# Patient Record
Sex: FEMALE | Race: OTHER | Employment: FULL TIME | URBAN - METROPOLITAN AREA
[De-identification: names, ages, dates, MRNs, and addresses within clinical notes are randomized per-mention and may not be internally consistent; named-entity substitution may affect disease eponyms.]

---

## 2017-12-14 ENCOUNTER — HOSPITAL ENCOUNTER (OUTPATIENT)
Dept: SURGERY | Age: 52
Discharge: HOME | End: 2017-12-14
Attending: ORTHOPAEDIC SURGERY
Payer: OTHER MISCELLANEOUS

## 2017-12-14 VITALS
DIASTOLIC BLOOD PRESSURE: 85 MMHG | OXYGEN SATURATION: 96 % | BODY MASS INDEX: 34.14 KG/M2 | TEMPERATURE: 95.8 F | SYSTOLIC BLOOD PRESSURE: 125 MMHG | WEIGHT: 173.9 LBS | HEIGHT: 60 IN | HEART RATE: 83 BPM | RESPIRATION RATE: 16 BRPM

## 2017-12-14 LAB — POTASSIUM SERPL-SCNC: 3.6 MMOL/L (ref 3.5–5.1)

## 2017-12-14 PROCEDURE — 84132 ASSAY OF SERUM POTASSIUM: CPT | Performed by: ANESTHESIOLOGY

## 2017-12-14 RX ORDER — IBUPROFEN 200 MG
200 TABLET ORAL AS NEEDED
Status: ON HOLD | COMMUNITY
End: 2017-12-21 | Stop reason: SINTOL

## 2017-12-14 RX ORDER — LISINOPRIL AND HYDROCHLOROTHIAZIDE 10; 12.5 MG/1; MG/1
TABLET ORAL DAILY
COMMUNITY

## 2017-12-14 RX ORDER — ATORVASTATIN CALCIUM 20 MG/1
20 TABLET, FILM COATED ORAL
COMMUNITY

## 2017-12-14 RX ORDER — GEMFIBROZIL 600 MG/1
600 TABLET, FILM COATED ORAL 2 TIMES DAILY
COMMUNITY

## 2017-12-14 RX ORDER — ACETAMINOPHEN 325 MG/1
325 TABLET ORAL
COMMUNITY

## 2017-12-17 PROBLEM — S83.521A RUPTURE OF POSTERIOR CRUCIATE LIGAMENT OF RIGHT KNEE: Status: ACTIVE | Noted: 2017-12-17

## 2017-12-17 PROBLEM — S83.241A TEAR OF MEDIAL MENISCUS OF RIGHT KNEE: Status: ACTIVE | Noted: 2017-12-17

## 2017-12-17 PROBLEM — S83.281A TEAR OF LATERAL MENISCUS OF RIGHT KNEE: Status: ACTIVE | Noted: 2017-12-17

## 2017-12-17 PROBLEM — M22.41 CHONDROMALACIA OF RIGHT PATELLA: Status: ACTIVE | Noted: 2017-12-17

## 2017-12-17 PROBLEM — M94.261 CHONDROMALACIA OF RIGHT KNEE: Status: ACTIVE | Noted: 2017-12-17

## 2017-12-17 NOTE — BRIEF OP NOTE
BRIEF OPERATIVE NOTE    Date of Procedure: 12/21/2017     Preoperative Diagnosis:  PCL TEAR RIGHT KNEE [S83.521A]      MEDIAL MENISCAL TEAR RIGHT KNEE  [S83.241A]      LATERAL MENISCAL TEAR RIGHT KNEE  [S83.281A]      PATELLOFEMORAL CHONDROMALACIA RIGHT KNEE  [M22.41]      CHONDROMALACIA RIGHT KNEE  [F40.431]    Postoperative Diagnosis:  SAME      Procedure(s): ARTHROSCOPY RIGHT KNEE ARTHROSCOPIC POSTERIOR CRUCIATE LIGAMENT RECONSTRUCTION UTILIZING ACHILLES TENDON ALLOGRAFT, PARTIAL MEDIAL AND LATERAL MENISECTOMIES    Surgeon(s) and Role:     * Sam Gaspar MD - Primary           Anesthesia: General WITH FEMORAL/SCIATIC NERVE BLOCK    Complications: NONE    Implants:     Implant Name Type Inv.  Item Serial No.  Lot No. LRB No. Used Action   TENDON ACHILLES TEND W CALC -- 19.5CM [facility] - [id number]  TENDON ACHILLES TEND W CALC -- 19.5CM [facility] [id number] [employer] [id number] Right 1 Implanted   WASHER LIG POST SCREW --  - [id number]   WASHER LIG POST SCREW --  [id number] [employer] [id number] Right 1 Implanted        TOURNIQUET:  111 Rusty Cho MD

## 2017-12-17 NOTE — H&P
[facility] HISTORY AND PHYSICAL    Subjective:     Patient is a 46 y.o. FEMALE WITH RIGHT KNEE PAIN. SEE OFFICE NOTE. Patient Active Problem List    Diagnosis Date Noted    Rupture of posterior cruciate ligament of right knee 2017    Tear of medial meniscus of right knee 2017    Tear of lateral meniscus of right knee 2017    Chondromalacia of right patella 2017    Chondromalacia of right knee 2017     Past Medical History:   Diagnosis Date    Adverse effect of anesthesia     pt reports she woke during 2 surgeries with general anesthesia    Former smoker     Hypercholesteremia     Hypertension       Past Surgical History:   Procedure Laterality Date    HX APPENDECTOMY      HX  SECTION      HX OOPHORECTOMY      HX OTHER SURGICAL      vericose vein removal left      Prior to Admission medications    Medication Sig Start Date End Date Taking? Authorizing Provider   atorvastatin (LIPITOR) 20 mg tablet Take 20 mg by mouth nightly. Historical Provider   lisinopril-hydroCHLOROthiazide (PRINZIDE, ZESTORETIC) 10-12.5 mg per tablet Take  by mouth daily. Indications: hypertension    Historical Provider   gemfibrozil (LOPID) 600 mg tablet Take 600 mg by mouth two (2) times a day. Historical Provider   Omega-3 Fatty Acids 60- mg cpDR Take  by mouth daily. Historical Provider   ibuprofen (ADVIL) 200 mg tablet Take 200 mg by mouth as needed for Pain. Stop 5 days prior to surgery per anesthesia guidelines. Historical Provider   acetaminophen (TYLENOL) 325 mg tablet Take 325 mg by mouth every four (4) hours as needed for Pain. Historical Provider     No Known Allergies   Social History   Substance Use Topics    Smoking status: Former Smoker    Smokeless tobacco: Former User     Quit date: 2017      Comment: quit     Alcohol use No      No family history on file.    Review of Systems  A comprehensive review of systems was negative except for that written in the HPI. Objective:     No data found. There were no vitals taken for this visit. General:  Alert, cooperative, no distress, appears stated age. Head:  Normocephalic, without obvious abnormality, atraumatic. Back:   Symmetric, no curvature. ROM normal. No CVA tenderness. Lungs:   Clear to auscultation bilaterally. Chest wall:  No tenderness or deformity. Heart:  Regular rate and rhythm, S1, S2 normal, no murmur, click, rub or gallop. Extremities: Extremities normal, atraumatic, no cyanosis or edema. Pulses: 2+ and symmetric all extremities. Skin: Skin color, texture, turgor normal. No rashes or lesions. Lymph nodes: Cervical, supraclavicular, and axillary nodes normal.   Neurologic: CNII-XII intact. Normal strength, sensation and reflexes throughout. Assessment:   Principal Problem:    Rupture of posterior cruciate ligament of right knee (12/17/2017)    Active Problems:    Tear of medial meniscus of right knee (12/17/2017)      Tear of lateral meniscus of right knee (12/17/2017)      Chondromalacia of right patella (12/17/2017)      Chondromalacia of right knee (12/17/2017)        Plan:   The various methods of treatment have been discussed with the patient and family. PATIENT HAS EXHAUSTED NON-OPERATIVE MODALITIES. After consideration of risks, benefits and other options for treatment, the patient has consented to surgical intervention. SEE OFFICE NOTE.     Milton Crandall MD

## 2017-12-20 ENCOUNTER — ANESTHESIA EVENT (OUTPATIENT)
Dept: SURGERY | Age: 52
End: 2017-12-20
Payer: OTHER MISCELLANEOUS

## 2017-12-21 ENCOUNTER — HOSPITAL ENCOUNTER (OUTPATIENT)
Age: 52
Setting detail: OBSERVATION
Discharge: HOME | End: 2017-12-23
Attending: ORTHOPAEDIC SURGERY | Admitting: ORTHOPAEDIC SURGERY
Payer: OTHER MISCELLANEOUS

## 2017-12-21 ENCOUNTER — APPOINTMENT (OUTPATIENT)
Dept: GENERAL RADIOLOGY | Age: 52
End: 2017-12-21
Attending: ORTHOPAEDIC SURGERY
Payer: OTHER MISCELLANEOUS

## 2017-12-21 ENCOUNTER — ANESTHESIA (OUTPATIENT)
Dept: SURGERY | Age: 52
End: 2017-12-21
Payer: OTHER MISCELLANEOUS

## 2017-12-21 PROBLEM — I10 HYPERTENSION: Status: ACTIVE | Noted: 2017-12-21

## 2017-12-21 PROBLEM — S83.521A SPRAIN OF POSTERIOR CRUCIATE LIGAMENT OF RIGHT KNEE: Status: ACTIVE | Noted: 2017-12-21

## 2017-12-21 PROBLEM — E78.5 HYPERLIPIDEMIA: Status: ACTIVE | Noted: 2017-12-21

## 2017-12-21 PROBLEM — E11.9 DIABETES MELLITUS TYPE 2, DIET-CONTROLLED (HCC): Status: ACTIVE | Noted: 2017-12-21

## 2017-12-21 LAB
EST. AVERAGE GLUCOSE BLD GHB EST-MCNC: 171 MG/DL
GLUCOSE BLD STRIP.AUTO-MCNC: 131 MG/DL (ref 65–100)
HBA1C MFR BLD: 7.6 % (ref 4.8–6)
HCG UR QL: NEGATIVE

## 2017-12-21 PROCEDURE — 76010000172 HC OR TIME 2.5 TO 3 HR INTENSV-TIER 1: Performed by: ORTHOPAEDIC SURGERY

## 2017-12-21 PROCEDURE — 76060000036 HC ANESTHESIA 2.5 TO 3 HR: Performed by: ORTHOPAEDIC SURGERY

## 2017-12-21 PROCEDURE — 74011250636 HC RX REV CODE- 250/636: Performed by: ANESTHESIOLOGY

## 2017-12-21 PROCEDURE — 77030002916 HC SUT ETHLN J&J -A: Performed by: ORTHOPAEDIC SURGERY

## 2017-12-21 PROCEDURE — 73560 X-RAY EXAM OF KNEE 1 OR 2: CPT

## 2017-12-21 PROCEDURE — 77030020782 HC GWN BAIR PAWS FLX 3M -B: Performed by: ANESTHESIOLOGY

## 2017-12-21 PROCEDURE — 99218 HC RM OBSERVATION: CPT

## 2017-12-21 PROCEDURE — 77030016993: Performed by: ORTHOPAEDIC SURGERY

## 2017-12-21 PROCEDURE — 77030002913 HC SUT ETHBND J&J -B: Performed by: ORTHOPAEDIC SURGERY

## 2017-12-21 PROCEDURE — 74011000250 HC RX REV CODE- 250

## 2017-12-21 PROCEDURE — 77030011283 HC ELECTRD NDL COVD -A: Performed by: ORTHOPAEDIC SURGERY

## 2017-12-21 PROCEDURE — 77030018836 HC SOL IRR NACL ICUM -A: Performed by: ORTHOPAEDIC SURGERY

## 2017-12-21 PROCEDURE — 77030014726 HC WSHR SUT W/FX MDCS -D: Performed by: ORTHOPAEDIC SURGERY

## 2017-12-21 PROCEDURE — 77030006891 HC BLD SHV RESECT STRY -B: Performed by: ORTHOPAEDIC SURGERY

## 2017-12-21 PROCEDURE — 76210000016 HC OR PH I REC 1 TO 1.5 HR: Performed by: ORTHOPAEDIC SURGERY

## 2017-12-21 PROCEDURE — 74011250636 HC RX REV CODE- 250/636

## 2017-12-21 PROCEDURE — 77030004453 HC BUR SHV STRY -B: Performed by: ORTHOPAEDIC SURGERY

## 2017-12-21 PROCEDURE — 74011000250 HC RX REV CODE- 250: Performed by: ANESTHESIOLOGY

## 2017-12-21 PROCEDURE — 77030020143 HC AIRWY LARYN INTUB CGAS -A: Performed by: ANESTHESIOLOGY

## 2017-12-21 PROCEDURE — 74011250637 HC RX REV CODE- 250/637: Performed by: ANESTHESIOLOGY

## 2017-12-21 PROCEDURE — 76942 ECHO GUIDE FOR BIOPSY: CPT | Performed by: ORTHOPAEDIC SURGERY

## 2017-12-21 PROCEDURE — 77030006788 HC BLD SAW OSC STRY -B: Performed by: ORTHOPAEDIC SURGERY

## 2017-12-21 PROCEDURE — 77030018986 HC SUT ETHBND4 J&J -B: Performed by: ORTHOPAEDIC SURGERY

## 2017-12-21 PROCEDURE — L1830 KO IMMOB CANVAS LONG PRE OTS: HCPCS | Performed by: ORTHOPAEDIC SURGERY

## 2017-12-21 PROCEDURE — 76010010054 HC POST OP PAIN BLOCK: Performed by: ORTHOPAEDIC SURGERY

## 2017-12-21 PROCEDURE — C1762 CONN TISS, HUMAN(INC FASCIA): HCPCS | Performed by: ORTHOPAEDIC SURGERY

## 2017-12-21 PROCEDURE — 77030003666 HC NDL SPINAL BD -A: Performed by: ORTHOPAEDIC SURGERY

## 2017-12-21 PROCEDURE — 77030020753 HC CUF TRNQT 1BLA STRY -B: Performed by: ORTHOPAEDIC SURGERY

## 2017-12-21 PROCEDURE — 81025 URINE PREGNANCY TEST: CPT

## 2017-12-21 PROCEDURE — 74011250636 HC RX REV CODE- 250/636: Performed by: ORTHOPAEDIC SURGERY

## 2017-12-21 PROCEDURE — 77030006590 HC BLD ARTHSC GRFT J&J -C: Performed by: ORTHOPAEDIC SURGERY

## 2017-12-21 PROCEDURE — 77030031139 HC SUT VCRL2 J&J -A: Performed by: ORTHOPAEDIC SURGERY

## 2017-12-21 PROCEDURE — 77030002986 HC SUT PROL J&J -A: Performed by: ORTHOPAEDIC SURGERY

## 2017-12-21 PROCEDURE — 77030008574 HC TBNG SUC IRR STRY -B: Performed by: ORTHOPAEDIC SURGERY

## 2017-12-21 PROCEDURE — 77030003602 HC NDL NRV BLK BBMI -B: Performed by: ANESTHESIOLOGY

## 2017-12-21 PROCEDURE — 83036 HEMOGLOBIN GLYCOSYLATED A1C: CPT | Performed by: ORTHOPAEDIC SURGERY

## 2017-12-21 PROCEDURE — 77030002937 HC SUT MERS J&J -B: Performed by: ORTHOPAEDIC SURGERY

## 2017-12-21 PROCEDURE — 77030020180 HC ACCS ACL/PCL RAPPC S&N -C: Performed by: ORTHOPAEDIC SURGERY

## 2017-12-21 PROCEDURE — 82962 GLUCOSE BLOOD TEST: CPT

## 2017-12-21 DEVICE — GRAFT HUM TISS W10-20MMXL19.5-20CM ACHILLES TEND FRZN: Type: IMPLANTABLE DEVICE | Site: KNEE | Status: FUNCTIONAL

## 2017-12-21 DEVICE — WASHER ORTH L5.5MM TI POST SCR: Type: IMPLANTABLE DEVICE | Site: KNEE | Status: FUNCTIONAL

## 2017-12-21 RX ORDER — DEXAMETHASONE SODIUM PHOSPHATE 4 MG/ML
INJECTION, SOLUTION INTRA-ARTICULAR; INTRALESIONAL; INTRAMUSCULAR; INTRAVENOUS; SOFT TISSUE AS NEEDED
Status: DISCONTINUED | OUTPATIENT
Start: 2017-12-21 | End: 2017-12-21 | Stop reason: HOSPADM

## 2017-12-21 RX ORDER — HYDROCODONE BITARTRATE AND ACETAMINOPHEN 5; 325 MG/1; MG/1
2 TABLET ORAL AS NEEDED
Status: DISCONTINUED | OUTPATIENT
Start: 2017-12-21 | End: 2017-12-21 | Stop reason: HOSPADM

## 2017-12-21 RX ORDER — SODIUM CHLORIDE, SODIUM LACTATE, POTASSIUM CHLORIDE, CALCIUM CHLORIDE 600; 310; 30; 20 MG/100ML; MG/100ML; MG/100ML; MG/100ML
75 INJECTION, SOLUTION INTRAVENOUS CONTINUOUS
Status: DISCONTINUED | OUTPATIENT
Start: 2017-12-21 | End: 2017-12-21 | Stop reason: HOSPADM

## 2017-12-21 RX ORDER — HYDROMORPHONE HYDROCHLORIDE 2 MG/1
2 TABLET ORAL
Status: DISCONTINUED | OUTPATIENT
Start: 2017-12-21 | End: 2017-12-23 | Stop reason: HOSPADM

## 2017-12-21 RX ORDER — FAMOTIDINE 20 MG/1
20 TABLET, FILM COATED ORAL ONCE
Status: COMPLETED | OUTPATIENT
Start: 2017-12-21 | End: 2017-12-21

## 2017-12-21 RX ORDER — FENTANYL CITRATE 50 UG/ML
100 INJECTION, SOLUTION INTRAMUSCULAR; INTRAVENOUS ONCE
Status: DISCONTINUED | OUTPATIENT
Start: 2017-12-21 | End: 2017-12-21 | Stop reason: SDUPTHER

## 2017-12-21 RX ORDER — LISINOPRIL AND HYDROCHLOROTHIAZIDE 10; 12.5 MG/1; MG/1
1 TABLET ORAL DAILY
Status: DISCONTINUED | OUTPATIENT
Start: 2017-12-22 | End: 2017-12-23 | Stop reason: HOSPADM

## 2017-12-21 RX ORDER — MIDAZOLAM HYDROCHLORIDE 1 MG/ML
5 INJECTION, SOLUTION INTRAMUSCULAR; INTRAVENOUS ONCE
Status: DISCONTINUED | OUTPATIENT
Start: 2017-12-21 | End: 2017-12-21 | Stop reason: HOSPADM

## 2017-12-21 RX ORDER — LIDOCAINE HYDROCHLORIDE 10 MG/ML
0.1 INJECTION INFILTRATION; PERINEURAL AS NEEDED
Status: DISCONTINUED | OUTPATIENT
Start: 2017-12-21 | End: 2017-12-21 | Stop reason: HOSPADM

## 2017-12-21 RX ORDER — PROPOFOL 10 MG/ML
INJECTION, EMULSION INTRAVENOUS AS NEEDED
Status: DISCONTINUED | OUTPATIENT
Start: 2017-12-21 | End: 2017-12-21 | Stop reason: HOSPADM

## 2017-12-21 RX ORDER — MIDAZOLAM HYDROCHLORIDE 1 MG/ML
2 INJECTION, SOLUTION INTRAMUSCULAR; INTRAVENOUS
Status: DISCONTINUED | OUTPATIENT
Start: 2017-12-21 | End: 2017-12-21 | Stop reason: HOSPADM

## 2017-12-21 RX ORDER — SODIUM CHLORIDE 0.9 % (FLUSH) 0.9 %
5-10 SYRINGE (ML) INJECTION EVERY 8 HOURS
Status: DISCONTINUED | OUTPATIENT
Start: 2017-12-21 | End: 2017-12-21 | Stop reason: HOSPADM

## 2017-12-21 RX ORDER — ACETAMINOPHEN 325 MG/1
325 TABLET ORAL
Status: DISCONTINUED | OUTPATIENT
Start: 2017-12-21 | End: 2017-12-23 | Stop reason: HOSPADM

## 2017-12-21 RX ORDER — TEMAZEPAM 15 MG/1
15 CAPSULE ORAL
Status: DISCONTINUED | OUTPATIENT
Start: 2017-12-21 | End: 2017-12-23 | Stop reason: HOSPADM

## 2017-12-21 RX ORDER — HYDROMORPHONE HYDROCHLORIDE 2 MG/ML
0.5 INJECTION, SOLUTION INTRAMUSCULAR; INTRAVENOUS; SUBCUTANEOUS
Status: DISCONTINUED | OUTPATIENT
Start: 2017-12-21 | End: 2017-12-21 | Stop reason: HOSPADM

## 2017-12-21 RX ORDER — ACETAMINOPHEN 10 MG/ML
INJECTION, SOLUTION INTRAVENOUS AS NEEDED
Status: DISCONTINUED | OUTPATIENT
Start: 2017-12-21 | End: 2017-12-21 | Stop reason: HOSPADM

## 2017-12-21 RX ORDER — FENTANYL CITRATE 50 UG/ML
100 INJECTION, SOLUTION INTRAMUSCULAR; INTRAVENOUS ONCE
Status: COMPLETED | OUTPATIENT
Start: 2017-12-21 | End: 2017-12-21

## 2017-12-21 RX ORDER — LIDOCAINE HYDROCHLORIDE 10 MG/ML
0.1 INJECTION INFILTRATION; PERINEURAL AS NEEDED
Status: DISCONTINUED | OUTPATIENT
Start: 2017-12-21 | End: 2017-12-21 | Stop reason: SDUPTHER

## 2017-12-21 RX ORDER — CEFAZOLIN SODIUM/WATER 2 G/20 ML
2 SYRINGE (ML) INTRAVENOUS ONCE
Status: COMPLETED | OUTPATIENT
Start: 2017-12-21 | End: 2017-12-21

## 2017-12-21 RX ORDER — PROMETHAZINE HYDROCHLORIDE 25 MG/1
25 TABLET ORAL
Status: DISCONTINUED | OUTPATIENT
Start: 2017-12-21 | End: 2017-12-23 | Stop reason: HOSPADM

## 2017-12-21 RX ORDER — EPHEDRINE SULFATE 50 MG/ML
INJECTION, SOLUTION INTRAVENOUS AS NEEDED
Status: DISCONTINUED | OUTPATIENT
Start: 2017-12-21 | End: 2017-12-21 | Stop reason: HOSPADM

## 2017-12-21 RX ORDER — FACIAL-BODY WIPES
10 EACH TOPICAL DAILY PRN
Status: DISCONTINUED | OUTPATIENT
Start: 2017-12-21 | End: 2017-12-23 | Stop reason: HOSPADM

## 2017-12-21 RX ORDER — ONDANSETRON 2 MG/ML
INJECTION INTRAMUSCULAR; INTRAVENOUS AS NEEDED
Status: DISCONTINUED | OUTPATIENT
Start: 2017-12-21 | End: 2017-12-21 | Stop reason: HOSPADM

## 2017-12-21 RX ORDER — HYDROMORPHONE HYDROCHLORIDE 2 MG/1
4 TABLET ORAL
Status: DISCONTINUED | OUTPATIENT
Start: 2017-12-21 | End: 2017-12-23 | Stop reason: HOSPADM

## 2017-12-21 RX ORDER — SODIUM CHLORIDE 0.9 % (FLUSH) 0.9 %
5-10 SYRINGE (ML) INJECTION AS NEEDED
Status: DISCONTINUED | OUTPATIENT
Start: 2017-12-21 | End: 2017-12-21 | Stop reason: HOSPADM

## 2017-12-21 RX ORDER — DOCUSATE SODIUM 100 MG/1
100 CAPSULE, LIQUID FILLED ORAL 2 TIMES DAILY
Status: DISCONTINUED | OUTPATIENT
Start: 2017-12-22 | End: 2017-12-23 | Stop reason: HOSPADM

## 2017-12-21 RX ORDER — GEMFIBROZIL 600 MG/1
600 TABLET, FILM COATED ORAL 2 TIMES DAILY
Status: DISCONTINUED | OUTPATIENT
Start: 2017-12-22 | End: 2017-12-23 | Stop reason: HOSPADM

## 2017-12-21 RX ORDER — ATORVASTATIN CALCIUM 10 MG/1
20 TABLET, FILM COATED ORAL
Status: DISCONTINUED | OUTPATIENT
Start: 2017-12-21 | End: 2017-12-23 | Stop reason: HOSPADM

## 2017-12-21 RX ORDER — MIDAZOLAM HYDROCHLORIDE 1 MG/ML
2 INJECTION, SOLUTION INTRAMUSCULAR; INTRAVENOUS ONCE
Status: COMPLETED | OUTPATIENT
Start: 2017-12-21 | End: 2017-12-21

## 2017-12-21 RX ORDER — CELECOXIB 200 MG/1
200 CAPSULE ORAL
Status: DISCONTINUED | OUTPATIENT
Start: 2017-12-21 | End: 2017-12-21 | Stop reason: HOSPADM

## 2017-12-21 RX ORDER — HYDROMORPHONE HYDROCHLORIDE 2 MG/ML
1 INJECTION, SOLUTION INTRAMUSCULAR; INTRAVENOUS; SUBCUTANEOUS
Status: DISCONTINUED | OUTPATIENT
Start: 2017-12-21 | End: 2017-12-23 | Stop reason: HOSPADM

## 2017-12-21 RX ORDER — SODIUM CHLORIDE 0.9 % (FLUSH) 0.9 %
5-10 SYRINGE (ML) INJECTION AS NEEDED
Status: DISCONTINUED | OUTPATIENT
Start: 2017-12-21 | End: 2017-12-23 | Stop reason: HOSPADM

## 2017-12-21 RX ORDER — FENTANYL CITRATE 50 UG/ML
INJECTION, SOLUTION INTRAMUSCULAR; INTRAVENOUS AS NEEDED
Status: DISCONTINUED | OUTPATIENT
Start: 2017-12-21 | End: 2017-12-21 | Stop reason: HOSPADM

## 2017-12-21 RX ORDER — SODIUM CHLORIDE 0.9 % (FLUSH) 0.9 %
5-10 SYRINGE (ML) INJECTION EVERY 8 HOURS
Status: DISCONTINUED | OUTPATIENT
Start: 2017-12-21 | End: 2017-12-23 | Stop reason: HOSPADM

## 2017-12-21 RX ORDER — CEFAZOLIN SODIUM/WATER 2 G/20 ML
2 SYRINGE (ML) INTRAVENOUS ONCE
Status: DISCONTINUED | OUTPATIENT
Start: 2017-12-22 | End: 2017-12-21

## 2017-12-21 RX ORDER — LIDOCAINE HYDROCHLORIDE 20 MG/ML
INJECTION, SOLUTION EPIDURAL; INFILTRATION; INTRACAUDAL; PERINEURAL AS NEEDED
Status: DISCONTINUED | OUTPATIENT
Start: 2017-12-21 | End: 2017-12-21 | Stop reason: HOSPADM

## 2017-12-21 RX ORDER — SODIUM CHLORIDE 9 MG/ML
75 INJECTION, SOLUTION INTRAVENOUS CONTINUOUS
Status: DISPENSED | OUTPATIENT
Start: 2017-12-21 | End: 2017-12-22

## 2017-12-21 RX ORDER — OXYCODONE HYDROCHLORIDE 5 MG/1
5 TABLET ORAL
Status: DISCONTINUED | OUTPATIENT
Start: 2017-12-21 | End: 2017-12-21 | Stop reason: HOSPADM

## 2017-12-21 RX ADMIN — EPHEDRINE SULFATE 10 MG: 50 INJECTION, SOLUTION INTRAVENOUS at 17:06

## 2017-12-21 RX ADMIN — FAMOTIDINE 20 MG: 20 TABLET, FILM COATED ORAL at 12:17

## 2017-12-21 RX ADMIN — PROPOFOL 200 MG: 10 INJECTION, EMULSION INTRAVENOUS at 16:55

## 2017-12-21 RX ADMIN — ACETAMINOPHEN 1000 MG: 10 INJECTION, SOLUTION INTRAVENOUS at 19:14

## 2017-12-21 RX ADMIN — DEXAMETHASONE SODIUM PHOSPHATE 8 MG: 4 INJECTION, SOLUTION INTRA-ARTICULAR; INTRALESIONAL; INTRAMUSCULAR; INTRAVENOUS; SOFT TISSUE at 17:07

## 2017-12-21 RX ADMIN — Medication 2 G: at 16:51

## 2017-12-21 RX ADMIN — SODIUM CHLORIDE, SODIUM LACTATE, POTASSIUM CHLORIDE, AND CALCIUM CHLORIDE: 600; 310; 30; 20 INJECTION, SOLUTION INTRAVENOUS at 16:51

## 2017-12-21 RX ADMIN — MIDAZOLAM HYDROCHLORIDE 2 MG: 1 INJECTION, SOLUTION INTRAMUSCULAR; INTRAVENOUS at 14:40

## 2017-12-21 RX ADMIN — HYDROMORPHONE HYDROCHLORIDE 1 MG: 2 INJECTION, SOLUTION INTRAMUSCULAR; INTRAVENOUS; SUBCUTANEOUS at 21:20

## 2017-12-21 RX ADMIN — LIDOCAINE HYDROCHLORIDE 60 MG: 20 INJECTION, SOLUTION EPIDURAL; INFILTRATION; INTRACAUDAL; PERINEURAL at 16:55

## 2017-12-21 RX ADMIN — LIDOCAINE HYDROCHLORIDE 0.1 ML: 10 INJECTION, SOLUTION INFILTRATION; PERINEURAL at 12:20

## 2017-12-21 RX ADMIN — SODIUM CHLORIDE, SODIUM LACTATE, POTASSIUM CHLORIDE, AND CALCIUM CHLORIDE 75 ML/HR: 600; 310; 30; 20 INJECTION, SOLUTION INTRAVENOUS at 12:15

## 2017-12-21 RX ADMIN — FENTANYL CITRATE 25 MCG: 50 INJECTION, SOLUTION INTRAMUSCULAR; INTRAVENOUS at 19:07

## 2017-12-21 RX ADMIN — SODIUM CHLORIDE, SODIUM LACTATE, POTASSIUM CHLORIDE, AND CALCIUM CHLORIDE: 600; 310; 30; 20 INJECTION, SOLUTION INTRAVENOUS at 17:16

## 2017-12-21 RX ADMIN — ONDANSETRON 4 MG: 2 INJECTION INTRAMUSCULAR; INTRAVENOUS at 19:14

## 2017-12-21 RX ADMIN — SODIUM CHLORIDE, SODIUM LACTATE, POTASSIUM CHLORIDE, AND CALCIUM CHLORIDE 75 ML/HR: 600; 310; 30; 20 INJECTION, SOLUTION INTRAVENOUS at 14:06

## 2017-12-21 RX ADMIN — FENTANYL CITRATE 100 MCG: 50 INJECTION INTRAMUSCULAR; INTRAVENOUS at 14:40

## 2017-12-21 RX ADMIN — PROPOFOL 50 MG: 10 INJECTION, EMULSION INTRAVENOUS at 18:36

## 2017-12-21 NOTE — ANESTHESIA PROCEDURE NOTES
Peripheral Block    Start time: 12/21/2017 2:41 PM  End time: 12/21/2017 2:50 PM  Performed by: Sonya Ochoa  Authorized by: Sonya Ochoa       Pre-procedure:    Indications: at surgeon's request and post-op pain management    Preanesthetic Checklist: patient identified, risks and benefits discussed, site marked, timeout performed, anesthesia consent given and patient being monitored    Timeout Time: 14:40          Block Type:   Block Type:  Sciatic single shot  Laterality:  Right  Monitoring:  Standard ASA monitoring, continuous pulse ox, frequent vital sign checks, heart rate, oxygen and responsive to questions  Injection Technique:  Single shot  Procedures: ultrasound guided and nerve stimulator    Patient Position: supine  Prep: chlorhexidine    Location:  Upper thigh  Needle Type:  Stimuplex  Needle Gauge:  22 G  Needle Localization:  Anatomical landmarks, nerve stimulator and ultrasound guidance  Motor Response: minimal motor response >0.4 mA    Medication Injected:  0.5%  ropivacaine  Adds:  Epi 1:200K  Volume (mL):  30  Add'l Medication Injected:  0.375%    Assessment:    Injection Assessment:

## 2017-12-21 NOTE — ANESTHESIA PREPROCEDURE EVALUATION
Anesthetic History   No history of anesthetic complications            Review of Systems / Medical History  Patient summary reviewed, nursing notes reviewed and pertinent labs reviewed    Pulmonary  Within defined limits                 Neuro/Psych   Within defined limits           Cardiovascular    Hypertension: well controlled          Hyperlipidemia    Exercise tolerance: >4 METS     GI/Hepatic/Renal     GERD: well controlled           Endo/Other        Obesity     Other Findings              Physical Exam    Airway  Mallampati: II  TM Distance: 4 - 6 cm  Neck ROM: normal range of motion   Mouth opening: Normal     Cardiovascular  Regular rate and rhythm,  S1 and S2 normal,  no murmur, click, rub, or gallop  Rhythm: regular  Rate: normal         Dental    Dentition: Upper partial plate     Pulmonary  Breath sounds clear to auscultation               Abdominal         Other Findings            Anesthetic Plan    ASA: 2  Anesthesia type: general      Post-op pain plan if not by surgeon: peripheral nerve block single    Induction: Intravenous  Anesthetic plan and risks discussed with: Patient and Spouse

## 2017-12-21 NOTE — ANESTHESIA PROCEDURE NOTES
Peripheral Block    Start time: 12/21/2017 2:52 PM  End time: 12/21/2017 2:55 PM  Performed by: Thelma Mata  Authorized by: Thelma Mata       Pre-procedure:    Indications: at surgeon's request and post-op pain management    Preanesthetic Checklist: patient identified, risks and benefits discussed, site marked, timeout performed, anesthesia consent given and patient being monitored    Timeout Time: 14:51          Block Type:   Block Type:  Femoral single shot  Laterality:  Right  Monitoring:  Standard ASA monitoring, continuous pulse ox, frequent vital sign checks, heart rate, responsive to questions and oxygen  Injection Technique:  Single shot  Procedures: ultrasound guided and nerve stimulator    Patient Position: supine  Prep: chlorhexidine    Location:  Upper thigh  Needle Type:  Stimuplex  Needle Gauge:  22 G  Needle Localization:  Ultrasound guidance and nerve stimulator  Motor Response: minimal motor response >0.4 mA    Medication Injected:  0.2%  ropivacaine  Adds:  Epi 1:200K  Volume (mL):  20  Add'l Medication Injected:  0.375%    Assessment:  Number of attempts:  1  Injection Assessment:  Incremental injection every 5 mL, local visualized surrounding nerve on ultrasound, negative aspiration for CSF, negative aspiration for blood, no paresthesia, no intravascular symptoms and ultrasound image on chart  Patient tolerance:  Patient tolerated the procedure well with no immediate complications

## 2017-12-21 NOTE — IP AVS SNAPSHOT
Julia Haney 
 
 
 [address] [place] [address] 
[phone] Patient: Finesse Spann MRN: [id number] :[id number] About your hospitalization You were admitted on:  2017 You last received care in the:  Jayy Swanson 1 You were discharged on:  2017 Why you were hospitalized Your primary diagnosis was:  Rupture Of Posterior Cruciate Ligament Of Right Knee Your diagnoses also included:  Tear Of Medial Meniscus Of Right Knee, Tear Of Lateral Meniscus Of Right Knee, Chondromalacia Of Right Patella, Chondromalacia Of Right Knee, Sprain Of Posterior Cruciate Ligament Of Right Knee, Diabetes Mellitus Type 2, Diet-Controlled (Hcc), Hypertension, Hyperlipidemia Things You Need To Do (next 8 weeks) Follow up with Janak Montoya MD  
As needed Phone:  [phone] Where:  [address] [facility] [address] Follow up with Efrem Velázquez MD  
As scheduled by office Phone:  [phone] Where:  [address] [facility] , [address], [facility] [address] Discharge Orders None A check cari indicates which time of day the medication should be taken. My Medications ASK your physician about these medications Instructions Each Dose to Equal  
 Morning Noon Evening Bedtime ADVIL 200 mg tablet Generic drug:  ibuprofen Your next dose is: Today Take 200 mg by mouth as needed for Pain. Stop 5 days prior to surgery per anesthesia guidelines. 200 mg  
    
   
   
  
   
  
 atorvastatin 20 mg tablet Commonly known as:  LIPITOR Your next dose is: Today Take 20 mg by mouth nightly. 20 mg  
    
   
   
   
  
  
 gemfibrozil 600 mg tablet Commonly known as:  LOPID Your next dose is: Today Take 600 mg by mouth two (2) times a day.   
 600 mg  
    
   
   
  
   
  
 lisinopril-hydroCHLOROthiazide 10-12.5 mg per tablet Commonly known as:  Linda Lush Your next dose is:  Tomorrow Take  by mouth daily. Indications: hypertension Omega-3 Fatty Acids 60- mg Cpdr  
Your next dose is:  Tomorrow Take  by mouth daily. TYLENOL 325 mg tablet Generic drug:  acetaminophen Your next dose is: Today Take 325 mg by mouth every four (4) hours as needed for Pain. 325 mg Discharge Instructions DISCHARGE SUMMARY from Nurse The following personal items collected during your admission are returned to you:  
Dental Appliance: Dental Appliances: Lowers; Other (comment) (bridge ) Vision: Visual Aid: Glasses Hearing Aid:   na 
Jewelry: Jewelry: None Clothing: Clothing: At bedside Other Valuables: Other Valuables: None Valuables sent to safe:   na 
 
 
 
 
PATIENT INSTRUCTIONS: 
 
New Medications: 
 
Dilaudid 2 mg tabs Take 1-2 tabs every 4-6 hrs as needed for pain. Toradol 10 mg tabs Take 1 tab every 6 hrs x 5 days. Phenergan 25 mg tabs Take 1 tab every 8 hrs as needed for nausea. After general anesthesia or intravenous sedation, for 24 hours or while taking prescription Narcotics: · Limit your activities · Do not drive and operate hazardous machinery · Do not make important personal or business decisions · Do  not drink alcoholic beverages · If you have not urinated within 8 hours after discharge, please contact your surgeon on call. Report the following to your surgeon: 
· Excessive pain, swelling, redness or odor of or around the surgical area · Temperature over 101 · Nausea and vomiting lasting longer than 4 hours or if unable to take medications · Any signs of decreased circulation or nerve impairment to extremity: change in color, persistent  numbness, tingling, coldness or increase pain · Any questions, call office @ [phone]. What to do at Home: 
Recommended activity: activity as tolerated, as instructed per Dr. Andre Rdz. Continue with exercises taught by Physical Therapy. Resume pre hospital diet. Use walker to ambulate. Partial weight  bearing to right knee. Wear knee immobilizer as instructed. Use ice and elevate leg to decrease pain and swelling. If you experience any of the following symptoms temp>101, pain unrelieved by meds, or persistent nausea or vomitting, please follow up with Dr. Andre Rdz @ [phone]. *  Please give a list of your current medications to your Primary Care Provider. *  Please update this list whenever your medications are discontinued, doses are 
    changed, or new medications (including over-the-counter products) are added. *  Please carry medication information at all times in case of emergency situations. Reconstrucción del ligamento farnaz anterior: Graeme Kumar en el hogar - [ Anterior Cruciate Ligament Reconstruction: What to Expect at [facility] ] Mueller recuperación La cirugía del ligamento farnaz anterior (LCA) reemplaza el ligamento dañado con yovana nuevo, llamado injerto. En la [employer], el injerto es un tendón que se extrae de mueller propia rodilla o los isquiotibiales (corva). En algunos casos, el injerto proviene de un donante. Se sentirá fatigado daniel varios días. Tendrá la rodilla hinchada y podría tener entumecimiento alrededor del bianca (incisión) en la rodilla. El tobillo y la espinilla podrían tener moretones o estar hinchados. Puede colocarse hielo en la bulmaro para reducir la hinchazón. La mayoría de estas complicaciones desaparecerán en pocos días. En poco tiempo debería comenzar a notar mejoría en la rodilla. Jamal vez pueda volver a hacer la mayoría de beth actividades habituales en pocas semanas. Destiny pasarán varios meses hasta que recupere el uso completo de la rodilla.  Podría tardar Keena Cease 6 meses y un año antes de que la rodilla esté lista para hacer trabajo físico intenso o ciertos deportes. La cirugía puede ayudar. Destiny aun después de la \[facility]\"", es posible que la rodilla no sea dunham savanah palmira lo era antes de la lesión. Deberá hacer ejercicios de rehabilitación para recuperar la fuerza y el movimiento de la articulación. El tiempo que tarde en volver a hacer deportes o ejercicio dependerá de cómo siga el programa de rehabilitación y cómo sane la rodilla. Lucero médico o fisioterapeuta le dará Javan Meckel idea de cuándo podría retomar estas actividades. Namrata Shira de las personas pueden volver a trotar en unos 4 meses y correr o montar en bicicleta al cabo de unos 4 a 6 meses. Es posible que necesite usar un aparato ortopédico en la rodilla para practicar deportes. Esta hoja de [employer] idea general del tiempo que le llevará recuperarse. Sin embargo, cada persona se recupera a un ritmo diferente. Siga los pasos que se mencionan a continuación para recuperarse lo más rápido posible. Cómo puede cuidarse en el hogar? Actividad ? · Descanse cuando se sienta cansado. Dormir lo suficiente le ayudará a recuperarse. Duerma con la rodilla elevada, destiny no flexionada. Ponga miroslava almohada debajo del pie. Mantenga la pierna levantada el mayor tiempo posible daniel los primeros días. ? · Podría usar un aparato ortopédico y [employer] para moverse por la casa y hacer las tareas diarias. No ponga el peso sobre lucero pierna sin las [employer] que lucero médico lo autorice. Tendrá los músculos del muslo débiles, así que tómese lucero tiempo y no corra riesgos. Tendrá que usar las muletas daniel 1 o 2 semanas. ? · No todos los médicos indican el uso de aparatos ortopédicos. Si tiene un aparato ortopédico, quíteselo únicamente para ejercitar la rodilla o para ducharse. Tenga cuidado de no ponerse al aparato ortopédico demasiado apretado. Es probable que necesite usarlo por entre 2 y 10 semanas. ? · No humberto ninguna actividad intensa daniel 12 semanas. [place] incluye no solamente deportes, sino también cosas palmira cortar el césped (zacate), barrer las hojas o quitar la donovan. ? · Puede ducharse entre 24 y 50 horas después de la [place], si lucero médico lo [place]. Cuando se duche, mantenga secos el vendaje y la incisión cubriéndolos con un plástico y sujetando el plástico con cinta adhesiva. Sería conveniente conseguir miroslava banqueta para ducha para sentarse. Si tiene un aparato ortopédico, quíteselo solo si lucero médico lo autoriza. ? · Si lucero médico no quiere que se duche ni que se quite el aparato ortopédico, puede darse un baño de esponja. ? · No tome sherwin de inmersión, nade, utilice miroslava bañera de hidromasaje ni sumerja la pierna en agua daniel 2 semanas o hasta que lucero médico lo autorice. ? · Puede conducir cuando ya no esté utilizando las [employer] ni el aparato ortopédico para la rodilla, cuando no esté tomando ya analgésicos (medicamentos para el dolor) recetados y cuando tenga algo de control sobre lucero rodilla. [address], esto lleva de 1 a 2 semanas. ? · La rapidez con la que puede volver a lucero empleo depende del trabajo que humberto. Si permanece sentado en lucero trabajo, podría volver en 1 o 2 semanas. Destiny si está de pie en lucero trabajo, podría llevarle de 4 a 6 semanas. Si lucero trabajo incluye mucha [facility], podría tardar de 4 a 6 meses. Alimentación ? · Puede continuar con lucero dieta normal. Si tiene [employer], coma alimentos suaves bajos en grasa, palmira arroz sin condimentos, agnieszka a la baldev, pan marialuisa y yogur. Precious abundantes líquidos. ? · Podría notar que no evacua el intestino con regularidad kirk después de la [place]. [place] es común. Trate de evitar el estreñimiento y de no hacer esfuerzos cuando evacua el intestino. Jamal vez desee fish un suplemento de [employer].  Si no ha evacuado el intestino después de un par de días, pregúntele a mueller médico si puede fish un laxante suave. Medicamentos ? · Mueller médico le dirá si puede volver a fish beth medicamentos y cuándo puede volver a hacerlo. También le dará indicaciones sobre cualquier medicamento nuevo que deba fish usted. ? · Si leona medicamentos que previenen la formación de coágulos de [place], palmira warfarina (Coumadin), clopidogrel (Plavix) o aspirina, asegúrese de hablar con mueller médico. Él o betzaida le dirá si debe volver a fish estos medicamentos y en qué momento. Asegúrese de que entiende exactamente lo que el médico quiere que humberto. ? · [employer] analgésicos exactamente según las indicaciones. ¨ Si el médico le recetó un analgésico, tómelo según las indicaciones. ¨ Si no está tomando un analgésico recetado, pregúntele a mueller médico si puede fish yovana de [employer]. ? · Si mueller médico le recetó antibióticos, tómelos según las indicaciones. No deje de tomarlos por el hecho de sentirse mejor. Debe fish todos los antibióticos hasta terminarlos. ? · Si le parece que el analgésico le está produciendo malestar estomacal: [address] comidas (a menos que mueller médico le haya indicado lo contrario). ¨ Pídale al médico un analgésico diferente. Cuidado de la incisión ? · Si tiene un vendaje sobre la incisión, manténgalo limpio y seco. Siga las instrucciones de mueller médico. Mueller médico probablemente querrá que se deje el vendaje hasta que Caralee Crazier a mueller consultorio. Si mueller médico lo permite, podría quitarse el vendaje al cabo de 50 a 67 horas después de la [facility]. ? · Si le lazcano colocado tiras de cinta [employer] incisión, déjeselas puestas miroslava semana o hasta que se caigan por sí solas. Mantenga la bulmaro limpia y seca. Ejercicio ? · Ejercitarse en un programa de rehabilitación es miroslava parte importante de mueller tratamiento. Le ayudará a mejorar la amplitud de movimiento de mueller Sarah [place] y a recuperar mueller fuerza muscular. ? · Es posible que le den miroslava máquina de movimiento pasivo continuo. Esta máquina hará algunos de los ejercicios por usted. La usará daniel aproximadamente 2 semanas. Melodye Harbour y Lyndal [employer] ? · Para reducir la hinchazón y el dolor, colóquese hielo o miroslava compresa fría sobre la rodilla de 10 a 20 minutos cada vez. Repita eliezer proceso cada pocas horas. Póngase un paño belcher entre el hielo y la piel. ? · Eleve la pierna adolorida sobre miroslava almohada cuando se aplique hielo o en cualquier momento que se siente o se acueste, daniel los 3 días después de la [facility] Islands. Trate de mantenerla por encima del nivel del corazón. Kimberly ayudará a reducir la hinchazón. ? · Si lucero médico le ronald medias de compresión, úselas daniel el tiempo que le indique. [place] a [employer] de [place]. La atención de seguimiento es miroslava parte clave de lucero tratamiento y seguridad. Asegúrese de hacer y acudir a todas las citas, y llame a lucero médico si está teniendo problemas. También es miroslava buena idea saber los resultados de los exámenes y mantener miroslava lista de los medicamentos que leona. Cuándo debe pedir ayuda? Llame al 911 en cualquier momento que considere que puede necesitar atención de [place]. Por ejemplo, llame si: 
? · Se desmayó (perdió el conocimiento). ? · Tiene grave dificultad para respirar. ? · Tiene dolor repentino en el pecho y falta de [place], o tose alexa. ?Llame a lucero médico ahora mismo o busque atención médica inmediata si: 
? · Tiene dolor que no mejora después de fish analgésicos (medicamentos para el dolor). ? · Tiene puntos de sutura flojos o se abre la incisión. ? · [address], tales palmira: ¨ Aumento de dolor, hinchazón, temperatura o enrojecimiento. ¨ Vetas mcghee que salen de la incisión. ¨ Pus que sale de la incisión. Earnie Jhonny. ? Vigile atentamente los [facility], y asegúrese de comunicarse con lucero médico si tiene algún problema. Dónde puede encontrar más información en inglés? Estrada Glenn a [email or web address] Harvinder Nithya Y225 en la búsqueda para aprender más acerca de \"Reconstrucción del ligamento farnaz anterior: Mika Peto en el hogar - [ Anterior Cruciate Ligament Reconstruction: What to Expect at Home ]. \" 
Revisado: 21 marzo, 2017 Versión del contenido: 11.4 © 8607-7877 Healthwise, Incorporated. Las instrucciones de cuidado fueron adaptadas bajo licencia por [employer] (which disclaims liability or warranty for this information). Si usted tiene [place]ens afección médica o sobre estas instrucciones, siempre pregunte a lucero profesional de trinh. Healthwise, Incorporated niega toda garantía o responsabilidad por lucero uso de esta información. These are general instructions for a healthy lifestyle: No smoking/ No tobacco products/ Avoid exposure to second hand smoke Surgeon General's Warning:  Quitting smoking now greatly reduces serious risk to your health. Obesity, smoking, and sedentary lifestyle greatly increases your risk for illness A healthy diet, regular physical exercise & weight monitoring are important for maintaining a healthy lifestyle You may be retaining fluid if you have a history of heart failure or if you experience any of the following symptoms:  Weight gain of 3 pounds or more overnight or 5 pounds in a week, increased swelling in our hands or feet or shortness of breath while lying flat in bed. Please call your doctor as soon as you notice any of these symptoms; do not wait until your next office visit. Recognize signs and symptoms of STROKE: 
 
F-face looks uneven A-arms unable to move or move unevenly S-speech slurred or non-existent T-time-call 911 as soon as signs and symptoms begin-DO NOT go Back to bed or wait to see if you get better-TIME IS BRAIN. The discharge information has been reviewed with the patient. The patient verbalized understanding. MyChart Announcement We are excited to announce that we are making your provider's discharge notes available to you in Visualeadhart. You will see these notes when they are completed and signed by the physician that discharged you from your recent hospital stay. If you have any questions or concerns about any information you see in Visualeadhart, please call the Health Information Department where you were seen or reach out to your Primary Care Provider for more information about your plan of care. Introducing [facility] SERVICES! Juan Pablo Benítez introduce portal paciente [facility] . Ahora se puede acceder a partes de lucero expediente médico, enviar por correo electrónico la oficina de lucero médico y solicitar renovaciones de medicamentos en línea. En lucero navegador de Internet , Nicole Loron a [email or web address] Humberto clic en el usuario por Diane Abbasi? Brock mirzaic aquí en la sesión Elease Isauro. Verá la página de registro [place]. Ingrese lucero código de [employer] deonte y palmira aparece a continuación. Usted no tendrá que [employer] código después de ramya completado el proceso de registro . Si usted no se inscribe antes de la fecha de caducidad , debe solicitar un nuevo código. · MyCBrewster Código de acceso : [id number] Expires: 2/28/2018 12:46 AM 
 
Ingresa los últimos cuatro dígitos de lucero Número de Seguro Social ( xxxx ) y fecha de nacimiento ( dd / mm / aaaa ) palmira se indica y humberto clic en Enviar. Kevin será llevado a la siguiente página de registro . Crear un ID MyCmaureen . Esta será lucero ID de inicio de sesión de MyChart y no puede ser [place] , por lo que pensar en miroslava que es Gipson Saver y fácil de recordar . Crear miroslava contraseña MyCmaureen . ted puede cambiar lucero contraseña en cualquier momento . Ingrese lucero Password Reset de preguntas y [employer] .  [place] se puede utilizar en un momento posterior si usted olvida lucero contraseña. Introduzca lucero dirección de correo electrónico . Albertina Moore recibirá miroslava notificación por correo electrónico cuando la nueva información está disponible en MyChart . Gonzalo Pillar clic en Registrarse. Di Mings andrez y descargar porciones de lucero expediente médico. 
Ayad clic en el enlace de descarga del menú Resumen para descargar miroslava copia portátil de lucero información médica . Si tiene [employer] , por favor visite la sección de preguntas frecuentes del sitio web MyChart . Recuerde, MyChart NO es que se utilizará para las necesidades urgentes. Para emergencias médicas , llame al 911 . Ahora disponible en lucero iPhone y Android ! Providers Seen During Your Hospitalization Provider Specialty Primary office phone Rosa Oppenheim., MD Orthopedic Surgery [phone] Immunizations Administered for This Admission Name Date Influenza Vaccine (Quad) PF 12/22/2017 Your Primary Care Physician (PCP) Primary Care Physician Office Phone Office Fax 238 [address] [phone] You are allergic to the following No active allergies Recent Documentation Weight Breastfeeding? BMI Smoking Status 78.5 kg No 33.79 kg/m2 Former Smoker Emergency Contacts Name Discharge Info Relation Home Work Mobile Ester Cantrell ([address] [place] [address]) DISCHARGE CAREGIVER [3] Other Relative [6] [phone] Patient Belongings The following personal items are in your possession at time of discharge: 
  Dental Appliances: Uppers  Visual Aid: Glasses      Home Medications: None   Jewelry: None Please provide this summary of care documentation to your next provider. Signatures-by signing, you are acknowledging that this After Visit Summary has been reviewed with you and you have received a copy.   
  
 
  
    
    
 Patient Signature: ____________________________________________________________ Date:  ____________________________________________________________  
  
Jackelin Quitman Provider Signature:  ____________________________________________________________ Date:  ____________________________________________________________  
  
  
   
  
Reji Anderson [address] [facility] 
[phone] Patient: Javan Jack MRN: [id number] :1965 Sobre mueller hospitalización Le admitieron el:  2017 Mueller tratamiento más reciente fue el:  SFE 3 ORTHO Le dieron de jeremias el:  2017 Por qué le ingresaron Mueller diagnosis primaria es:  Rupture Of Posterior Cruciate Ligament Of Right Knee Mueller diagnosis también incluye:  Tear Of Medial Meniscus Of Right Knee, Tear Of Lateral Meniscus Of Right Knee, Chondromalacia Of Right Patella, Chondromalacia Of Right Knee, Sprain Of Posterior Cruciate Ligament Of Right Knee, Diabetes Mellitus Type 2, Diet-Controlled (Hcc), Hypertension, Hyperlipidemia Things You Need To Do (next 8 weeks) Follow up with Kiran Chavez MD  
As needed Phone:  [phone] Where:  [address] [place] [address] Follow up with Greg Sky MD  
As scheduled by office Phone:  [phone] Where:  [address], [facility] [address] Discharge Orders [facility] A check cari indicates which time of day the medication should be taken. My Medications ASK your physician about these medications Instructions Each Dose to Equal  
 Morning Noon Evening Bedtime ADVIL 200 mg tablet Medicamento genérico:  ibuprofen Your next dose is: Today Take 200 mg by mouth as needed for Pain. Stop 5 days prior to surgery per anesthesia guidelines. 200 mg  
    
   
   
  
   
  
 atorvastatin 20 mg tablet También conocido palmira:  LIPITOR Your next dose is: Today Take 20 mg by mouth nightly. 20 mg  
    
   
   
   
  
  
 gemfibrozil 600 mg tablet También conocido palmira:  LOPID Your next dose is: Today Take 600 mg by mouth two (2) times a day. 600 mg  
    
   
   
  
   
  
 lisinopril-hydroCHLOROthiazide 10-12.5 mg per tablet También conocido palmira:  Toi Petey Your next dose is:  Tomorrow Take  by mouth daily. Indications: hypertension Omega-3 Fatty Acids 60- mg Cpdr  
Your next dose is:  Tomorrow Take  by mouth daily. TYLENOL 325 mg tablet Medicamento genérico:  acetaminophen Your next dose is: Today Take 325 mg by mouth every four (4) hours as needed for Pain. 325 mg Instrucciones a ervin de jeremias DISCHARGE SUMMARY from Nurse The following personal items collected during your admission are returned to you:  
Dental Appliance: Dental Appliances: Lowers; Other (comment) (bridge ) Vision: Visual Aid: Glasses Hearing Aid:   na 
Jewelry: Jewelry: None Clothing: Clothing: At bedside Other Valuables: Other Valuables: None Valuables sent to safe:   na 
 
 
 
 
PATIENT INSTRUCTIONS: 
 
New Medications: 
 
Dilaudid 2 mg tabs Take 1-2 tabs every 4-6 hrs as needed for pain. Toradol 10 mg tabs Take 1 tab every 6 hrs x 5 days. Phenergan 25 mg tabs Take 1 tab every 8 hrs as needed for nausea. After general anesthesia or intravenous sedation, for 24 hours or while taking prescription Narcotics: · Limit your activities · Do not drive and operate hazardous machinery · Do not make important personal or business decisions · Do  not drink alcoholic beverages · If you have not urinated within 8 hours after discharge, please contact your surgeon on call. Report the following to your surgeon: · Excessive pain, swelling, redness or odor of or around the surgical area · Temperature over 101 · Nausea and vomiting lasting longer than 4 hours or if unable to take medications · Any signs of decreased circulation or nerve impairment to extremity: change in color, persistent  numbness, tingling, coldness or increase pain · Any questions, call office @ [phone]. What to do at Home: 
Recommended activity: activity as tolerated, as instructed per Dr. Beatriz Parsons. Continue with exercises taught by Physical Therapy. Resume pre hospital diet. Use walker to ambulate. Partial weight  bearing to right knee. Wear knee immobilizer as instructed. Use ice and elevate leg to decrease pain and swelling. If you experience any of the following symptoms temp>101, pain unrelieved by meds, or persistent nausea or vomitting, please follow up with Dr. Beatriz Parsons @ [phone]. *  Please give a list of your current medications to your Primary Care Provider. *  Please update this list whenever your medications are discontinued, doses are 
    changed, or new medications (including over-the-counter products) are added. *  Please carry medication information at all times in case of emergency situations. Reconstrucción del ligamento farnaz anterior: Samy Don en el hogar - [ Anterior Cruciate Ligament Reconstruction: What to Expect at [facility] ] Mueller recuperación La cirugía del ligamento farnaz anterior (LCA) reemplaza el ligamento dañado con yovana nuevo, llamado injerto. En la [employer], el injerto es un tendón que se extrae de mueller propia rodilla o los isquiotibiales (corva). En algunos casos, el injerto proviene de un donante. Se sentirá fatigado daniel varios días. Tendrá la rodilla hinchada y podría tener entumecimiento alrededor del bianca (incisión) en la rodilla. El tobillo y la espinilla podrían tener moretones o estar hinchados.  Puede colocarse hielo en la bulmaro para reducir la hinchazón. La mayoría de estas complicaciones desaparecerán en pocos días. En poco tiempo debería comenzar a notar mejoría en la rodilla. Jamal vez pueda volver a hacer la mayoría de beth actividades habituales en pocas semanas. Destiny pasarán varios meses hasta que recupere el uso completo de la rodilla. Podría tardar Jerelyn Roof 6 meses y un año antes de que la rodilla esté lista para hacer trabajo físico intenso o ciertos deportes. La cirugía puede ayudar. Destiny aun después de la [facility] [place], es posible que la rodilla no sea dunham savanah palmira lo era antes de la lesión. Deberá hacer ejercicios de rehabilitación para recuperar la fuerza y el movimiento de la articulación. El tiempo que tarde en volver a hacer deportes o ejercicio dependerá de cómo siga el programa de rehabilitación y cómo sane la rodilla. Lucero médico o fisioterapeuta le dará Gisele Lady idea de cuándo podría retomar estas actividades. Madalynn Terry de las personas pueden volver a trotar en unos 4 meses y correr o montar en bicicleta al cabo de unos 4 a 6 meses. Es posible que necesite usar un aparato ortopédico en la rodilla para practicar deportes. Esta hoja de [employer] idea general del tiempo que le llevará recuperarse. Sin embargo, cada persona se recupera a un ritmo diferente. Siga los pasos que se mencionan a continuación para recuperarse lo más rápido posible. Cómo puede cuidarse en el hogar? Actividad ? · Descanse cuando se sienta cansado. Dormir lo suficiente le ayudará a recuperarse. Duerma con la rodilla elevada, destiny no flexionada. Ponga miroslava almohada debajo del pie. Mantenga la pierna levantada el mayor tiempo posible daniel los primeros días. ? · Podría usar un aparato ortopédico y [employer] para moverse por la casa y hacer las tareas diarias. No ponga el peso sobre lucero pierna sin las [employer] que lucero médico lo autorice.  Tendrá los músculos del muslo débiles, así que tómese lucero tiempo y no corra riesgos. Tendrá que usar las muletas daniel 1 o 2 semanas. ? · No todos los médicos indican el uso de aparatos ortopédicos. Si tiene un aparato ortopédico, quíteselo únicamente para ejercitar la rodilla o para ducharse. Tenga cuidado de no ponerse al aparato ortopédico demasiado apretado. Es probable que necesite usarlo por entre 2 y 10 semanas. ? · No humberto ninguna actividad intensa daniel 12 semanas. [place] incluye no solamente deportes, sino también cosas palmira cortar el césped (zacate), barrer las hojas o quitar la donovan. ? · Puede ducharse entre 24 y 50 horas después de la [place], si lucero médico lo [place]. Cuando se duche, mantenga secos el vendaje y la incisión cubriéndolos con un plástico y sujetando el plástico con cinta adhesiva. Sería conveniente conseguir miroslava banqueta para ducha para sentarse. Si tiene un aparato ortopédico, quíteselo solo si lucero médico lo autoriza. ? · Si lucero médico no quiere que se duche ni que se quite el aparato ortopédico, puede darse un baño de esponja. ? · No tome sherwin de inmersión, nade, utilice miroslava bañera de hidromasaje ni sumerja la pierna en agua daniel 2 semanas o hasta que lucero médico lo autorice. ? · Puede conducir cuando ya no esté utilizando las [employer] ni el aparato ortopédico para la rodilla, cuando no esté tomando ya analgésicos (medicamentos para el dolor) recetados y cuando tenga algo de control sobre lucero rodilla. [address], esto lleva de 1 a 2 semanas. ? · La rapidez con la que puede volver a lucero empleo depende del trabajo que humberto. Si permanece sentado en lucero trabajo, podría volver en 1 o 2 semanas. Destiny si está de pie en lucero trabajo, podría llevarle de 4 a 6 semanas. Si lucero trabajo incluye mucha [facility], podría tardar de 4 a 6 meses. Alimentación ?  · Puede continuar con lucero dieta normal. Si tiene [employer], coma alimentos suaves bajos en grasa, palmira arroz sin condimentos, agnieszka a la baldev, pan marialuisa y yogur. Precious abundantes líquidos. ? · Podría notar que no evacua el intestino con regularidad kirk después de la [place]. [place] es común. Trate de evitar el estreñimiento y de no hacer esfuerzos cuando evacua el intestino. Jamal vez desee fish un suplemento de [employer]. Si no ha evacuado el intestino después de un par de días, pregúntele a lucero médico si puede fish un laxante suave. Medicamentos ? · Lucero médico le dirá si puede volver a fish beth medicamentos y cuándo puede volver a hacerlo. También le dará indicaciones sobre cualquier medicamento nuevo que deba fish usted. ? · Si leona medicamentos que previenen la formación de coágulos de [place], palmira warfarina (Coumadin), clopidogrel (Plavix) o aspirina, asegúrese de hablar con lucero médico. Él o betzaida le dirá si debe volver a fish estos medicamentos y en qué momento. Asegúrese de que entiende exactamente lo que el médico quiere que humberto. ? · [employer] analgésicos exactamente según las indicaciones. ¨ Si el médico le recetó un analgésico, tómelo según las indicaciones. ¨ Si no está tomando un analgésico recetado, pregúntele a lucero médico si puede fish yovana de [employer]. ? · Si lucero médico le recetó antibióticos, tómelos según las indicaciones. No deje de tomarlos por el hecho de sentirse mejor. Debe fish todos los antibióticos hasta terminarlos. ? · Si le parece que el analgésico le está produciendo malestar estomacal: [address] comidas (a menos que lucero médico le haya indicado lo contrario). ¨ [place]dale al médico un analgésico diferente. Cuidado de la incisión ? · Si tiene un vendaje sobre la incisión, manténgalo limpio y seco. Siga las instrucciones de lucero médico. Lucero médico probablemente querrá que se deje el vendaje hasta que Tiff Sables a lucero consultorio. Si lucero médico lo permite, podría quitarse el vendaje al cabo de 50 a 67 horas después de la [facility] [place]. ? · Si le lazcano colocado tiras de cinta [employer] incisión, déjeselas puestas miroslava semana o hasta que se caigan por sí solas. Mantenga la bulmaro limpia y seca. Ejercicio ? · Ejercitarse en un programa de rehabilitación es miroslava parte importante de lucero tratamiento. Le ayudará a mejorar la amplitud de movimiento de lucero Charna Forde y a recuperar lucero fuerza muscular. ? · Es posible que le den miroslava máquina de movimiento pasivo continuo. Esta máquina hará algunos de los ejercicios por usted. La usará daniel aproximadamente 2 semanas. Precious Lowery y Rolando Jimenez ? · Para reducir la hinchazón y el dolor, colóquese hielo o miroslava compresa fría sobre la rodilla de 10 a 20 minutos cada vez. Repita eliezer proceso cada pocas horas. Póngase un paño belcher entre el hielo y la piel. ? · Eleve la pierna adolorida sobre miroslava almohada cuando se aplique hielo o en cualquier momento que se siente o se acueste, daniel los 3 días después de la \[facility] [place] [facility]\"". Trate de mantenerla por encima del nivel del corazón. [place] ayudará a reducir la hinchazón. ? · Si lucero médico le ronald medias de compresión, úselas daniel el tiempo que le indique. Kyleigh Freshwater a [employer] de [place]. La atención de seguimiento es miroslava parte clave de lucero tratamiento y seguridad. Asegúrese de hacer y acudir a todas las citas, y llame a lucero médico si está teniendo problemas. También es miroslava buena idea saber los resultados de los exámenes y mantener miroslava lista de los medicamentos que leona. Cuándo debe pedir ayuda? Llame al 911 en cualquier momento que considere que puede necesitar atención de [place]. Por ejemplo, llame si: 
? · Se desmayó (perdió el conocimiento). ? · Tiene grave dificultad para respirar. ? · Tiene dolor repentino en el pecho y falta de [place], o tose alexa. ?Llame a lucero médico ahora mismo o busque atención médica inmediata si: 
? · Tiene dolor que no mejora después de fish analgésicos (medicamentos para el dolor). ? · Tiene puntos de sutura flojos o se abre la incisión. ? · [address], tales palmira: ¨ Aumento de dolor, hinchazón, temperatura o enrojecimiento. ¨ Vetas mcghee que salen de la incisión. ¨ Pus que sale de la incisión. Laith Barrios. ? Vigile atentamente los [facility], y asegúrese de comunicarse con lucero médico si tiene algún problema. Dónde puede encontrar más información en inglés? Perfecto Denney a [email or web address].[email or web address] Sarah Parma Y225 en la búsqueda para aprender más acerca de \"Reconstrucción del ligamento farnaz anterior: Angelia Acevedo en el [facility] - [ Anterior Cruciate Ligament Reconstruction: What to Expect at Home ]. \" 
Revisado: 21 marzo, 2017 Versión del contenido: 11.4 © 5917-7271 Healthwise, Incorporated. Las instrucciones de cuidado fueron adaptadas bajo licencia por [employer] (which disclaims liability or warranty for this information). Si usted tiene [place] afección médica o sobre estas instrucciones, siempre pregunte a lucero profesional de trinh. Healthwise, Incorporated niega toda garantía o responsabilidad por lucero uso de esta información. These are general instructions for a healthy lifestyle: No smoking/ No tobacco products/ Avoid exposure to second hand smoke Surgeon General's Warning:  Quitting smoking now greatly reduces serious risk to your health. Obesity, smoking, and sedentary lifestyle greatly increases your risk for illness A healthy diet, regular physical exercise & weight monitoring are important for maintaining a healthy lifestyle You may be retaining fluid if you have a history of heart failure or if you experience any of the following symptoms:  Weight gain of 3 pounds or more overnight or 5 pounds in a week, increased swelling in our hands or feet or shortness of breath while lying flat in bed.   Please call your doctor as soon as you notice any of these symptoms; do not wait until your next office visit. Recognize signs and symptoms of STROKE: 
 
F-face looks uneven A-arms unable to move or move unevenly S-speech slurred or non-existent T-time-call 911 as soon as signs and symptoms begin-DO NOT go Back to bed or wait to see if you get better-TIME IS BRAIN. The discharge information has been reviewed with the patient. The patient verbalized understanding. [facility] Announcement We are excited to announce that we are making your provider's discharge notes available to you in [employer]. You will see these notes when they are completed and signed by the physician that discharged you from your recent hospital stay. If you have any questions or concerns about any information you see in [employer], please call the Health Information Department where you were seen or reach out to your Primary Care Provider for more information about your plan of care. Introducing [facility]! [facility] introduce portal paciente [employer] . Ahora se puede acceder a partes de lucero expediente médico, enviar por correo electrónico la oficina de lucero médico y solicitar renovaciones de medicamentos en línea. En lucero navegador de Internet , Abelinoon Fruit a [email or web address]. [email or web address] Humberto clic en el usuario por [employer] [employer]? Vonshaan Joseph clic aquí en la sesión [place]. Verá la página de registro [place]. Ingrese lucero código de [facility] Luzma deonte y palmira aparece a continuación. Usted no tendrá que [employer] código después de ramya completado el proceso de registro . Si usted no se inscribe antes de la fecha de caducidad , debe solicitar un nuevo código. · [employer] Código de acceso : [id number] Expires: 2/28/2018 12:46 AM 
 
Ingresa los últimos cuatro dígitos de lucero Número de Seguro Social ( xxxx ) y fecha de nacimiento ( dd / mm / aaaa ) palmira se indica y humberto clic en Enviar. Usted será llevado a la siguiente página de registro . Crear un ID MyChart . Esta será mueller ID de inicio de sesión de MyChart y no puede ser [place] , por lo que pensar en miroslava que es [place] y fácil de recordar . Crear miroslava contraseña MyChart . Usted puede cambiar mueller contraseña en cualquier momento . Ingrese mueller Password Reset de preguntas y [place] . [place] se puede utilizar en un momento posterior si usted olvida mueller contraseña. Introduzca mueller dirección de correo electrónico . Jose Tera recibirá miroslava notificación por correo electrónico cuando la nueva información está disponible en MyChart . Addy Niraj clic en Registrarse. Noemi Dimitri andrez y descargar porciones de mueller expediente médico. 
Ayad clic en el enlace de descarga del menú Resumen para descargar miroslava copia portátil de mueller información médica . Si tiene [employer] , por favor visite la sección de preguntas frecuentes del sitio web MyChart . Recuerde, MyChart NO es que se utilizará para las necesidades urgentes. Para emergencias médicas , llame al 911 . Ahora disponible en mueller iPhone y Android ! Providers Seen During Your Hospitalization Personal Médico Especialidad Teléfono principal de la oficina Jacqueline Cano MD Orthopedic Surgery [phone] Kelly Yost Administradas en esta admisión:    
   
 Jordan Almaguerl Influenza Vaccine (Quad) PF 12/22/2017 Mueller médico de atención primaria (PCP ) Primary Care Physician Office Phone Office Fax 238 [facility], [address] [place] [address] [phone] Tiene alergias a lo siguiente No tiene alergias Documentación recientes Weight Está amamantando? BMI ([address]) Estatus de tabaquísmo 78.5 kg No 33.79 kg/m2 Former Smoker Emergency Contacts Name Discharge Info Relation Home Work Mobile Ester Cantrell ([address] [facility]) DISCHARGE CAREGIVER [3] Other Relative [6] [phone] Patient Belongings The following personal items are in your possession at time of discharge: Dental Appliances: Uppers  Visual Aid: Glasses      Home Medications: None   Jewelry: None Please provide this summary of care documentation to your next provider. Signatures-by signing, you are acknowledging that this After Visit Summary has been reviewed with you and you have received a copy. Patient Signature:  ____________________________________________________________ Date:  ____________________________________________________________  
  
Guillermo [place] Provider Signature:  ____________________________________________________________ Date:  ____________________________________________________________

## 2017-12-22 ENCOUNTER — HOME HEALTH ADMISSION (OUTPATIENT)
Dept: HOME HEALTH SERVICES | Facility: HOME HEALTH | Age: 52
End: 2017-12-22
Payer: OTHER MISCELLANEOUS

## 2017-12-22 LAB
ANION GAP SERPL CALC-SCNC: 11 MMOL/L (ref 7–16)
BUN SERPL-MCNC: 12 MG/DL (ref 6–23)
CALCIUM SERPL-MCNC: 8.7 MG/DL (ref 8.3–10.4)
CHLORIDE SERPL-SCNC: 103 MMOL/L (ref 98–107)
CO2 SERPL-SCNC: 23 MMOL/L (ref 21–32)
CREAT SERPL-MCNC: 0.91 MG/DL (ref 0.6–1)
ERYTHROCYTE [DISTWIDTH] IN BLOOD BY AUTOMATED COUNT: 15.3 % (ref 11.9–14.6)
GLUCOSE SERPL-MCNC: 208 MG/DL (ref 65–100)
HCT VFR BLD AUTO: 33.1 % (ref 35.8–46.3)
HGB BLD-MCNC: 10.8 G/DL (ref 11.7–15.4)
MAGNESIUM SERPL-MCNC: 2 MG/DL (ref 1.8–2.4)
MCH RBC QN AUTO: 28.5 PG (ref 26.1–32.9)
MCHC RBC AUTO-ENTMCNC: 32.6 G/DL (ref 31.4–35)
MCV RBC AUTO: 87.3 FL (ref 79.6–97.8)
PLATELET # BLD AUTO: 201 K/UL (ref 150–450)
PMV BLD AUTO: 10.6 FL (ref 10.8–14.1)
POTASSIUM SERPL-SCNC: 3.9 MMOL/L (ref 3.5–5.1)
RBC # BLD AUTO: 3.79 M/UL (ref 4.05–5.25)
SODIUM SERPL-SCNC: 137 MMOL/L (ref 136–145)
WBC # BLD AUTO: 13.3 K/UL (ref 4.3–11.1)

## 2017-12-22 PROCEDURE — 97162 PT EVAL MOD COMPLEX 30 MIN: CPT

## 2017-12-22 PROCEDURE — 74011250636 HC RX REV CODE- 250/636: Performed by: ORTHOPAEDIC SURGERY

## 2017-12-22 PROCEDURE — 36415 COLL VENOUS BLD VENIPUNCTURE: CPT | Performed by: ORTHOPAEDIC SURGERY

## 2017-12-22 PROCEDURE — 99218 HC RM OBSERVATION: CPT

## 2017-12-22 PROCEDURE — 90471 IMMUNIZATION ADMIN: CPT

## 2017-12-22 PROCEDURE — 83735 ASSAY OF MAGNESIUM: CPT | Performed by: ORTHOPAEDIC SURGERY

## 2017-12-22 PROCEDURE — 74011250637 HC RX REV CODE- 250/637: Performed by: FAMILY MEDICINE

## 2017-12-22 PROCEDURE — 74011250637 HC RX REV CODE- 250/637: Performed by: ORTHOPAEDIC SURGERY

## 2017-12-22 PROCEDURE — 74011000250 HC RX REV CODE- 250: Performed by: ORTHOPAEDIC SURGERY

## 2017-12-22 PROCEDURE — 77030027138 HC INCENT SPIROMETER -A

## 2017-12-22 PROCEDURE — 94760 N-INVAS EAR/PLS OXIMETRY 1: CPT

## 2017-12-22 PROCEDURE — 80048 BASIC METABOLIC PNL TOTAL CA: CPT | Performed by: ORTHOPAEDIC SURGERY

## 2017-12-22 PROCEDURE — 85027 COMPLETE CBC AUTOMATED: CPT | Performed by: ORTHOPAEDIC SURGERY

## 2017-12-22 PROCEDURE — 90686 IIV4 VACC NO PRSV 0.5 ML IM: CPT | Performed by: ORTHOPAEDIC SURGERY

## 2017-12-22 RX ORDER — LIDOCAINE HYDROCHLORIDE 10 MG/ML
0.1 INJECTION INFILTRATION; PERINEURAL AS NEEDED
Status: CANCELLED | OUTPATIENT
Start: 2017-12-22

## 2017-12-22 RX ORDER — SODIUM CHLORIDE, SODIUM LACTATE, POTASSIUM CHLORIDE, CALCIUM CHLORIDE 600; 310; 30; 20 MG/100ML; MG/100ML; MG/100ML; MG/100ML
75 INJECTION, SOLUTION INTRAVENOUS CONTINUOUS
Status: CANCELLED | OUTPATIENT
Start: 2017-12-22 | End: 2017-12-23

## 2017-12-22 RX ORDER — MIDAZOLAM HYDROCHLORIDE 1 MG/ML
2 INJECTION, SOLUTION INTRAMUSCULAR; INTRAVENOUS
Status: CANCELLED | OUTPATIENT
Start: 2017-12-22

## 2017-12-22 RX ORDER — SODIUM CHLORIDE 0.9 % (FLUSH) 0.9 %
5-10 SYRINGE (ML) INJECTION AS NEEDED
Status: CANCELLED | OUTPATIENT
Start: 2017-12-22

## 2017-12-22 RX ORDER — FENTANYL CITRATE 50 UG/ML
100 INJECTION, SOLUTION INTRAMUSCULAR; INTRAVENOUS ONCE
Status: CANCELLED | OUTPATIENT
Start: 2017-12-22 | End: 2017-12-22

## 2017-12-22 RX ORDER — MIDAZOLAM HYDROCHLORIDE 1 MG/ML
2 INJECTION, SOLUTION INTRAMUSCULAR; INTRAVENOUS ONCE
Status: CANCELLED | OUTPATIENT
Start: 2017-12-22 | End: 2017-12-22

## 2017-12-22 RX ORDER — SODIUM CHLORIDE 0.9 % (FLUSH) 0.9 %
5-10 SYRINGE (ML) INJECTION EVERY 8 HOURS
Status: CANCELLED | OUTPATIENT
Start: 2017-12-22

## 2017-12-22 RX ADMIN — DOCUSATE SODIUM 100 MG: 100 CAPSULE, LIQUID FILLED ORAL at 08:52

## 2017-12-22 RX ADMIN — HYDROMORPHONE HYDROCHLORIDE 1 MG: 2 INJECTION, SOLUTION INTRAMUSCULAR; INTRAVENOUS; SUBCUTANEOUS at 22:20

## 2017-12-22 RX ADMIN — HYDROMORPHONE HYDROCHLORIDE 2 MG: 2 TABLET ORAL at 13:14

## 2017-12-22 RX ADMIN — HYDROMORPHONE HYDROCHLORIDE 1 MG: 2 INJECTION, SOLUTION INTRAMUSCULAR; INTRAVENOUS; SUBCUTANEOUS at 15:18

## 2017-12-22 RX ADMIN — WATER 1 G: 1 INJECTION INTRAMUSCULAR; INTRAVENOUS; SUBCUTANEOUS at 08:51

## 2017-12-22 RX ADMIN — WATER 1 G: 1 INJECTION INTRAMUSCULAR; INTRAVENOUS; SUBCUTANEOUS at 18:25

## 2017-12-22 RX ADMIN — SODIUM CHLORIDE 75 ML/HR: 900 INJECTION, SOLUTION INTRAVENOUS at 02:34

## 2017-12-22 RX ADMIN — HYDROMORPHONE HYDROCHLORIDE 2 MG: 2 TABLET ORAL at 00:28

## 2017-12-22 RX ADMIN — HYDROMORPHONE HYDROCHLORIDE 4 MG: 2 TABLET ORAL at 23:40

## 2017-12-22 RX ADMIN — PROMETHAZINE HYDROCHLORIDE 12.5 MG: 25 TABLET ORAL at 15:20

## 2017-12-22 RX ADMIN — WATER 1 G: 1 INJECTION INTRAMUSCULAR; INTRAVENOUS; SUBCUTANEOUS at 00:22

## 2017-12-22 RX ADMIN — DOCUSATE SODIUM 100 MG: 100 CAPSULE, LIQUID FILLED ORAL at 18:28

## 2017-12-22 RX ADMIN — ATORVASTATIN CALCIUM 20 MG: 10 TABLET, FILM COATED ORAL at 00:22

## 2017-12-22 RX ADMIN — HYDROMORPHONE HYDROCHLORIDE 2 MG: 2 TABLET ORAL at 05:31

## 2017-12-22 RX ADMIN — HYDROMORPHONE HYDROCHLORIDE 2 MG: 2 TABLET ORAL at 18:28

## 2017-12-22 RX ADMIN — HYDROMORPHONE HYDROCHLORIDE 2 MG: 2 TABLET ORAL at 08:51

## 2017-12-22 RX ADMIN — GEMFIBROZIL 600 MG: 600 TABLET ORAL at 18:28

## 2017-12-22 RX ADMIN — ATORVASTATIN CALCIUM 20 MG: 10 TABLET, FILM COATED ORAL at 22:15

## 2017-12-22 RX ADMIN — GEMFIBROZIL 600 MG: 600 TABLET ORAL at 08:52

## 2017-12-22 RX ADMIN — HYDROMORPHONE HYDROCHLORIDE 2 MG: 2 TABLET ORAL at 02:33

## 2017-12-22 RX ADMIN — INFLUENZA VIRUS VACCINE 0.5 ML: 15; 15; 15; 15 SUSPENSION INTRAMUSCULAR at 13:14

## 2017-12-22 NOTE — OP NOTES
[address] REPORT    Derral Skiff  MR#: [id number]  : 1965  ACCOUNT #: [de-identified]   DATE OF SERVICE: 2017    PREOPERATIVE DIAGNOSES:  1. Posterior cruciate ligament tear, right knee. 2.  Medial meniscal tear, right knee. 3.  Lateral meniscal tear, right knee. 4.  Chondromalacia, right knee. 5.  Patellofemoral chondromalacia, right knee. POSTOPERATIVE DIAGNOSES:  1. Posterior cruciate ligament tear, right knee. 2.  Medial meniscal tear, right knee. 3.  Lateral meniscal tear, right knee. 4.  Chondromalacia, right knee. 5.  Patellofemoral chondromalacia, right knee. PROCEDURES PERFORMED:  Arthroscopy of the right knee, arthroscopic posterior cruciate ligament reconstruction utilizing Achilles tendon allograft, partial medial and lateral meniscectomies. OPERATING SURGEON:  Karen Mckee. Salo Krishna MD    SPECIMENS REMOVED:   1. PCL tear, grade III. 2.  Degenerative medial meniscal tear. 3.  Degenerative lateral meniscal tear. 4.  Grade II chondromalacia of the patella. 5.  Grade I chondromalacia, lateral aspect of the patella. ANESTHESIA:  GENERAL WITH FEMORAL/SCIATIC NERVE BLOCK    CPT CODES:  [id number] and 72017. ICD-10 CODES:  S83.521, S83.241, S83.281, M22.41, M94.261.    TOURNIQUET TIME:  111 minutes. HARDWARE UTILIZED:  Two Med Surg posts and screws and Achilles tendon allograft. INDICATIONS:  The patient is a 51-year-old female injured her right knee on the job secondary to a fall. Preop physical examination and MRI demonstrated a grade III PCL tear, medial and lateral meniscal tears, chondromalacia throughout the right knee. The patient has exhausted nonoperative modalities. The patient has an unstable right knee. She is now electively for operative reconstruction. DESCRIPTION OF PROCEDURE:  Following identification of the patient, the patient taken to the operative suite.   Following the induction of general anesthesia, a femoral sciatic nerve block for postop pain control and 2 g of IV Ancef, the patient positioned on the operating table in supine fashion. The right knee was examined under anesthesia. The patient was noted with negative Lachman, 3+ posterior drawer, negative Dial test.     The right leg was then prepped and draped in a sterile fashion. The right leg was then elevated, exsanguinated with an Esmarch bandage, tourniquet was elevated to 300 mmHg. At this point, a 5 cm incision extending from the mid patellar tendon region distally was performed. Skin was incised. The subcutaneous tissue was then dissected down to the proximal tibia. The tibia tubercle and proximal lateral tibia was identified. The anterior tibialis muscle was then elevated in the hockey-stick like fashion. The proximal tibia was exposed. At this point, the 2 arthroscopic portal sites were then identified, incised, and the scope was introduced into the knee. Diagnostic arthroscopy was then commenced. Suprapatellar pouch, medial and lateral gutters were visualized. There was no evidence of loose body, foreign body or pathologic plica. The undersurface of the patella and trochlear groove were visualized. The trochlea was intact. There was a grade II chondral lesion on the lateral aspect of the patella. This was lightly shaved. A formal chondroplasty was not performed. The scope was advanced in the medial compartment as leg was flexed 30 degrees. Medial meniscus was visualized in its entirety, as well as through the notch. There was a degenerative tear of the posterior horn of the medial meniscus. With the use of an upgoing side-biter, partial meniscectomy was then performed. This was contoured with a meniscal shaver. The meniscus was probed and noted to be stable. The articular surface of the medial femoral condyle and medial suprapatella were intact. The scope was advanced to the notch. ACL was intact.   PCL was torn. The scope was advanced in the lateral compartment. The leg was placed in the figure 4 position. The lateral meniscus was visualized in its entirety, both above and below the popliteal hiatus. There was a degenerative lateral meniscal tear. Again, with the use of upgoing side-biter, partial meniscectomy was then performed. This was contoured with a meniscal shaver, and the meniscus was probed and noted to be stable. The articular surface was noted for grade I chondromalacia of the lateral femoral condyle  , which was left alone. The scope was advanced back into the notch. The PCL stump was debrided in its entirety. At this point, a posteromedial portal was then established, and the tibial insertion of the PCL was debrided as well, roughly 1 cm below the joint line. Once an adequate debridement of the PCL had been performed, the PCL tibial drill guide was inserted in the appropriate position on the tibial spine under direct vision. The guidewire was then passed. The tibial tunnel was reamed with an 11 mm reamer. The tunnel was debrided with a soft tissue shaver, and acromionized using the bur. The posterior aspect of the tunnel was carefully debrided under direct visualization with curved and straight reamers. At this point, attention was then turned to drilling our femoral tunnel. The femoral tunnel was then drilled via freehand technique from the anterolateral portal, first with an 8, 9 and 10.5 mm reamer through the medial cortex. Once was then complete, a medial incision was then performed. Dissection was then carried down to the medial femoral condyle and the tunnel entrance. This was debrided as well. At this point,  #5 sutures then passed through the PCL femoral tunnel, #5 sutures were passed through the PCL tibial tunnel. At this point, the Achilles tendon allograft was identified. It was prepared. The bony portion of the graft was debrided. The graft was sized appropriately. At this point,  the graft was passed first down through the femoral tunnel into the anterolateral fat pad, and then the graft was passed with an anterior drawer through the tibial tunnel. At this point, provisional fixation on the tibial side was achieved using the Med Surg post and screw. At this point, a second Med Surg post and screw was put on the distal medial femur. The tendinous   portion of the graft on the femoral side was tensioned, with the knee in 90 degrees of flexion and with an anterior drawer. The tendinous portion of the femoral side was secured in the standard fashion, Med Surg post and screw were centered in standard fashion. At this point, the tibial side was then re-tensioned, again with the knee in 90 degrees with an anterior drawer. The final tibial fixation was then achieved utilizing Med Surg post and screw. This was centered in standard fashion and secured. At this point, the knee was then examined. There was no further evidence of a posterior drawer. The knee was in excellent position. The scope was introduced back into the knee. The PCL graft was in excellent position, no impingement, excellent tension. ACL was intact. At this point, the tourniquet was released. Tourniquet time was 111 minutes. The arthroscopic portals were reapproximated with 2-0 nylon horizontal mattress sutures. The anterior wound was closed with 0 Vicryl figure-of-eight sutures and a 2-0 Prolene subcuticular stitch. The medial wound was closed with 0 Vicryl figure-of-eight sutures and 2-0 nylon horizontal mattress sutures. A sterile dressing was applied. A Cryo/Cuff for the knee was applied. The patient was then transferred to the recovery room in stable condition. Please note, the patient had dopplerable pulses at the end of the case. This injury was secondary to a workplace injury.       MD ALEXANDR Villalta / SAMINA  D: 12/21/2017 23:03     T: 12/22/2017 11:19  JOB #: [id number]

## 2017-12-22 NOTE — PROGRESS NOTES
Patient still with dense block  Starting to feel more  Has increased pain  No motor function  Feels pressure  Will observe in hospital today  Home tomorrow if neuro function improves  NWB right leg while block in effect

## 2017-12-22 NOTE — PROGRESS NOTES
Orthopedic Joint Progress Note    2017  Admit Date: 2017  Admit Diagnosis: Tear of posterior cruciate ligament of knee, right, initial encounter [S83.521A]  Tear of medial meniscus of right knee, unspecified tear type, unspecified whether old or current tear, initial encounter [S83.241A]  Tear of lateral meniscus of right knee, unspecified tear type, unspecified whether old or current tear, initial encounter [S83.281A]  Chondromalacia, patella, right [M22.41]  Chondromalacia of right knee [M94.261]  Tear of posterior cruciate ligament of knee, right, initial encounter [S83.521A]  Tear of medial meniscus of right knee, unspecified tear type, unspecified whether old or current tear, initial encounter [S83.241A]  Tear of lateral meniscus of right knee, unspecified tear type, unspecified whether old or current tear, initial encounter [S83.281A]  Chondromalacia, patella, right [M22.41]  Chondromalacia of right knee [M94.261]    1 Day Post-Op    Subjective: block still in effect minimal pain     Lyndal Omar     Review of Systems: Pertinent items are noted in HPI. Objective:     PT/OT:     PATIENT MOBILITY                           Vital Signs:    Blood pressure 95/63, pulse 94, temperature 96.5 °F (35.8 °C), resp. rate 16, weight 78.5 kg (173 lb), SpO2 96 %, not currently breastfeeding.   Temp (24hrs), Av.1 °F (36.2 °C), Min:96.2 °F (35.7 °C), Max:98.3 °F (36.8 °C)      Pain Control:   Pain Assessment  Pain Scale 1: Numeric (0 - 10)  Pain Intensity 1: 2    Meds:  Current Facility-Administered Medications   Medication Dose Route Frequency    0.9% sodium chloride infusion  75 mL/hr IntraVENous CONTINUOUS    sodium chloride (NS) flush 5-10 mL  5-10 mL IntraVENous Q8H    sodium chloride (NS) flush 5-10 mL  5-10 mL IntraVENous PRN    ceFAZolin (ANCEF) 1 g in sterile water (preservative free) 10 mL IV syringe  1 g IntraVENous Q8H    bisacodyl (DULCOLAX) suppository 10 mg  10 mg Rectal DAILY PRN    sodium phosphate (FLEET'S) enema 118 mL  1 Enema Rectal PRN    docusate sodium (COLACE) capsule 100 mg  100 mg Oral BID    promethazine (PHENERGAN) tablet 25 mg  25 mg Oral Q4H PRN    HYDROmorphone (PF) (DILAUDID) injection 1 mg  1 mg IntraVENous Q1H PRN    temazepam (RESTORIL) capsule 15 mg  15 mg Oral QHS PRN    HYDROmorphone (DILAUDID) tablet 4 mg  4 mg Oral Q3H PRN    HYDROmorphone (DILAUDID) tablet 2 mg  2 mg Oral Q3H PRN    acetaminophen (TYLENOL) tablet 325 mg  325 mg Oral Q4H PRN    atorvastatin (LIPITOR) tablet 20 mg  20 mg Oral QHS    gemfibrozil (LOPID) tablet 600 mg  600 mg Oral BID    lisinopril-hydroCHLOROthiazide (PRINZIDE, ZESTORETIC) 10-12.5 mg per tablet 1 Tab  1 Tab Oral DAILY        LAB:    No results found for: INR  Lab Results   Component Value Date/Time    HGB 10.8 12/22/2017 05:41 AM       Wound Knee Right (Active)   DRESSING STATUS Clean, dry, and intact 12/21/2017  8:51 PM   DRESSING TYPE Dry dressing; Ice wound dressing/Cryotherapy 12/21/2017  7:46 PM   SPLINT TYPE/MATERIAL Knee immobilizer 12/21/2017  7:46 PM   Number of days:1             Physical Exam:  No significant changes    Assessment:      Principal Problem:    Rupture of posterior cruciate ligament of right knee (12/17/2017)    Active Problems:    Tear of medial meniscus of right knee (12/17/2017)      Tear of lateral meniscus of right knee (12/17/2017)      Chondromalacia of right patella (12/17/2017)      Chondromalacia of right knee (12/17/2017)      Sprain of posterior cruciate ligament of right knee (12/21/2017)      Diabetes mellitus type 2, diet-controlled ([address] (12/21/2017)      Hypertension (12/21/2017)      Hyperlipidemia (12/21/2017)         Plan:     Continue PT/OT/Rehab  Block still in effect  No need for re block  Discharge home after afternoon physical therapy  Non weightbearing until block wears off then 50% pwb right leg    Patient Expects to be Discharged to[de-identified] Unknown     Signed By: Davey Gutiérrez Katie Valle MD

## 2017-12-22 NOTE — PERIOP NOTES
TRANSFER - OUT REPORT:    Verbal report given to [facility] RN(name) on [facility]  being transferred to [facility](unit) for routine post - op       Report consisted of patients Situation, Background, Assessment and   Recommendations(SBAR). Information from the following report(s) SBAR, Kardex, OR Summary, Procedure Summary, Intake/Output and MAR was reviewed with the receiving nurse. Opportunity for questions and clarification was provided.       Patient transported with:   O2 @ 3 liters  Tech

## 2017-12-22 NOTE — CONSULTS
HOSPITALIST INITIAL CONSULT NOTE    NAME:  Pancho Nelson   Age:  46 y.o.  :   1965   MRN:   [id number]  PCP: Genaro Ohara MD  Consulting MD:  Treatment Team: Attending Provider: Roseanne Bravo MD; Consulting Provider: Butch Ashford MD    REASON FOR CONSULT: medical comanagement     HISTORY OF PRESENT ILLNESS AT TIME OF CONSULT:   Pancho Nelson is a 46y.o. year-old female with a past medical history of hypertension and hyperlipidemia who is status post R knee arthroscopy and PCL reconstruction by Dr. Cherylene Keeler today. Dr. Cherylene Keeler has consulted our services for medical comanagement. Currently the patient denies any pain. She denies any nausea, vomiting, constipation, diarrhea, fevers, chills, chest pain, shortness of breath. REVIEW OF SYSTEMS: Comprehensive ROS performed and negative except as stated in HPI. Past Medical History:   Diagnosis Date    Adverse effect of anesthesia     pt reports she woke during 2 surgeries with general anesthesia    Former smoker     Hypercholesteremia     Hypertension         Past Surgical History:   Procedure Laterality Date    HX APPENDECTOMY      HX  SECTION      HX OOPHORECTOMY      HX OTHER SURGICAL      vericose vein removal left       Prior to Admission Medications   Prescriptions Last Dose Informant Patient Reported? Taking? Omega-3 Fatty Acids 60- mg cpDR 2017 at Unknown time  Yes Yes   Sig: Take  by mouth daily. acetaminophen (TYLENOL) 325 mg tablet 2017 at Unknown time  Yes Yes   Sig: Take 325 mg by mouth every four (4) hours as needed for Pain. atorvastatin (LIPITOR) 20 mg tablet 2017 at Unknown time  Yes Yes   Sig: Take 20 mg by mouth nightly. gemfibrozil (LOPID) 600 mg tablet 2017 at Unknown time  Yes Yes   Sig: Take 600 mg by mouth two (2) times a day.    lisinopril-hydroCHLOROthiazide (PRINZIDE, ZESTORETIC) 10-12.5 mg per tablet 2017 at Unknown time  Yes Yes   Sig: Take  by mouth daily. Indications: hypertension      Facility-Administered Medications: None       No Known Allergies    History reviewed. No pertinent family history. Social History   Substance Use Topics    Smoking status: Former Smoker    Smokeless tobacco: Former User     Quit date: 2017      Comment: quit     Alcohol use No         Objective:     Visit Vitals    /63    Pulse 92    Temp 96.5 °F (35.8 °C)    Resp 20    Wt 78.5 kg (173 lb)    SpO2 96%    Breastfeeding No    BMI 33.79 kg/m2      Temp (24hrs), Av.6 °F (36.4 °C), Min:96.5 °F (35.8 °C), Max:98.3 °F (36.8 °C)    Oxygen Therapy  O2 Sat (%): 96 % (17)  O2 Device: Nasal cannula (17)  O2 Flow Rate (L/min): 3 l/min (17)  Physical Exam:  General:    The patient is a pleasant middle aged female in no acute distress. Head:   Normocephalic/atraumatic. Eyes:  No palpebral pallor or scleral icterus. ENT:  External auricular and nasal exam within normal limits. Mucous membranes are moist.  Neck:  Supple, non-tender, no JVD. Lungs:   Clear to auscultation bilaterally without wheezes or crackles. No respiratory distress or accessory muscle use. Heart:   Regular rate and rhythm, without murmurs, rubs, or gallops. Abdomen:   Soft, non-tender, non-distended with normoactive bowel sounds. Genitourinary: No tenderness over the bladder or bilateral CVAs. Extremities: Without clubbing, cyanosis, or edema. Skin:     Normal color, texture, and turgor. No rashes, lesions, or jaundice. Pulses: Radial and dorsalis pedis pulses present 2+ bilaterally. Capillary refill <2s. Neurologic: CN II-XII grossly intact and symmetrical.     Moving all four extremities well with no focal deficits. Psychiatric: Pleasant demeanor, appropriate affect.  Alert and oriented x 3    Data Review:   Recent Results (from the past 24 hour(s))   HEMOGLOBIN A1C WITH EAG    Collection Time: 17 12:22 PM   Result Value Ref Range    Hemoglobin A1c 7.6 (H) 4.8 - 6.0 %    Est. average glucose 171 mg/dL   GLUCOSE, POC    Collection Time: 12/21/17 12:23 PM   Result Value Ref Range    Glucose (POC) 131 (H) 65 - 100 mg/dL   HCG URINE, QL. - POC    Collection Time: 12/21/17 12:26 PM   Result Value Ref Range    Pregnancy test,urine (POC) NEGATIVE  NEG         Imaging Bree Glaze /Studies:  Xr Knee Rt Max 2 Vws    Result Date: 12/21/2017  IMPRESSION: Typical postsurgical changes. Assessment and Recommendations: Active Hospital Problems    Diagnosis Date Noted    Sprain of posterior cruciate ligament of right knee 12/21/2017    Diabetes mellitus type 2, diet-controlled ([address] 12/21/2017    Hypertension 12/21/2017    Hyperlipidemia 12/21/2017    Rupture of posterior cruciate ligament of right knee 12/17/2017    Tear of medial meniscus of right knee 12/17/2017    Tear of lateral meniscus of right knee 12/17/2017    Chondromalacia of right patella 12/17/2017    Chondromalacia of right knee 12/17/2017       - Hypertension. Stable. Continue with home medications. - Hyperlipidemia. Stable. Continue home meds. - Diabetes mellitus type 2. Pt reports receiving diagnosis of DM in past, but has never bed on medications. Hgb A1c is > 7.0, so recommend pt discuss metformin or other oral hypoglycemics with PCP after discharge. For now, will follow BG daily. No indication for insulin at this time. - DVT Prophylaxis: per orthopedic surgery    Thank you for the opportunity to participate in [address] [place] [address]. Please don't hesitate to call if you have any questions.     Signed By: Kimberly Castillo MD     December 21, 2017

## 2017-12-22 NOTE — PROGRESS NOTES
Sensation returning to R thigh now. Foot remains numb. Ace bandage to R LE with iceman and knee immobilizer. No complaints. IV to [facility].

## 2017-12-22 NOTE — DISCHARGE SUMMARY
[address] [facility] Discharge Summary      Patient ID:  Graham Chowdhury  [id number]  46 y.o.  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]  Chondromalacia of right knee [M94.261]  Tear of posterior cruciate ligament of knee, right, initial encounter [D52.558Q]  Tear of medial meniscus of right knee, unspecified tear type, unspecified whether old or current tear, initial encounter [S83.241A]  Tear of lateral meniscus of right knee, unspecified tear type, unspecified whether old or current tear, initial encounter [S83.281A]  Chondromalacia, patella, right [M22.41]  Chondromalacia of right knee [M94.261]    Discharge Diagnoses: Principal Problem:    Rupture of posterior cruciate ligament of right knee (12/17/2017)    Active Problems:    Tear of medial meniscus of right knee (12/17/2017)      Tear of lateral meniscus of right knee (12/17/2017)      Chondromalacia of right patella (12/17/2017)      Chondromalacia of right knee (12/17/2017)      Diabetes mellitus type 2, diet-controlled ([facility] [address] (12/21/2017)      Hypertension (12/21/2017)      Hyperlipidemia (12/21/2017)        Surgeon: Bay Lerner MD                                Perioperative Antibiotics: Ancef  _X__                                                Vancomycin  ___          Post Op complications: none        Discharged to: Home    Discharge instructions:  -Resume pre hospital diet             -Resume home medications per medical continuation form     50 % PWB RIGHT LEG  KNEE IMMOBILIZER RIGHT KNEE  -Follow up in office as scheduled       Signed:  Brie Briseno MD  12/23/2017  7:56 PM

## 2017-12-22 NOTE — PROGRESS NOTES
Pt admitted into room 337 from PACU via bed. Pt is numb on bilateral LE's and has  c/o pain 5/10.  1 mg dilaudid given SIVP. Bulky ACE wrap to right leg from thigh to foot is clean dry and intact  Toes warm,  Brisk cap refill, and with 2+ pedal pulses. Family at the bedside.  assists with questions and teaching during the admission process. Bed controls, lights, plan of care and dining on call explained to pt via . Supervisor called for the blue  phone for use during the night.

## 2017-12-22 NOTE — PERIOP NOTES
TRANSFER - IN REPORT:    Verbal report received from [facility] (name) on Natalio Conner  being received from [facility] (unit) for routine progression of care      Report consisted of patients Situation, Background, Assessment and   Recommendations(SBAR). Information from the following report(s) SBAR, Kardex, Intake/Output and MAR was reviewed with the receiving nurse. Opportunity for questions and clarification was provided.

## 2017-12-22 NOTE — PROGRESS NOTES
present for physical therapy session with Jana Kaminski PT, consult with social Maida Guidry, and Dr. Sindi Barroso.            Giana Hansen [facility]//Patient Relations  [place]  [address] / [place], [address] [facility]

## 2017-12-22 NOTE — PROGRESS NOTES
12/22/17 0834   Oxygen Therapy   O2 Sat (%) 92 %   Pulse via Oximetry 75 beats per minute   O2 Device Room air   Patient achieved  800  Ml/sec on IS. Patient encouraged to do 10 breaths every hour while awake-patient agreed and demonstrated. No shortness of breath or distress noted. BS are clear b/l.  in room.

## 2017-12-22 NOTE — DISCHARGE INSTRUCTIONS
DISCHARGE SUMMARY from Nurse    The following personal items collected during your admission are returned to you:   Dental Appliance: Dental Appliances: Lowers; Other (comment) (bridge )  Vision: Visual Aid: Glasses  Hearing Aid:   na  Jewelry: Jewelry: None  Clothing: Clothing: At bedside  Other Valuables: Other Valuables: None  Valuables sent to safe:   na          PATIENT INSTRUCTIONS:    New Medications:    Dilaudid 2 mg tabs Take 1-2 tabs every 4-6 hrs as needed for pain. Toradol 10 mg tabs Take 1 tab every 6 hrs x 5 days. Phenergan 25 mg tabs Take 1 tab every 8 hrs as needed for nausea. After general anesthesia or intravenous sedation, for 24 hours or while taking prescription Narcotics:  · Limit your activities  · Do not drive and operate hazardous machinery  · Do not make important personal or business decisions  · Do  not drink alcoholic beverages  · If you have not urinated within 8 hours after discharge, please contact your surgeon on call. Report the following to your surgeon:  · Excessive pain, swelling, redness or odor of or around the surgical area  · Temperature over 101  · Nausea and vomiting lasting longer than 4 hours or if unable to take medications  · Any signs of decreased circulation or nerve impairment to extremity: change in color, persistent  numbness, tingling, coldness or increase pain  · Any questions, call office @ [phone]. What to do at Home:  Recommended activity: activity as tolerated, as instructed per Dr. Harpreet Johnson. Continue with exercises taught by Physical Therapy. Resume pre hospital diet. Use walker to ambulate. Partial weight  bearing to right knee. Wear knee immobilizer as instructed. Use ice and elevate leg to decrease pain and swelling. If you experience any of the following symptoms temp>101, pain unrelieved by meds, or persistent nausea or vomitting, please follow up with Dr. Harpreet Johnson @ [phone].       *  Please give a list of your current medications to your Primary Care Provider. *  Please update this list whenever your medications are discontinued, doses are      changed, or new medications (including over-the-counter products) are added. *  Please carry medication information at all times in case of emergency situations. Reconstrucción del ligamento farnaz anterior: Naya [place] en el hogar - [ Anterior Cruciate Ligament Reconstruction: What to Expect at Home ]  Mueller recuperación  La cirugía del ligamento farnaz anterior (LCA) reemplaza el ligamento dañado con yovana nuevo, llamado injerto. En la [employer], el injerto es un tendón que se extrae de mueller propia rodilla o los isquiotibiales (corva). En algunos casos, el injerto proviene de un donante. Se sentirá fatigado daniel varios días. Tendrá la rodilla hinchada y podría tener entumecimiento alrededor del bianca (incisión) en la rodilla. El tobillo y la espinilla podrían tener moretones o estar hinchados. Puede colocarse hielo en la bulmaro para reducir la hinchazón. La mayoría de estas complicaciones desaparecerán en pocos días. En poco tiempo debería comenzar a notar mejoría en la rodilla. Jamal vez pueda volver a hacer la mayoría de beth actividades habituales en pocas semanas. Destiny pasarán varios meses hasta que recupere el uso completo de la rodilla. Podría tardar Melva Ree 6 meses y un año antes de que la rodilla esté lista para hacer trabajo físico intenso o ciertos deportes. La cirugía puede ayudar. Destiny aun después de la \[facility] [place] [facility]\"", es posible que la rodilla no sea dunham savanah palmira lo era antes de la lesión. Deberá hacer ejercicios de rehabilitación para recuperar la fuerza y el movimiento de la articulación. El tiempo que tarde en volver a hacer deportes o ejercicio dependerá de cómo siga el programa de rehabilitación y cómo sane la rodilla. Mueller médico o fisioterapeuta le dará Xavier parker de cuándo podría retomar estas actividades.  Young  de las personas pueden volver a trotar en unos 4 meses y correr o montar en bicicleta al cabo de unos 4 a 6 meses. Es posible que necesite usar un aparato ortopédico en la rodilla para practicar deportes. Esta hoja de [employer] idea general del tiempo que le llevará recuperarse. Sin embargo, cada persona se recupera a un ritmo diferente. Siga los pasos que se mencionan a continuación para recuperarse lo más rápido posible. ¿Cómo puede cuidarse en el hogar? Actividad  ? · Descanse cuando se sienta cansado. Dormir lo suficiente le ayudará a recuperarse. Duerma con la rodilla elevada, rylie no flexionada. Ponga miroslava almohada debajo del pie. Mantenga la pierna levantada el mayor tiempo posible daniel los primeros días. ? · Podría usar un aparato ortopédico y [employer] para moverse por la casa y hacer las tareas diarias. No ponga el peso sobre lucero pierna sin las [employer] que lucero médico lo autorice. Tendrá los músculos del muslo débiles, así que tómese lucero tiempo y no corra riesgos. Tendrá que usar las muletas daniel 1 o 2 semanas. ? · No todos los médicos indican el uso de aparatos ortopédicos. Si tiene un aparato ortopédico, quíteselo únicamente para ejercitar la rodilla o para ducharse. Tenga cuidado de no ponerse al aparato ortopédico demasiado apretado. Es probable que necesite usarlo por entre 2 y 10 semanas. ? · No humberto ninguna actividad intensa daniel 12 semanas. [place] incluye no solamente deportes, sino también cosas palmira cortar el césped (zacate), barrer las hojas o quitar la donovan. ? · Puede ducharse entre 24 y 50 horas después de la [place], si lucero médico lo [place]. Cuando se duche, mantenga secos el vendaje y la incisión cubriéndolos con un plástico y sujetando el plástico con cinta adhesiva. Sería conveniente conseguir miroslava banqueta para ducha para sentarse. Si tiene un aparato ortopédico, quíteselo solo si lucero médico lo autoriza.    ? · Si lucero médico no quiere que se duche ni que se quite el aparato ortopédico, puede darse un baño de esponja. ? · No tome sherwin de inmersión, nade, utilice miroslava bañera de hidromasaje ni sumerja la pierna en agua daniel 2 semanas o hasta que lucero médico lo autorice. ? · Puede conducir cuando ya no esté utilizando las CDNlion ni el aparato ortopédico para la rodilla, cuando no esté tomando ya analgésicos (medicamentos para el dolor) recetados y cuando tenga algo de control sobre lucero rodilla. [address], esto lleva de 1 a 2 semanas. ? · La rapidez con la que puede volver a lucero empleo depende del trabajo que humberto. Si permanece sentado en lucero trabajo, podría volver en 1 o 2 semanas. Destiny si está de pie en lucero trabajo, podría llevarle de 4 a 6 semanas. Si lucero trabajo incluye mucha [facility], podría tardar de 4 a 6 meses. Alimentación  ? · Puede continuar con lucero dieta normal. Si tiene [employer], coma alimentos suaves bajos en grasa, palmira arroz sin condimentos, agnieszka a la baldev, pan marialuisa y yogur. Precious abundantes líquidos. ? · Podría notar que no evacua el intestino con regularidad kirk después de la [place]. [place] es común. Trate de evitar el estreñimiento y de no hacer esfuerzos cuando evacua el intestino. Jamal vez desee fish un suplemento de iDubba. Si no ha evacuado el intestino después de un par de días, pregúntele a lucero médico si puede fish un laxante suave. Medicamentos  ? · Lucero médico le dirá si puede volver a fish beth medicamentos y cuándo puede volver a hacerlo. También le dará indicaciones sobre cualquier medicamento nuevo que deba fish usted. ? · Si leona medicamentos que previenen la formación de coágulos de [place], palmira warfarina (Coumadin), clopidogrel (Plavix) o aspirina, asegúrese de hablar con lucero médico. Él o betzaida le dirá si debe volver a fish estos medicamentos y en qué momento. Asegúrese de que entiende exactamente lo que el médico quiere que humberto. ? · [employer] analgésicos exactamente según las indicaciones.   ¨ Si el [employer] recetó un analgésico, tómelo según las indicaciones. ¨ Si no está tomando un analgésico recetado, pregúntele a lucero médico si puede fish yovana de [place]. ? · Si lucero médico le recetó antibióticos, tómelos según las indicaciones. No deje de tomarlos por el hecho de sentirse mejor. Debe fish todos los antibióticos hasta terminarlos. ? · Si le parece que el analgésico le está produciendo malestar estomacal:  ¨ [place] el medicamento después de las comidas (a menos que lucero médico le haya indicado lo contrario). ¨ [place] al médico un analgésico diferente. Cuidado de la incisión  ? · Si tiene un vendaje sobre la incisión, manténgalo limpio y seco. Siga las instrucciones de lucero médico. Lucero médico probablemente querrá que se deje el vendaje hasta que Kip Severe a lucero consultorio. Si lucero médico lo permite, podría quitarse el vendaje al cabo de 50 a 67 horas después de la [facility] [place]. ? · Si le lazcano colocado tiras de cinta [employer] incisión, déjeselas puestas miroslava semana o hasta que se caigan por sí solas. Mantenga la bulmaro limpia y seca. Ejercicio  ? · Ejercitarse en un programa de rehabilitación es miroslava parte importante de lucero tratamiento. Le ayudará a mejorar la amplitud de movimiento de lucero Carloyn Downy y a recuperar lucero fuerza muscular. ? · Es posible que le den miroslava máquina de movimiento pasivo continuo. Esta máquina hará algunos de los ejercicios por usted. La usará daniel aproximadamente 2 semanas. Hielo y elevación  ? · Para reducir la hinchazón y el dolor, colóquese hielo o miroslava compresa fría sobre la rodilla de 10 a 20 minutos cada vez. Repita eliezer proceso cada pocas horas. Póngase un paño belcher entre el hielo y la piel. ? · Eleve la pierna adolorida sobre miroslava almohada cuando se aplique hielo o en cualquier momento que se siente o se acueste, daniel los 3 días después de la [place]. Trate de mantenerla por encima del nivel del corazón. [place] ayudará a reducir la hinchazón.    ? · Si lucero médico le ronald medias de compresión, úselas daniel el tiempo que le indique. Lorena Alley a [employer] de [place]. La atención de seguimiento es miroslava parte clave de lucero tratamiento y seguridad. Asegúrese de hacer y acudir a todas las citas, y llame a lucero médico si está teniendo problemas. También es miroslava buena idea saber los resultados de los exámenes y mantener miroslava lista de los medicamentos que leona. ¿Cuándo debe pedir ayuda? Llame al 911 en cualquier momento que considere que puede necesitar atención de [place]. Por ejemplo, llame si:  ? · Se desmayó (perdió el conocimiento). ? · Tiene grave dificultad para respirar. ? · Tiene dolor repentino en el pecho y falta de [place], o tose alexa. ?Llame a lucero médico ahora mismo o busque atención médica inmediata si:  ? · Tiene dolor que no mejora después de fish analgésicos (medicamentos para el dolor). ? · Tiene puntos de sutura flojos o se abre la incisión. ? · Tiene señales de infección, tales palmira:  ¨ Aumento de dolor, hinchazón, temperatura o enrojecimiento. ¨ Vetas mcghee que salen de la incisión. ¨ Pus que sale de la incisión. [place]. ? Vigile atentamente los [facility], y asegúrese de comunicarse con lucero médico si tiene algún problema. ¿Dónde puede encontrar más información en inglés? Sonja Cevallos a http://brain-erna.info/. Ness Longo Y225 en la búsqueda para aprender más acerca de \"Reconstrucción del ligamento farnaz anterior: Dionicio Magdalena en el hogar - [ Anterior Cruciate Ligament Reconstruction: What to Expect at Home ]. \"  Revisado: 21 marzo, 2017  Versión del contenido: 11.4  © 3187-1323 Healthwise, Incorporated. Las instrucciones de cuidado fueron adaptadas bajo licencia por [employer] (which disclaims liability or warranty for this information). Si usted tiene [place] afección médica o sobre estas instrucciones, siempre pregunte a lucero profesional de trinh.  Healthwise, Incorporated niega toda garantía o responsabilidad por lucero uso de nathan king. These are general instructions for a healthy lifestyle:    No smoking/ No tobacco products/ Avoid exposure to second hand smoke    Surgeon General's Warning:  Quitting smoking now greatly reduces serious risk to your health. Obesity, smoking, and sedentary lifestyle greatly increases your risk for illness    A healthy diet, regular physical exercise & weight monitoring are important for maintaining a healthy lifestyle    You may be retaining fluid if you have a history of heart failure or if you experience any of the following symptoms:  Weight gain of 3 pounds or more overnight or 5 pounds in a week, increased swelling in our hands or feet or shortness of breath while lying flat in bed. Please call your doctor as soon as you notice any of these symptoms; do not wait until your next office visit. Recognize signs and symptoms of STROKE:    F-face looks uneven    A-arms unable to move or move unevenly    S-speech slurred or non-existent    T-time-call 911 as soon as signs and symptoms begin-DO NOT go       Back to bed or wait to see if you get better-TIME IS BRAIN. The discharge information has been reviewed with the patient. The patient verbalized understanding.

## 2017-12-22 NOTE — PROGRESS NOTES
Foot remains numb but has sensation in knee, starting to throb, medicated with dilaudid 2mg po.  Resting in bed with iceman and JAYLA MCINTYRE.

## 2017-12-22 NOTE — PROGRESS NOTES
Initial visit with patient by [address] [place] [address]. Card left. Sascha Ott M.Div.

## 2017-12-22 NOTE — PROGRESS NOTES
Problem: Mobility Impaired (Adult and Pediatric)  Goal: *Acute Goals and Plan of Care (Insert Text)  1 WEEK GOALS :  (1.)Ms. Richie Silvestre will move from supine to sit and sit to supine  with SUPERVISION within 7 day(s). (2.)Ms. Richie Silvestre will transfer from bed to chair and chair to bed with SUPERVISION using the least restrictive device within 7 day(s). (3.)Ms. Richie Silvestre will ambulate with SUPERVISION for 50-75 feet with the least restrictive device within 7 day(s). __________________________________________________________________________    PHYSICAL THERAPY: Initial Assessment, Treatment Day: Day of Assessment, AM 12/22/2017  OBSERVATION: Hospital Day: 2  Payor: Alejo Bautista / Plan: [address] [place] [address] / Product Type:  Workers Comp /      NAME/AGE/GENDER: Pancho Nelson is a 46 y.o. female   PRIMARY DIAGNOSIS: Tear of posterior cruciate ligament of knee, right, initial encounter [S83.521A]  Tear of medial meniscus of right knee, unspecified tear type, unspecified whether old or current tear, initial encounter [S83.241A]  Tear of lateral meniscus of right knee, unspecified tear type, unspecified whether old or current tear, initial encounter [S83.281A]  Chondromalacia, patella, right [M22.41]  Chondromalacia of right knee [M94.261]  Tear of posterior cruciate ligament of knee, right, initial encounter [A62.329X]  Tear of medial meniscus of right knee, unspecified tear type, unspecified whether old or current tear, initial encounter [S83.241A]  Tear of lateral meniscus of right knee, unspecified tear type, unspecified whether old or current tear, initial encounter [S83.281A]  Chondromalacia, patella, right [M22.41]  Chondromalacia of right knee [M94.261] Rupture of posterior cruciate ligament of right knee Rupture of posterior cruciate ligament of right knee  Procedure(s) (LRB):  RIGHT KNEE ARTHROSCOPY WITH POSTERIOR CRUCIATE LIGAMENT RECONSTRUCTION WITH ALLOGRAFT WITH BONE PATELLA TENDON BONE GRAFT  GEN/FEM SCIATIC NERVE BLOCK  (Right)  POSS ACHILLES ALLOGRAFT/ RIGHT (Right)  1 Day Post-Op  ICD-10: Treatment Diagnosis:   · Generalized Muscle Weakness (M62.81)  · Other lack of cordination (R27.8)  · Difficulty in walking, Not elsewhere classified (R26.2)  · Other abnormalities of gait and mobility (R26.89)   Precaution/Allergies:  Review of patient's allergies indicates no known allergies. ASSESSMENT:     Ms. Susie Marie presents with block still intact therefore having to perform NWB technique with transfers & gait. This pt was informed with  present that she was not safe to be unattended till HHPT determined that pt was independent using a rolling walker with the proper WB technique. This section established at most recent assessment   PROBLEM LIST (Impairments causing functional limitations):  1. Decreased Strength  2. Decreased ADL/Functional Activities  3. Decreased Transfer Abilities  4. Decreased Ambulation Ability/Technique  5. Decreased Balance  6. Increased Pain  7. Decreased Activity Tolerance   INTERVENTIONS PLANNED: (Benefits and precautions of physical therapy have been discussed with the patient.)  1. Balance Exercise  2. Bed Mobility  3. Gait Training  4. Therapeutic Activites  5. Transfer Training     TREATMENT PLAN: Frequency/Duration: twice daily for duration of hospital stay  Rehabilitation Potential For Stated Goals: Darcie Hargrove REHABILITATION/EQUIPMENT: (at time of discharge pending progress): Due to the probability of continued deficits (see above) this patient will likely need continued skilled physical therapy after discharge.   Equipment:    rolling walker fixed wheels & HHPT              HISTORY:   History of Present Injury/Illness (Reason for Referral):    Past Medical History/Comorbidities:   Ms. Susie Marie  has a past medical history of Adverse effect of anesthesia; Former smoker; Hypercholesteremia; and Hypertension. She also has no past medical history of Difficult intubation; Malignant hyperthermia due to anesthesia; Nausea & vomiting; or Pseudocholinesterase deficiency. Ms. Teresa Chavarria  has a past surgical history that includes hx appendectomy; hx other surgical; hx oophorectomy; and hx  section. Social History/Living Environment:   Home Environment: Private residence  # Steps to Enter: 0  One/Two Story Residence: One story  Living Alone: No  Support Systems: Family member(s), Friends \ neighbors  Patient Expects to be Discharged to[de-identified] Private residence  Current DME Used/Available at Home: Crutches  Prior Level of Function/Work/Activity:  Pt was functioning with crutches & knee brace since Oct 2017  Personal Factors: Other factors that influence how disability is experienced by the patient:  Current & PMH   Number of Personal Factors/Comorbidities that affect the Plan of Care: 1-2: MODERATE COMPLEXITY   EXAMINATION:   Most Recent Physical Functioning:   Gross Assessment:  AROM: Generally decreased, functional (left LE)  Strength: Generally decreased, functional (left LE)  Coordination: Generally decreased, functional (left LE)                    Balance:  Sitting: Intact; Without support  Standing: Impaired; With support (walker) Bed Mobility:  Supine to Sit: Minimum assistance  Sit to Supine:  (NT)  Scooting: Contact guard assistance       Transfers:  Sit to Stand: Minimum assistance  Stand to Sit: Minimum assistance  Bed to Chair: Minimum assistance (with walker)  Gait:  Right Side Weight Bearing:  (will treat with block in as NWB, when block gone 50% PWB)  Step Length: Right shortened  Gait Abnormalities: Decreased step clearance;Shuffling gait  Distance (ft): 30 Feet (ft)  Assistive Device: Walker, rolling  Ambulation - Level of Assistance: Contact guard assistance   Functional Mobility:         Gait/Ambulation:  cga        Transfers: cga        Bed Mobility:  min   Body Structures Involved:  1. Joints  2. Muscles Body Functions Affected:  1. Sensory/Pain  2. Movement Related Activities and Participation Affected:  1. General Tasks and Demands  2. Mobility   Number of elements that affect the Plan of Care: 3: MODERATE COMPLEXITY   CLINICAL PRESENTATION:   Presentation: Evolving clinical presentation with changing clinical characteristics: MODERATE COMPLEXITY   CLINICAL DECISION MAKIN [facility] Mobility Inpatient Short Form  How much difficulty does the patient currently have. .. Unable A Lot A Little None   1. Turning over in bed (including adjusting bedclothes, sheets and blankets)? [] 1   [] 2   [x] 3   [] 4   2. Sitting down on and standing up from a chair with arms ( e.g., wheelchair, bedside commode, etc.)   [] 1   [] 2   [x] 3   [] 4   3. Moving from lying on back to sitting on the side of the bed? [] 1   [] 2   [x] 3   [] 4   How much help from another person does the patient currently need. .. Total A Lot A Little None   4. Moving to and from a bed to a chair (including a wheelchair)? [] 1   [] 2   [x] 3   [] 4   5. Need to walk in hospital room? [] 1   [] 2   [x] 3   [] 4   6. Climbing 3-5 steps with a railing? [x] 1   [] 2   [] 3   [] 4   © , Trustees of [address] [place], [place] [address], under license to [employer]. All rights reserved      Score:  Initial: 16 Most Recent: X (Date: -- )    Interpretation of Tool:  Represents activities that are increasingly more difficult (i.e. Bed mobility, Transfers, Gait). Score 24 23 22-20 19-15 14-10 9-7 6     Modifier CH CI CJ CK CL CM CN      ?  Mobility - Walking and Moving Around:     - CURRENT STATUS: CK - 40%-59% impaired, limited or restricted    - GOAL STATUS: CJ - 20%-39% impaired, limited or restricted  ---------------To be determined---------------    - D/C STATUS:  ---------------To be determined---------------  Payor: GENERIC WORKERS COMPENSATION / Plan: [address] / Product Type: Workers Comp /      Medical Necessity:     · Patient is expected to demonstrate progress in strength, balance, coordination and functional technique to decrease assistance required with bed mobility, transfers & gait. Reason for Services/Other Comments:  · Patient continues to require skilled intervention due to pt unsafe with functional moibility. Use of outcome tool(s) and clinical judgement create a POC that gives a: Questionable prediction of patient's progress: MODERATE COMPLEXITY            TREATMENT:   (In addition to Assessment/Re-Assessment sessions the following treatments were rendered)   Pre-treatment Symptoms/Complaints:  Poor pain control  Pain: Initial: visual scale  Pain Intensity 1: 6  Pain Location 1: Knee  Pain Orientation 1: Right  Pain Intervention(s) 1: Cold pack, Repositioned  Post Session:  6/10     Assessment/Reassessment only, no treatment provided today    Braces/Orthotics/Lines/Etc:   · IV  Treatment/Session Assessment:    · Response to Treatment:  Tolerated poor  · Interdisciplinary Collaboration:   o Registered Nurse  o   · After treatment position/precautions:   o Up in chair  o Bed/Chair-wheels locked  o Call light within reach  o RN notified   · Compliance with Program/Exercises: Will assess as treatment progresses. · Recommendations/Intent for next treatment session: \"Next visit will focus on reduction in assistance provided\".   Total Treatment Duration:  PT Patient Time In/Time Out  Time In: 1045  Time Out: 1100    Vamshi Rodas PT

## 2017-12-23 VITALS
RESPIRATION RATE: 16 BRPM | SYSTOLIC BLOOD PRESSURE: 108 MMHG | HEART RATE: 69 BPM | DIASTOLIC BLOOD PRESSURE: 68 MMHG | OXYGEN SATURATION: 96 % | TEMPERATURE: 97.9 F | WEIGHT: 173 LBS | BODY MASS INDEX: 33.79 KG/M2

## 2017-12-23 PROBLEM — S83.521A SPRAIN OF POSTERIOR CRUCIATE LIGAMENT OF RIGHT KNEE: Status: RESOLVED | Noted: 2017-12-21 | Resolved: 2017-12-23

## 2017-12-23 PROCEDURE — 74011250636 HC RX REV CODE- 250/636: Performed by: ORTHOPAEDIC SURGERY

## 2017-12-23 PROCEDURE — 97530 THERAPEUTIC ACTIVITIES: CPT

## 2017-12-23 PROCEDURE — 74011250637 HC RX REV CODE- 250/637: Performed by: ORTHOPAEDIC SURGERY

## 2017-12-23 PROCEDURE — 74011250637 HC RX REV CODE- 250/637: Performed by: FAMILY MEDICINE

## 2017-12-23 PROCEDURE — 99218 HC RM OBSERVATION: CPT

## 2017-12-23 RX ADMIN — HYDROMORPHONE HYDROCHLORIDE 4 MG: 2 TABLET ORAL at 04:48

## 2017-12-23 RX ADMIN — DOCUSATE SODIUM 100 MG: 100 CAPSULE, LIQUID FILLED ORAL at 08:00

## 2017-12-23 RX ADMIN — GEMFIBROZIL 600 MG: 600 TABLET ORAL at 08:00

## 2017-12-23 RX ADMIN — HYDROMORPHONE HYDROCHLORIDE 1 MG: 2 INJECTION, SOLUTION INTRAMUSCULAR; INTRAVENOUS; SUBCUTANEOUS at 07:57

## 2017-12-23 RX ADMIN — HYDROMORPHONE HYDROCHLORIDE 4 MG: 2 TABLET ORAL at 12:14

## 2017-12-23 NOTE — PROGRESS NOTES
Pt is resting in the bed c/o pain 10/10, and is crying  4 mg dilaudid given PO. B/P Is low so explained to pt we can not give the IV pain med. Pt able to move her feet and ankles now.  at the bedside. Dressing clean dry and intact with the ace wrap , and knee immobilizer.   No further needs or c/o's

## 2017-12-23 NOTE — PROGRESS NOTES
Awake,alert,,pain level=6/10 as stated ,,able to dorsiflexed  Right foot fairly well,,,noted knee immobilizer present on RLE,, Dr.A. Rouse in to see patient,,orders made,,assessment completed ,,given slow IV push Dilaudid (1mg.),prn for pain,,immediate needs attended to by ,,patient is Italian but can understand [place] when spoken to. .. Mabeline Sink Mabeline Sink Mabeline Sink

## 2017-12-23 NOTE — DISCHARGE SUMMARY
[address] [address] LINDSAY Jameson  MR#: [id number]  : 1965  ACCOUNT #: [de-identified]   ADMIT DATE: 2017  DISCHARGE DATE:     ADMISSION DIAGNOSES:    1. Posterior cruciate ligament tear right knee. 2.  Medial meniscal tear right knee. 3.  Lateral meniscal repair right knee  4. Patellofemoral chondromalacia right knee. 5.  Chondromalacia right knee. 6.  Hypertension and hypercholesterolemia. Please see H and P, office notes and consult for details. HISTORY OF PRESENT ILLNESS:  Patient is a 59-year-old female who sustained a right knee injury on the job. The patient was admitted on 2017 underwent uncomplicated arthroscopy of the right knee, arthroscopic posterior cruciate ligament reconstruction utilizing Achilles tendon allograft, partial medial and lateral meniscectomies. Patient was kept overnight for observation due to the magnitude of her operative procedure. On postoperative day #1, it was noted that the patient's right lower extremity block was still intact. The patient had a dense block with only pressure sensation and inability to move her foot, ankle, toes and support her weight. Due to the fact that the patient could not bear weight, therapy could not start. The patient could not support herself. The patient started to improve in regards to resolution of her block and improvement of her neurologic function late in the day on 2017. Due to the magnitude of the block, it was elected to keep the patient overnight for an additional night. On postoperative day #2, which is 2017 her block had resolved. All labs were within normal limits. She was discharged home on postoperative day #2. She will continue therapy on the outside and follow up in my office in 10 days. The hospitalist was following.       MD ALEXANDR Fierro/JINNY  D: 2017 08:31     T: 2017 12:02  JOB #: [id number]

## 2017-12-23 NOTE — PROGRESS NOTES
Orthopedic Joint Progress Note    2017  Admit Date: 2017  Admit Diagnosis: Tear of posterior cruciate ligament of knee, right, initial encounter [S83.521A]  Tear of medial meniscus of right knee, unspecified tear type, unspecified whether old or current tear, initial encounter [S83.241A]  Tear of lateral meniscus of right knee, unspecified tear type, unspecified whether old or current tear, initial encounter [S83.281A]  Chondromalacia, patella, right [M22.41]  Chondromalacia of right knee [M94.261]  Tear of posterior cruciate ligament of knee, right, initial encounter [S83.521A]  Tear of medial meniscus of right knee, unspecified tear type, unspecified whether old or current tear, initial encounter [S83.241A]  Tear of lateral meniscus of right knee, unspecified tear type, unspecified whether old or current tear, initial encounter [S83.281A]  Chondromalacia, patella, right [M22.41]  Chondromalacia of right knee [M94.261]    2 Days Post-Op    Subjective: more pain today can move leg, foot, ankle, and toes     Anneliese      Review of Systems: Pertinent items are noted in HPI. Objective:     PT/OT:     PATIENT MOBILITY    Bed Mobility  Supine to Sit: Minimum assistance  Sit to Supine:  (NT)  Scooting: Contact guard assistance  Transfers  Sit to Stand: Minimum assistance  Stand to Sit: Minimum assistance  Bed to Chair: Minimum assistance (with walker)      Gait  Step Length: Right shortened  Gait Abnormalities: Decreased step clearance, Shuffling gait  Ambulation - Level of Assistance: Contact guard assistance  Distance (ft): 30 Feet (ft)  Assistive Device: Walker, rolling   Weight Bearing Status  Right Side Weight Bearing:  (will treat with block in as NWB, when block gone 50% PWB)        Vital Signs:    Blood pressure 108/68, pulse 69, temperature 97.9 °F (36.6 °C), resp. rate 16, weight 78.5 kg (173 lb), SpO2 96 %, not currently breastfeeding.   Temp (24hrs), Av.1 °F (36.7 °C), Min:97.8 °F (36.6 °C), Max:98.4 °F (36.9 °C)      Pain Control:   Pain Assessment  Pain Scale 1: Numeric (0 - 10)  Pain Intensity 1: 7  Pain Location 1: Knee  Pain Orientation 1: Right  Pain Description 1: Constant  Pain Intervention(s) 1: Medication (see MAR), Cold pack    Meds:  Current Facility-Administered Medications   Medication Dose Route Frequency    sodium chloride (NS) flush 5-10 mL  5-10 mL IntraVENous Q8H    sodium chloride (NS) flush 5-10 mL  5-10 mL IntraVENous PRN    bisacodyl (DULCOLAX) suppository 10 mg  10 mg Rectal DAILY PRN    sodium phosphate (FLEET'S) enema 118 mL  1 Enema Rectal PRN    docusate sodium (COLACE) capsule 100 mg  100 mg Oral BID    promethazine (PHENERGAN) tablet 25 mg  25 mg Oral Q4H PRN    HYDROmorphone (PF) (DILAUDID) injection 1 mg  1 mg IntraVENous Q1H PRN    temazepam (RESTORIL) capsule 15 mg  15 mg Oral QHS PRN    HYDROmorphone (DILAUDID) tablet 4 mg  4 mg Oral Q3H PRN    HYDROmorphone (DILAUDID) tablet 2 mg  2 mg Oral Q3H PRN    acetaminophen (TYLENOL) tablet 325 mg  325 mg Oral Q4H PRN    atorvastatin (LIPITOR) tablet 20 mg  20 mg Oral QHS    gemfibrozil (LOPID) tablet 600 mg  600 mg Oral BID    lisinopril-hydroCHLOROthiazide (PRINZIDE, ZESTORETIC) 10-12.5 mg per tablet 1 Tab  1 Tab Oral DAILY        LAB:    No results found for: INR  Lab Results   Component Value Date/Time    HGB 10.8 12/22/2017 05:41 AM       Wound Knee Right (Active)   DRESSING STATUS Clean, dry, and intact 12/22/2017  7:55 PM   DRESSING TYPE Dry dressing; Ice wound dressing/Cryotherapy 12/21/2017  7:46 PM   SPLINT TYPE/MATERIAL Knee immobilizer 12/22/2017  7:55 PM   Number of days:2             Physical Exam:  No significant changes    Assessment:      Principal Problem:    Rupture of posterior cruciate ligament of right knee (12/17/2017)    Active Problems:    Tear of medial meniscus of right knee (12/17/2017)      Tear of lateral meniscus of right knee (12/17/2017)      Chondromalacia of right patella (12/17/2017)      Chondromalacia of right knee (12/17/2017)      Diabetes mellitus type 2, diet-controlled ([address] (12/21/2017)      Hypertension (12/21/2017)      Hyperlipidemia (12/21/2017)         Plan:     Continue PT/OT/Rehab  Neurologic function returned  Discharge home after afternoon physical therapy today  Will arrange home health and physical therapy    Patient Expects to be Discharged to[de-identified] Private residence     Signed By: Connie Virk MD

## 2017-12-23 NOTE — PROGRESS NOTES
Patient request for pain medication. See MAR for medication administration. Patient crying DT pain level of 10. Patient given IV medication. RN explained to patient that she should call before pain level is so high. Available times for pain medication written on white board and information verbally communicated to patient via [facility]  # [id number].

## 2017-12-23 NOTE — PROGRESS NOTES
Shift assessment completed with assistance of  # [id number]. Able to verbalize needs. Resting quietly with no distress noted. Dressing to right knee is clean, dry and intact. Everlena Marrow in place. Patient is non weight bearing on RLE. Neurovascular and peripheral vascular checks WNL. Incentive Spirometry at bedside. Patient encouraged to use hourly x 10 repetitions. Patient voiding clear yellow urine without difficulty. Pain is being managed with Dilaudid with patient tolerating well. Bed low and locked. Call light within reach. Patient instructed to call for assistance. Pt verbalizes understanding. Will monitor.

## 2017-12-23 NOTE — PROGRESS NOTES
Problem: Mobility Impaired (Adult and Pediatric)  Goal: *Acute Goals and Plan of Care (Insert Text)  1 WEEK GOALS :  (1.)Ms. Kaitlyn Colon will move from supine to sit and sit to supine  with SUPERVISION within 7 day(s). (2.)Ms. Kaitlyn Colon will transfer from bed to chair and chair to bed with SUPERVISION using the least restrictive device within 7 day(s). (3.)Ms. Kaitlyn Colon will ambulate with SUPERVISION for 50-75 feet with the least restrictive device within 7 day(s). __________________________________________________________________________    PHYSICAL THERAPY: Daily Note, Discharge, Treatment Day: 1st, AM 12/23/2017  OBSERVATION: Hospital Day: 3  Payor: Kiko Nguyen / Plan: [id number] [facility] / Product Type:  Workers Comp /      NAME/AGE/GENDER: Javan Santiago is a 46 y.o. female   PRIMARY DIAGNOSIS: Tear of posterior cruciate ligament of knee, right, initial encounter [S83.521A]  Tear of medial meniscus of right knee, unspecified tear type, unspecified whether old or current tear, initial encounter [S83.241A]  Tear of lateral meniscus of right knee, unspecified tear type, unspecified whether old or current tear, initial encounter [S83.281A]  Chondromalacia, patella, right [M22.41]  Chondromalacia of right knee [M94.261]  Tear of posterior cruciate ligament of knee, right, initial encounter [Y05.473I]  Tear of medial meniscus of right knee, unspecified tear type, unspecified whether old or current tear, initial encounter [S83.241A]  Tear of lateral meniscus of right knee, unspecified tear type, unspecified whether old or current tear, initial encounter [S83.281A]  Chondromalacia, patella, right [M22.41]  Chondromalacia of right knee [M94.261] Rupture of posterior cruciate ligament of right knee Rupture of posterior cruciate ligament of right knee  Procedure(s) (LRB):  RIGHT KNEE ARTHROSCOPY WITH POSTERIOR CRUCIATE LIGAMENT RECONSTRUCTION WITH ALLOGRAFT WITH BONE PATELLA TENDON BONE GRAFT  GEN/FEM SCIATIC NERVE BLOCK  (Right)  POSS ACHILLES ALLOGRAFT/ RIGHT (Right)  2 Days Post-Op  ICD-10: Treatment Diagnosis:   · Generalized Muscle Weakness (M62.81)  · Other lack of cordination (R27.8)  · Difficulty in walking, Not elsewhere classified (R26.2)  · Other abnormalities of gait and mobility (R26.89)   Precaution/Allergies:  Review of patient's allergies indicates no known allergies. ASSESSMENT:     Ms. Geneva Butler was able to demonstrate correct PWB with  present. PT instructed spouse on how to carry pt in chair up 2 steps since she is unable to safely attempt stairs. PT instructed pt to perform 100 + ankle pumps a day & to not get up unattended, still a fall risk. This section established at most recent assessment   PROBLEM LIST (Impairments causing functional limitations):  1. Decreased Strength  2. Decreased ADL/Functional Activities  3. Decreased Transfer Abilities  4. Decreased Ambulation Ability/Technique  5. Decreased Balance  6. Increased Pain  7. Decreased Activity Tolerance   INTERVENTIONS PLANNED: (Benefits and precautions of physical therapy have been discussed with the patient.)  1. Balance Exercise  2. Bed Mobility  3. Gait Training  4. Therapeutic Activites  5. Transfer Training     TREATMENT PLAN: Frequency/Duration: twice daily for duration of hospital stay  Rehabilitation Potential For Stated Goals: Mounika Navarro REHABILITATION/EQUIPMENT: (at time of discharge pending progress): Due to the probability of continued deficits (see above) this patient will likely need continued skilled physical therapy after discharge.   Equipment:    rolling walker fixed wheels & HHPT              HISTORY:   History of Present Injury/Illness (Reason for Referral):  As noted above multiple repairs to right knee  Past Medical History/Comorbidities:   Ms. Geneva Butler  has a past medical history of Adverse effect of anesthesia; Former smoker; Hypercholesteremia; and Hypertension. She also has no past medical history of Difficult intubation; Malignant hyperthermia due to anesthesia; Nausea & vomiting; or Pseudocholinesterase deficiency. Ms. Latrice Devlin  has a past surgical history that includes hx appendectomy; hx other surgical; hx oophorectomy; and hx  section. Social History/Living Environment:   Home Environment: Private residence  # Steps to Enter: 0  One/Two Story Residence: One story  Living Alone: No  Support Systems: Family member(s), Friends \ neighbors  Patient Expects to be Discharged to[de-identified] Private residence  Current DME Used/Available at Home: Crutches  Prior Level of Function/Work/Activity:  Pt was functioning with crutches & knee brace since Oct 2017  Personal Factors: Other factors that influence how disability is experienced by the patient:  Current & PMH   Number of Personal Factors/Comorbidities that affect the Plan of Care: 1-2: MODERATE COMPLEXITY   EXAMINATION:   Most Recent Physical Functioning:   Gross Assessment:  AROM: Generally decreased, functional (left LE)  Strength: Generally decreased, functional (left LE)  Coordination: Generally decreased, functional (left LE)                    Balance:  Sitting: Intact; Without support  Standing: Impaired; With support (walker) Bed Mobility:  Supine to Sit:  (NT)  Sit to Supine:  (NT)  Scooting: Contact guard assistance       Transfers:  Sit to Stand: Contact guard assistance  Stand to Sit: Contact guard assistance  Bed to Chair: Contact guard assistance (walker)  Duration: 15 Minutes (extra time to work through activity noted)  Gait:  Right Side Weight Bearing: Partial (%) (50%)  Step Length: Left shortened;Right shortened  Gait Abnormalities: Antalgic;Decreased step clearance  Distance (ft): 40 Feet (ft)  Assistive Device: Walker, rolling  Ambulation - Level of Assistance: Contact guard assistance   Functional Mobility: Gait/Ambulation:  cga        Transfers:  cga        Bed Mobility:  NT   Body Structures Involved:  1. Joints  2. Muscles Body Functions Affected:  1. Sensory/Pain  2. Movement Related Activities and Participation Affected:  1. General Tasks and Demands  2. Mobility   Number of elements that affect the Plan of Care: 3: MODERATE COMPLEXITY   CLINICAL PRESENTATION:   Presentation: Evolving clinical presentation with changing clinical characteristics: MODERATE COMPLEXITY   CLINICAL DECISION MAKIN [facility] Inpatient Short Form  How much difficulty does the patient currently have. .. Unable A Lot A Little None   1. Turning over in bed (including adjusting bedclothes, sheets and blankets)? [] 1   [] 2   [x] 3   [] 4   2. Sitting down on and standing up from a chair with arms ( e.g., wheelchair, bedside commode, etc.)   [] 1   [] 2   [x] 3   [] 4   3. Moving from lying on back to sitting on the side of the bed? [] 1   [] 2   [x] 3   [] 4   How much help from another person does the patient currently need. .. Total A Lot A Little None   4. Moving to and from a bed to a chair (including a wheelchair)? [] 1   [] 2   [x] 3   [] 4   5. Need to walk in hospital room? [] 1   [] 2   [x] 3   [] 4   6. Climbing 3-5 steps with a railing? [x] 1   [] 2   [] 3   [] 4   © , Trustees of [address] [place] [address], under license to "[employer] All rights reserved      Score:  Initial: 16 Most Recent: X (Date: -- )    Interpretation of Tool:  Represents activities that are increasingly more difficult (i.e. Bed mobility, Transfers, Gait). Score 24 23 22-20 19-15 14-10 9-7 6     Modifier CH CI CJ CK CL CM CN      ?  Mobility - Walking and Moving Around:     - CURRENT STATUS: CK - 40%-59% impaired, limited or restricted    - GOAL STATUS: CJ - 20%-39% impaired, limited or restricted  ---------------To be determined---------------    - D/C STATUS:  CK - 40%-59% impaired, limited or restricted  Payor: Alem Lozano / Plan: [id number] [facility] / Product Type: Workers Comp /      Medical Necessity:     · Patient is expected to demonstrate progress in strength, balance, coordination and functional technique to decrease assistance required with bed mobility, transfers & gait. Reason for Services/Other Comments:  · Patient continues to require skilled intervention due to pt unsafe with functional moibility. Use of outcome tool(s) and clinical judgement create a POC that gives a: Questionable prediction of patient's progress: MODERATE COMPLEXITY            TREATMENT:   (In addition to Assessment/Re-Assessment sessions the following treatments were rendered)   Pre-treatment Symptoms/Complaints:  Pt agreeable & understanding of PT's instruction  Pain: Initial: visual scale  Pain Intensity 1: 6  Pain Location 1: Knee  Pain Orientation 1: Right  Pain Intervention(s) 1: Cold pack  Post Session:  6/10     Therapeutic Activity: (  15 Minutes (extra time to work through activity noted) ):  Therapeutic activities including transfers, working on gait with teaching PWB 50% [employer] to improve safe maneuvering. Braces/Orthotics/Lines/Etc:   · IV  Treatment/Session Assessment:    · Response to Treatment:  Tolerated better today  · Interdisciplinary Collaboration:   o Registered Nurse  o   · After treatment position/precautions:   o Up in chair  o Bed/Chair-wheels locked  o Caregiver at bedside  o Call light within reach  o RN notified  o Family at bedside   · Compliance with Program/Exercises: Will assess as treatment progresses.   Recommendations/discharge acute PT, HHPT to follow up  Total Treatment Duration:  PT Patient Time In/Time Out  Time In: 1105  Time Out: 210 Lakisha Rey, PT

## 2017-12-23 NOTE — PROGRESS NOTES
IV access taken out of right arm,,,given two tablets Dilaudid ( 2mgs. each),prn for pain,,dressing changed already on RLE,,ace wrapped,,dorsiflexing right foot well,,knee immobilizer in place with instructions given to patient to keep RLE elevated and straight with immobilizer on,,discharge instructions given to patient thru ,,also given four prescriptions from physician (Dilaudid,Phenergan,Toradol and Restoril tablets),,both given opportunity for questions and for clarifications,,DME's needed available in room to take home. ..... Michelle Drake

## 2017-12-27 ENCOUNTER — HOME CARE VISIT (OUTPATIENT)
Dept: SCHEDULING | Facility: HOME HEALTH | Age: 52
End: 2017-12-27
Payer: OTHER MISCELLANEOUS

## 2017-12-27 VITALS
SYSTOLIC BLOOD PRESSURE: 132 MMHG | TEMPERATURE: 96.8 F | DIASTOLIC BLOOD PRESSURE: 76 MMHG | HEART RATE: 69 BPM | RESPIRATION RATE: 18 BRPM

## 2017-12-27 PROCEDURE — G0151 HHCP-SERV OF PT,EA 15 MIN: HCPCS

## 2017-12-27 PROCEDURE — 400013 HH SOC

## 2017-12-29 ENCOUNTER — HOME CARE VISIT (OUTPATIENT)
Dept: SCHEDULING | Facility: HOME HEALTH | Age: 52
End: 2017-12-29
Payer: OTHER MISCELLANEOUS

## 2017-12-29 VITALS
SYSTOLIC BLOOD PRESSURE: 122 MMHG | RESPIRATION RATE: 16 BRPM | HEART RATE: 72 BPM | TEMPERATURE: 97.3 F | DIASTOLIC BLOOD PRESSURE: 78 MMHG

## 2017-12-29 PROCEDURE — G0157 HHC PT ASSISTANT EA 15: HCPCS

## 2017-12-29 NOTE — ANESTHESIA POSTPROCEDURE EVALUATION
Patient received her vitamin b12 vaccine today. Tolerated vaccine well. She will follow next month for next medication administration. Post-Anesthesia Evaluation and Assessment    Patient: Barry Breaux MRN: [id number]  SSN: [id number]    YOB: 1965  Age: 46 y.o. Sex: female       Cardiovascular Function/Vital Signs  Visit Vitals    /60    Pulse 85    Temp 36.7 °C (98 °F)    Resp 14    Wt 78.5 kg (173 lb)    SpO2 94%    Breastfeeding No    BMI 33.79 kg/m2       Patient is status post general, regional anesthesia for Procedure(s):  RIGHT KNEE ARTHROSCOPY WITH POSTERIOR CRUCIATE LIGAMENT RECONSTRUCTION WITH ALLOGRAFT WITH BONE PATELLA TENDON BONE GRAFT  GEN/FEM SCIATIC NERVE BLOCK   POSS ACHILLES ALLOGRAFT/ RIGHT. Nausea/Vomiting: None    Postoperative hydration reviewed and adequate. Pain:  Pain Scale 1: Numeric (0 - 10) (12/21/17 2015)  Pain Intensity 1: 0 (12/21/17 2015)   Managed    Neurological Status:   Neuro (WDL): Exceptions to WDL (12/21/17 1946)  Neuro  Neurologic State: Drowsy (12/21/17 2050)  Cognition: Follows commands (12/21/17 2015)  LUE Motor Response: Purposeful (12/21/17 2015)  LLE Motor Response: Purposeful (12/21/17 2015)  RUE Motor Response: Purposeful (12/21/17 2015)  RLE Motor Response: Pharmacologically paralyzed (12/21/17 2015)   At baseline    Mental Status and Level of Consciousness: Arousable    Pulmonary Status:   O2 Device: Nasal cannula (12/21/17 2030)   Adequate oxygenation and airway patent    Complications related to anesthesia: None    Post-anesthesia assessment completed.  No concerns    Signed By: Sandy May MD     December 21, 2017

## 2018-01-03 ENCOUNTER — HOME CARE VISIT (OUTPATIENT)
Dept: SCHEDULING | Facility: HOME HEALTH | Age: 53
End: 2018-01-03
Payer: OTHER MISCELLANEOUS

## 2018-01-03 VITALS
HEART RATE: 76 BPM | RESPIRATION RATE: 16 BRPM | TEMPERATURE: 98.3 F | SYSTOLIC BLOOD PRESSURE: 122 MMHG | DIASTOLIC BLOOD PRESSURE: 70 MMHG

## 2018-01-03 PROCEDURE — G0157 HHC PT ASSISTANT EA 15: HCPCS

## 2018-01-05 ENCOUNTER — HOME CARE VISIT (OUTPATIENT)
Dept: SCHEDULING | Facility: HOME HEALTH | Age: 53
End: 2018-01-05
Payer: OTHER MISCELLANEOUS

## 2018-01-05 VITALS
RESPIRATION RATE: 18 BRPM | SYSTOLIC BLOOD PRESSURE: 116 MMHG | DIASTOLIC BLOOD PRESSURE: 76 MMHG | TEMPERATURE: 97.8 F | HEART RATE: 76 BPM

## 2018-01-05 PROCEDURE — G0151 HHCP-SERV OF PT,EA 15 MIN: HCPCS

## 2018-01-08 ENCOUNTER — HOSPITAL ENCOUNTER (OUTPATIENT)
Dept: PHYSICAL THERAPY | Age: 53
Discharge: HOME | End: 2018-01-08
Payer: COMMERCIAL

## 2018-01-08 PROCEDURE — 97110 THERAPEUTIC EXERCISES: CPT

## 2018-01-08 PROCEDURE — 97162 PT EVAL MOD COMPLEX 30 MIN: CPT

## 2018-01-08 NOTE — PROGRESS NOTES
Ambulatory/Rehab Services H2 Model Falls Risk Assessment    Risk Factor Pts. ·   Confusion/Disorientation/Impulsivity  []    4 ·   Symptomatic Depression  []   2 ·   Altered Elimination  []   1 ·   Dizziness/Vertigo  []   1 ·   Gender (Male)  []   1 ·   Any administered antiepileptics (anticonvulsants):  []   2 ·   Any administered benzodiazepines:  []   1 ·   Visual Impairment (specify):  []   1 ·   Portable Oxygen Use  []   1 ·   Orthostatic ? BP  []   1 ·   History of Recent Falls (within 3 mos.)  []   5     Ability to Rise from Chair (choose one) Pts. ·   Ability to rise in a single movement  []   0 ·   Pushes up, successful in one attempt  [x]   1 ·   Multiple attempts, but successful  []   3 ·   Unable to rise without assistance  []   4   Total: (5 or greater = High Risk) 1     Falls Prevention Plan:   []                Physical Limitations to Exercise (specify):   []                Mobility Assistance Device (type):   []                Exercise/Equipment Adaptation (specify):    ©2010 [facility] [address] [place] [address] Patent #[id number].  Federal Law prohibits the replication, distribution or use without written permission from [facility] [employer]

## 2018-01-08 NOTE — THERAPY EVALUATION
[address]  : 1965  Payor: [address] [place] [address] Road / Plan: Mercedes Precise / Product Type: Workers Comp /  [address]  at [facility] [place] [employer]  [address].  [phone]   [phone]       OUTPATIENT PHYSICAL [place] Assessment 2018      ICD-10: Treatment Diagnosis: Sprain of posterior cruciate ligament of right knee, sequela (S83.521S); Pain in right knee (M25.561); Stiffness of right knee, not elsewhere classified (M25.661); Difficulty in walking, not elsewhere classified (R26.2)  Precautions/Allergies:   Review of patient's allergies indicates no known allergies. Fall Risk Score: 1 (? 5 = High Risk)  MD Orders:Evaluate and treat, HEP, ROM MEDICAL/REFERRING DIAGNOSIS:  S/p R knee scope, PCL reconstruction   DATE OF ONSET: Surgery 17  REFERRING PHYSICIAN: Tomasz Payton MD  RETURN PHYSICIAN APPOINTMENT: 18     INITIAL ASSESSMENT:  Ms. Jg Jules presents 2 1/2 weeks s/p right knee scope with PCL reconstruction. Post-op wound healing, swelling, pain, decreased ROM and strength are limiting basic mobility and normal activity level. She will benefit from PT for guided post-op rehab to promote normalized return of motion, strength, and function in order for safe return to work and daily. PROBLEM LIST (Impacting functional limitations):  1. Post-op pain and swelling right knee  2. Decreased ROM right knee   3. Decreased functional strength right knee/LE   4. Decreased gait skills INTERVENTIONS PLANNED:  1. aquatic therapy  2. Thermal and electric modalities, manual therapies for pain. 3. Manual therapies and therapeutic exercises for ROM and strength. 4. Therapeutic exercises for gait and balance   TREATMENT PLAN:  Effective Dates: 18 TO 18. Frequency/Duration: 3 times a week for 12 weeks  GOALS: (Goals have been discussed and agreed upon with patient.)   Short-Term Functional Goals: Time Frame: 6 weeks  1.  Pt will be able to perform a good quad set and SLR with no extensor lag with for improved strength for gait. 2. Pt will increase R knee ROM to 0-90 for improved mobility. 3. Pt will improved R ankle DF AROM to 0 for improved gait. 4. Pt will report pain 5/10 with modified daily activities. Discharge Goals: Time Frame: 12 weeks  1. Pt will increase R LE strength to 5/5 with manual muscle testing for improved strength for ADLs and work  2. Pt will negotiate 1 flight of stairs with step over step with good control. 3. Pt will increase R knee ROM to 0-120 for mobility. 4. Pt will report pain 3/10 with daily activities. 5. Pt will score 45/80 on LEFS. Rehabilitation Potential For Stated Goals: Good  Regarding Perez  therapy, I certify that the treatment plan above will be carried out by a therapist or under their direction. Thank you for this referral,    Allie Ward, PT                    HISTORY:   History of Present Injury/Illness (Reason for Referral): She works at [employer] and she was entering the cooler and there was oil on the floor. She slipped and the feet slipped out from under her. She tried to get up and slipped again. Injury was August 14-17. She did go to physical therapy but it was making her worse. They decided to have surgery. Surgery 12-21-17. Stayed 2 nights in the hospital. She did have home health physical therapy 3-4x. Pain increases with walking. Past Medical History/Comorbidities: diabetes type 2 controlled with diet, HTN, L LE varicose vein surgery  Social History/Living Environment: Lives with . 2 steps to get inside. Prior Level of Function/Work/Activity: Works at [employer]. 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 Job does require lifting. Would like to get back to walking and walking dogs.    Current Medications:       Current Outpatient Prescriptions:     magnesium hydroxide (BEAUCHAMP MILK OF MAGNESIA) 400 mg/5 mL suspension, Take 15 mL by mouth as needed for Constipation. as needed daily, Disp: , Rfl:     temazepam (RESTORIL) 15 mg capsule, Take 15 mg by mouth nightly as needed for Sleep., Disp: , Rfl:     promethazine (PHENERGAN) 25 mg tablet, Take 25 mg by mouth every eight (8) hours as needed for Nausea., Disp: , Rfl:     HYDROmorphone (DILAUDID) 2 mg tablet, Take 2-4 mg by mouth as needed for Pain. as needed every 4-6 hours, Disp: , Rfl:     atorvastatin (LIPITOR) 20 mg tablet, Take 20 mg by mouth nightly., Disp: , Rfl:     lisinopril-hydroCHLOROthiazide (PRINZIDE, ZESTORETIC) 10-12.5 mg per tablet, Take  by mouth daily. Indications: hypertension, Disp: , Rfl:     gemfibrozil (LOPID) 600 mg tablet, Take 600 mg by mouth two (2) times a day., Disp: , Rfl:     Omega-3 Fatty Acids 60- mg cpDR, Take  by mouth daily. , Disp: , Rfl:     acetaminophen (TYLENOL) 325 mg tablet, Take 325 mg by mouth every four (4) hours as needed for Pain., Disp: , Rfl:    Date Last Reviewed:  1-8-18   Number of Personal Factors/Comorbidities that affect the Plan of Care: 1-2: MODERATE COMPLEXITY   EXAMINATION:     Observation/Orthostatic Postural Assessment: Pt with healed incision on the R knee with small area of redness over medial knee incision. Scabbing noted to each incision. Quad atrophy to the R knee. Pt arrived to therapy with walker with non weight bearing on R LE    Palpation: tender to palpation around each incision. ROM:    LLE AROM  L Knee Flexion: 120  L Knee Extension: 0  L Ankle Dorsiflexion: 10    RLE AROM  R Ankle Dorsiflexion: -20  RLE PROM  R Knee Flexion: 15  R Knee Extension: 0      Strength:         poor quad set on R LE         Special Tests: none  Functional Mobility: Sit to/from Stand: independent. Bed Mobility: independent and uses hands to move the R LE. Walking on level ground: with walker with supervision.  Verbal cues to place weight on the R LE and able to ambulate with step two gait pattern with very limited weight on the R LE. Balance:n/t        Body Structures Involved:  1. Joints  2. Muscles  3. Ligaments Body Functions Affected:  1. Neuromusculoskeletal Activities and Participation Affected:  1. Mobility  2. Community, Social and [place]   Number of elements (examined above) that affect the Plan of Care: 4+: HIGH COMPLEXITY   CLINICAL PRESENTATION:   Presentation: Evolving clinical presentation with changing clinical characteristics: MODERATE COMPLEXITY   CLINICAL DECISION MAKING:   Outcome Measure: Tool Used: Lower Extremity Functional Scale (LEFS)  Score:  Initial: 5/80 Most Recent: X/80 (Date: -- )   Interpretation of Score: 20 questions each scored on a 5 point scale with 0 representing \"extreme difficulty or unable to perform\" and 4 representing \"no difficulty\". The lower the score, the greater the functional disability. 80/80 represents no disability. Minimal detectable change is 9 points. Score 80 79-63 62-48 47-32 31-16 15-1 0   Modifier CH CI CJ CK CL CM CN     Medical Necessity:   · Patient is expected to demonstrate progress in strength, range of motion and balance to improve gait. Reason for Services/Other Comments:  · Patient continues to require skilled intervention due to post-op R knee, decreased ROM, strength, gait. Use of outcome tool(s) and clinical judgement create a POC that gives a: Questionable prediction of patient's progress: MODERATE COMPLEXITY            TREATMENT:   (In addition to Assessment/Re-Assessment sessions the following treatments were rendered)  Pre-treatment Symptoms/Complaints: Pt reports she does not like the new brace because it rubs on her incision. Pain: Initial:   8/10 at rest.  Post Session:  8/10. Therapeutic Exercise (15 Minutes): Instruction and performance in initial post-op knee exercises for muscle activation, including quad setting in extension and ankle pumps.  Practiced gait with rolling walker with maximal verbal cues to place the right foot on the ground and step two the right foot uses the walker to un weight the R LE. Pt able to place very small amount of weight on the R LE during gait. Practiced gait ~20 ft with supervision. She is to work on putting weight on the R LE at home. HEP: she is to work on quad sets, ankle pumps, and walking putting the R foot on the ground. Treatment/Session Assessment:    · Response to Treatment:  Pain with knee flexion ROM. · Compliance with Program/Exercises: Will assess as treatment progresses. · Recommendations/Intent for next treatment session: \"Next visit will focus on gait, knee ROM\".    Total Treatment Duration: 50 Minutes  PT Patient Time In/Time Out  Time In: 1030  Time Out: [address]

## 2018-01-10 ENCOUNTER — HOSPITAL ENCOUNTER (OUTPATIENT)
Dept: PHYSICAL THERAPY | Age: 53
Discharge: HOME | End: 2018-01-10
Payer: COMMERCIAL

## 2018-01-10 PROCEDURE — 97113 AQUATIC THERAPY/EXERCISES: CPT

## 2018-01-10 NOTE — THERAPY EVALUATION
Dana Magallanes  : 1965  Payor: Mathew Ordaz / Plan: Keshav Caras / Product Type: Workers Comp /  [address]  at [address] [place] [address] Rd  [address] [facility],  [place].  [phone]   [phone]       OUTPATIENT PHYSICAL THERAPY:Daily Note 1/10/2018      ICD-10: Treatment Diagnosis: Sprain of posterior cruciate ligament of right knee, sequela (S83.521S); Pain in right knee (M25.561); Stiffness of right knee, not elsewhere classified (M25.661); Difficulty in walking, not elsewhere classified (R26.2)  Precautions/Allergies:   Review of patient's allergies indicates no known allergies. Fall Risk Score: 1 (? 5 = High Risk)  MD Orders:Evaluate and treat, HEP, ROM MEDICAL/REFERRING DIAGNOSIS:  S/p R knee scope, PCL reconstruction   DATE OF ONSET: Surgery 17  REFERRING PHYSICIAN: Lauren Weaver MD  RETURN PHYSICIAN APPOINTMENT: 18     INITIAL ASSESSMENT:  Ms. Refugio Miller presents 2 1/2 weeks s/p right knee scope with PCL reconstruction. Post-op wound healing, swelling, pain, decreased ROM and strength are limiting basic mobility and normal activity level. She will benefit from PT for guided post-op rehab to promote normalized return of motion, strength, and function in order for safe return to work and daily. PROBLEM LIST (Impacting functional limitations):  1. Post-op pain and swelling right knee  2. Decreased ROM right knee   3. Decreased functional strength right knee/LE   4. Decreased gait skills INTERVENTIONS PLANNED:  1. aquatic therapy  2. Thermal and electric modalities, manual therapies for pain. 3. Manual therapies and therapeutic exercises for ROM and strength. 4. Therapeutic exercises for gait and balance   TREATMENT PLAN:  Effective Dates: 18 TO 18. Frequency/Duration: 3 times a week for 12 weeks  GOALS: (Goals have been discussed and agreed upon with patient.)   Short-Term Functional Goals: Time Frame: 6 weeks  1.  Pt will be able to perform a good quad set and SLR with no extensor lag with for improved strength for gait. 2. Pt will increase R knee ROM to 0-90 for improved mobility. 3. Pt will improved R ankle DF AROM to 0 for improved gait. 4. Pt will report pain 5/10 with modified daily activities. Discharge Goals: Time Frame: 12 weeks  1. Pt will increase R LE strength to 5/5 with manual muscle testing for improved strength for ADLs and work  2. Pt will negotiate 1 flight of stairs with step over step with good control. 3. Pt will increase R knee ROM to 0-120 for mobility. 4. Pt will report pain 3/10 with daily activities. 5. Pt will score 45/80 on LEFS. Rehabilitation Potential For Stated Goals: Good  Regarding Perez  therapy, I certify that the treatment plan above will be carried out by a therapist or under their direction. Thank you for this referral,    Yaritza Navarrete, PT                    HISTORY:   History of Present Injury/Illness (Reason for Referral): She works at [employer] and she was entering the cooler and there was oil on the floor. She slipped and the feet slipped out from under her. She tried to get up and slipped again. Injury was August 14-17. She did go to physical therapy but it was making her worse. They decided to have surgery. Surgery 12-21-17. Stayed 2 nights in the hospital. She did have home health physical therapy 3-4x. Pain increases with walking. Past Medical History/Comorbidities: diabetes type 2 controlled with diet, HTN, L LE varicose vein surgery  Social History/Living Environment: Lives with . 2 steps to get inside. Prior Level of Function/Work/Activity: Works at [employer]. 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 Job does require lifting. Would like to get back to walking and walking dogs.    Current Medications:       Current Outpatient Prescriptions:     magnesium hydroxide (BEAUCHAMP MILK OF MAGNESIA) 400 mg/5 mL suspension, Take 15 mL by mouth as needed for Constipation. as needed daily, Disp: , Rfl:     temazepam (RESTORIL) 15 mg capsule, Take 15 mg by mouth nightly as needed for Sleep., Disp: , Rfl:     promethazine (PHENERGAN) 25 mg tablet, Take 25 mg by mouth every eight (8) hours as needed for Nausea., Disp: , Rfl:     HYDROmorphone (DILAUDID) 2 mg tablet, Take 2-4 mg by mouth as needed for Pain. as needed every 4-6 hours, Disp: , Rfl:     atorvastatin (LIPITOR) 20 mg tablet, Take 20 mg by mouth nightly., Disp: , Rfl:     lisinopril-hydroCHLOROthiazide (PRINZIDE, ZESTORETIC) 10-12.5 mg per tablet, Take  by mouth daily. Indications: hypertension, Disp: , Rfl:     gemfibrozil (LOPID) 600 mg tablet, Take 600 mg by mouth two (2) times a day., Disp: , Rfl:     Omega-3 Fatty Acids 60- mg cpDR, Take  by mouth daily. , Disp: , Rfl:     acetaminophen (TYLENOL) 325 mg tablet, Take 325 mg by mouth every four (4) hours as needed for Pain., Disp: , Rfl:    Date Last Reviewed:  1-8-18   Number of Personal Factors/Comorbidities that affect the Plan of Care: 1-2: MODERATE COMPLEXITY   EXAMINATION:     Observation/Orthostatic Postural Assessment: Pt with healed incision on the R knee with small area of redness over medial knee incision. Scabbing noted to each incision. Quad atrophy to the R knee. Pt arrived to therapy with walker with non weight bearing on R LE    Palpation: tender to palpation around each incision. ROM:                Strength:         poor quad set on R LE         Special Tests: none  Functional Mobility: Sit to/from Stand: independent. Bed Mobility: independent and uses hands to move the R LE. Walking on level ground: with walker with supervision. Verbal cues to place weight on the R LE and able to ambulate with step two gait pattern with very limited weight on the R LE. Balance:n/t        Body Structures Involved:  1. Joints  2. Muscles  3. Ligaments Body Functions Affected:  1.  Neuromusculoskeletal Activities and Participation Affected:  1. Mobility  2. Community, Social and [place]   Number of elements (examined above) that affect the Plan of Care: 4+: HIGH COMPLEXITY   CLINICAL PRESENTATION:   Presentation: Evolving clinical presentation with changing clinical characteristics: MODERATE COMPLEXITY   CLINICAL DECISION MAKING:   Outcome Measure: Tool Used: Lower Extremity Functional Scale (LEFS)  Score:  Initial: 5/80 Most Recent: X/80 (Date: -- )   Interpretation of Score: 20 questions each scored on a 5 point scale with 0 representing \"extreme difficulty or unable to perform\" and 4 representing \"no difficulty\". The lower the score, the greater the functional disability. 80/80 represents no disability. Minimal detectable change is 9 points. Score 80 79-63 62-48 47-32 31-16 15-1 0   Modifier CH CI CJ CK CL CM CN     Medical Necessity:   · Patient is expected to demonstrate progress in strength, range of motion and balance to improve gait. Reason for Services/Other Comments:  · Patient continues to require skilled intervention due to post-op R knee, decreased ROM, strength, gait. Use of outcome tool(s) and clinical judgement create a POC that gives a: Questionable prediction of patient's progress: MODERATE COMPLEXITY            TREATMENT:   (In addition to Assessment/Re-Assessment sessions the following treatments were rendered)  Pre-treatment Symptoms/Complaints: Pt states she is hoping to walk in the pool  Pain: Initial:   8/10 at rest.  Post Session:  8/10. Aquatic Exercise- 45 minutes- Focused on ROM- flexion of knee, all ROM of ankle in standing position in 3.5' and in sitting on seat in 3.5 '  Walking- Pt walked with marked limp in 3.5 ft depth due to lack of knee ROM . Several laps. Minimal improvement in technique by end of session. Manual assist with knee flexion, dorsiflexion, plantarflexion in pool. Pt complaining of pain with ex.      HEP: she is to work on quad sets, ankle pumps, and walking putting the R foot on the ground. Encouraged pt to perform ROM ex throughout her day. Treatment/Session Assessment:  Minimal improvement in ROM after session. · Response to Treatment:  Pt complained of pain during stretching and session in pool but states thigh felt looser and more relaxed after session. · Compliance with Program/Exercises: Will assess as treatment progresses. · Recommendations/Intent for next treatment session: \"Next visit will focus on gait, knee ROM\".    Total Treatment Duration: 45 Minutes  PT Patient Time In/Time Out  Time In: 0100  Time Out: 0145    Chavo Segal, PT

## 2018-01-12 ENCOUNTER — HOSPITAL ENCOUNTER (OUTPATIENT)
Dept: PHYSICAL THERAPY | Age: 53
Discharge: HOME | End: 2018-01-12
Payer: COMMERCIAL

## 2018-01-12 PROCEDURE — 97113 AQUATIC THERAPY/EXERCISES: CPT

## 2018-01-15 ENCOUNTER — HOSPITAL ENCOUNTER (OUTPATIENT)
Dept: PHYSICAL THERAPY | Age: 53
Discharge: HOME | End: 2018-01-15
Payer: COMMERCIAL

## 2018-01-15 PROCEDURE — 97110 THERAPEUTIC EXERCISES: CPT

## 2018-01-15 NOTE — PROGRESS NOTES
Morelia Gonzalez  : 1965  Payor: [address] [place] [address] Road / Plan: [place] / Product Type: Workers Comp /  Julio Heredia at [address], [address] [place].  [phone]   [phone]       OUTPATIENT PHYSICAL THERAPY:Daily Note 1/15/2018      ICD-10: Treatment Diagnosis: Sprain of posterior cruciate ligament of right knee, sequela (S83.521S); Pain in right knee (M25.561); Stiffness of right knee, not elsewhere classified (M25.661); Difficulty in walking, not elsewhere classified (R26.2)  Precautions/Allergies:   Review of patient's allergies indicates no known allergies. Fall Risk Score: 1 (? 5 = High Risk)  MD Orders:Evaluate and treat, HEP, ROM MEDICAL/REFERRING DIAGNOSIS:  S/p R knee scope, PCL reconstruction   DATE OF ONSET: Surgery 17  REFERRING PHYSICIAN: Rose Ansari., MD  RETURN PHYSICIAN APPOINTMENT: 18     INITIAL ASSESSMENT:  Ms. Crystal Gr presents 2 1/2 weeks s/p right knee scope with PCL reconstruction. Post-op wound healing, swelling, pain, decreased ROM and strength are limiting basic mobility and normal activity level. She will benefit from PT for guided post-op rehab to promote normalized return of motion, strength, and function in order for safe return to work and daily. PROBLEM LIST (Impacting functional limitations):  1. Post-op pain and swelling right knee  2. Decreased ROM right knee   3. Decreased functional strength right knee/LE   4. Decreased gait skills INTERVENTIONS PLANNED:  1. aquatic therapy  2. Thermal and electric modalities, manual therapies for pain. 3. Manual therapies and therapeutic exercises for ROM and strength. 4. Therapeutic exercises for gait and balance   TREATMENT PLAN:  Effective Dates: 18 TO 18. Frequency/Duration: 3 times a week for 12 weeks  GOALS: (Goals have been discussed and agreed upon with patient.)   Short-Term Functional Goals: Time Frame: 6 weeks  1.  Pt will be able to perform a good quad set and SLR with no extensor lag with for improved strength for gait. 2. Pt will increase R knee ROM to 0-90 for improved mobility. 3. Pt will improved R ankle DF AROM to 0 for improved gait. 4. Pt will report pain 5/10 with modified daily activities. Discharge Goals: Time Frame: 12 weeks  1. Pt will increase R LE strength to 5/5 with manual muscle testing for improved strength for ADLs and work  2. Pt will negotiate 1 flight of stairs with step over step with good control. 3. Pt will increase R knee ROM to 0-120 for mobility. 4. Pt will report pain 3/10 with daily activities. 5. Pt will score 45/80 on LEFS. Rehabilitation Potential For Stated Goals: Good  Regarding Perez  therapy, I certify that the treatment plan above will be carried out by a therapist or under their direction. Thank you for this referral,    Yaritza Navarrete, PT                    HISTORY:   History of Present Injury/Illness (Reason for Referral): She works at [employer] and she was entering the cooler and there was oil on the floor. She slipped and the feet slipped out from under her. She tried to get up and slipped again. Injury was August 14-17. She did go to physical therapy but it was making her worse. They decided to have surgery. Surgery 12-21-17. Stayed 2 nights in the hospital. She did have home health physical therapy 3-4x. Pain increases with walking. Past Medical History/Comorbidities: diabetes type 2 controlled with diet, HTN, L LE varicose vein surgery  Social History/Living Environment: Lives with . 2 steps to get inside. Prior Level of Function/Work/Activity: Works at [employer]. 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 Job does require lifting. Would like to get back to walking and walking dogs.    Current Medications:       Current Outpatient Prescriptions:     magnesium hydroxide (BEAUCHAMP MILK OF MAGNESIA) 400 mg/5 mL suspension, Take 15 mL by mouth as needed for Constipation. as needed daily, Disp: , Rfl:     temazepam (RESTORIL) 15 mg capsule, Take 15 mg by mouth nightly as needed for Sleep., Disp: , Rfl:     promethazine (PHENERGAN) 25 mg tablet, Take 25 mg by mouth every eight (8) hours as needed for Nausea., Disp: , Rfl:     HYDROmorphone (DILAUDID) 2 mg tablet, Take 2-4 mg by mouth as needed for Pain. as needed every 4-6 hours, Disp: , Rfl:     atorvastatin (LIPITOR) 20 mg tablet, Take 20 mg by mouth nightly., Disp: , Rfl:     lisinopril-hydroCHLOROthiazide (PRINZIDE, ZESTORETIC) 10-12.5 mg per tablet, Take  by mouth daily. Indications: hypertension, Disp: , Rfl:     gemfibrozil (LOPID) 600 mg tablet, Take 600 mg by mouth two (2) times a day., Disp: , Rfl:     Omega-3 Fatty Acids 60- mg cpDR, Take  by mouth daily. , Disp: , Rfl:     acetaminophen (TYLENOL) 325 mg tablet, Take 325 mg by mouth every four (4) hours as needed for Pain., Disp: , Rfl:    Date Last Reviewed:  1-8-18   Number of Personal Factors/Comorbidities that affect the Plan of Care: 1-2: MODERATE COMPLEXITY   EXAMINATION:     Observation/Orthostatic Postural Assessment: Pt with healed incision on the R knee with small area of redness over medial knee incision. Scabbing noted to each incision. Quad atrophy to the R knee. Pt arrived to therapy with walker with non weight bearing on R LE    Palpation: tender to palpation around each incision. ROM:                Strength:         poor quad set on R LE         Special Tests: none  Functional Mobility: Sit to/from Stand: independent. Bed Mobility: independent and uses hands to move the R LE. Walking on level ground: with walker with supervision. Verbal cues to place weight on the R LE and able to ambulate with step two gait pattern with very limited weight on the R LE. Balance:n/t        Body Structures Involved:  1. Joints  2. Muscles  3. Ligaments Body Functions Affected:  1.  Neuromusculoskeletal Activities and Participation Affected:  1. Mobility  2. Community, Social and [place]   Number of elements (examined above) that affect the Plan of Care: 4+: HIGH COMPLEXITY   CLINICAL PRESENTATION:   Presentation: Evolving clinical presentation with changing clinical characteristics: MODERATE COMPLEXITY   CLINICAL DECISION MAKING:   Outcome Measure: Tool Used: Lower Extremity Functional Scale (LEFS)  Score:  Initial: 5/80 Most Recent: X/80 (Date: -- )   Interpretation of Score: 20 questions each scored on a 5 point scale with 0 representing \"extreme difficulty or unable to perform\" and 4 representing \"no difficulty\". The lower the score, the greater the functional disability. 80/80 represents no disability. Minimal detectable change is 9 points. Score 80 79-63 62-48 47-32 31-16 15-1 0   Modifier CH CI CJ CK CL CM CN     Medical Necessity:   · Patient is expected to demonstrate progress in strength, range of motion and balance to improve gait. Reason for Services/Other Comments:  · Patient continues to require skilled intervention due to post-op R knee, decreased ROM, strength, gait. Use of outcome tool(s) and clinical judgement create a POC that gives a: Questionable prediction of patient's progress: MODERATE COMPLEXITY            TREATMENT:   (In addition to Assessment/Re-Assessment sessions the following treatments were rendered)  Pre-treatment Symptoms/Complaints: Pt states she worked on bending the knee at home. She enters clinic and sits with knee propped in extension. Discussed with pt attempting to rest foot on floor and have some flexion in resting position. Pain: Initial:   8/10 at rest.  Post Session:  8/10. Aquatic Exercise- 45 minutes- Focused on ROM- flexion of knee, in standing position in 3.5' and in sitting on seat in 3.5 '  Utilized mini squats, lunge onto step, knee flex in standing with hip extended. Also hip ext in standing.   Walking- Pt performed walking in 3.5 dept- forward, backward, march. Pt was able to walk with some knee flexion by end of session. Manual assist with knee flexion, dorsiflexion, plantarflexion in pool. Did not measure flexion but approx 45 degrees by end of session today. Pt complaining of pain with ex. HEP: she is to work on quad sets, ankle pumps, and walking putting the R foot on the ground. Encouraged pt to perform ROM ex throughout her day. Treatment/Session Assessment:  Pt had significant improvement in flexion and in wt bearing by end of session. Able to  3.5 depth and wt bear only on R.    · Response to Treatment:  Pt complained of pain during stretching and session in pool but states thigh felt looser and more relaxed after session. · Compliance with Program/Exercises: Will assess as treatment progresses. · Recommendations/Intent for next treatment session: \"Next visit will focus on gait, knee ROM\".    Total Treatment Duration: 45 Minutes       Carl Randle, PT

## 2018-01-15 NOTE — PROGRESS NOTES
Adrien Gandhi  : 1965  Payor: [address] [place] [address] Road / Plan: Rebecca Dyer / Product Type: Workers Comp /  [address]  at [facility] [place] [employer]  [address] [place] [address], [address], [address] [place] [address]  [phone]   [phone]       OUTPATIENT PHYSICAL THERAPY:Daily Note 1/15/2018      ICD-10: Treatment Diagnosis: Sprain of posterior cruciate ligament of right knee, sequela (S83.521S); Pain in right knee (M25.561); Stiffness of right knee, not elsewhere classified (M25.661); Difficulty in walking, not elsewhere classified (R26.2)  Precautions/Allergies:   Review of patient's allergies indicates no known allergies. Fall Risk Score: 1 (? 5 = High Risk)  MD Orders:Evaluate and treat, HEP, ROM MEDICAL/REFERRING DIAGNOSIS:  S/p R knee scope, PCL reconstruction   DATE OF ONSET: Surgery 17  REFERRING PHYSICIAN: Earlene Johnson MD  RETURN PHYSICIAN APPOINTMENT: 18     INITIAL ASSESSMENT:  Ms. Joe Lacey presents 2 1/2 weeks s/p right knee scope with PCL reconstruction. Post-op wound healing, swelling, pain, decreased ROM and strength are limiting basic mobility and normal activity level. She will benefit from PT for guided post-op rehab to promote normalized return of motion, strength, and function in order for safe return to work and daily. PROBLEM LIST (Impacting functional limitations):  1. Post-op pain and swelling right knee  2. Decreased ROM right knee   3. Decreased functional strength right knee/LE   4. Decreased gait skills INTERVENTIONS PLANNED:  1. aquatic therapy  2. Thermal and electric modalities, manual therapies for pain. 3. Manual therapies and therapeutic exercises for ROM and strength. 4. Therapeutic exercises for gait and balance   TREATMENT PLAN:  Effective Dates: 18 TO 18. Frequency/Duration: 3 times a week for 12 weeks  GOALS: (Goals have been discussed and agreed upon with patient.)   Short-Term Functional Goals: Time Frame: 6 weeks  1.  Pt will be able to perform a good quad set and SLR with no extensor lag with for improved strength for gait. 2. Pt will increase R knee ROM to 0-90 for improved mobility. 3. Pt will improved R ankle DF AROM to 0 for improved gait. 4. Pt will report pain 5/10 with modified daily activities. Discharge Goals: Time Frame: 12 weeks  1. Pt will increase R LE strength to 5/5 with manual muscle testing for improved strength for ADLs and work  2. Pt will negotiate 1 flight of stairs with step over step with good control. 3. Pt will increase R knee ROM to 0-120 for mobility. 4. Pt will report pain 3/10 with daily activities. 5. Pt will score 45/80 on LEFS. Rehabilitation Potential For Stated Goals: Good  Regarding Perez  therapy, I certify that the treatment plan above will be carried out by a therapist or under their direction. Thank you for this referral,    Allie Bardales, PT                    HISTORY:   History of Present Injury/Illness (Reason for Referral): She works at [employer] and she was entering the cooler and there was oil on the floor. She slipped and the feet slipped out from under her. She tried to get up and slipped again. Injury was August 14-17. She did go to physical therapy but it was making her worse. They decided to have surgery. Surgery 12-21-17. Stayed 2 nights in the hospital. She did have home health physical therapy 3-4x. Pain increases with walking. Past Medical History/Comorbidities: diabetes type 2 controlled with diet, HTN, L LE varicose vein surgery  Social History/Living Environment: Lives with . 2 steps to get inside. Prior Level of Function/Work/Activity: Works at [employer]. 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 Job does require lifting. Would like to get back to walking and walking dogs.    Current Medications:       Current Outpatient Prescriptions:     magnesium hydroxide (BEAUCHAMP MILK OF MAGNESIA) 400 mg/5 mL suspension, Take 15 mL by mouth as needed for Constipation. as needed daily, Disp: , Rfl:     temazepam (RESTORIL) 15 mg capsule, Take 15 mg by mouth nightly as needed for Sleep., Disp: , Rfl:     promethazine (PHENERGAN) 25 mg tablet, Take 25 mg by mouth every eight (8) hours as needed for Nausea., Disp: , Rfl:     HYDROmorphone (DILAUDID) 2 mg tablet, Take 2-4 mg by mouth as needed for Pain. as needed every 4-6 hours, Disp: , Rfl:     atorvastatin (LIPITOR) 20 mg tablet, Take 20 mg by mouth nightly., Disp: , Rfl:     lisinopril-hydroCHLOROthiazide (PRINZIDE, ZESTORETIC) 10-12.5 mg per tablet, Take  by mouth daily. Indications: hypertension, Disp: , Rfl:     gemfibrozil (LOPID) 600 mg tablet, Take 600 mg by mouth two (2) times a day., Disp: , Rfl:     Omega-3 Fatty Acids 60- mg cpDR, Take  by mouth daily. , Disp: , Rfl:     acetaminophen (TYLENOL) 325 mg tablet, Take 325 mg by mouth every four (4) hours as needed for Pain., Disp: , Rfl:    Date Last Reviewed:  1-15-18   Number of Personal Factors/Comorbidities that affect the Plan of Care: 1-2: MODERATE COMPLEXITY   EXAMINATION:     Observation/Orthostatic Postural Assessment:  Pt arrived to therapy with walker with partial weight bearing on R LE and knee brace locked in extension    Palpation: tender to palpation around each incision. ROM:         RLE PROM  R Knee Flexion: 30      Strength:         poor quad set on R LE         Special Tests: none      Balance:n/t        Body Structures Involved:  1. Joints  2. Muscles  3. Ligaments Body Functions Affected:  1. Neuromusculoskeletal Activities and Participation Affected:  1. Mobility  2. Community, Social and [place]   Number of elements (examined above) that affect the Plan of Care: 4+: HIGH COMPLEXITY   CLINICAL PRESENTATION:   Presentation: Evolving clinical presentation with changing clinical characteristics: MODERATE COMPLEXITY   CLINICAL DECISION MAKING:   Outcome Measure:    Tool Used: Lower Extremity Functional Scale (LEFS)  Score:  Initial: 5/80 Most Recent: X/80 (Date: -- )   Interpretation of Score: 20 questions each scored on a 5 point scale with 0 representing \"extreme difficulty or unable to perform\" and 4 representing \"no difficulty\". The lower the score, the greater the functional disability. 80/80 represents no disability. Minimal detectable change is 9 points. Score 80 79-63 62-48 47-32 31-16 15-1 0   Modifier CH CI CJ CK CL CM CN     Medical Necessity:   · Patient is expected to demonstrate progress in strength, range of motion and balance to improve gait. Reason for Services/Other Comments:  · Patient continues to require skilled intervention due to post-op R knee, decreased ROM, strength, gait. Use of outcome tool(s) and clinical judgement create a POC that gives a: Questionable prediction of patient's progress: MODERATE COMPLEXITY            TREATMENT:   (In addition to Assessment/Re-Assessment sessions the following treatments were rendered)  Pre-treatment Symptoms/Complaints: Pt reports she feels like she is getting better. Pain: Initial:   5/10 at rest.  Post Session:  5/10. Therapeutic Exercise (40 Minutes): For improved muscle activation, gait, and knee ROM. Pt supine and quad setx10, short arc quad with 1/2 foam roll and then foam roll x10 ea, ankle 4-way with yellow t-band x10, SLR with min assist 10x, side lying hip abduction with pillow between knees x10 reps. Knee flexion PROM with half roll under knee and then in sitting with patient's leg resting on therapist's leg. Reviewed how to unlock and lock the brace with pt sitting with pt to perform at home to work on knee flexion. Pt standing at walker and weight shifts side to side x10 reps. HEP: she is to work on quad sets, ankle pumps, and walking putting the R foot on the ground. Treatment/Session Assessment:    · Response to Treatment:  May need to review locking and unlocking the brace.  Improved knee flexion ROM today and improved weight bearing on the R LE during gait. · Compliance with Program/Exercises: Will assess as treatment progresses. · Recommendations/Intent for next treatment session: \"Next visit will focus on gait, knee ROM\". Review locking and unlocking the brace.    Total Treatment Duration: 40 Minutes  PT Patient Time In/Time Out  Time In: 0953  Time Out: 1632 [facility]

## 2018-01-17 ENCOUNTER — HOSPITAL ENCOUNTER (OUTPATIENT)
Dept: PHYSICAL THERAPY | Age: 53
End: 2018-01-17
Payer: COMMERCIAL

## 2018-01-19 ENCOUNTER — HOSPITAL ENCOUNTER (OUTPATIENT)
Dept: PHYSICAL THERAPY | Age: 53
Discharge: HOME | End: 2018-01-19
Payer: COMMERCIAL

## 2018-01-19 PROCEDURE — 97113 AQUATIC THERAPY/EXERCISES: CPT

## 2018-01-19 NOTE — PROGRESS NOTES
Chely Augustin  : 1965  Payor: [address] [place] [address] Road / Plan: [place] / Product Type: Workers Comp /  [address]  at [facility] [place] [employer]  [address] [place] [address], [address].  [phone]   [phone]       OUTPATIENT PHYSICAL THERAPY:Daily Note 2018      ICD-10: Treatment Diagnosis: Sprain of posterior cruciate ligament of right knee, sequela (S83.521S); Pain in right knee (M25.561); Stiffness of right knee, not elsewhere classified (M25.661); Difficulty in walking, not elsewhere classified (R26.2)  Precautions/Allergies:   Review of patient's allergies indicates no known allergies. Fall Risk Score: 1 (? 5 = High Risk)  MD Orders:Evaluate and treat, HEP, ROM MEDICAL/REFERRING DIAGNOSIS:  S/p R knee scope, PCL reconstruction   DATE OF ONSET: Surgery 17  REFERRING PHYSICIAN: Deana Valencia MD  RETURN PHYSICIAN APPOINTMENT: 18     INITIAL ASSESSMENT:  Ms. Jomar Arango presents 2 1/2 weeks s/p right knee scope with PCL reconstruction. Post-op wound healing, swelling, pain, decreased ROM and strength are limiting basic mobility and normal activity level. She will benefit from PT for guided post-op rehab to promote normalized return of motion, strength, and function in order for safe return to work and daily. PROBLEM LIST (Impacting functional limitations):  1. Post-op pain and swelling right knee  2. Decreased ROM right knee   3. Decreased functional strength right knee/LE   4. Decreased gait skills INTERVENTIONS PLANNED:  1. aquatic therapy  2. Thermal and electric modalities, manual therapies for pain. 3. Manual therapies and therapeutic exercises for ROM and strength. 4. Therapeutic exercises for gait and balance   TREATMENT PLAN:  Effective Dates: 18 TO 18. Frequency/Duration: 3 times a week for 12 weeks  GOALS: (Goals have been discussed and agreed upon with patient.)   Short-Term Functional Goals: Time Frame: 6 weeks  1.  Pt will be able to perform a good quad set and SLR with no extensor lag with for improved strength for gait. 2. Pt will increase R knee ROM to 0-90 for improved mobility. 3. Pt will improved R ankle DF AROM to 0 for improved gait. 4. Pt will report pain 5/10 with modified daily activities. Discharge Goals: Time Frame: 12 weeks  1. Pt will increase R LE strength to 5/5 with manual muscle testing for improved strength for ADLs and work  2. Pt will negotiate 1 flight of stairs with step over step with good control. 3. Pt will increase R knee ROM to 0-120 for mobility. 4. Pt will report pain 3/10 with daily activities. 5. Pt will score 45/80 on LEFS. Rehabilitation Potential For Stated Goals: Good  Regarding Perez  therapy, I certify that the treatment plan above will be carried out by a therapist or under their direction. Thank you for this referral,    Yaritza Navarrete, PT                    HISTORY:   History of Present Injury/Illness (Reason for Referral): She works at [employer] and she was entering the cooler and there was oil on the floor. She slipped and the feet slipped out from under her. She tried to get up and slipped again. Injury was August 14-17. She did go to physical therapy but it was making her worse. They decided to have surgery. Surgery 12-21-17. Stayed 2 nights in the hospital. She did have home health physical therapy 3-4x. Pain increases with walking. Past Medical History/Comorbidities: diabetes type 2 controlled with diet, HTN, L LE varicose vein surgery  Social History/Living Environment: Lives with . 2 steps to get inside. Prior Level of Function/Work/Activity: Works at [employer]. 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 Job does require lifting. Would like to get back to walking and walking dogs.    Current Medications:       Current Outpatient Prescriptions:     magnesium hydroxide (BEAUCHAMP MILK OF MAGNESIA) 400 mg/5 mL suspension, Take 15 mL by mouth as needed for Constipation. as needed daily, Disp: , Rfl:     temazepam (RESTORIL) 15 mg capsule, Take 15 mg by mouth nightly as needed for Sleep., Disp: , Rfl:     promethazine (PHENERGAN) 25 mg tablet, Take 25 mg by mouth every eight (8) hours as needed for Nausea., Disp: , Rfl:     HYDROmorphone (DILAUDID) 2 mg tablet, Take 2-4 mg by mouth as needed for Pain. as needed every 4-6 hours, Disp: , Rfl:     atorvastatin (LIPITOR) 20 mg tablet, Take 20 mg by mouth nightly., Disp: , Rfl:     lisinopril-hydroCHLOROthiazide (PRINZIDE, ZESTORETIC) 10-12.5 mg per tablet, Take  by mouth daily. Indications: hypertension, Disp: , Rfl:     gemfibrozil (LOPID) 600 mg tablet, Take 600 mg by mouth two (2) times a day., Disp: , Rfl:     Omega-3 Fatty Acids 60- mg cpDR, Take  by mouth daily. , Disp: , Rfl:     acetaminophen (TYLENOL) 325 mg tablet, Take 325 mg by mouth every four (4) hours as needed for Pain., Disp: , Rfl:    Date Last Reviewed:  1-15-18   Number of Personal Factors/Comorbidities that affect the Plan of Care: 1-2: MODERATE COMPLEXITY   EXAMINATION:     Observation/Orthostatic Postural Assessment:  Pt arrived to therapy with walker with partial weight bearing on R LE and knee brace locked in extension    Palpation: tender to palpation around each incision. ROM:                Strength:         poor quad set on R LE         Special Tests: none      Balance:n/t        Body Structures Involved:  1. Joints  2. Muscles  3. Ligaments Body Functions Affected:  1. Neuromusculoskeletal Activities and Participation Affected:  1. Mobility  2. Community, Social and [place]   Number of elements (examined above) that affect the Plan of Care: 4+: HIGH COMPLEXITY   CLINICAL PRESENTATION:   Presentation: Evolving clinical presentation with changing clinical characteristics: MODERATE COMPLEXITY   CLINICAL DECISION MAKING:   Outcome Measure:    Tool Used: Lower Extremity Functional Scale (LEFS)  Score:  Initial: 5/80 Most Recent: X/80 (Date: -- )   Interpretation of Score: 20 questions each scored on a 5 point scale with 0 representing \"extreme difficulty or unable to perform\" and 4 representing \"no difficulty\". The lower the score, the greater the functional disability. 80/80 represents no disability. Minimal detectable change is 9 points. Score 80 79-63 62-48 47-32 31-16 15-1 0   Modifier CH CI CJ CK CL CM CN     Medical Necessity:   · Patient is expected to demonstrate progress in strength, range of motion and balance to improve gait. Reason for Services/Other Comments:  · Patient continues to require skilled intervention due to post-op R knee, decreased ROM, strength, gait. Use of outcome tool(s) and clinical judgement create a POC that gives a: Questionable prediction of patient's progress: MODERATE COMPLEXITY            TREATMENT:   (In addition to Assessment/Re-Assessment sessions the following treatments were rendered)  Pre-treatment Symptoms/Complaints: Pt states she is bending more. Pain: Initial:   5/10 at rest.  Post Session:  5/10. Aquatic therapy- 45 minutes   Date:  1/19 Date:   Date:     Activity/Exercise Parameters Parameters Parameters   Lunges onto step for ROM 5 min     Squats 3.5 depth      On step in 3.g depth 10  10     Seated knee flex/ext 25x     Standing hip ext with knee flex 10x with overpressure  10x with noodle for overpressure     Gait- forward 6 laps               back 6 laps               march 6 laps                                HEP: she is to work on quad sets, ankle pumps, and walking putting the R foot on the ground. Treatment/Session Assessment:    · Response to Treatment:  Stated she was locking and unlocking brace now. Improved ROM today therefore improved gait in pool. · Compliance with Program/Exercises: encouraged pt to unlock brace when possible and work on ex throughout day.   · Recommendations/Intent for next treatment session: \"Next visit will focus on gait, knee ROM\".      Total Treatment Duration: 45 Minutes  PT Patient Time In/Time Out  Time In: 0115  Time Out: 0200    Oren Landers, PT

## 2018-01-22 ENCOUNTER — HOSPITAL ENCOUNTER (OUTPATIENT)
Dept: PHYSICAL THERAPY | Age: 53
Discharge: HOME | End: 2018-01-22
Payer: COMMERCIAL

## 2018-01-22 PROCEDURE — 97110 THERAPEUTIC EXERCISES: CPT

## 2018-01-22 NOTE — PROGRESS NOTES
Morelia Gonzalez  : 1965  Payor: [address] [place] [address] Road / Plan: [employer] / Product Type: Workers Comp /  [address]  at [facility] [place] [employer]  [address] [place] [address], [address].  [phone]   [phone]       OUTPATIENT PHYSICAL THERAPY:Daily Note 2018      ICD-10: Treatment Diagnosis: Sprain of posterior cruciate ligament of right knee, sequela (S83.521S); Pain in right knee (M25.561); Stiffness of right knee, not elsewhere classified (M25.661); Difficulty in walking, not elsewhere classified (R26.2)  Precautions/Allergies:   Review of patient's allergies indicates no known allergies. Fall Risk Score: 1 (? 5 = High Risk)  MD Orders:Evaluate and treat, HEP, ROM MEDICAL/REFERRING DIAGNOSIS:  S/p R knee scope, PCL reconstruction   DATE OF ONSET: Surgery 17  REFERRING PHYSICIAN: Rose Ansari., MD  RETURN PHYSICIAN APPOINTMENT: 18     INITIAL ASSESSMENT:  Ms. Crystal Gr presents 2 1/2 weeks s/p right knee scope with PCL reconstruction. Post-op wound healing, swelling, pain, decreased ROM and strength are limiting basic mobility and normal activity level. She will benefit from PT for guided post-op rehab to promote normalized return of motion, strength, and function in order for safe return to work and daily. PROBLEM LIST (Impacting functional limitations):  1. Post-op pain and swelling right knee  2. Decreased ROM right knee   3. Decreased functional strength right knee/LE   4. Decreased gait skills INTERVENTIONS PLANNED:  1. aquatic therapy  2. Thermal and electric modalities, manual therapies for pain. 3. Manual therapies and therapeutic exercises for ROM and strength. 4. Therapeutic exercises for gait and balance   TREATMENT PLAN:  Effective Dates: 18 TO 18. Frequency/Duration: 3 times a week for 12 weeks  GOALS: (Goals have been discussed and agreed upon with patient.)   Short-Term Functional Goals: Time Frame: 6 weeks  1.  Pt will be able to perform a good quad set and SLR with no extensor lag with for improved strength for gait. 2. Pt will increase R knee ROM to 0-90 for improved mobility. 3. Pt will improved R ankle DF AROM to 0 for improved gait. 4. Pt will report pain 5/10 with modified daily activities. Discharge Goals: Time Frame: 12 weeks  1. Pt will increase R LE strength to 5/5 with manual muscle testing for improved strength for ADLs and work  2. Pt will negotiate 1 flight of stairs with step over step with good control. 3. Pt will increase R knee ROM to 0-120 for mobility. 4. Pt will report pain 3/10 with daily activities. 5. Pt will score 45/80 on LEFS. Rehabilitation Potential For Stated Goals: Good  Regarding Perez  therapy, I certify that the treatment plan above will be carried out by a therapist or under their direction. Thank you for this referral,    Allie Alberto , PT                    HISTORY:   History of Present Injury/Illness (Reason for Referral): She works at [employer] and she was entering the cooler and there was oil on the floor. She slipped and the feet slipped out from under her. She tried to get up and slipped again. Injury was August 14-17. She did go to physical therapy but it was making her worse. They decided to have surgery. Surgery 12-21-17. Stayed 2 nights in the hospital. She did have home health physical therapy 3-4x. Pain increases with walking. Past Medical History/Comorbidities: diabetes type 2 controlled with diet, HTN, L LE varicose vein surgery  Social History/Living Environment: Lives with . 2 steps to get inside. Prior Level of Function/Work/Activity: Works at [employer]. 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 Job does require lifting. Would like to get back to walking and walking dogs.    Current Medications:       Current Outpatient Prescriptions:     magnesium hydroxide (BEAUCHAMP MILK OF MAGNESIA) 400 mg/5 mL suspension, Take 15 mL by mouth as needed for Constipation. as needed daily, Disp: , Rfl:     temazepam (RESTORIL) 15 mg capsule, Take 15 mg by mouth nightly as needed for Sleep., Disp: , Rfl:     promethazine (PHENERGAN) 25 mg tablet, Take 25 mg by mouth every eight (8) hours as needed for Nausea., Disp: , Rfl:     HYDROmorphone (DILAUDID) 2 mg tablet, Take 2-4 mg by mouth as needed for Pain. as needed every 4-6 hours, Disp: , Rfl:     atorvastatin (LIPITOR) 20 mg tablet, Take 20 mg by mouth nightly., Disp: , Rfl:     lisinopril-hydroCHLOROthiazide (PRINZIDE, ZESTORETIC) 10-12.5 mg per tablet, Take  by mouth daily. Indications: hypertension, Disp: , Rfl:     gemfibrozil (LOPID) 600 mg tablet, Take 600 mg by mouth two (2) times a day., Disp: , Rfl:     Omega-3 Fatty Acids 60- mg cpDR, Take  by mouth daily. , Disp: , Rfl:     acetaminophen (TYLENOL) 325 mg tablet, Take 325 mg by mouth every four (4) hours as needed for Pain., Disp: , Rfl:    Date Last Reviewed:  1-15-18   Number of Personal Factors/Comorbidities that affect the Plan of Care: 1-2: MODERATE COMPLEXITY   EXAMINATION:     Observation/Orthostatic Postural Assessment:  Pt arrived to therapy with walker with partial weight bearing on R LE and knee brace locked in extension    Palpation: tender to palpation around each incision. ROM:         RLE PROM  R Knee Flexion: 45      Strength:         poor quad set on R LE         Special Tests: none      Balance:n/t        Body Structures Involved:  1. Joints  2. Muscles  3. Ligaments Body Functions Affected:  1. Neuromusculoskeletal Activities and Participation Affected:  1. Mobility  2. Community, Social and [place]   Number of elements (examined above) that affect the Plan of Care: 4+: HIGH COMPLEXITY   CLINICAL PRESENTATION:   Presentation: Evolving clinical presentation with changing clinical characteristics: MODERATE COMPLEXITY   CLINICAL DECISION MAKING:   Outcome Measure:    Tool Used: Lower Extremity Functional Scale (LEFS)  Score:  Initial: 5/80 Most Recent: X/80 (Date: -- )   Interpretation of Score: 20 questions each scored on a 5 point scale with 0 representing \"extreme difficulty or unable to perform\" and 4 representing \"no difficulty\". The lower the score, the greater the functional disability. 80/80 represents no disability. Minimal detectable change is 9 points. Score 80 79-63 62-48 47-32 31-16 15-1 0   Modifier CH CI CJ CK CL CM CN     Medical Necessity:   · Patient is expected to demonstrate progress in strength, range of motion and balance to improve gait. Reason for Services/Other Comments:  · Patient continues to require skilled intervention due to post-op R knee, decreased ROM, strength, gait. Use of outcome tool(s) and clinical judgement create a POC that gives a: Questionable prediction of patient's progress: MODERATE COMPLEXITY            TREATMENT:   (In addition to Assessment/Re-Assessment sessions the following treatments were rendered)  Pre-treatment Symptoms/Complaints: Pt reports the knee feels a little sore. Saturday pain was a 10/10. Pain: Initial:   5/10 Post Session:  5/10. Therapeutic Exercise (40 Minutes): For improved muscle activation, gait, and knee ROM. Pt supine and quad set 2x10, short arc quad with 1/2 foam roll and then foam roll 2x10 ea, ankle 4-way with yellow t-band x10, SLR with min assist 2x 10x, side lying hip abduction with pillow between knees 2x10 reps. Pt in sitting and heel slide on slide board with strap 2x10 then sitting and heel slides for knee flexion ROM 2x10. Standing quad sets 2x10. HEP: she is to work on quad sets, ankle pumps, and walking putting the R foot on the ground. Treatment/Session Assessment:    · Response to Treatment: She is making slow progress with R knee flexion ROM. · Compliance with Program/Exercises:  Appears complaint   · Recommendations/Intent for next treatment session:  \"Next visit will focus on gait, knee ROM\".    Total Treatment Duration: 40 Minutes  PT Patient Time In/Time Out  Time In: 1300  Time Out: [address] [place]

## 2018-01-24 ENCOUNTER — HOSPITAL ENCOUNTER (OUTPATIENT)
Dept: PHYSICAL THERAPY | Age: 53
Discharge: HOME | End: 2018-01-24
Payer: COMMERCIAL

## 2018-01-24 PROCEDURE — 97113 AQUATIC THERAPY/EXERCISES: CPT

## 2018-01-24 NOTE — PROGRESS NOTES
Ajit Foster  : 1965  Payor: [address] [place] [address] Road / Plan: [employer] / Product Type: Workers Comp /  [address]  at [facility] [place] [employer]  [address] [place] [address], [address].  [phone]   [phone]       OUTPATIENT PHYSICAL THERAPY:Daily Note 2018      ICD-10: Treatment Diagnosis: Sprain of posterior cruciate ligament of right knee, sequela (S83.521S); Pain in right knee (M25.561); Stiffness of right knee, not elsewhere classified (M25.661); Difficulty in walking, not elsewhere classified (R26.2)  Precautions/Allergies:   Review of patient's allergies indicates no known allergies. Fall Risk Score: 1 (? 5 = High Risk)  MD Orders:Evaluate and treat, HEP, ROM MEDICAL/REFERRING DIAGNOSIS:  S/p R knee scope, PCL reconstruction   DATE OF ONSET: Surgery 17  REFERRING PHYSICIAN: Clint Augustin MD  RETURN PHYSICIAN APPOINTMENT: 18     INITIAL ASSESSMENT:  Ms. Raya Becerril presents 2 1/2 weeks s/p right knee scope with PCL reconstruction. Post-op wound healing, swelling, pain, decreased ROM and strength are limiting basic mobility and normal activity level. She will benefit from PT for guided post-op rehab to promote normalized return of motion, strength, and function in order for safe return to work and daily. PROBLEM LIST (Impacting functional limitations):  1. Post-op pain and swelling right knee  2. Decreased ROM right knee   3. Decreased functional strength right knee/LE   4. Decreased gait skills INTERVENTIONS PLANNED:  1. aquatic therapy  2. Thermal and electric modalities, manual therapies for pain. 3. Manual therapies and therapeutic exercises for ROM and strength. 4. Therapeutic exercises for gait and balance   TREATMENT PLAN:  Effective Dates: 18 TO 18. Frequency/Duration: 3 times a week for 12 weeks  GOALS: (Goals have been discussed and agreed upon with patient.)   Short-Term Functional Goals: Time Frame: 6 weeks  1.  Pt will be able to perform a good quad set and SLR with no extensor lag with for improved strength for gait. 2. Pt will increase R knee ROM to 0-90 for improved mobility. 3. Pt will improved R ankle DF AROM to 0 for improved gait. 4. Pt will report pain 5/10 with modified daily activities. Discharge Goals: Time Frame: 12 weeks  1. Pt will increase R LE strength to 5/5 with manual muscle testing for improved strength for ADLs and work  2. Pt will negotiate 1 flight of stairs with step over step with good control. 3. Pt will increase R knee ROM to 0-120 for mobility. 4. Pt will report pain 3/10 with daily activities. 5. Pt will score 45/80 on LEFS. Rehabilitation Potential For Stated Goals: Good  Regarding Perez  therapy, I certify that the treatment plan above will be carried out by a therapist or under their direction. Thank you for this referral,    Yaritza Navarrete, PT                    HISTORY:   History of Present Injury/Illness (Reason for Referral): She works at [employer] and she was entering the cooler and there was oil on the floor. She slipped and the feet slipped out from under her. She tried to get up and slipped again. Injury was August 14-17. She did go to physical therapy but it was making her worse. They decided to have surgery. Surgery 12-21-17. Stayed 2 nights in the hospital. She did have home health physical therapy 3-4x. Pain increases with walking. Past Medical History/Comorbidities: diabetes type 2 controlled with diet, HTN, L LE varicose vein surgery  Social History/Living Environment: Lives with . 2 steps to get inside. Prior Level of Function/Work/Activity: Works at [employer]. 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 Job does require lifting. Would like to get back to walking and walking dogs.    Current Medications:       Current Outpatient Prescriptions:     magnesium hydroxide (BEAUCHAMP MILK OF MAGNESIA) 400 mg/5 mL suspension, Take 15 mL by mouth as needed for Constipation. as needed daily, Disp: , Rfl:     temazepam (RESTORIL) 15 mg capsule, Take 15 mg by mouth nightly as needed for Sleep., Disp: , Rfl:     promethazine (PHENERGAN) 25 mg tablet, Take 25 mg by mouth every eight (8) hours as needed for Nausea., Disp: , Rfl:     HYDROmorphone (DILAUDID) 2 mg tablet, Take 2-4 mg by mouth as needed for Pain. as needed every 4-6 hours, Disp: , Rfl:     atorvastatin (LIPITOR) 20 mg tablet, Take 20 mg by mouth nightly., Disp: , Rfl:     lisinopril-hydroCHLOROthiazide (PRINZIDE, ZESTORETIC) 10-12.5 mg per tablet, Take  by mouth daily. Indications: hypertension, Disp: , Rfl:     gemfibrozil (LOPID) 600 mg tablet, Take 600 mg by mouth two (2) times a day., Disp: , Rfl:     Omega-3 Fatty Acids 60- mg cpDR, Take  by mouth daily. , Disp: , Rfl:     acetaminophen (TYLENOL) 325 mg tablet, Take 325 mg by mouth every four (4) hours as needed for Pain., Disp: , Rfl:    Date Last Reviewed:  1-15-18   Number of Personal Factors/Comorbidities that affect the Plan of Care: 1-2: MODERATE COMPLEXITY   EXAMINATION:     Observation/Orthostatic Postural Assessment:  Pt arrived to therapy with walker with partial weight bearing on R LE and knee brace locked in extension    Palpation: tender to palpation around each incision. ROM:                Strength:         poor quad set on R LE         Special Tests: none      Balance:n/t        Body Structures Involved:  1. Joints  2. Muscles  3. Ligaments Body Functions Affected:  1. Neuromusculoskeletal Activities and Participation Affected:  1. Mobility  2. Community, Social and [place]   Number of elements (examined above) that affect the Plan of Care: 4+: HIGH COMPLEXITY   CLINICAL PRESENTATION:   Presentation: Evolving clinical presentation with changing clinical characteristics: MODERATE COMPLEXITY   CLINICAL DECISION MAKING:   Outcome Measure:    Tool Used: Lower Extremity Functional Scale (LEFS)  Score:  Initial: 5/80 Most Recent: X/80 (Date: -- )   Interpretation of Score: 20 questions each scored on a 5 point scale with 0 representing \"extreme difficulty or unable to perform\" and 4 representing \"no difficulty\". The lower the score, the greater the functional disability. 80/80 represents no disability. Minimal detectable change is 9 points. Score 80 79-63 62-48 47-32 31-16 15-1 0   Modifier CH CI CJ CK CL CM CN     Medical Necessity:   · Patient is expected to demonstrate progress in strength, range of motion and balance to improve gait. Reason for Services/Other Comments:  · Patient continues to require skilled intervention due to post-op R knee, decreased ROM, strength, gait. Use of outcome tool(s) and clinical judgement create a POC that gives a: Questionable prediction of patient's progress: MODERATE COMPLEXITY            TREATMENT:   (In addition to Assessment/Re-Assessment sessions the following treatments were rendered)  Pre-treatment Symptoms/Complaints: Pt states she is doing ok. Pain: Initial:   5/10 at rest.  Post Session:  5/10. Aquatic therapy- 45 minutes   Date:  1/19 Date:  1/24 Date:     Activity/Exercise Parameters Parameters Parameters   Lunges onto step for ROM 5 min 5 min    Squats 3.5 depth      On step in 3.g depth 10  10 10  10    Seated knee flex/ext 25x Seated with overpressure 25x    Standing hip ext with knee flex 10x with overpressure  10x with noodle for overpressure 10x with overpressure and hold 10sec  Noodle overpressure 10x    Gait- forward 6 laps 6 laps              back 6 laps               march 6 laps     kicking  Small range 15                         HEP: ROM out of brace    Treatment/Session Assessment:    · Response to Treatment:  ROM slowly improving. · Compliance with Program/Exercises: encouraged pt to unlock brace when possible and work on ex throughout day. · Recommendations/Intent for next treatment session:  \"Next visit will focus on gait, knee ROM\".      Total Treatment Duration: 45 Minutes  PT Patient Time In/Time Out  Time In: 0100  Time Out: 0145    Janna Paredes, PT

## 2018-01-26 ENCOUNTER — HOSPITAL ENCOUNTER (OUTPATIENT)
Dept: PHYSICAL THERAPY | Age: 53
Discharge: HOME | End: 2018-01-26
Payer: COMMERCIAL

## 2018-01-26 PROCEDURE — 97113 AQUATIC THERAPY/EXERCISES: CPT

## 2018-01-29 ENCOUNTER — HOSPITAL ENCOUNTER (OUTPATIENT)
Dept: PHYSICAL THERAPY | Age: 53
Discharge: HOME | End: 2018-01-29
Payer: COMMERCIAL

## 2018-01-29 PROCEDURE — 97110 THERAPEUTIC EXERCISES: CPT

## 2018-01-29 NOTE — PROGRESS NOTES
Nilda Donis  : 1965  Payor: [address] [place] [address] Road / Plan: [facility] / Product Type: Workers Comp /  [address]  at [facility] [place] [employer]  [facility], [address] [place] [address] [address], 4[address] [facility]  [phone]   [phone]       OUTPATIENT PHYSICAL THERAPY:Daily Note and Progress Report 2018      ICD-10: Treatment Diagnosis: Sprain of posterior cruciate ligament of right knee, sequela (S83.521S); Pain in right knee (M25.561); Stiffness of right knee, not elsewhere classified (M25.661); Difficulty in walking, not elsewhere classified (R26.2)  Precautions/Allergies:   Review of patient's allergies indicates no known allergies. Fall Risk Score: 1 (? 5 = High Risk)  MD Orders:Evaluate and treat, HEP, ROM MEDICAL/REFERRING DIAGNOSIS:  S/p R knee scope, PCL reconstruction   DATE OF ONSET: Surgery 17  REFERRING PHYSICIAN: Luna Edmondson MD  RETURN PHYSICIAN APPOINTMENT: 18     INITIAL ASSESSMENT:  Ms. Prema Rodrigez presents s/p right knee scope with PCL reconstruction. She is progressing with R knee ROM. She does continue with stiffness with R ankle ROM. She is able to ambulate with increased weight on R LE with brace on and walker. She will benefit from continuing skilled physical therapy to continue to progress functional mobility. PROBLEM LIST (Impacting functional limitations):  1. Post-op pain and swelling right knee  2. Decreased ROM right knee   3. Decreased functional strength right knee/LE   4. Decreased gait skills INTERVENTIONS PLANNED:  1. aquatic therapy  2. Thermal and electric modalities, manual therapies for pain. 3. Manual therapies and therapeutic exercises for ROM and strength. 4. Therapeutic exercises for gait and balance   TREATMENT PLAN:  Effective Dates: 18 TO 18. Frequency/Duration: 3 times a week for 12 weeks  GOALS: (Goals have been discussed and agreed upon with patient.)   Short-Term Functional Goals: Time Frame: 6 weeks  1.  Pt will be able to perform a good quad set and SLR with no extensor lag with for improved strength for gait. Progressing towards  2. Pt will increase R knee ROM to 0-90 for improved mobility. Progressing towards  3. Pt will improved R ankle DF AROM to 0 for improved gait. Progressing towards  4. Pt will report pain 5/10 with modified daily activities. GOAL MET  Discharge Goals: Time Frame: 12 weeks progressing towards long term goals   1. Pt will increase R LE strength to 5/5 with manual muscle testing for improved strength for ADLs and work  2. Pt will negotiate 1 flight of stairs with step over step with good control. 3. Pt will increase R knee ROM to 0-120 for mobility. 4. Pt will report pain 3/10 with daily activities. 5. Pt will score 45/80 on LEFS. Rehabilitation Potential For Stated Goals: Good  Regarding Perez  therapy, I certify that the treatment plan above will be carried out by a therapist or under their direction. Thank you for this referral,    Allie Randolph, PT                    HISTORY:   History of Present Injury/Illness (Reason for Referral): She works at [employer] and she was entering the cooler and there was oil on the floor. She slipped and the feet slipped out from under her. She tried to get up and slipped again. Injury was August 14-17. She did go to physical therapy but it was making her worse. They decided to have surgery. Surgery 12-21-17. Stayed 2 nights in the hospital. She did have home health physical therapy 3-4x. Pain increases with walking. Past Medical History/Comorbidities: diabetes type 2 controlled with diet, HTN, L LE varicose vein surgery  Social History/Living Environment: Lives with . 2 steps to get inside. Prior Level of Function/Work/Activity: Works at [employer]. 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 Job does require lifting. Would like to get back to walking and walking dogs.    Current Medications: Current Outpatient Prescriptions:     magnesium hydroxide (BEAUCHAMP MILK OF MAGNESIA) 400 mg/5 mL suspension, Take 15 mL by mouth as needed for Constipation. as needed daily, Disp: , Rfl:     temazepam (RESTORIL) 15 mg capsule, Take 15 mg by mouth nightly as needed for Sleep., Disp: , Rfl:     promethazine (PHENERGAN) 25 mg tablet, Take 25 mg by mouth every eight (8) hours as needed for Nausea., Disp: , Rfl:     HYDROmorphone (DILAUDID) 2 mg tablet, Take 2-4 mg by mouth as needed for Pain. as needed every 4-6 hours, Disp: , Rfl:     atorvastatin (LIPITOR) 20 mg tablet, Take 20 mg by mouth nightly., Disp: , Rfl:     lisinopril-hydroCHLOROthiazide (PRINZIDE, ZESTORETIC) 10-12.5 mg per tablet, Take  by mouth daily. Indications: hypertension, Disp: , Rfl:     gemfibrozil (LOPID) 600 mg tablet, Take 600 mg by mouth two (2) times a day., Disp: , Rfl:     Omega-3 Fatty Acids 60- mg cpDR, Take  by mouth daily. , Disp: , Rfl:     acetaminophen (TYLENOL) 325 mg tablet, Take 325 mg by mouth every four (4) hours as needed for Pain., Disp: , Rfl:    Date Last Reviewed:  1-29-18   Number of Personal Factors/Comorbidities that affect the Plan of Care: 1-2: MODERATE COMPLEXITY   EXAMINATION:     Observation/Orthostatic Postural Assessment:  Pt arrived to therapy with walker with partial weight bearing on R LE and knee brace locked in extension    Palpation: no tenderness around incisions     ROM:         RLE AROM  R Ankle Dorsiflexion: -20  RLE PROM  R Knee Flexion: 80  R Ankle Dorsiflexion: -10      Strength:    SLR with min assist               Special Tests: none      Balance:n/t        Body Structures Involved:  1. Joints  2. Muscles  3. Ligaments Body Functions Affected:  1. Neuromusculoskeletal Activities and Participation Affected:  1. Mobility  2.  Community, Social and [place]   Number of elements (examined above) that affect the Plan of Care: 4+: HIGH COMPLEXITY   CLINICAL PRESENTATION: Presentation: Evolving clinical presentation with changing clinical characteristics: MODERATE COMPLEXITY   CLINICAL DECISION MAKING:   Outcome Measure: Tool Used: Lower Extremity Functional Scale (LEFS)  Score:  Initial: 5/80 Most Recent: X/80 (Date: -- )   Interpretation of Score: 20 questions each scored on a 5 point scale with 0 representing \"extreme difficulty or unable to perform\" and 4 representing \"no difficulty\". The lower the score, the greater the functional disability. 80/80 represents no disability. Minimal detectable change is 9 points. Score 80 79-63 62-48 47-32 31-16 15-1 0   Modifier CH CI CJ CK CL CM CN     Medical Necessity:   · Patient is expected to demonstrate progress in strength, range of motion and balance to improve gait. Reason for Services/Other Comments:  · Patient continues to require skilled intervention due to post-op R knee, decreased ROM, strength, gait. Use of outcome tool(s) and clinical judgement create a POC that gives a: Questionable prediction of patient's progress: MODERATE COMPLEXITY            TREATMENT:   (In addition to Assessment/Re-Assessment sessions the following treatments were rendered)  Pre-treatment Symptoms/Complaints: Pt reports she is walking better. Pain: Initial:   5/10 Post Session:  5/10. Therapeutic Exercise (40 Minutes): For improved muscle activation, gait, and knee ROM. Pt supine and quad set 2x10, short arc quad with 1/2 foam roll and then foam roll 2x10 ea, ankle 4-way with yellow t-band 2x10 for ankle DF and PF, AROM for ankle inversion and eversion 2x10 ea, SLR with min assist 2x 10x, side lying hip abduction with pillow between knees 2x10 reps. Pt in sitting and heel slide on slide board with strap 2x10 then sitting and heel slides for knee flexion ROM 2x10. Practiced gait with verbal cues for R foot flat on the floor instead of ankle inversion during stance phase.   Instructed in ankle DF stretch with strap 20\"x3 and instructed HEP: she is to work on quad sets, ankle pumps, and walking putting the R foot on the ground. Treatment/Session Assessment:    · Response to Treatment: She continues to make progress with knee flexion ROM. · Compliance with Program/Exercises:  Appears complaint   · Recommendations/Intent for next treatment session: \"Next visit will focus on gait, knee ROM\".    Total Treatment Duration: 40 Minutes  PT Patient Time In/Time Out  Time In: 1305  Time Out: Thomas 177

## 2018-01-29 NOTE — PROGRESS NOTES
Federica Aguilera  : 1965  Payor: [address] [place] [address] Road / Plan: Marie Calhouns / Product Type: Workers Comp /  [address]  at [facility] [place] [employer]  [address] [place] [address], [address] [place].  [phone]   [phone]       OUTPATIENT PHYSICAL THERAPY:Daily Note 2018      ICD-10: Treatment Diagnosis: Sprain of posterior cruciate ligament of right knee, sequela (S83.521S); Pain in right knee (M25.561); Stiffness of right knee, not elsewhere classified (M25.661); Difficulty in walking, not elsewhere classified (R26.2)  Precautions/Allergies:   Review of patient's allergies indicates no known allergies. Fall Risk Score: 1 (? 5 = High Risk)  MD Orders:Evaluate and treat, HEP, ROM MEDICAL/REFERRING DIAGNOSIS:  S/p R knee scope, PCL reconstruction   DATE OF ONSET: Surgery 17  REFERRING PHYSICIAN: Jesenia Philip MD  RETURN PHYSICIAN APPOINTMENT: 18     INITIAL ASSESSMENT:  Ms. Sivakumar Aiken presents 2 1/2 weeks s/p right knee scope with PCL reconstruction. Post-op wound healing, swelling, pain, decreased ROM and strength are limiting basic mobility and normal activity level. She will benefit from PT for guided post-op rehab to promote normalized return of motion, strength, and function in order for safe return to work and daily. PROBLEM LIST (Impacting functional limitations):  1. Post-op pain and swelling right knee  2. Decreased ROM right knee   3. Decreased functional strength right knee/LE   4. Decreased gait skills INTERVENTIONS PLANNED:  1. aquatic therapy  2. Thermal and electric modalities, manual therapies for pain. 3. Manual therapies and therapeutic exercises for ROM and strength. 4. Therapeutic exercises for gait and balance   TREATMENT PLAN:  Effective Dates: 18 TO 18. Frequency/Duration: 3 times a week for 12 weeks  GOALS: (Goals have been discussed and agreed upon with patient.)   Short-Term Functional Goals: Time Frame: 6 weeks  1.  Pt will be able to perform a good quad set and SLR with no extensor lag with for improved strength for gait. 2. Pt will increase R knee ROM to 0-90 for improved mobility. 3. Pt will improved R ankle DF AROM to 0 for improved gait. 4. Pt will report pain 5/10 with modified daily activities. Discharge Goals: Time Frame: 12 weeks  1. Pt will increase R LE strength to 5/5 with manual muscle testing for improved strength for ADLs and work  2. Pt will negotiate 1 flight of stairs with step over step with good control. 3. Pt will increase R knee ROM to 0-120 for mobility. 4. Pt will report pain 3/10 with daily activities. 5. Pt will score 45/80 on LEFS. Rehabilitation Potential For Stated Goals: Good  Regarding Perez  therapy, I certify that the treatment plan above will be carried out by a therapist or under their direction. Thank you for this referral,    Yaritza Navarrete, PT                    HISTORY:   History of Present Injury/Illness (Reason for Referral): She works at [employer] and she was entering the cooler and there was oil on the floor. She slipped and the feet slipped out from under her. She tried to get up and slipped again. Injury was August 14-17. She did go to physical therapy but it was making her worse. They decided to have surgery. Surgery 12-21-17. Stayed 2 nights in the hospital. She did have home health physical therapy 3-4x. Pain increases with walking. Past Medical History/Comorbidities: diabetes type 2 controlled with diet, HTN, L LE varicose vein surgery  Social History/Living Environment: Lives with . 2 steps to get inside. Prior Level of Function/Work/Activity: Works at [employer]. 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 Job does require lifting. Would like to get back to walking and walking dogs.    Current Medications:       Current Outpatient Prescriptions:     magnesium hydroxide (BEAUCHAMP MILK OF MAGNESIA) 400 mg/5 mL suspension, Take 15 mL by mouth as needed for Constipation. as needed daily, Disp: , Rfl:     temazepam (RESTORIL) 15 mg capsule, Take 15 mg by mouth nightly as needed for Sleep., Disp: , Rfl:     promethazine (PHENERGAN) 25 mg tablet, Take 25 mg by mouth every eight (8) hours as needed for Nausea., Disp: , Rfl:     HYDROmorphone (DILAUDID) 2 mg tablet, Take 2-4 mg by mouth as needed for Pain. as needed every 4-6 hours, Disp: , Rfl:     atorvastatin (LIPITOR) 20 mg tablet, Take 20 mg by mouth nightly., Disp: , Rfl:     lisinopril-hydroCHLOROthiazide (PRINZIDE, ZESTORETIC) 10-12.5 mg per tablet, Take  by mouth daily. Indications: hypertension, Disp: , Rfl:     gemfibrozil (LOPID) 600 mg tablet, Take 600 mg by mouth two (2) times a day., Disp: , Rfl:     Omega-3 Fatty Acids 60- mg cpDR, Take  by mouth daily. , Disp: , Rfl:     acetaminophen (TYLENOL) 325 mg tablet, Take 325 mg by mouth every four (4) hours as needed for Pain., Disp: , Rfl:    Date Last Reviewed:  1-15-18   Number of Personal Factors/Comorbidities that affect the Plan of Care: 1-2: MODERATE COMPLEXITY   EXAMINATION:     Observation/Orthostatic Postural Assessment:  Pt arrived to therapy with walker with partial weight bearing on R LE and knee brace locked in extension    Palpation: tender to palpation around each incision. ROM:                Strength:         poor quad set on R LE         Special Tests: none      Balance:n/t        Body Structures Involved:  1. Joints  2. Muscles  3. Ligaments Body Functions Affected:  1. Neuromusculoskeletal Activities and Participation Affected:  1. Mobility  2. Community, Social and [place]   Number of elements (examined above) that affect the Plan of Care: 4+: HIGH COMPLEXITY   CLINICAL PRESENTATION:   Presentation: Evolving clinical presentation with changing clinical characteristics: MODERATE COMPLEXITY   CLINICAL DECISION MAKING:   Outcome Measure:    Tool Used: Lower Extremity Functional Scale (LEFS)  Score:  Initial: 5/80 Most Recent: X/80 (Date: -- )   Interpretation of Score: 20 questions each scored on a 5 point scale with 0 representing \"extreme difficulty or unable to perform\" and 4 representing \"no difficulty\". The lower the score, the greater the functional disability. 80/80 represents no disability. Minimal detectable change is 9 points. Score 80 79-63 62-48 47-32 31-16 15-1 0   Modifier CH CI CJ CK CL CM CN     Medical Necessity:   · Patient is expected to demonstrate progress in strength, range of motion and balance to improve gait. Reason for Services/Other Comments:  · Patient continues to require skilled intervention due to post-op R knee, decreased ROM, strength, gait. Use of outcome tool(s) and clinical judgement create a POC that gives a: Questionable prediction of patient's progress: MODERATE COMPLEXITY            TREATMENT:   (In addition to Assessment/Re-Assessment sessions the following treatments were rendered)  Pre-treatment Symptoms/Complaints: Pt states she worked very hard at home. Pain: Initial:   5/10 at rest.  Post Session:  5/10. Aquatic therapy- 45 minutes   Date:  1/19 Date:  1/24 Date:  1/26   Activity/Exercise Parameters Parameters Parameters   Lunges onto step for ROM 5 min 5 min 5 min   Squats 3.5 depth      On step in 3.g depth 10  10 10  10 15  15   Seated knee flex/ext 25x Seated with overpressure 25x    Standing hip ext with knee flex 10x with overpressure  10x with noodle for overpressure 10x with overpressure and hold 10sec  Noodle overpressure 10x 10x with overpressure   Gait- forward 6 laps 6 laps 6 laps               back 6 laps  6 laps             march 6 laps  6 laps   kicking  Small range 15                         HEP: ROM out of brace    Treatment/Session Assessment:    · Response to Treatment:  Knee flexion greater than 90 degrees today.   · Compliance with Program/Exercises: encouraged pt to unlock brace when possible and work on ex throughout day.  · Recommendations/Intent for next treatment session: \"Next visit will focus on gait, knee ROM\".      Total Treatment Duration: 45 Minutes  PT Patient Time In/Time Out  Time In: 0115  Time Out: 0200    Ben Law PT

## 2018-01-31 ENCOUNTER — HOSPITAL ENCOUNTER (OUTPATIENT)
Dept: PHYSICAL THERAPY | Age: 53
Discharge: HOME | End: 2018-01-31
Payer: COMMERCIAL

## 2018-01-31 PROCEDURE — 97113 AQUATIC THERAPY/EXERCISES: CPT

## 2018-01-31 NOTE — PROGRESS NOTES
Jeanette Funez  : 1965  Payor: [address] [place] [address] Road / Plan: Gia Vance / Product Type: Workers Comp /  [address]  [facility] [place] [address] [facility] [address].  [phone]   [phone]       OUTPATIENT PHYSICAL THERAPY:Daily Note 2018      ICD-10: Treatment Diagnosis: Sprain of posterior cruciate ligament of right knee, sequela (S83.521S); Pain in right knee (M25.561); Stiffness of right knee, not elsewhere classified (M25.661); Difficulty in walking, not elsewhere classified (R26.2)  Precautions/Allergies:   Review of patient's allergies indicates no known allergies. Fall Risk Score: 1 (? 5 = High Risk)  MD Orders:Evaluate and treat, HEP, ROM MEDICAL/REFERRING DIAGNOSIS:  S/p R knee scope, PCL reconstruction   DATE OF ONSET: Surgery 17  REFERRING PHYSICIAN: Jose Nunes MD  RETURN PHYSICIAN APPOINTMENT: 18     INITIAL ASSESSMENT:  Ms. Liang Hernandez presents s/p right knee scope with PCL reconstruction. She is progressing with R knee ROM. She does continue with stiffness with R ankle ROM. She is able to ambulate with increased weight on R LE with brace on and walker. She will benefit from continuing skilled physical therapy to continue to progress functional mobility. PROBLEM LIST (Impacting functional limitations):  1. Post-op pain and swelling right knee  2. Decreased ROM right knee   3. Decreased functional strength right knee/LE   4. Decreased gait skills INTERVENTIONS PLANNED:  1. aquatic therapy  2. Thermal and electric modalities, manual therapies for pain. 3. Manual therapies and therapeutic exercises for ROM and strength. 4. Therapeutic exercises for gait and balance   TREATMENT PLAN:  Effective Dates: 18 TO 18. Frequency/Duration: 3 times a week for 12 weeks  GOALS: (Goals have been discussed and agreed upon with patient.)   Short-Term Functional Goals: Time Frame: 6 weeks  1.  Pt will be able to perform a good quad set and SLR with no extensor lag with for improved strength for gait. Progressing towards  2. Pt will increase R knee ROM to 0-90 for improved mobility. Progressing towards  3. Pt will improved R ankle DF AROM to 0 for improved gait. Progressing towards  4. Pt will report pain 5/10 with modified daily activities. GOAL MET  Discharge Goals: Time Frame: 12 weeks progressing towards long term goals   1. Pt will increase R LE strength to 5/5 with manual muscle testing for improved strength for ADLs and work  2. Pt will negotiate 1 flight of stairs with step over step with good control. 3. Pt will increase R knee ROM to 0-120 for mobility. 4. Pt will report pain 3/10 with daily activities. 5. Pt will score 45/80 on LEFS. Rehabilitation Potential For Stated Goals: Good  Regarding Perez  therapy, I certify that the treatment plan above will be carried out by a therapist or under their direction. Thank you for this referral,    Yaritza Navarrete, PT                    HISTORY:   History of Present Injury/Illness (Reason for Referral): She works at [employer] and she was entering the cooler and there was oil on the floor. She slipped and the feet slipped out from under her. She tried to get up and slipped again. Injury was August 14-17. She did go to physical therapy but it was making her worse. They decided to have surgery. Surgery 12-21-17. Stayed 2 nights in the hospital. She did have home health physical therapy 3-4x. Pain increases with walking. Past Medical History/Comorbidities: diabetes type 2 controlled with diet, HTN, L LE varicose vein surgery  Social History/Living Environment: Lives with . 2 steps to get inside. Prior Level of Function/Work/Activity: Works at [employer]. 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 Job does require lifting. Would like to get back to walking and walking dogs.    Current Medications:       Current Outpatient Prescriptions:     magnesium hydroxide (BEAUCHAMP MILK OF MAGNESIA) 400 mg/5 mL suspension, Take 15 mL by mouth as needed for Constipation. as needed daily, Disp: , Rfl:     temazepam (RESTORIL) 15 mg capsule, Take 15 mg by mouth nightly as needed for Sleep., Disp: , Rfl:     promethazine (PHENERGAN) 25 mg tablet, Take 25 mg by mouth every eight (8) hours as needed for Nausea., Disp: , Rfl:     HYDROmorphone (DILAUDID) 2 mg tablet, Take 2-4 mg by mouth as needed for Pain. as needed every 4-6 hours, Disp: , Rfl:     atorvastatin (LIPITOR) 20 mg tablet, Take 20 mg by mouth nightly., Disp: , Rfl:     lisinopril-hydroCHLOROthiazide (PRINZIDE, ZESTORETIC) 10-12.5 mg per tablet, Take  by mouth daily. Indications: hypertension, Disp: , Rfl:     gemfibrozil (LOPID) 600 mg tablet, Take 600 mg by mouth two (2) times a day., Disp: , Rfl:     Omega-3 Fatty Acids 60- mg cpDR, Take  by mouth daily. , Disp: , Rfl:     acetaminophen (TYLENOL) 325 mg tablet, Take 325 mg by mouth every four (4) hours as needed for Pain., Disp: , Rfl:    Date Last Reviewed:  1-29-18   Number of Personal Factors/Comorbidities that affect the Plan of Care: 1-2: MODERATE COMPLEXITY   EXAMINATION:     Observation/Orthostatic Postural Assessment:  Pt arrived to therapy with walker with partial weight bearing on R LE and knee brace locked in extension    Palpation: no tenderness around incisions     ROM:                Strength:    SLR with min assist               Special Tests: none      Balance:n/t        Body Structures Involved:  1. Joints  2. Muscles  3. Ligaments Body Functions Affected:  1. Neuromusculoskeletal Activities and Participation Affected:  1. Mobility  2.  Community, Social and [place]   Number of elements (examined above) that affect the Plan of Care: 4+: HIGH COMPLEXITY   CLINICAL PRESENTATION:   Presentation: Evolving clinical presentation with changing clinical characteristics: [address] MAKING:   Outcome Measure: Tool Used: Lower Extremity Functional Scale (LEFS)  Score:  Initial: 5/80 Most Recent: X/80 (Date: -- )   Interpretation of Score: 20 questions each scored on a 5 point scale with 0 representing \"extreme difficulty or unable to perform\" and 4 representing \"no difficulty\". The lower the score, the greater the functional disability. 80/80 represents no disability. Minimal detectable change is 9 points. Score 80 79-63 62-48 47-32 31-16 15-1 0   Modifier CH CI CJ CK CL CM CN     Medical Necessity:   · Patient is expected to demonstrate progress in strength, range of motion and balance to improve gait. Reason for Services/Other Comments:  · Patient continues to require skilled intervention due to post-op R knee, decreased ROM, strength, gait. Use of outcome tool(s) and clinical judgement create a POC that gives a: Questionable prediction of patient's progress: MODERATE COMPLEXITY            TREATMENT:   (In addition to Assessment/Re-Assessment sessions the following treatments were rendered)  Pre-treatment Symptoms/Complaints: Pt states it is not hurting as much. Pain: Initial:   3/10 Post Session:  3/10. Date:  1/31 Date:   Date:     Activity/Exercise Parameters Parameters Parameters   Lunge on step for flex and ext stretch 5 min     squats 2 x 10     Calf stretch 5 x 20 sec     Seated knee flex and ext 2 x 10     Ankle dorsi flex/ plantar flex 2 x 10  Manual stretching for ankle     Toe tap 2 x 10 on bottom step     Walking  Forward - 6  Back 4 March 6                                     HEP: she is to work on quad sets, ankle pumps, and walking putting the R foot on the ground. Treatment/Session Assessment:    · Response to Treatment: worked on keeping foot flat with standing and walking. Increased awareness of foot position. · Compliance with Program/Exercises:  Appears complaint   · Recommendations/Intent for next treatment session:  \"Next visit will focus on gait, knee ROM\".    Total Treatment Duration: 45 Minutes  PT Patient Time In/Time Out  Time In: 0100  Time Out: 0145    Carmelita Bergeron, PT

## 2018-02-02 ENCOUNTER — HOSPITAL ENCOUNTER (OUTPATIENT)
Dept: PHYSICAL THERAPY | Age: 53
Discharge: HOME | End: 2018-02-02
Payer: COMMERCIAL

## 2018-02-02 PROCEDURE — 97113 AQUATIC THERAPY/EXERCISES: CPT

## 2018-02-02 NOTE — PROGRESS NOTES
Deanna Sheppard  : 1965  Payor: [address] [place] [address] Road / Plan: Mc Vázquez / Product Type: Workers Comp /  Gerard Dieterich  at [address] [place] [address] [address]  [address] [facility], [address].  [phone]   [phone]       OUTPATIENT PHYSICAL THERAPY:Daily Note 2018      ICD-10: Treatment Diagnosis: Sprain of posterior cruciate ligament of right knee, sequela (S83.521S); Pain in right knee (M25.561); Stiffness of right knee, not elsewhere classified (M25.661); Difficulty in walking, not elsewhere classified (R26.2)  Precautions/Allergies:   Review of patient's allergies indicates no known allergies. Fall Risk Score: 1 (? 5 = High Risk)  MD Orders:Evaluate and treat, HEP, ROM MEDICAL/REFERRING DIAGNOSIS:  S/p R knee scope, PCL reconstruction   DATE OF ONSET: Surgery 17  REFERRING PHYSICIAN: Yahaira Pope MD  RETURN PHYSICIAN APPOINTMENT: 18     INITIAL ASSESSMENT:  Ms. Aayush Nix presents s/p right knee scope with PCL reconstruction. She is progressing with R knee ROM. She does continue with stiffness with R ankle ROM. She is able to ambulate with increased weight on R LE with brace on and walker. She will benefit from continuing skilled physical therapy to continue to progress functional mobility. PROBLEM LIST (Impacting functional limitations):  1. Post-op pain and swelling right knee  2. Decreased ROM right knee   3. Decreased functional strength right knee/LE   4. Decreased gait skills INTERVENTIONS PLANNED:  1. aquatic therapy  2. Thermal and electric modalities, manual therapies for pain. 3. Manual therapies and therapeutic exercises for ROM and strength. 4. Therapeutic exercises for gait and balance   TREATMENT PLAN:  Effective Dates: 18 TO 18. Frequency/Duration: 3 times a week for 12 weeks  GOALS: (Goals have been discussed and agreed upon with patient.)   Short-Term Functional Goals: Time Frame: 6 weeks  1.  Pt will be able to perform a good quad set and SLR with no extensor lag with for improved strength for gait. Progressing towards  2. Pt will increase R knee ROM to 0-90 for improved mobility. Progressing towards  3. Pt will improved R ankle DF AROM to 0 for improved gait. Progressing towards  4. Pt will report pain 5/10 with modified daily activities. GOAL MET  Discharge Goals: Time Frame: 12 weeks progressing towards long term goals   1. Pt will increase R LE strength to 5/5 with manual muscle testing for improved strength for ADLs and work  2. Pt will negotiate 1 flight of stairs with step over step with good control. 3. Pt will increase R knee ROM to 0-120 for mobility. 4. Pt will report pain 3/10 with daily activities. 5. Pt will score 45/80 on LEFS. Rehabilitation Potential For Stated Goals: Good  Regarding Perez  therapy, I certify that the treatment plan above will be carried out by a therapist or under their direction. Thank you for this referral,    Yaritza Navarrete, PT                    HISTORY:   History of Present Injury/Illness (Reason for Referral): She works at [employer] and she was entering the cooler and there was oil on the floor. She slipped and the feet slipped out from under her. She tried to get up and slipped again. Injury was August 14-17. She did go to physical therapy but it was making her worse. They decided to have surgery. Surgery 12-21-17. Stayed 2 nights in the hospital. She did have home health physical therapy 3-4x. Pain increases with walking. Past Medical History/Comorbidities: diabetes type 2 controlled with diet, HTN, L LE varicose vein surgery  Social History/Living Environment: Lives with . 2 steps to get inside. Prior Level of Function/Work/Activity: Works at [employer]. 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 Job does require lifting. Would like to get back to walking and walking dogs.    Current Medications:       Current Outpatient Prescriptions:     magnesium hydroxide (BEAUCHAMP MILK OF MAGNESIA) 400 mg/5 mL suspension, Take 15 mL by mouth as needed for Constipation. as needed daily, Disp: , Rfl:     temazepam (RESTORIL) 15 mg capsule, Take 15 mg by mouth nightly as needed for Sleep., Disp: , Rfl:     promethazine (PHENERGAN) 25 mg tablet, Take 25 mg by mouth every eight (8) hours as needed for Nausea., Disp: , Rfl:     HYDROmorphone (DILAUDID) 2 mg tablet, Take 2-4 mg by mouth as needed for Pain. as needed every 4-6 hours, Disp: , Rfl:     atorvastatin (LIPITOR) 20 mg tablet, Take 20 mg by mouth nightly., Disp: , Rfl:     lisinopril-hydroCHLOROthiazide (PRINZIDE, ZESTORETIC) 10-12.5 mg per tablet, Take  by mouth daily. Indications: hypertension, Disp: , Rfl:     gemfibrozil (LOPID) 600 mg tablet, Take 600 mg by mouth two (2) times a day., Disp: , Rfl:     Omega-3 Fatty Acids 60- mg cpDR, Take  by mouth daily. , Disp: , Rfl:     acetaminophen (TYLENOL) 325 mg tablet, Take 325 mg by mouth every four (4) hours as needed for Pain., Disp: , Rfl:    Date Last Reviewed:  1-29-18   Number of Personal Factors/Comorbidities that affect the Plan of Care: 1-2: MODERATE COMPLEXITY   EXAMINATION:     Observation/Orthostatic Postural Assessment:  Pt arrived to therapy with walker with partial weight bearing on R LE and knee brace locked in extension    Palpation: no tenderness around incisions     ROM:                Strength:    SLR with min assist               Special Tests: none      Balance:n/t        Body Structures Involved:  1. Joints  2. Muscles  3. Ligaments Body Functions Affected:  1. Neuromusculoskeletal Activities and Participation Affected:  1. Mobility  2.  Community, Social and [place]   Number of elements (examined above) that affect the Plan of Care: 4+: HIGH COMPLEXITY   CLINICAL PRESENTATION:   Presentation: Evolving clinical presentation with changing clinical characteristics: [address] MAKING:   Outcome Measure: Tool Used: Lower Extremity Functional Scale (LEFS)  Score:  Initial: 5/80 Most Recent: X/80 (Date: -- )   Interpretation of Score: 20 questions each scored on a 5 point scale with 0 representing \"extreme difficulty or unable to perform\" and 4 representing \"no difficulty\". The lower the score, the greater the functional disability. 80/80 represents no disability. Minimal detectable change is 9 points. Score 80 79-63 62-48 47-32 31-16 15-1 0   Modifier CH CI CJ CK CL CM CN     Medical Necessity:   · Patient is expected to demonstrate progress in strength, range of motion and balance to improve gait. Reason for Services/Other Comments:  · Patient continues to require skilled intervention due to post-op R knee, decreased ROM, strength, gait. Use of outcome tool(s) and clinical judgement create a POC that gives a: Questionable prediction of patient's progress: MODERATE COMPLEXITY            TREATMENT:   (In addition to Assessment/Re-Assessment sessions the following treatments were rendered)  Pre-treatment Symptoms/Complaints: Pt continues to make progress with ROM and wt bearing. Pain: Initial:   3/10 Post Session:  3/10. Date:  1/31 Date:  2/2 Date:     Activity/Exercise Parameters Parameters Parameters   Lunge on step for flex and ext stretch 5 min 5 min    squats 2 x 10 2 x10    Calf stretch 5 x 20 sec     Seated knee flex and ext 2 x 10 With manual assist 5 min    Ankle dorsi flex/ plantar flex 2 x 10  Manual stretching for ankle     Toe tap 2 x 10 on bottom step     Walking  Forward - 6  Back 4 March 6 Forward 6  Back 4 March 6                                    HEP: she is to work on quad sets, ankle pumps, and walking putting the R foot on the ground. Treatment/Session Assessment:    · Response to Treatment: continues to make progress and work hard  · Compliance with Program/Exercises:  Appears complaint   · Recommendations/Intent for next treatment session:  \"Next visit will focus on gait, knee ROM\".    Total Treatment Duration: 45 Minutes  PT Patient Time In/Time Out  Time In: 0115  Time Out: 0200    Justin Barroso PT

## 2018-02-07 ENCOUNTER — HOSPITAL ENCOUNTER (OUTPATIENT)
Dept: PHYSICAL THERAPY | Age: 53
Discharge: HOME | End: 2018-02-07
Payer: COMMERCIAL

## 2018-02-07 PROCEDURE — 97110 THERAPEUTIC EXERCISES: CPT

## 2018-02-07 NOTE — PROGRESS NOTES
Israel Castelans  : 1965  Payor: [address] [place] [address] Road / Plan: Roby Ram / Product Type: Workers Comp /  [address]  at [facility] [place] [facility]  [address] [place] [address], [address].  [phone]   [phone]       OUTPATIENT PHYSICAL THERAPY:Daily Note 2018      ICD-10: Treatment Diagnosis: Sprain of posterior cruciate ligament of right knee, sequela (S83.521S); Pain in right knee (M25.561); Stiffness of right knee, not elsewhere classified (M25.661); Difficulty in walking, not elsewhere classified (R26.2)  Precautions/Allergies:   Review of patient's allergies indicates no known allergies. Fall Risk Score: 1 (? 5 = High Risk)  MD Orders:Evaluate and treat, HEP, ROM MEDICAL/REFERRING DIAGNOSIS:  S/p R knee scope, PCL reconstruction   DATE OF ONSET: Surgery 17  REFERRING PHYSICIAN: Hailey Westbrook MD  RETURN PHYSICIAN APPOINTMENT: 18     INITIAL ASSESSMENT:  Ms. Marifer Gonzalez presents s/p right knee scope with PCL reconstruction. She is progressing with R knee ROM. She does continue with stiffness with R ankle ROM. She is able to ambulate with increased weight on R LE with brace on and walker. She will benefit from continuing skilled physical therapy to continue to progress functional mobility. PROBLEM LIST (Impacting functional limitations):  1. Post-op pain and swelling right knee  2. Decreased ROM right knee   3. Decreased functional strength right knee/LE   4. Decreased gait skills INTERVENTIONS PLANNED:  1. aquatic therapy  2. Thermal and electric modalities, manual therapies for pain. 3. Manual therapies and therapeutic exercises for ROM and strength. 4. Therapeutic exercises for gait and balance   TREATMENT PLAN:  Effective Dates: 18 TO 18. Frequency/Duration: 3 times a week for 12 weeks  GOALS: (Goals have been discussed and agreed upon with patient.)   Short-Term Functional Goals: Time Frame: 6 weeks  1.  Pt will be able to perform a good quad set and SLR with no extensor lag with for improved strength for gait. Progressing towards  2. Pt will increase R knee ROM to 0-90 for improved mobility. Progressing towards  3. Pt will improved R ankle DF AROM to 0 for improved gait. Progressing towards  4. Pt will report pain 5/10 with modified daily activities. GOAL MET  Discharge Goals: Time Frame: 12 weeks progressing towards long term goals   1. Pt will increase R LE strength to 5/5 with manual muscle testing for improved strength for ADLs and work  2. Pt will negotiate 1 flight of stairs with step over step with good control. 3. Pt will increase R knee ROM to 0-120 for mobility. 4. Pt will report pain 3/10 with daily activities. 5. Pt will score 45/80 on LEFS. Rehabilitation Potential For Stated Goals: Good  Regarding Perez  therapy, I certify that the treatment plan above will be carried out by a therapist or under their direction. Thank you for this referral,    Allie Chamorro, PT                    HISTORY:   History of Present Injury/Illness (Reason for Referral): She works at [employer] and she was entering the cooler and there was oil on the floor. She slipped and the feet slipped out from under her. She tried to get up and slipped again. Injury was August 14-17. She did go to physical therapy but it was making her worse. They decided to have surgery. Surgery 12-21-17. Stayed 2 nights in the hospital. She did have home health physical therapy 3-4x. Pain increases with walking. Past Medical History/Comorbidities: diabetes type 2 controlled with diet, HTN, L LE varicose vein surgery  Social History/Living Environment: Lives with . 2 steps to get inside. Prior Level of Function/Work/Activity: Works at [employer]. 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 Job does require lifting. Would like to get back to walking and walking dogs.    Current Medications:       Current Outpatient Prescriptions:     magnesium hydroxide (BEAUCHAMP MILK OF MAGNESIA) 400 mg/5 mL suspension, Take 15 mL by mouth as needed for Constipation. as needed daily, Disp: , Rfl:     temazepam (RESTORIL) 15 mg capsule, Take 15 mg by mouth nightly as needed for Sleep., Disp: , Rfl:     promethazine (PHENERGAN) 25 mg tablet, Take 25 mg by mouth every eight (8) hours as needed for Nausea., Disp: , Rfl:     HYDROmorphone (DILAUDID) 2 mg tablet, Take 2-4 mg by mouth as needed for Pain. as needed every 4-6 hours, Disp: , Rfl:     atorvastatin (LIPITOR) 20 mg tablet, Take 20 mg by mouth nightly., Disp: , Rfl:     lisinopril-hydroCHLOROthiazide (PRINZIDE, ZESTORETIC) 10-12.5 mg per tablet, Take  by mouth daily. Indications: hypertension, Disp: , Rfl:     gemfibrozil (LOPID) 600 mg tablet, Take 600 mg by mouth two (2) times a day., Disp: , Rfl:     Omega-3 Fatty Acids 60- mg cpDR, Take  by mouth daily. , Disp: , Rfl:     acetaminophen (TYLENOL) 325 mg tablet, Take 325 mg by mouth every four (4) hours as needed for Pain., Disp: , Rfl:    Date Last Reviewed:  2-7-18   Number of Personal Factors/Comorbidities that affect the Plan of Care: 1-2: MODERATE COMPLEXITY   EXAMINATION:     Observation/Orthostatic Postural Assessment:  Pt arrived to therapy with walker with partial weight bearing on R LE and knee brace locked in extension    Palpation: no tenderness around incisions     ROM:                Strength:                   Special Tests: none      Balance:n/t        Body Structures Involved:  1. Joints  2. Muscles  3. Ligaments Body Functions Affected:  1. Neuromusculoskeletal Activities and Participation Affected:  1. Mobility  2.  Community, Social and [place]   Number of elements (examined above) that affect the Plan of Care: 4+: HIGH COMPLEXITY   CLINICAL PRESENTATION:   Presentation: Evolving clinical presentation with changing clinical characteristics: MODERATE COMPLEXITY   CLINICAL DECISION MAKING: Outcome Measure: Tool Used: Lower Extremity Functional Scale (LEFS)  Score:  Initial: 5/80 Most Recent: X/80 (Date: -- )   Interpretation of Score: 20 questions each scored on a 5 point scale with 0 representing \"extreme difficulty or unable to perform\" and 4 representing \"no difficulty\". The lower the score, the greater the functional disability. 80/80 represents no disability. Minimal detectable change is 9 points. Score 80 79-63 62-48 47-32 31-16 15-1 0   Modifier CH CI CJ CK CL CM CN     Medical Necessity:   · Patient is expected to demonstrate progress in strength, range of motion and balance to improve gait. Reason for Services/Other Comments:  · Patient continues to require skilled intervention due to post-op R knee, decreased ROM, strength, gait. Use of outcome tool(s) and clinical judgement create a POC that gives a: Questionable prediction of patient's progress: MODERATE COMPLEXITY            TREATMENT:   (In addition to Assessment/Re-Assessment sessions the following treatments were rendered)  Pre-treatment Symptoms/Complaints: Pt reports she is walking better. She reports pain in the foot that goes up her leg when she walks with her foot flat instead of walking on the side of her foot. Pain: Initial:   5/10 Post Session:  5/10. Therapeutic Exercise (45 Minutes): For improved muscle activation, gait, and knee ROM. Pt supine and quad set 2x10,, ankle 4-way with yellow t-band 2x10 for ankle DF and PF, AROM for ankle inversion and eversion 2x10 ea, SLR 2x 10x, side lying hip abduction with pillow between knees 2x10 reps. Pt in sitting and heel slide on slide board with strap 2x10 then sitting and heel slides for knee flexion ROM 2x10. Seated long arc quad 2x10. Kinesio tape applied for lateral patella support during long arc quads. Added sit to stand with knee brace on but unlocked x10 reps. Tactile cues on R LE for knee flexion during standing and sitting.    Added standing toe taps on the L LE with standing with increased weight on R LE x10 reps with tactile cues on knee for support. Practice gait with walker with brace unlocked and taking bigger steps with left LE. Instructed in ankle DF stretch with strap 20\"x3 and instructed     HEP: she is to work on quad sets, ankle pumps, and walking putting the R foot on the ground. Treatment/Session Assessment:    · Response to Treatment: She had pain with long arc quads and said it felt like the knee \"got stuck\". It could be decreased patella tracking from keeping knee in extended position for so long. She was able to increase strengthening exercises today. · Compliance with Program/Exercises:  Appears complaint   · Recommendations/Intent for next treatment session: \"Next visit will focus on gait, knee ROM\".    Total Treatment Duration: 45 Minutes  PT Patient Time In/Time Out  Time In: 8514  Time Out: 1190 Jevon Rey, PT

## 2018-02-08 ENCOUNTER — HOSPITAL ENCOUNTER (OUTPATIENT)
Dept: PHYSICAL THERAPY | Age: 53
End: 2018-02-08
Payer: COMMERCIAL

## 2018-02-08 PROCEDURE — 97113 AQUATIC THERAPY/EXERCISES: CPT

## 2018-02-08 NOTE — PROGRESS NOTES
Patrick James  : 1965  Payor: Armando Rogers / Plan: Nick Inch / Product Type: Workers Comp /  [address] at [facility] [place] [facility]  [facility], [address] [place] [address],[address], [facility] [address]  [phone]   [phone]       OUTPATIENT PHYSICAL [address] Note and Aquatic Note 2018      ICD-10: Treatment Diagnosis: Sprain of posterior cruciate ligament of right knee, sequela (S83.521S); Pain in right knee (M25.561); Stiffness of right knee, not elsewhere classified (M25.661); Difficulty in walking, not elsewhere classified (R26.2)  Precautions/Allergies:   Review of patient's allergies indicates no known allergies. Fall Risk Score: 1 (? 5 = High Risk)  MD Orders:Evaluate and treat, HEP, ROM MEDICAL/REFERRING DIAGNOSIS:  S/p R knee scope, PCL reconstruction   DATE OF ONSET: Surgery 17  REFERRING PHYSICIAN: Nasima Quinones MD  RETURN PHYSICIAN APPOINTMENT: 18     INITIAL ASSESSMENT:  Ms. Frankie Kate presents s/p right knee scope with PCL reconstruction. She is progressing with R knee ROM. She does continue with stiffness with R ankle ROM. She is able to ambulate with increased weight on R LE with brace on and walker. She will benefit from continuing skilled physical therapy to continue to progress functional mobility. PROBLEM LIST (Impacting functional limitations):  1. Post-op pain and swelling right knee  2. Decreased ROM right knee   3. Decreased functional strength right knee/LE   4. Decreased gait skills INTERVENTIONS PLANNED:  1. aquatic therapy  2. Thermal and electric modalities, manual therapies for pain. 3. Manual therapies and therapeutic exercises for ROM and strength. 4. Therapeutic exercises for gait and balance   TREATMENT PLAN:  Effective Dates: 18 TO 18. Frequency/Duration: 3 times a week for 12 weeks  GOALS: (Goals have been discussed and agreed upon with patient.)   Short-Term Functional Goals: Time Frame: 6 weeks  1.  Pt will be able to perform a good quad set and SLR with no extensor lag with for improved strength for gait. Progressing towards  2. Pt will increase R knee ROM to 0-90 for improved mobility. Progressing towards  3. Pt will improved R ankle DF AROM to 0 for improved gait. Progressing towards  4. Pt will report pain 5/10 with modified daily activities. GOAL MET  Discharge Goals: Time Frame: 12 weeks progressing towards long term goals   1. Pt will increase R LE strength to 5/5 with manual muscle testing for improved strength for ADLs and work  2. Pt will negotiate 1 flight of stairs with step over step with good control. 3. Pt will increase R knee ROM to 0-120 for mobility. 4. Pt will report pain 3/10 with daily activities. 5. Pt will score 45/80 on LEFS. Rehabilitation Potential For Stated Goals: Good  Regarding Perez  therapy, I certify that the treatment plan above will be carried out by a therapist or under their direction. Thank you for this referral,    Kimberly Gant PTA                    HISTORY:   History of Present Injury/Illness (Reason for Referral): She works at [employer] and she was entering the cooler and there was oil on the floor. She slipped and the feet slipped out from under her. She tried to get up and slipped again. Injury was August 14-17. She did go to physical therapy but it was making her worse. They decided to have surgery. Surgery 12-21-17. Stayed 2 nights in the hospital. She did have home health physical therapy 3-4x. Pain increases with walking. Past Medical History/Comorbidities: diabetes type 2 controlled with diet, HTN, L LE varicose vein surgery  Social History/Living Environment: Lives with . 2 steps to get inside. Prior Level of Function/Work/Activity: Works at [employer]. 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 Job does require lifting. Would like to get back to walking and walking dogs.    Current Medications:     No current facility-administered medications for this encounter. Current Outpatient Prescriptions:     magnesium hydroxide (BEAUCHAMP MILK OF MAGNESIA) 400 mg/5 mL suspension, Take 15 mL by mouth as needed for Constipation. as needed daily, Disp: , Rfl:     temazepam (RESTORIL) 15 mg capsule, Take 15 mg by mouth nightly as needed for Sleep., Disp: , Rfl:     promethazine (PHENERGAN) 25 mg tablet, Take 25 mg by mouth every eight (8) hours as needed for Nausea., Disp: , Rfl:     HYDROmorphone (DILAUDID) 2 mg tablet, Take 2-4 mg by mouth as needed for Pain. as needed every 4-6 hours, Disp: , Rfl:     atorvastatin (LIPITOR) 20 mg tablet, Take 20 mg by mouth nightly., Disp: , Rfl:     lisinopril-hydroCHLOROthiazide (PRINZIDE, ZESTORETIC) 10-12.5 mg per tablet, Take  by mouth daily. Indications: hypertension, Disp: , Rfl:     gemfibrozil (LOPID) 600 mg tablet, Take 600 mg by mouth two (2) times a day., Disp: , Rfl:     Omega-3 Fatty Acids 60- mg cpDR, Take  by mouth daily. , Disp: , Rfl:     acetaminophen (TYLENOL) 325 mg tablet, Take 325 mg by mouth every four (4) hours as needed for Pain., Disp: , Rfl:    Date Last Reviewed:  1-29-18   Number of Personal Factors/Comorbidities that affect the Plan of Care: 1-2: MODERATE COMPLEXITY   EXAMINATION:     Observation/Orthostatic Postural Assessment:  Pt arrived to therapy with walker with partial weight bearing on R LE and knee brace locked in extension    Palpation: no tenderness around incisions     ROM:                Strength:    SLR with min assist               Special Tests: none      Balance:n/t        Body Structures Involved:  1. Joints  2. Muscles  3. Ligaments Body Functions Affected:  1. Neuromusculoskeletal Activities and Participation Affected:  1. Mobility  2.  Community, Social and [place]   Number of elements (examined above) that affect the Plan of Care: 4+: HIGH COMPLEXITY   CLINICAL PRESENTATION:   Presentation: Evolving clinical presentation with changing clinical characteristics: MODERATE COMPLEXITY   CLINICAL DECISION MAKING:   Outcome Measure: Tool Used: Lower Extremity Functional Scale (LEFS)  Score:  Initial: 5/80 Most Recent: X/80 (Date: -- )   Interpretation of Score: 20 questions each scored on a 5 point scale with 0 representing \"extreme difficulty or unable to perform\" and 4 representing \"no difficulty\". The lower the score, the greater the functional disability. 80/80 represents no disability. Minimal detectable change is 9 points. Score 80 79-63 62-48 47-32 31-16 15-1 0   Modifier CH CI CJ CK CL CM CN     Medical Necessity:   · Patient is expected to demonstrate progress in strength, range of motion and balance to improve gait. Reason for Services/Other Comments:  · Patient continues to require skilled intervention due to post-op R knee, decreased ROM, strength, gait. Use of outcome tool(s) and clinical judgement create a POC that gives a: Questionable prediction of patient's progress: MODERATE COMPLEXITY            TREATMENT:   (In addition to Assessment/Re-Assessment sessions the following treatments were rendered)  Pre-treatment Symptoms/Complaints: Patient through  states her patella has been \"catching\". Pain: Initial:   4-5/10 Post Session:  5/10.    45 minutes    Date:  1/31 Date:  2-6-18 Date:  2/8/18   Activity/Exercise Parameters Parameters Parameters   Lunge on step for flex and ext stretch 5 min Lunge on red step   2 x 10 B     squats 2 x 10 2 x 10     Calf stretch 5 x 20 sec 5 x 20 sec  5 x 10 secs   Seated knee flex and ext 2 x 10 X 20  With over pressure on R  X 20 reps   Ankle dorsi flex/ plantar flex 2 x 10  Manual stretching for ankle X 20   Standing x 10  X 20 sitting   X 10 standing   Toe tap 2 x 10 on bottom step     Walking  Forward - 6  Back 4 March 6 Forward - 6  Backward - 4 Forward x 4   Sideways x 4   Jumping jacks  X 20 in 4.5 feet with rings and hold ing rail X 2 min in 4. 5' with rings and holding therapist   Cross country ski   X 20 in 4.5 feet with rings x2 min in 4. 5' holding rail with blue rings   clams  X 10 B    Reverse clams  X 10 B    Bicycle    X 2 min with blue rings and holding therapist   Cassi [address]    x2 min with blue rings and holding therapist   Step ups - short step in 4.5'   x10 B         Pt worked on pre-gait exercises with emphasis on heel first and keeping right great toe down. HEP:  She is to work heel/toe taps and walking getting heel down first with great toe down      Treatment/Session Assessment:  Pt doing well in pool. She seems to enjoy the water more now since she is becoming more comfortable with it. · Response to Treatment: worked on keeping foot flat with standing and walking. Increased awareness of foot position. · Compliance with Program/Exercises:  Appears complaint   · Recommendations/Intent for next treatment session: \"Next visit will focus on gait, knee ROM\".    Total Treatment Duration: 39 Minutes  PT Patient Time In/Time Out  Time In: 1225  Time Out: PATTIE Higuera

## 2018-02-12 ENCOUNTER — HOSPITAL ENCOUNTER (OUTPATIENT)
Dept: PHYSICAL THERAPY | Age: 53
Discharge: HOME | End: 2018-02-12
Payer: COMMERCIAL

## 2018-02-12 PROCEDURE — 97113 AQUATIC THERAPY/EXERCISES: CPT

## 2018-02-13 ENCOUNTER — HOSPITAL ENCOUNTER (OUTPATIENT)
Dept: PHYSICAL THERAPY | Age: 53
Discharge: HOME | End: 2018-02-13
Payer: COMMERCIAL

## 2018-02-13 PROCEDURE — 97110 THERAPEUTIC EXERCISES: CPT

## 2018-02-13 PROCEDURE — 97140 MANUAL THERAPY 1/> REGIONS: CPT

## 2018-02-13 NOTE — PROGRESS NOTES
Sheila Matthews  : 1965  Payor: Nicola Cobb / Plan: Misael Wilson / Product Type: Workers Comp /  [address]  at [facility] [place] [employer]  [facility], [address] [place] [address],[address], [address] [facility]  [phone]   [phone]       OUTPATIENT PHYSICAL THERAPY:Daily Note and Aquatic Note       ICD-10: Treatment Diagnosis: Sprain of posterior cruciate ligament of right knee, sequela (S83.521S); Pain in right knee (M25.561); Stiffness of right knee, not elsewhere classified (M25.661); Difficulty in walking, not elsewhere classified (R26.2)  Precautions/Allergies:   Review of patient's allergies indicates no known allergies. Fall Risk Score: 1 (? 5 = High Risk)  MD Orders:Evaluate and treat, HEP, ROM MEDICAL/REFERRING DIAGNOSIS:  S/p R knee scope, PCL reconstruction   DATE OF ONSET: Surgery 17  REFERRING PHYSICIAN: Elina Chand MD  RETURN PHYSICIAN APPOINTMENT: 18     INITIAL ASSESSMENT:  Ms. Caleb Richards presents s/p right knee scope with PCL reconstruction. She is progressing with R knee ROM. She does continue with stiffness with R ankle ROM. She is able to ambulate with increased weight on R LE with brace on and walker. She will benefit from continuing skilled physical therapy to continue to progress functional mobility. PROBLEM LIST (Impacting functional limitations):  1. Post-op pain and swelling right knee  2. Decreased ROM right knee   3. Decreased functional strength right knee/LE   4. Decreased gait skills INTERVENTIONS PLANNED:  1. aquatic therapy  2. Thermal and electric modalities, manual therapies for pain. 3. Manual therapies and therapeutic exercises for ROM and strength. 4. Therapeutic exercises for gait and balance   TREATMENT PLAN:  Effective Dates: 18 TO 18. Frequency/Duration: 3 times a week for 12 weeks  GOALS: (Goals have been discussed and agreed upon with patient.)   Short-Term Functional Goals: Time Frame: 6 weeks  1.  Pt will be able to perform a good quad set and SLR with no extensor lag with for improved strength for gait. Progressing towards  2. Pt will increase R knee ROM to 0-90 for improved mobility. Progressing towards  3. Pt will improved R ankle DF AROM to 0 for improved gait. Progressing towards  4. Pt will report pain 5/10 with modified daily activities. GOAL MET  Discharge Goals: Time Frame: 12 weeks progressing towards long term goals   1. Pt will increase R LE strength to 5/5 with manual muscle testing for improved strength for ADLs and work  2. Pt will negotiate 1 flight of stairs with step over step with good control. 3. Pt will increase R knee ROM to 0-120 for mobility. 4. Pt will report pain 3/10 with daily activities. 5. Pt will score 45/80 on LEFS. Rehabilitation Potential For Stated Goals: Good  Regarding Perez  therapy, I certify that the treatment plan above will be carried out by a therapist or under their direction. Thank you for this referral,    Yaritza Navarrete, PT                    HISTORY:   History of Present Injury/Illness (Reason for Referral): She works at [employer] and she was entering the cooler and there was oil on the floor. She slipped and the feet slipped out from under her. She tried to get up and slipped again. Injury was August 14-17. She did go to physical therapy but it was making her worse. They decided to have surgery. Surgery 12-21-17. Stayed 2 nights in the hospital. She did have home health physical therapy 3-4x. Pain increases with walking. Past Medical History/Comorbidities: diabetes type 2 controlled with diet, HTN, L LE varicose vein surgery  Social History/Living Environment: Lives with . 2 steps to get inside. Prior Level of Function/Work/Activity: Works at [employer]. 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 Job does require lifting. Would like to get back to walking and walking dogs.    Current Medications:       Current Outpatient Prescriptions:     magnesium hydroxide (BEAUCHAMP MILK OF MAGNESIA) 400 mg/5 mL suspension, Take 15 mL by mouth as needed for Constipation. as needed daily, Disp: , Rfl:     temazepam (RESTORIL) 15 mg capsule, Take 15 mg by mouth nightly as needed for Sleep., Disp: , Rfl:     promethazine (PHENERGAN) 25 mg tablet, Take 25 mg by mouth every eight (8) hours as needed for Nausea., Disp: , Rfl:     HYDROmorphone (DILAUDID) 2 mg tablet, Take 2-4 mg by mouth as needed for Pain. as needed every 4-6 hours, Disp: , Rfl:     atorvastatin (LIPITOR) 20 mg tablet, Take 20 mg by mouth nightly., Disp: , Rfl:     lisinopril-hydroCHLOROthiazide (PRINZIDE, ZESTORETIC) 10-12.5 mg per tablet, Take  by mouth daily. Indications: hypertension, Disp: , Rfl:     gemfibrozil (LOPID) 600 mg tablet, Take 600 mg by mouth two (2) times a day., Disp: , Rfl:     Omega-3 Fatty Acids 60- mg cpDR, Take  by mouth daily. , Disp: , Rfl:     acetaminophen (TYLENOL) 325 mg tablet, Take 325 mg by mouth every four (4) hours as needed for Pain., Disp: , Rfl:    Date Last Reviewed:  1-29-18   Number of Personal Factors/Comorbidities that affect the Plan of Care: 1-2: MODERATE COMPLEXITY   EXAMINATION:     Observation/Orthostatic Postural Assessment:  Pt arrived to therapy with walker with partial weight bearing on R LE and knee brace locked in extension    Palpation: no tenderness around incisions     ROM:                Strength:    SLR with min assist               Special Tests: none      Balance:n/t        Body Structures Involved:  1. Joints  2. Muscles  3. Ligaments Body Functions Affected:  1. Neuromusculoskeletal Activities and Participation Affected:  1. Mobility  2.  Community, Social and [place]   Number of elements (examined above) that affect the Plan of Care: 4+: HIGH COMPLEXITY   CLINICAL PRESENTATION:   Presentation: Evolving clinical presentation with changing clinical characteristics: MODERATE COMPLEXITY   CLINICAL DECISION MAKING:   Outcome Measure: Tool Used: Lower Extremity Functional Scale (LEFS)  Score:  Initial: 5/80 Most Recent: X/80 (Date: -- )   Interpretation of Score: 20 questions each scored on a 5 point scale with 0 representing \"extreme difficulty or unable to perform\" and 4 representing \"no difficulty\". The lower the score, the greater the functional disability. 80/80 represents no disability. Minimal detectable change is 9 points. Score 80 79-63 62-48 47-32 31-16 15-1 0   Modifier CH CI CJ CK CL CM CN     Medical Necessity:   · Patient is expected to demonstrate progress in strength, range of motion and balance to improve gait. Reason for Services/Other Comments:  · Patient continues to require skilled intervention due to post-op R knee, decreased ROM, strength, gait. Use of outcome tool(s) and clinical judgement create a POC that gives a: Questionable prediction of patient's progress: MODERATE COMPLEXITY            TREATMENT:   (In addition to Assessment/Re-Assessment sessions the following treatments were rendered)  Pre-treatment Symptoms/Complaints: Patient continues to have incidence of complaining of the knee cap or knee catching.   Pain: Initial:   4-5/10 Post Session:  5/10.    45 minutes    Date:  1/31 Date:  2-6-18 Date:  2/8/18 Date  2/12/18   Activity/Exercise Parameters Parameters Parameters    Lunge on step for flex and ext stretch 5 min Lunge on red step   2 x 10 B   2 x 10   squats 2 x 10 2 x 10   2 x 10   Calf stretch 5 x 20 sec 5 x 20 sec  5 x 10 secs 5 x 10   Seated knee flex and ext 2 x 10 X 20  With over pressure on R  X 20 reps X 20   Manual assist x 20   Ankle dorsi flex/ plantar flex 2 x 10  Manual stretching for ankle X 20   Standing x 10  X 20 sitting   X 10 standing    Toe tap 2 x 10 on bottom step      Walking  Forward - 6  Back 4 March 6 Forward - 6  Backward - 4 Forward x 4   Sideways x 4 Forward x 6  Back x 4   Jumping jacks  X 20 in 4.5 feet with rings and hold ing rail X 2 min in 4. 5' with rings and holding therapist    Cross country ski   X 20 in 4.5 feet with rings x2 min in 4. 5' holding rail with blue rings    clams  X 10 B     Reverse clams  X 10 B     Bicycle    X 2 min with blue rings and holding therapist Seated x 30   DKTC    x2 min with blue rings and holding therapist    Step ups - short step in 4.5'   x10 B Red step x 10   Hs curls     With over pressure x 10   Leg press    noodle x 10                     HEP:  She is to work heel/toe taps and walking getting heel down first with great toe down      Treatment/Session Assessment:  Pt doing well in pool. ROM improving  · Response to Treatment: working on active knee flex / ext,  Incorporating knee flexion into gait. · Compliance with Program/Exercises:  Appears complaint   · Recommendations/Intent for next treatment session: \"Next visit will focus on gait, knee ROM\".    Total Treatment Duration: 45 Minutes  PT Patient Time In/Time Out  Time In: 1100  Time Out: 2425 Andreas Morrow, PT

## 2018-02-13 NOTE — PROGRESS NOTES
Molina Avelar  : 1965  Payor: [address] [place] [address] Road / Plan: Chepe Zamorano / Product Type: Workers Comp /  [address]  [facility] [place] [facility]  [address] [place] [address], [address].  [phone]   [phone]       OUTPATIENT PHYSICAL THERAPY:Daily Note 2018      ICD-10: Treatment Diagnosis: Sprain of posterior cruciate ligament of right knee, sequela (S83.521S); Pain in right knee (M25.561); Stiffness of right knee, not elsewhere classified (M25.661); Difficulty in walking, not elsewhere classified (R26.2)  Precautions/Allergies:   Review of patient's allergies indicates no known allergies. Fall Risk Score: 1 (? 5 = High Risk)  MD Orders:Evaluate and treat, HEP, ROM MEDICAL/REFERRING DIAGNOSIS:  S/p R knee scope, PCL reconstruction   DATE OF ONSET: Surgery 17  REFERRING PHYSICIAN: Carter Alvarez MD  RETURN PHYSICIAN APPOINTMENT: 18     INITIAL ASSESSMENT:  Ms. Pete Hernandez presents s/p right knee scope with PCL reconstruction. She is progressing with R knee ROM. She does continue with stiffness with R ankle ROM. She is able to ambulate with increased weight on R LE with brace on and walker. She will benefit from continuing skilled physical therapy to continue to progress functional mobility. PROBLEM LIST (Impacting functional limitations):  1. Post-op pain and swelling right knee  2. Decreased ROM right knee   3. Decreased functional strength right knee/LE   4. Decreased gait skills INTERVENTIONS PLANNED:  1. aquatic therapy  2. Thermal and electric modalities, manual therapies for pain. 3. Manual therapies and therapeutic exercises for ROM and strength. 4. Therapeutic exercises for gait and balance   TREATMENT PLAN:  Effective Dates: 18 TO 18. Frequency/Duration: 3 times a week for 12 weeks  GOALS: (Goals have been discussed and agreed upon with patient.)   Short-Term Functional Goals: Time Frame: 6 weeks  1.  Pt will be able to perform a good quad set and SLR with no extensor lag with for improved strength for gait. Progressing towards  2. Pt will increase R knee ROM to 0-90 for improved mobility. Progressing towards  3. Pt will improved R ankle DF AROM to 0 for improved gait. Progressing towards  4. Pt will report pain 5/10 with modified daily activities. GOAL MET  Discharge Goals: Time Frame: 12 weeks progressing towards long term goals   1. Pt will increase R LE strength to 5/5 with manual muscle testing for improved strength for ADLs and work  2. Pt will negotiate 1 flight of stairs with step over step with good control. 3. Pt will increase R knee ROM to 0-120 for mobility. 4. Pt will report pain 3/10 with daily activities. 5. Pt will score 45/80 on LEFS. Rehabilitation Potential For Stated Goals: Good  Regarding Perez  therapy, I certify that the treatment plan above will be carried out by a therapist or under their direction. Thank you for this referral,    Lauren Adonna Saint, PT                    HISTORY:   History of Present Injury/Illness (Reason for Referral): She works at [employer] and she was entering the cooler and there was oil on the floor. She slipped and the feet slipped out from under her. She tried to get up and slipped again. Injury was August 14-17. She did go to physical therapy but it was making her worse. They decided to have surgery. Surgery 12-21-17. Stayed 2 nights in the hospital. She did have home health physical therapy 3-4x. Pain increases with walking. Past Medical History/Comorbidities: diabetes type 2 controlled with diet, HTN, L LE varicose vein surgery  Social History/Living Environment: Lives with . 2 steps to get inside. Prior Level of Function/Work/Activity: Works at [employer]. 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 Job does require lifting. Would like to get back to walking and walking dogs.    Current Medications:       Current Outpatient Prescriptions:     magnesium hydroxide (BEAUCHAMP MILK OF MAGNESIA) 400 mg/5 mL suspension, Take 15 mL by mouth as needed for Constipation. as needed daily, Disp: , Rfl:     temazepam (RESTORIL) 15 mg capsule, Take 15 mg by mouth nightly as needed for Sleep., Disp: , Rfl:     promethazine (PHENERGAN) 25 mg tablet, Take 25 mg by mouth every eight (8) hours as needed for Nausea., Disp: , Rfl:     HYDROmorphone (DILAUDID) 2 mg tablet, Take 2-4 mg by mouth as needed for Pain. as needed every 4-6 hours, Disp: , Rfl:     atorvastatin (LIPITOR) 20 mg tablet, Take 20 mg by mouth nightly., Disp: , Rfl:     lisinopril-hydroCHLOROthiazide (PRINZIDE, ZESTORETIC) 10-12.5 mg per tablet, Take  by mouth daily. Indications: hypertension, Disp: , Rfl:     gemfibrozil (LOPID) 600 mg tablet, Take 600 mg by mouth two (2) times a day., Disp: , Rfl:     Omega-3 Fatty Acids 60- mg cpDR, Take  by mouth daily. , Disp: , Rfl:     acetaminophen (TYLENOL) 325 mg tablet, Take 325 mg by mouth every four (4) hours as needed for Pain., Disp: , Rfl:    Date Last Reviewed:  2-7-18   Number of Personal Factors/Comorbidities that affect the Plan of Care: 1-2: MODERATE COMPLEXITY   EXAMINATION:     Observation/Orthostatic Postural Assessment:  Pt arrived to therapy with walker with partial weight bearing on R LE and knee brace locked in extension    Palpation: no tenderness around incisions     ROM:         RLE PROM  R Knee Flexion: 100      Strength:                   Special Tests: none      Balance:n/t        Body Structures Involved:  1. Joints  2. Muscles  3. Ligaments Body Functions Affected:  1. Neuromusculoskeletal Activities and Participation Affected:  1. Mobility  2.  Community, Social and [place]   Number of elements (examined above) that affect the Plan of Care: 4+: HIGH COMPLEXITY   CLINICAL PRESENTATION:   Presentation: Evolving clinical presentation with changing clinical characteristics: MODERATE COMPLEXITY CLINICAL DECISION MAKING:   Outcome Measure: Tool Used: Lower Extremity Functional Scale (LEFS)  Score:  Initial: 5/80 Most Recent: X/80 (Date: -- )   Interpretation of Score: 20 questions each scored on a 5 point scale with 0 representing \"extreme difficulty or unable to perform\" and 4 representing \"no difficulty\". The lower the score, the greater the functional disability. 80/80 represents no disability. Minimal detectable change is 9 points. Score 80 79-63 62-48 47-32 31-16 15-1 0   Modifier CH CI CJ CK CL CM CN     Medical Necessity:   · Patient is expected to demonstrate progress in strength, range of motion and balance to improve gait. Reason for Services/Other Comments:  · Patient continues to require skilled intervention due to post-op R knee, decreased ROM, strength, gait. Use of outcome tool(s) and clinical judgement create a POC that gives a: Questionable prediction of patient's progress: MODERATE COMPLEXITY            TREATMENT:   (In addition to Assessment/Re-Assessment sessions the following treatments were rendered)  Pre-treatment Symptoms/Complaints: Pt reports she thinks she is putting her foot flat more when she is walking. Pain: Initial:   4/10 Post Session:  5/10. MANUAL THERAPY: (10 minutes): Joint mobilization and Soft tissue mobilization was utilized and necessary because of the patient's restricted joint motion and restricted motion of soft tissue. Pt supine and sitting and scar massage to incisions. BriefTool assisted soft tissue mobilization to anterior knee incision. Patella superior and inferior glides with active knee flexion in sitting. Therapeutic Exercise (35 Minutes): For improved muscle activation, gait, and knee ROM. sitting and heel slides for knee flexion ROM 3x10. Seated long arc quad 3x10 with tactile cues on the patella. Instructed in hip flexor stretch off the side of table for flexibility and pt demonstrated 30\"x3.  She is to perform at home and she verbalizes understanding. Shuttle press with 0# 2x10 with tactile cues on R foot to keep foot flat and verbal cues for pushing through her foot. Standing with tactile cues on the R knee and foot and weight shifts onto R LE and heel/toe out with the L LE to increase weight through the R LE.      HEP: she is to work on quad sets, ankle pumps, and walking putting the R foot on the ground. She is to work on scar massage and hip flexor/quad stretch in supine. Treatment/Session Assessment:    · Response to Treatment: Improved knee flexion PROM today. She had a difficult time with shuttle press and pushing through the R LE to move the shuttle. · Compliance with Program/Exercises:  Appears complaint   · Recommendations/Intent for next treatment session: \"Next visit will focus on gait, knee ROM\".    Total Treatment Duration: 45 Minutes  PT Patient Time In/Time Out  Time In: 1320  Time Out: [address]

## 2018-02-16 ENCOUNTER — HOSPITAL ENCOUNTER (OUTPATIENT)
Dept: PHYSICAL THERAPY | Age: 53
Discharge: HOME | End: 2018-02-16
Payer: COMMERCIAL

## 2018-02-16 PROCEDURE — 97113 AQUATIC THERAPY/EXERCISES: CPT

## 2018-02-16 NOTE — PROGRESS NOTES
Cristofer Simon  : 1965  Payor: [address] [place] [address] Road / Plan: Maia Libman / Product Type: Workers Comp /  [address]  at [facility] [place] [employer]  1101 [facility], [address] [place] [address] [address]  [phone]   [phone]       OUTPATIENT PHYSICAL [address] Note and Aquatic Note 2018      ICD-10: Treatment Diagnosis: Sprain of posterior cruciate ligament of right knee, sequela (S83.521S); Pain in right knee (M25.561); Stiffness of right knee, not elsewhere classified (M25.661); Difficulty in walking, not elsewhere classified (R26.2)  Precautions/Allergies:   Review of patient's allergies indicates no known allergies. Fall Risk Score: 1 (? 5 = High Risk)  MD Orders:Evaluate and treat, HEP, ROM MEDICAL/REFERRING DIAGNOSIS:  S/p R knee scope, PCL reconstruction   DATE OF ONSET: Surgery 17  REFERRING PHYSICIAN: Gatito Lai MD  RETURN PHYSICIAN APPOINTMENT: 18     INITIAL ASSESSMENT:  Ms. Lynette Mcguire presents s/p right knee scope with PCL reconstruction. She is progressing with R knee ROM. She does continue with stiffness with R ankle ROM. She is able to ambulate with increased weight on R LE with brace on and walker. She will benefit from continuing skilled physical therapy to continue to progress functional mobility. PROBLEM LIST (Impacting functional limitations):  1. Post-op pain and swelling right knee  2. Decreased ROM right knee   3. Decreased functional strength right knee/LE   4. Decreased gait skills INTERVENTIONS PLANNED:  1. aquatic therapy  2. Thermal and electric modalities, manual therapies for pain. 3. Manual therapies and therapeutic exercises for ROM and strength. 4. Therapeutic exercises for gait and balance   TREATMENT PLAN:  Effective Dates: 18 TO 18. Frequency/Duration: 3 times a week for 12 weeks  GOALS: (Goals have been discussed and agreed upon with patient.)   Short-Term Functional Goals: Time Frame: 6 weeks  1.  Pt will be able to perform a good quad set and SLR with no extensor lag with for improved strength for gait. Progressing towards  2. Pt will increase R knee ROM to 0-90 for improved mobility. Progressing towards  3. Pt will improved R ankle DF AROM to 0 for improved gait. Progressing towards  4. Pt will report pain 5/10 with modified daily activities. GOAL MET  Discharge Goals: Time Frame: 12 weeks progressing towards long term goals   1. Pt will increase R LE strength to 5/5 with manual muscle testing for improved strength for ADLs and work  2. Pt will negotiate 1 flight of stairs with step over step with good control. 3. Pt will increase R knee ROM to 0-120 for mobility. 4. Pt will report pain 3/10 with daily activities. 5. Pt will score 45/80 on LEFS. Rehabilitation Potential For Stated Goals: Good  Regarding Perez  therapy, I certify that the treatment plan above will be carried out by a therapist or under their direction. Thank you for this referral,    Yaritza Navarrete, PT                    HISTORY:   History of Present Injury/Illness (Reason for Referral): She works at [employer] and she was entering the cooler and there was oil on the floor. She slipped and the feet slipped out from under her. She tried to get up and slipped again. Injury was August 14-17. She did go to physical therapy but it was making her worse. They decided to have surgery. Surgery 12-21-17. Stayed 2 nights in the hospital. She did have home health physical therapy 3-4x. Pain increases with walking. Past Medical History/Comorbidities: diabetes type 2 controlled with diet, HTN, L LE varicose vein surgery  Social History/Living Environment: Lives with . 2 steps to get inside. Prior Level of Function/Work/Activity: Works at [employer]. 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 Job does require lifting. Would like to get back to walking and walking dogs.    Current Medications: Current Outpatient Prescriptions:     magnesium hydroxide (BEAUCHAMP MILK OF MAGNESIA) 400 mg/5 mL suspension, Take 15 mL by mouth as needed for Constipation. as needed daily, Disp: , Rfl:     temazepam (RESTORIL) 15 mg capsule, Take 15 mg by mouth nightly as needed for Sleep., Disp: , Rfl:     promethazine (PHENERGAN) 25 mg tablet, Take 25 mg by mouth every eight (8) hours as needed for Nausea., Disp: , Rfl:     HYDROmorphone (DILAUDID) 2 mg tablet, Take 2-4 mg by mouth as needed for Pain. as needed every 4-6 hours, Disp: , Rfl:     atorvastatin (LIPITOR) 20 mg tablet, Take 20 mg by mouth nightly., Disp: , Rfl:     lisinopril-hydroCHLOROthiazide (PRINZIDE, ZESTORETIC) 10-12.5 mg per tablet, Take  by mouth daily. Indications: hypertension, Disp: , Rfl:     gemfibrozil (LOPID) 600 mg tablet, Take 600 mg by mouth two (2) times a day., Disp: , Rfl:     Omega-3 Fatty Acids 60- mg cpDR, Take  by mouth daily. , Disp: , Rfl:     acetaminophen (TYLENOL) 325 mg tablet, Take 325 mg by mouth every four (4) hours as needed for Pain., Disp: , Rfl:    Date Last Reviewed:  1-29-18   Number of Personal Factors/Comorbidities that affect the Plan of Care: 1-2: MODERATE COMPLEXITY   EXAMINATION:     Observation/Orthostatic Postural Assessment:  Pt arrived to therapy with walker with partial weight bearing on R LE and knee brace locked in extension    Palpation: no tenderness around incisions     ROM:                Strength:    SLR with min assist               Special Tests: none      Balance:n/t        Body Structures Involved:  1. Joints  2. Muscles  3. Ligaments Body Functions Affected:  1. Neuromusculoskeletal Activities and Participation Affected:  1. Mobility  2.  Community, Social and [place]   Number of elements (examined above) that affect the Plan of Care: 4+: HIGH COMPLEXITY   CLINICAL PRESENTATION:   Presentation: Evolving clinical presentation with changing clinical characteristics: MODERATE COMPLEXITY CLINICAL DECISION MAKING:   Outcome Measure: Tool Used: Lower Extremity Functional Scale (LEFS)  Score:  Initial: 5/80 Most Recent: X/80 (Date: -- )   Interpretation of Score: 20 questions each scored on a 5 point scale with 0 representing \"extreme difficulty or unable to perform\" and 4 representing \"no difficulty\". The lower the score, the greater the functional disability. 80/80 represents no disability. Minimal detectable change is 9 points. Score 80 79-63 62-48 47-32 31-16 15-1 0   Modifier CH CI CJ CK CL CM CN     Medical Necessity:   · Patient is expected to demonstrate progress in strength, range of motion and balance to improve gait. Reason for Services/Other Comments:  · Patient continues to require skilled intervention due to post-op R knee, decreased ROM, strength, gait. Use of outcome tool(s) and clinical judgement create a POC that gives a: Questionable prediction of patient's progress: MODERATE COMPLEXITY            TREATMENT:   (In addition to Assessment/Re-Assessment sessions the following treatments were rendered)  Pre-treatment Symptoms/Complaints: Patient states it is not catching as much.   Pain: Initial:   4-5/10 Post Session:  5/10.    45 minutes    Date:  1/31 Date:  2-6-18 Date:  2/8/18 Date  2/12/18 Date  2/16/18   Activity/Exercise Parameters Parameters Parameters     Lunge on step for flex and ext stretch 5 min Lunge on red step   2 x 10 B   2 x 10 5 min   squats 2 x 10 2 x 10   2 x 10 2 x10   Calf stretch 5 x 20 sec 5 x 20 sec  5 x 10 secs 5 x 10 5 x 10 sec   Seated knee flex and ext 2 x 10 X 20  With over pressure on R  X 20 reps X 20   Manual assist x 20 X 20   Ankle dorsi flex/ plantar flex 2 x 10  Manual stretching for ankle X 20   Standing x 10  X 20 sitting   X 10 standing  X 20 sitting   Toe tap 2 x 10 on bottom step       Walking  Forward - 6  Back 4 March 6 Forward - 6  Backward - 4 Forward x 4   Sideways x 4 Forward x 6  Back x 4 Forward x 6  Back x 4   Jumping jacks X 20 in 4.5 feet with rings and hold ing rail X 2 min in 4. 5' with rings and holding therapist     Cross country ski   X 20 in 4.5 feet with rings x2 min in 4. 5' holding rail with blue rings     clams  X 10 B      Reverse clams  X 10 B      Bicycle    X 2 min with blue rings and holding therapist Seated x 30 Seated x 30   DKTC    x2 min with blue rings and holding therapist     Step ups - short step in 4.5'   x10 B Red step x 10 Red step x 10   Hs curls     With over pressure x 10 X 20 with overpressure   Leg press    noodle x 10                        HEP:  She is to work heel/toe taps and walking getting heel down first with great toe down      Treatment/Session Assessment:  Continues to have marked weakness but it progressing in wt bearing and ROM. · Response to Treatment: working on active knee flex / ext,  Incorporating knee flexion into gait. · Compliance with Program/Exercises:  Appears complaint   · Recommendations/Intent for next treatment session: \"Next visit will focus on gait, knee ROM\".    Total Treatment Duration: 45 Minutes       Carl Randle, PT

## 2018-02-19 ENCOUNTER — HOSPITAL ENCOUNTER (OUTPATIENT)
Dept: PHYSICAL THERAPY | Age: 53
Discharge: HOME | End: 2018-02-19
Payer: COMMERCIAL

## 2018-02-19 PROCEDURE — 97110 THERAPEUTIC EXERCISES: CPT

## 2018-02-19 NOTE — PROGRESS NOTES
Ainsley Ascencio  : 1965  Payor: [address] [place] [address] Road / Plan: Nanettejerson Peterson / Product Type: [employer] /  [address]  at [address] [place] [address] Rd  1101 [facility], [address].  [phone]   [phone]       OUTPATIENT PHYSICAL THERAPY:Daily Note 2018      ICD-10: Treatment Diagnosis: Sprain of posterior cruciate ligament of right knee, sequela (S83.521S); Pain in right knee (M25.561); Stiffness of right knee, not elsewhere classified (M25.661); Difficulty in walking, not elsewhere classified (R26.2)  Precautions/Allergies:   Review of patient's allergies indicates no known allergies. Fall Risk Score: 1 (? 5 = High Risk)  MD Orders:Evaluate and treat, HEP, ROM MEDICAL/REFERRING DIAGNOSIS:  S/p R knee scope, PCL reconstruction   DATE OF ONSET: Surgery 17  REFERRING PHYSICIAN: Daquan Hines MD  RETURN PHYSICIAN APPOINTMENT: 18     INITIAL ASSESSMENT:  Ms. Michael Kenney presents s/p right knee scope with PCL reconstruction. She is progressing with R knee ROM. She does continue with stiffness with R ankle ROM. She is able to ambulate with increased weight on R LE with brace on and walker. She will benefit from continuing skilled physical therapy to continue to progress functional mobility. PROBLEM LIST (Impacting functional limitations):  1. Post-op pain and swelling right knee  2. Decreased ROM right knee   3. Decreased functional strength right knee/LE   4. Decreased gait skills INTERVENTIONS PLANNED:  1. aquatic therapy  2. Thermal and electric modalities, manual therapies for pain. 3. Manual therapies and therapeutic exercises for ROM and strength. 4. Therapeutic exercises for gait and balance   TREATMENT PLAN:  Effective Dates: 18 TO 18. Frequency/Duration: 3 times a week for 12 weeks  GOALS: (Goals have been discussed and agreed upon with patient.)   Short-Term Functional Goals: Time Frame: 6 weeks  1.  Pt will be able to perform a good quad set and SLR with no extensor lag with for improved strength for gait. Progressing towards  2. Pt will increase R knee ROM to 0-90 for improved mobility. Progressing towards  3. Pt will improved R ankle DF AROM to 0 for improved gait. Progressing towards  4. Pt will report pain 5/10 with modified daily activities. GOAL MET  Discharge Goals: Time Frame: 12 weeks progressing towards long term goals   1. Pt will increase R LE strength to 5/5 with manual muscle testing for improved strength for ADLs and work  2. Pt will negotiate 1 flight of stairs with step over step with good control. 3. Pt will increase R knee ROM to 0-120 for mobility. 4. Pt will report pain 3/10 with daily activities. 5. Pt will score 45/80 on LEFS. Rehabilitation Potential For Stated Goals: Good  Regarding Perez  therapy, I certify that the treatment plan above will be carried out by a therapist or under their direction. Thank you for this referral,    Allie Guy, PT                    HISTORY:   History of Present Injury/Illness (Reason for Referral): She works at [employer] and she was entering the cooler and there was oil on the floor. She slipped and the feet slipped out from under her. She tried to get up and slipped again. Injury was August 14-17. She did go to physical therapy but it was making her worse. They decided to have surgery. Surgery 12-21-17. Stayed 2 nights in the hospital. She did have home health physical therapy 3-4x. Pain increases with walking. Past Medical History/Comorbidities: diabetes type 2 controlled with diet, HTN, L LE varicose vein surgery  Social History/Living Environment: Lives with . 2 steps to get inside. Prior Level of Function/Work/Activity: Works at [employer]. 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 Job does require lifting. Would like to get back to walking and walking dogs.    Current Medications:       Current Outpatient Prescriptions:     magnesium hydroxide (BEAUCHAMP MILK OF MAGNESIA) 400 mg/5 mL suspension, Take 15 mL by mouth as needed for Constipation. as needed daily, Disp: , Rfl:     temazepam (RESTORIL) 15 mg capsule, Take 15 mg by mouth nightly as needed for Sleep., Disp: , Rfl:     promethazine (PHENERGAN) 25 mg tablet, Take 25 mg by mouth every eight (8) hours as needed for Nausea., Disp: , Rfl:     HYDROmorphone (DILAUDID) 2 mg tablet, Take 2-4 mg by mouth as needed for Pain. as needed every 4-6 hours, Disp: , Rfl:     atorvastatin (LIPITOR) 20 mg tablet, Take 20 mg by mouth nightly., Disp: , Rfl:     lisinopril-hydroCHLOROthiazide (PRINZIDE, ZESTORETIC) 10-12.5 mg per tablet, Take  by mouth daily. Indications: hypertension, Disp: , Rfl:     gemfibrozil (LOPID) 600 mg tablet, Take 600 mg by mouth two (2) times a day., Disp: , Rfl:     Omega-3 Fatty Acids 60- mg cpDR, Take  by mouth daily. , Disp: , Rfl:     acetaminophen (TYLENOL) 325 mg tablet, Take 325 mg by mouth every four (4) hours as needed for Pain., Disp: , Rfl:    Date Last Reviewed:  2-19-18   Number of Personal Factors/Comorbidities that affect the Plan of Care: 1-2: MODERATE COMPLEXITY   EXAMINATION:     Observation/Orthostatic Postural Assessment:  Pt arrived to therapy with walker with partial weight bearing on R LE and knee brace locked in extension    Palpation: no tenderness around incisions     ROM:                Strength:                   Special Tests: none      Balance:n/t        Body Structures Involved:  1. Joints  2. Muscles  3. Ligaments Body Functions Affected:  1. Neuromusculoskeletal Activities and Participation Affected:  1. Mobility  2.  Community, Social and [place]   Number of elements (examined above) that affect the Plan of Care: 4+: HIGH COMPLEXITY   CLINICAL PRESENTATION:   Presentation: Evolving clinical presentation with changing clinical characteristics: MODERATE COMPLEXITY   CLINICAL DECISION MAKING: Outcome Measure: Tool Used: Lower Extremity Functional Scale (LEFS)  Score:  Initial: 5/80 Most Recent: X/80 (Date: -- )   Interpretation of Score: 20 questions each scored on a 5 point scale with 0 representing \"extreme difficulty or unable to perform\" and 4 representing \"no difficulty\". The lower the score, the greater the functional disability. 80/80 represents no disability. Minimal detectable change is 9 points. Score 80 79-63 62-48 47-32 31-16 15-1 0   Modifier CH CI CJ CK CL CM CN     Medical Necessity:   · Patient is expected to demonstrate progress in strength, range of motion and balance to improve gait. Reason for Services/Other Comments:  · Patient continues to require skilled intervention due to post-op R knee, decreased ROM, strength, gait. Use of outcome tool(s) and clinical judgement create a POC that gives a: Questionable prediction of patient's progress: MODERATE COMPLEXITY            TREATMENT:   (In addition to Assessment/Re-Assessment sessions the following treatments were rendered)  Pre-treatment Symptoms/Complaints: Pt reports she thinks she is putting her foot flat more when she is walking. Pain: Initial:   4/10 Post Session:  5/10. Therapeutic Exercise (40 Minutes): For improved muscle activation, gait, and knee ROM. sitting and heel slides for knee flexion ROM 3x10. Hip flexion stretch in supine with R LE hanging off table 30\"x3. Seated long arc quad 1# 3x10 with tactile cues on the patella. Shuttle press with 0# 2x10 with tactile cues on R foot to keep foot flat and verbal cues for pushing through her foot. Standing with tactile cues on the R knee and foot and weight shifts onto R LE and heel/toe out with the L LE to increase weight through the R LE. Sit to stand 2x10 with tactile cues on R LE to increase weight on R LE.      HEP: she is to work on quad sets, ankle pumps, and walking putting the R foot on the ground.   She is to work on scar massage and hip flexor/quad stretch in supine. Treatment/Session Assessment:    · Response to Treatment: Improved weight shifting onto R LE. Weakness in R LE that is limiting functional mobility. · Compliance with Program/Exercises:  Appears complaint   · Recommendations/Intent for next treatment session: \"Next visit will focus on gait, knee ROM\".    Total Treatment Duration: 40 Minutes  PT Patient Time In/Time Out  Time In: 1300  Time Out: [address] [place]

## 2018-02-21 ENCOUNTER — HOSPITAL ENCOUNTER (OUTPATIENT)
Dept: PHYSICAL THERAPY | Age: 53
Discharge: HOME | End: 2018-02-21
Payer: COMMERCIAL

## 2018-02-21 PROCEDURE — 97113 AQUATIC THERAPY/EXERCISES: CPT

## 2018-02-21 NOTE — PROGRESS NOTES
Charly Campo  : 1965  Payor: [address] [place] [address] Road / Plan: Antony Hoop / Product Type: Workers Comp /  [address]  at [facility] [place] [employer]  1101 [facility], [address] [place] [address],[address], [address]  [phone]   [phone]       OUTPATIENT PHYSICAL 1300 [facility] Aquatics 2018      ICD-10: Treatment Diagnosis: Sprain of posterior cruciate ligament of right knee, sequela (S83.521S); Pain in right knee (M25.561); Stiffness of right knee, not elsewhere classified (M25.661); Difficulty in walking, not elsewhere classified (R26.2)  Precautions/Allergies:   Review of patient's allergies indicates no known allergies. Fall Risk Score: 1 (? 5 = High Risk)  MD Orders:Evaluate and treat, HEP, ROM MEDICAL/REFERRING DIAGNOSIS:  S/p R knee scope, PCL reconstruction   DATE OF ONSET: Surgery 17  REFERRING PHYSICIAN: Kirsten Chaparro MD  RETURN PHYSICIAN APPOINTMENT: 18     INITIAL ASSESSMENT:  Ms. Tayla Winters presents s/p right knee scope with PCL reconstruction. She is progressing with R knee ROM. She does continue with stiffness with R ankle ROM. She is able to ambulate with increased weight on R LE with brace on and walker. She will benefit from continuing skilled physical therapy to continue to progress functional mobility. PROBLEM LIST (Impacting functional limitations):  1. Post-op pain and swelling right knee  2. Decreased ROM right knee   3. Decreased functional strength right knee/LE   4. Decreased gait skills INTERVENTIONS PLANNED:  1. aquatic therapy  2. Thermal and electric modalities, manual therapies for pain. 3. Manual therapies and therapeutic exercises for ROM and strength. 4. Therapeutic exercises for gait and balance   TREATMENT PLAN:  Effective Dates: 18 TO 18. Frequency/Duration: 3 times a week for 12 weeks  GOALS: (Goals have been discussed and agreed upon with patient.)   Short-Term Functional Goals: Time Frame: 6 weeks  1.  Pt will be able to perform a good quad set and SLR with no extensor lag with for improved strength for gait. Progressing towards  2. Pt will increase R knee ROM to 0-90 for improved mobility. Progressing towards  3. Pt will improved R ankle DF AROM to 0 for improved gait. Progressing towards  4. Pt will report pain 5/10 with modified daily activities. GOAL MET  Discharge Goals: Time Frame: 12 weeks progressing towards long term goals   1. Pt will increase R LE strength to 5/5 with manual muscle testing for improved strength for ADLs and work  2. Pt will negotiate 1 flight of stairs with step over step with good control. 3. Pt will increase R knee ROM to 0-120 for mobility. 4. Pt will report pain 3/10 with daily activities. 5. Pt will score 45/80 on LEFS. Rehabilitation Potential For Stated Goals: Good  Regarding Perez  therapy, I certify that the treatment plan above will be carried out by a therapist or under their direction. Thank you for this referral,    Yaritza Navarrete, PT                    HISTORY:   History of Present Injury/Illness (Reason for Referral): She works at [employer] and she was entering the cooler and there was oil on the floor. She slipped and the feet slipped out from under her. She tried to get up and slipped again. Injury was August 14-17. She did go to physical therapy but it was making her worse. They decided to have surgery. Surgery 12-21-17. Stayed 2 nights in the hospital. She did have home health physical therapy 3-4x. Pain increases with walking. Past Medical History/Comorbidities: diabetes type 2 controlled with diet, HTN, L LE varicose vein surgery  Social History/Living Environment: Lives with . 2 steps to get inside. Prior Level of Function/Work/Activity: Works at [employer]. 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 Job does require lifting. Would like to get back to walking and walking dogs.    Current Medications:       Current Outpatient Prescriptions:     magnesium hydroxide (BEAUCHAMP MILK OF MAGNESIA) 400 mg/5 mL suspension, Take 15 mL by mouth as needed for Constipation. as needed daily, Disp: , Rfl:     temazepam (RESTORIL) 15 mg capsule, Take 15 mg by mouth nightly as needed for Sleep., Disp: , Rfl:     promethazine (PHENERGAN) 25 mg tablet, Take 25 mg by mouth every eight (8) hours as needed for Nausea., Disp: , Rfl:     HYDROmorphone (DILAUDID) 2 mg tablet, Take 2-4 mg by mouth as needed for Pain. as needed every 4-6 hours, Disp: , Rfl:     atorvastatin (LIPITOR) 20 mg tablet, Take 20 mg by mouth nightly., Disp: , Rfl:     lisinopril-hydroCHLOROthiazide (PRINZIDE, ZESTORETIC) 10-12.5 mg per tablet, Take  by mouth daily. Indications: hypertension, Disp: , Rfl:     gemfibrozil (LOPID) 600 mg tablet, Take 600 mg by mouth two (2) times a day., Disp: , Rfl:     Omega-3 Fatty Acids 60- mg cpDR, Take  by mouth daily. , Disp: , Rfl:     acetaminophen (TYLENOL) 325 mg tablet, Take 325 mg by mouth every four (4) hours as needed for Pain., Disp: , Rfl:    Date Last Reviewed:  2-21-18   Number of Personal Factors/Comorbidities that affect the Plan of Care: 1-2: MODERATE COMPLEXITY   EXAMINATION:     Observation/Orthostatic Postural Assessment:  Pt arrived to therapy with walker with partial weight bearing on R LE and knee brace locked in extension    Palpation: no tenderness around incisions     ROM:                Strength:                   Special Tests: none      Balance:n/t        Body Structures Involved:  1. Joints  2. Muscles  3. Ligaments Body Functions Affected:  1. Neuromusculoskeletal Activities and Participation Affected:  1. Mobility  2.  Community, Social and [place]   Number of elements (examined above) that affect the Plan of Care: 4+: HIGH COMPLEXITY   CLINICAL PRESENTATION:   Presentation: Evolving clinical presentation with changing clinical characteristics: MODERATE COMPLEXITY   CLINICAL DECISION MAKING: Outcome Measure: Tool Used: Lower Extremity Functional Scale (LEFS)  Score:  Initial: 5/80 Most Recent: X/80 (Date: -- )   Interpretation of Score: 20 questions each scored on a 5 point scale with 0 representing \"extreme difficulty or unable to perform\" and 4 representing \"no difficulty\". The lower the score, the greater the functional disability. 80/80 represents no disability. Minimal detectable change is 9 points. Score 80 79-63 62-48 47-32 31-16 15-1 0   Modifier CH CI CJ CK CL CM CN     Medical Necessity:   · Patient is expected to demonstrate progress in strength, range of motion and balance to improve gait. Reason for Services/Other Comments:  · Patient continues to require skilled intervention due to post-op R knee, decreased ROM, strength, gait. Use of outcome tool(s) and clinical judgement create a POC that gives a: Questionable prediction of patient's progress: MODERATE COMPLEXITY            TREATMENT:   (In addition to Assessment/Re-Assessment sessions the following treatments were rendered)  Pre-treatment Symptoms/Complaints: Pt reports that she is sore from seeing MD who checked her ROM  Pain: Initial:   4/10 Post Session:  5/10. Date:  2/21 Date:   Date:     Activity/Exercise Parameters Parameters Parameters   Lunges on step for ROM 5 min with overpressure     Seated knee ext 2# 2 x 30     Standing knee flex 2# 2 x 12     squats 2 x 12     Calf stretching Standing 3 x 20  Seated manual stretch                 Aquatic therapy- 45 minutes  HEP: she is to work on quad sets, ankle pumps, and walking putting the R foot on the ground. She is to work on scar massage and hip flexor/quad stretch in supine. Treatment/Session Assessment:    · Response to Treatment: Pt did well with active ex with 2#.  Requires assist for first few ex and to hold at end range before she began doing them indepedently.   · Compliance with Program/Exercises:  Appears complaint   · Recommendations/Intent for next treatment session: \"Next visit will focus on gait, knee ROM\".    Total Treatment Duration: 45 Minutes  PT Patient Time In/Time Out  Time In: 0145  Time Out: 0230    Jerman Gonzalez PT

## 2018-02-23 ENCOUNTER — HOSPITAL ENCOUNTER (OUTPATIENT)
Dept: PHYSICAL THERAPY | Age: 53
Discharge: HOME | End: 2018-02-23
Payer: COMMERCIAL

## 2018-02-23 PROCEDURE — 97113 AQUATIC THERAPY/EXERCISES: CPT

## 2018-02-23 NOTE — PROGRESS NOTES
Chely Augustin  : 1965  Payor: [address] [place] [address] Road / Plan: [place] / Product Type: Workers Comp /  [address] at [facility] [place] [employer]  1101 [facility], [address] [place] [address] [address], 1961 [facility]  [phone]   [phone]       OUTPATIENT PHYSICAL [address] [facility] 2018      ICD-10: Treatment Diagnosis: Sprain of posterior cruciate ligament of right knee, sequela (S83.521S); Pain in right knee (M25.561); Stiffness of right knee, not elsewhere classified (M25.661); Difficulty in walking, not elsewhere classified (R26.2)  Precautions/Allergies:   Review of patient's allergies indicates no known allergies. Fall Risk Score: 1 (? 5 = High Risk)  MD Orders:Evaluate and treat, HEP, ROM MEDICAL/REFERRING DIAGNOSIS:  S/p R knee scope, PCL reconstruction   DATE OF ONSET: Surgery 17  REFERRING PHYSICIAN: Deana Valencia MD  RETURN PHYSICIAN APPOINTMENT: 18     INITIAL ASSESSMENT:  Ms. Jomar Arango presents s/p right knee scope with PCL reconstruction. She is progressing with R knee ROM. She does continue with stiffness with R ankle ROM. She is able to ambulate with increased weight on R LE with brace on and walker. She will benefit from continuing skilled physical therapy to continue to progress functional mobility. PROBLEM LIST (Impacting functional limitations):  1. Post-op pain and swelling right knee  2. Decreased ROM right knee   3. Decreased functional strength right knee/LE   4. Decreased gait skills INTERVENTIONS PLANNED:  1. aquatic therapy  2. Thermal and electric modalities, manual therapies for pain. 3. Manual therapies and therapeutic exercises for ROM and strength. 4. Therapeutic exercises for gait and balance   TREATMENT PLAN:  Effective Dates: 18 TO 18. Frequency/Duration: 3 times a week for 12 weeks  GOALS: (Goals have been discussed and agreed upon with patient.)   Short-Term Functional Goals: Time Frame: 6 weeks  1.  Pt will be able to perform a good quad set and SLR with no extensor lag with for improved strength for gait. Progressing towards  2. Pt will increase R knee ROM to 0-90 for improved mobility. Progressing towards  3. Pt will improved R ankle DF AROM to 0 for improved gait. Progressing towards  4. Pt will report pain 5/10 with modified daily activities. GOAL MET  Discharge Goals: Time Frame: 12 weeks progressing towards long term goals   1. Pt will increase R LE strength to 5/5 with manual muscle testing for improved strength for ADLs and work  2. Pt will negotiate 1 flight of stairs with step over step with good control. 3. Pt will increase R knee ROM to 0-120 for mobility. 4. Pt will report pain 3/10 with daily activities. 5. Pt will score 45/80 on LEFS. Rehabilitation Potential For Stated Goals: Good  Regarding Perez  therapy, I certify that the treatment plan above will be carried out by a therapist or under their direction. Thank you for this referral,    Yaritza Navarrete, PT                    HISTORY:   History of Present Injury/Illness (Reason for Referral): She works at [employer] and she was entering the cooler and there was oil on the floor. She slipped and the feet slipped out from under her. She tried to get up and slipped again. Injury was August 14-17. She did go to physical therapy but it was making her worse. They decided to have surgery. Surgery 12-21-17. Stayed 2 nights in the hospital. She did have home health physical therapy 3-4x. Pain increases with walking. Past Medical History/Comorbidities: diabetes type 2 controlled with diet, HTN, L LE varicose vein surgery  Social History/Living Environment: Lives with . 2 steps to get inside. Prior Level of Function/Work/Activity: Works at [employer]. 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 Job does require lifting. Would like to get back to walking and walking dogs.    Current Medications:       Current Outpatient Prescriptions:     magnesium hydroxide (BEAUCHAMP MILK OF MAGNESIA) 400 mg/5 mL suspension, Take 15 mL by mouth as needed for Constipation. as needed daily, Disp: , Rfl:     temazepam (RESTORIL) 15 mg capsule, Take 15 mg by mouth nightly as needed for Sleep., Disp: , Rfl:     promethazine (PHENERGAN) 25 mg tablet, Take 25 mg by mouth every eight (8) hours as needed for Nausea., Disp: , Rfl:     HYDROmorphone (DILAUDID) 2 mg tablet, Take 2-4 mg by mouth as needed for Pain. as needed every 4-6 hours, Disp: , Rfl:     atorvastatin (LIPITOR) 20 mg tablet, Take 20 mg by mouth nightly., Disp: , Rfl:     lisinopril-hydroCHLOROthiazide (PRINZIDE, ZESTORETIC) 10-12.5 mg per tablet, Take  by mouth daily. Indications: hypertension, Disp: , Rfl:     gemfibrozil (LOPID) 600 mg tablet, Take 600 mg by mouth two (2) times a day., Disp: , Rfl:     Omega-3 Fatty Acids 60- mg cpDR, Take  by mouth daily. , Disp: , Rfl:     acetaminophen (TYLENOL) 325 mg tablet, Take 325 mg by mouth every four (4) hours as needed for Pain., Disp: , Rfl:    Date Last Reviewed:  2-21-18   Number of Personal Factors/Comorbidities that affect the Plan of Care: 1-2: MODERATE COMPLEXITY   EXAMINATION:     Observation/Orthostatic Postural Assessment:  Pt arrived to therapy with walker with partial weight bearing on R LE and knee brace locked in extension    Palpation: no tenderness around incisions     ROM:                Strength:                   Special Tests: none      Balance:n/t        Body Structures Involved:  1. Joints  2. Muscles  3. Ligaments Body Functions Affected:  1. Neuromusculoskeletal Activities and Participation Affected:  1. Mobility  2.  Community, Social and [place]   Number of elements (examined above) that affect the Plan of Care: 4+: HIGH COMPLEXITY   CLINICAL PRESENTATION:   Presentation: Evolving clinical presentation with changing clinical characteristics: MODERATE COMPLEXITY   CLINICAL DECISION MAKING: Outcome Measure: Tool Used: Lower Extremity Functional Scale (LEFS)  Score:  Initial: 5/80 Most Recent: X/80 (Date: -- )   Interpretation of Score: 20 questions each scored on a 5 point scale with 0 representing \"extreme difficulty or unable to perform\" and 4 representing \"no difficulty\". The lower the score, the greater the functional disability. 80/80 represents no disability. Minimal detectable change is 9 points. Score 80 79-63 62-48 47-32 31-16 15-1 0   Modifier CH CI CJ CK CL CM CN     Medical Necessity:   · Patient is expected to demonstrate progress in strength, range of motion and balance to improve gait. Reason for Services/Other Comments:  · Patient continues to require skilled intervention due to post-op R knee, decreased ROM, strength, gait. Use of outcome tool(s) and clinical judgement create a POC that gives a: Questionable prediction of patient's progress: MODERATE COMPLEXITY            TREATMENT:   (In addition to Assessment/Re-Assessment sessions the following treatments were rendered)  Pre-treatment Symptoms/Complaints: Pt states she is doing better. She continues to complain of the \"sticking\" sensation. She stated the MD has ordered a CPM for her. Pain: Initial:   4/10 Post Session:  5/10. Date:  2/21 Date:  2/23 Date:     Activity/Exercise Parameters Parameters Parameters   Lunges on step for ROM 5 min with overpressure 5 min on step    Seated knee ext 2# 2 x 30 2# 2 x 30    Standing knee flex 2# 2 x 12 2# 2 x 12    squats 2 x 12 2 x 15    Calf stretching Standing 3 x 20  Seated manual stretch Standing 2 x 15    Walking forward, back, side, march, toe/heels  6          Aquatic therapy- 45 minutes  HEP: she is to work on quad sets, ankle pumps, and walking putting the R foot on the ground. She is to work on scar massage and hip flexor/quad stretch in supine.     Treatment/Session Assessment:    · Response to Treatment: Pt is doing better with the active ex in the water with 2#.  · Compliance with Program/Exercises:  Appears complaint   · Recommendations/Intent for next treatment session: \"Next visit will focus on gait, knee ROM\".    Total Treatment Duration: 45 Minutes  PT Patient Time In/Time Out  Time In: 1215  Time Out: 0100    Sachin Ta, PT

## 2018-02-26 ENCOUNTER — HOSPITAL ENCOUNTER (OUTPATIENT)
Dept: PHYSICAL THERAPY | Age: 53
Discharge: HOME | End: 2018-02-26
Payer: COMMERCIAL

## 2018-02-26 PROCEDURE — 97110 THERAPEUTIC EXERCISES: CPT

## 2018-02-26 NOTE — PROGRESS NOTES
Sven Ordaz  : 1965  Payor: [address] [place] [address] Road / Plan: Clary Edward / Product Type: Workers Comp /  [address] [place] [place]  at [facility] [place] [employer]  [facility], [address] [place] [address],[address], [facility] [address].  [phone]   [phone]       OUTPATIENT PHYSICAL THERAPY:Daily Note and Progress Report 2018      ICD-10: Treatment Diagnosis: Sprain of posterior cruciate ligament of right knee, sequela (S83.521S); Pain in right knee (M25.561); Stiffness of right knee, not elsewhere classified (M25.661); Difficulty in walking, not elsewhere classified (R26.2)  Precautions/Allergies:   Review of patient's allergies indicates no known allergies. Fall Risk Score: 1 (? 5 = High Risk)  MD Orders:Evaluate and treat, HEP, ROM MEDICAL/REFERRING DIAGNOSIS:  S/p R knee scope, PCL reconstruction   DATE OF ONSET: Surgery 17  REFERRING PHYSICIAN: Smitha Anderson., MD  RETURN PHYSICIAN APPOINTMENT: 18     INITIAL ASSESSMENT:  Ms. Shannen Ocampo presents s/p right knee scope with PCL reconstruction. She continues to slowly progress with R knee ROM. She does continue with stiffness with R ankle ROM. She is able to ambulate with increased weight on R LE with brace on and walker and is able to ambulate short distances with no assistive device. She has difficulty with strengthening exercises requiring frequent verbal cueing and putting weight through R LE during ambulation and standing exercises. She will benefit from continuing skilled physical therapy to continue to progress functional mobility. PROBLEM LIST (Impacting functional limitations):  1. Post-op pain and swelling right knee  2. Decreased ROM right knee   3. Decreased functional strength right knee/LE   4. Decreased gait skills INTERVENTIONS PLANNED:  1. aquatic therapy  2. Thermal and electric modalities, manual therapies for pain. 3. Manual therapies and therapeutic exercises for ROM and strength.    4. Therapeutic exercises for gait and balance   TREATMENT PLAN:  Effective Dates: 1-8-18 TO 4-6-18. Frequency/Duration: 3 times a week for 12 weeks  GOALS: (Goals have been discussed and agreed upon with patient.)   Short-Term Functional Goals: Time Frame: 6 weeks  1. Pt will be able to perform a good quad set and SLR with no extensor lag with for improved strength for gait. Progressing towards  2. Pt will increase R knee ROM to 0-90 for improved mobility. GOAL MET  3. Pt will improved R ankle DF AROM to 0 for improved gait. Progressing towards  4. Pt will report pain 5/10 with modified daily activities. GOAL MET  Discharge Goals: Time Frame: 12 weeks progressing towards long term goals   1. Pt will increase R LE strength to 5/5 with manual muscle testing for improved strength for ADLs and work  2. Pt will negotiate 1 flight of stairs with step over step with good control. 3. Pt will increase R knee ROM to 0-120 for mobility. 4. Pt will report pain 3/10 with daily activities. 5. Pt will score 45/80 on LEFS. Rehabilitation Potential For Stated Goals: Good  Regarding Perez  therapy, I certify that the treatment plan above will be carried out by a therapist or under their direction. Thank you for this referral,    Allie Yarbrough Im, PT                    HISTORY:   History of Present Injury/Illness (Reason for Referral): She works at [employer] and she was entering the cooler and there was oil on the floor. She slipped and the feet slipped out from under her. She tried to get up and slipped again. Injury was August 14-17. She did go to physical therapy but it was making her worse. They decided to have surgery. Surgery 12-21-17. Stayed 2 nights in the hospital. She did have home health physical therapy 3-4x. Pain increases with walking. Past Medical History/Comorbidities: diabetes type 2 controlled with diet, HTN, L LE varicose vein surgery  Social History/Living Environment: Lives with . 2 steps to get inside. Prior Level of Function/Work/Activity: Works at [employer]. 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 Job does require lifting. Would like to get back to walking and walking dogs. Current Medications:       Current Outpatient Prescriptions:     magnesium hydroxide (BEAUCHAMP MILK OF MAGNESIA) 400 mg/5 mL suspension, Take 15 mL by mouth as needed for Constipation. as needed daily, Disp: , Rfl:     temazepam (RESTORIL) 15 mg capsule, Take 15 mg by mouth nightly as needed for Sleep., Disp: , Rfl:     promethazine (PHENERGAN) 25 mg tablet, Take 25 mg by mouth every eight (8) hours as needed for Nausea., Disp: , Rfl:     HYDROmorphone (DILAUDID) 2 mg tablet, Take 2-4 mg by mouth as needed for Pain. as needed every 4-6 hours, Disp: , Rfl:     atorvastatin (LIPITOR) 20 mg tablet, Take 20 mg by mouth nightly., Disp: , Rfl:     lisinopril-hydroCHLOROthiazide (PRINZIDE, ZESTORETIC) 10-12.5 mg per tablet, Take  by mouth daily. Indications: hypertension, Disp: , Rfl:     gemfibrozil (LOPID) 600 mg tablet, Take 600 mg by mouth two (2) times a day., Disp: , Rfl:     Omega-3 Fatty Acids 60- mg cpDR, Take  by mouth daily. , Disp: , Rfl:     acetaminophen (TYLENOL) 325 mg tablet, Take 325 mg by mouth every four (4) hours as needed for Pain., Disp: , Rfl:    Date Last Reviewed:  2-27-18   Number of Personal Factors/Comorbidities that affect the Plan of Care: 1-2: MODERATE COMPLEXITY   EXAMINATION:     Observation/Orthostatic Postural Assessment:  Pt arrived to therapy with walker with partial weight bearing on R LE with hinge brace on the R knee    Palpation: no tenderness around incisions     ROM:         RLE PROM  R Knee Flexion: 100  R Knee Extension: 0  R Ankle Dorsiflexion: 0      Strength:                   Mobility: Pt ambulates with no assistive device with decreased left step length, right ankle in supination in stance phase, decreased weight on R LE during stance phase.    Balance:n/t Body Structures Involved:  1. Joints  2. Muscles  3. Ligaments Body Functions Affected:  1. Neuromusculoskeletal Activities and Participation Affected:  1. Mobility  2. Community, Social and [facility]   Number of elements (examined above) that affect the Plan of Care: 4+: HIGH COMPLEXITY   CLINICAL PRESENTATION:   Presentation: Evolving clinical presentation with changing clinical characteristics: MODERATE COMPLEXITY   CLINICAL DECISION MAKING:   Outcome Measure: Tool Used: Lower Extremity Functional Scale (LEFS)  Score:  Initial: 5/80 Most Recent: X/80 (Date: -- )   Interpretation of Score: 20 questions each scored on a 5 point scale with 0 representing \"extreme difficulty or unable to perform\" and 4 representing \"no difficulty\". The lower the score, the greater the functional disability. 80/80 represents no disability. Minimal detectable change is 9 points. Score 80 79-63 62-48 47-32 31-16 15-1 0   Modifier CH CI CJ CK CL CM CN     Medical Necessity:   · Patient is expected to demonstrate progress in strength, range of motion and balance to improve gait. Reason for Services/Other Comments:  · Patient continues to require skilled intervention due to post-op R knee, decreased ROM, strength, gait. Use of outcome tool(s) and clinical judgement create a POC that gives a: Questionable prediction of patient's progress: MODERATE COMPLEXITY            TREATMENT:   (In addition to Assessment/Re-Assessment sessions the following treatments were rendered)  Pre-treatment Symptoms/Complaints: Pt reports she was given a new brace but feels like it is too tight and prefers the other brace. Pain: Initial:   4/10 Post Session:  5/10. Therapeutic Exercise (45 Minutes): For improved muscle activation, gait, and knee ROM. sitting and heel slides for knee flexion ROM 3x10. Seated long arc quad 1# 3x10 with tactile cues on the patella.     Shuttle press with 0# 2x10 with tactile cues on R foot to keep foot flat and verbal cues for pushing through her foot. . Sit to stand 2x10 with tactile cues on R LE to increase weight on R LE. Standing toe taps on 4\" step with R LE with maximal verbal and visual cues for hip and knee flexion and to avoid hip hiking 2x10. Toe taps with L LE with tactile cues on R LE and verbal cues to increase weight on R LE x10 reps. HEP: she is to work on quad sets, ankle pumps, and walking putting the R foot on the ground. She is to work on scar massage and hip flexor/quad stretch in supine. Treatment/Session Assessment:    · Response to Treatment: Improved knee and ankle ROM today. She continues to have difficulty with strengthening exercises and activating the quad muscle. · Compliance with Program/Exercises:  Appears complaint   · Recommendations/Intent for next treatment session: \"Next visit will focus on gait, knee ROM\".    Total Treatment Duration: 45 Minutes  PT Patient Time In/Time Out  Time In: 1520  Time Out: [address], JORGE

## 2018-02-28 ENCOUNTER — HOSPITAL ENCOUNTER (OUTPATIENT)
Dept: PHYSICAL THERAPY | Age: 53
Discharge: HOME | End: 2018-02-28
Payer: COMMERCIAL

## 2018-02-28 PROCEDURE — 97113 AQUATIC THERAPY/EXERCISES: CPT

## 2018-02-28 NOTE — PROGRESS NOTES
Israel Castelans  : 1965  Payor: [address] [place] [address] Road / Plan: Roby Ram / Product Type: [employer] /  [address]  at [address] [place] [address] Rd  [facility],  Kings Reyna.  [phone]   [phone]       OUTPATIENT PHYSICAL THERAPY:Daily Note 2018      ICD-10: Treatment Diagnosis: Sprain of posterior cruciate ligament of right knee, sequela (S83.521S); Pain in right knee (M25.561); Stiffness of right knee, not elsewhere classified (M25.661); Difficulty in walking, not elsewhere classified (R26.2)  Precautions/Allergies:   Review of patient's allergies indicates no known allergies. Fall Risk Score: 1 (? 5 = High Risk)  MD Orders:Evaluate and treat, HEP, ROM MEDICAL/REFERRING DIAGNOSIS:  S/p R knee scope, PCL reconstruction   DATE OF ONSET: Surgery 17  REFERRING PHYSICIAN: Hailey Westbrook MD  RETURN PHYSICIAN APPOINTMENT: 18     INITIAL ASSESSMENT:  Ms. Marifer Gonzalez presents s/p right knee scope with PCL reconstruction. She continues to slowly progress with R knee ROM. She does continue with stiffness with R ankle ROM. She is able to ambulate with increased weight on R LE with brace on and walker and is able to ambulate short distances with no assistive device. She has difficulty with strengthening exercises requiring frequent verbal cueing and putting weight through R LE during ambulation and standing exercises. She will benefit from continuing skilled physical therapy to continue to progress functional mobility. PROBLEM LIST (Impacting functional limitations):  1. Post-op pain and swelling right knee  2. Decreased ROM right knee   3. Decreased functional strength right knee/LE   4. Decreased gait skills INTERVENTIONS PLANNED:  1. aquatic therapy  2. Thermal and electric modalities, manual therapies for pain. 3. Manual therapies and therapeutic exercises for ROM and strength.    4. Therapeutic exercises for gait and balance   TREATMENT PLAN:  Effective Dates: 1-8-18 TO 4-6-18. Frequency/Duration: 3 times a week for 12 weeks  GOALS: (Goals have been discussed and agreed upon with patient.)   Short-Term Functional Goals: Time Frame: 6 weeks  1. Pt will be able to perform a good quad set and SLR with no extensor lag with for improved strength for gait. Progressing towards  2. Pt will increase R knee ROM to 0-90 for improved mobility. GOAL MET  3. Pt will improved R ankle DF AROM to 0 for improved gait. Progressing towards  4. Pt will report pain 5/10 with modified daily activities. GOAL MET  Discharge Goals: Time Frame: 12 weeks progressing towards long term goals   1. Pt will increase R LE strength to 5/5 with manual muscle testing for improved strength for ADLs and work  2. Pt will negotiate 1 flight of stairs with step over step with good control. 3. Pt will increase R knee ROM to 0-120 for mobility. 4. Pt will report pain 3/10 with daily activities. 5. Pt will score 45/80 on LEFS. Rehabilitation Potential For Stated Goals: Good  Regarding [facility] 87 therapy, I certify that the treatment plan above will be carried out by a therapist or under their direction. Thank you for this referral,    Yaritza Navarrete, PT                    HISTORY:   History of Present Injury/Illness (Reason for Referral): She works at [employer] and she was entering the cooler and there was oil on the floor. She slipped and the feet slipped out from under her. She tried to get up and slipped again. Injury was August 14-17. She did go to physical therapy but it was making her worse. They decided to have surgery. Surgery 12-21-17. Stayed 2 nights in the hospital. She did have home health physical therapy 3-4x. Pain increases with walking. Past Medical History/Comorbidities: diabetes type 2 controlled with diet, HTN, L LE varicose vein surgery  Social History/Living Environment: Lives with . 2 steps to get inside.      Prior Level of Function/Work/Activity: Works at [employer]. 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 Job does require lifting. Would like to get back to walking and walking dogs. Current Medications:       Current Outpatient Prescriptions:     magnesium hydroxide (BEAUCHAMP MILK OF MAGNESIA) 400 mg/5 mL suspension, Take 15 mL by mouth as needed for Constipation. as needed daily, Disp: , Rfl:     temazepam (RESTORIL) 15 mg capsule, Take 15 mg by mouth nightly as needed for Sleep., Disp: , Rfl:     promethazine (PHENERGAN) 25 mg tablet, Take 25 mg by mouth every eight (8) hours as needed for Nausea., Disp: , Rfl:     HYDROmorphone (DILAUDID) 2 mg tablet, Take 2-4 mg by mouth as needed for Pain. as needed every 4-6 hours, Disp: , Rfl:     atorvastatin (LIPITOR) 20 mg tablet, Take 20 mg by mouth nightly., Disp: , Rfl:     lisinopril-hydroCHLOROthiazide (PRINZIDE, ZESTORETIC) 10-12.5 mg per tablet, Take  by mouth daily. Indications: hypertension, Disp: , Rfl:     gemfibrozil (LOPID) 600 mg tablet, Take 600 mg by mouth two (2) times a day., Disp: , Rfl:     Omega-3 Fatty Acids 60- mg cpDR, Take  by mouth daily. , Disp: , Rfl:     acetaminophen (TYLENOL) 325 mg tablet, Take 325 mg by mouth every four (4) hours as needed for Pain., Disp: , Rfl:    Date Last Reviewed:  3/1/2018   Number of Personal Factors/Comorbidities that affect the Plan of Care: 1-2: MODERATE COMPLEXITY   EXAMINATION:     Observation/Orthostatic Postural Assessment:  Pt arrived to therapy with walker with partial weight bearing on R LE with hinge brace on the R knee    Palpation: no tenderness around incisions     ROM:                Strength:                   Mobility: Pt ambulates with no assistive device with decreased left step length, right ankle in supination in stance phase, decreased weight on R LE during stance phase. Balance:n/t        Body Structures Involved:  1. Joints  2. Muscles  3.  Ligaments Body Functions Affected:  1. Neuromusculoskeletal Activities and Participation Affected:  1. Mobility  2. Community, Social and [place]   Number of elements (examined above) that affect the Plan of Care: 4+: HIGH COMPLEXITY   CLINICAL PRESENTATION:   Presentation: Evolving clinical presentation with changing clinical characteristics: MODERATE COMPLEXITY   CLINICAL DECISION MAKING:   Outcome Measure: Tool Used: Lower Extremity Functional Scale (LEFS)  Score:  Initial: 5/80 Most Recent: X/80 (Date: -- )   Interpretation of Score: 20 questions each scored on a 5 point scale with 0 representing \"extreme difficulty or unable to perform\" and 4 representing \"no difficulty\". The lower the score, the greater the functional disability. 80/80 represents no disability. Minimal detectable change is 9 points. Score 80 79-63 62-48 47-32 31-16 15-1 0   Modifier CH CI CJ CK CL CM CN     Medical Necessity:   · Patient is expected to demonstrate progress in strength, range of motion and balance to improve gait. Reason for Services/Other Comments:  · Patient continues to require skilled intervention due to post-op R knee, decreased ROM, strength, gait. Use of outcome tool(s) and clinical judgement create a POC that gives a: Questionable prediction of patient's progress: MODERATE COMPLEXITY            TREATMENT:   (In addition to Assessment/Re-Assessment sessions the following treatments were rendered)  Pre-treatment Symptoms/Complaints: Pt states she is doing ok- continues to work at home. Pain: Initial:   4/10 Post Session:  5/10.         Aquatic therapy- 45 min   Date:  2/28 Date:   Date:     Activity/Exercise Parameters Parameters Parameters   Lunge on step for ROM 5 min     Step ups/step downs 10 on red step     Knee ext 4# seated 2 x 15     Knee flex 4# standing 2 x 15     Calf stretch 3 x 20 sec     squats 2 x 12     Walking - forward, back , side , march 4 laps each      Stationary bike- Pt worked on stationary bike for ROM x 7 minutes. Pt was able to go full revolutions of pedals with significant lateral deviation of knee for substitution. HEP: she is to work on quad sets, ankle pumps, and walking putting the R foot on the ground. She is to work on scar massage and hip flexor/quad stretch in supine. Treatment/Session Assessment:    · Response to Treatment: Continues to do well in the pool. Does complain of the knee \"sticking\". With knee ext she tended to internally rotate to initiate movement. · Compliance with Program/Exercises:  Appears complaint   · Recommendations/Intent for next treatment session: \"Next visit will focus on gait, knee ROM\".    Total Treatment Duration: 45 Minutes  PT Patient Time In/Time Out  Time In: 0100  Time Out: 0145    Jerman Gonzalez, PT

## 2018-03-02 ENCOUNTER — HOSPITAL ENCOUNTER (OUTPATIENT)
Dept: PHYSICAL THERAPY | Age: 53
Discharge: HOME | End: 2018-03-02
Payer: COMMERCIAL

## 2018-03-02 PROCEDURE — 97113 AQUATIC THERAPY/EXERCISES: CPT

## 2018-03-02 NOTE — PROGRESS NOTES
Federica Aguilera  : 1965  Payor: [address] [place] [address] Road / Plan: Datjerson Santana / Product Type: Workers Comp /  [address]  at [facility] [place] [facility]  [address] [place] [address], [address].  [phone]   [phone]       OUTPATIENT PHYSICAL THERAPY:Daily Note 3/2/2018      ICD-10: Treatment Diagnosis: Sprain of posterior cruciate ligament of right knee, sequela (S83.521S); Pain in right knee (M25.561); Stiffness of right knee, not elsewhere classified (M25.661); Difficulty in walking, not elsewhere classified (R26.2)  Precautions/Allergies:   Review of patient's allergies indicates no known allergies. Fall Risk Score: 1 (? 5 = High Risk)  MD Orders:Evaluate and treat, HEP, ROM MEDICAL/REFERRING DIAGNOSIS:  S/p R knee scope, PCL reconstruction   DATE OF ONSET: Surgery 17  REFERRING PHYSICIAN: Jesenia Philip MD  RETURN PHYSICIAN APPOINTMENT: 18     INITIAL ASSESSMENT:  Ms. Sivakumar Aiken presents s/p right knee scope with PCL reconstruction. She continues to slowly progress with R knee ROM. She does continue with stiffness with R ankle ROM. She is able to ambulate with increased weight on R LE with brace on and walker and is able to ambulate short distances with no assistive device. She has difficulty with strengthening exercises requiring frequent verbal cueing and putting weight through R LE during ambulation and standing exercises. She will benefit from continuing skilled physical therapy to continue to progress functional mobility. PROBLEM LIST (Impacting functional limitations):  1. Post-op pain and swelling right knee  2. Decreased ROM right knee   3. Decreased functional strength right knee/LE   4. Decreased gait skills INTERVENTIONS PLANNED:  1. aquatic therapy  2. Thermal and electric modalities, manual therapies for pain. 3. Manual therapies and therapeutic exercises for ROM and strength.    4. Therapeutic exercises for gait and balance   TREATMENT PLAN:  Effective Dates: 1-8-18 TO 4-6-18. Frequency/Duration: 3 times a week for 12 weeks  GOALS: (Goals have been discussed and agreed upon with patient.)   Short-Term Functional Goals: Time Frame: 6 weeks  1. Pt will be able to perform a good quad set and SLR with no extensor lag with for improved strength for gait. Progressing towards  2. Pt will increase R knee ROM to 0-90 for improved mobility. GOAL MET  3. Pt will improved R ankle DF AROM to 0 for improved gait. Progressing towards  4. Pt will report pain 5/10 with modified daily activities. GOAL MET  Discharge Goals: Time Frame: 12 weeks progressing towards long term goals   1. Pt will increase R LE strength to 5/5 with manual muscle testing for improved strength for ADLs and work  2. Pt will negotiate 1 flight of stairs with step over step with good control. 3. Pt will increase R knee ROM to 0-120 for mobility. 4. Pt will report pain 3/10 with daily activities. 5. Pt will score 45/80 on LEFS. Rehabilitation Potential For Stated Goals: Good  Regarding [facility] therapy, I certify that the treatment plan above will be carried out by a therapist or under their direction. Thank you for this referral,    Yaritza Navarrete, PT                    HISTORY:   History of Present Injury/Illness (Reason for Referral): She works at [employer] and she was entering the cooler and there was oil on the floor. She slipped and the feet slipped out from under her. She tried to get up and slipped again. Injury was August 14-17. She did go to physical therapy but it was making her worse. They decided to have surgery. Surgery 12-21-17. Stayed 2 nights in the hospital. She did have home health physical therapy 3-4x. Pain increases with walking. Past Medical History/Comorbidities: diabetes type 2 controlled with diet, HTN, L LE varicose vein surgery  Social History/Living Environment: Lives with . 2 steps to get inside.      Prior Level of Function/Work/Activity: Works at [employer]. 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 Job does require lifting. Would like to get back to walking and walking dogs. Current Medications:       Current Outpatient Prescriptions:     magnesium hydroxide (BEAUCHAMP MILK OF MAGNESIA) 400 mg/5 mL suspension, Take 15 mL by mouth as needed for Constipation. as needed daily, Disp: , Rfl:     temazepam (RESTORIL) 15 mg capsule, Take 15 mg by mouth nightly as needed for Sleep., Disp: , Rfl:     promethazine (PHENERGAN) 25 mg tablet, Take 25 mg by mouth every eight (8) hours as needed for Nausea., Disp: , Rfl:     HYDROmorphone (DILAUDID) 2 mg tablet, Take 2-4 mg by mouth as needed for Pain. as needed every 4-6 hours, Disp: , Rfl:     atorvastatin (LIPITOR) 20 mg tablet, Take 20 mg by mouth nightly., Disp: , Rfl:     lisinopril-hydroCHLOROthiazide (PRINZIDE, ZESTORETIC) 10-12.5 mg per tablet, Take  by mouth daily. Indications: hypertension, Disp: , Rfl:     gemfibrozil (LOPID) 600 mg tablet, Take 600 mg by mouth two (2) times a day., Disp: , Rfl:     Omega-3 Fatty Acids 60- mg cpDR, Take  by mouth daily. , Disp: , Rfl:     acetaminophen (TYLENOL) 325 mg tablet, Take 325 mg by mouth every four (4) hours as needed for Pain., Disp: , Rfl:    Date Last Reviewed:  3/2/2018   Number of Personal Factors/Comorbidities that affect the Plan of Care: 1-2: MODERATE COMPLEXITY   EXAMINATION:     Observation/Orthostatic Postural Assessment:  Pt arrived to therapy with walker with partial weight bearing on R LE with hinge brace on the R knee    Palpation: no tenderness around incisions     ROM:                Strength:                   Mobility: Pt ambulates with no assistive device with decreased left step length, right ankle in supination in stance phase, decreased weight on R LE during stance phase. Balance:n/t        Body Structures Involved:  1. Joints  2. Muscles  3.  Ligaments Body Functions Affected:  1. Neuromusculoskeletal Activities and Participation Affected:  1. Mobility  2. Community, Social and [place]   Number of elements (examined above) that affect the Plan of Care: 4+: HIGH COMPLEXITY   CLINICAL PRESENTATION:   Presentation: Evolving clinical presentation with changing clinical characteristics: MODERATE COMPLEXITY   CLINICAL DECISION MAKING:   Outcome Measure: Tool Used: Lower Extremity Functional Scale (LEFS)  Score:  Initial: 5/80 Most Recent: X/80 (Date: -- )   Interpretation of Score: 20 questions each scored on a 5 point scale with 0 representing \"extreme difficulty or unable to perform\" and 4 representing \"no difficulty\". The lower the score, the greater the functional disability. 80/80 represents no disability. Minimal detectable change is 9 points. Score 80 79-63 62-48 47-32 31-16 15-1 0   Modifier CH CI CJ CK CL CM CN     Medical Necessity:   · Patient is expected to demonstrate progress in strength, range of motion and balance to improve gait. Reason for Services/Other Comments:  · Patient continues to require skilled intervention due to post-op R knee, decreased ROM, strength, gait. Use of outcome tool(s) and clinical judgement create a POC that gives a: Questionable prediction of patient's progress: MODERATE COMPLEXITY            TREATMENT:   (In addition to Assessment/Re-Assessment sessions the following treatments were rendered)  Pre-treatment Symptoms/Complaints: Pt states she was very sore after riding the bike last time. Pain: Initial:   4/10 Post Session:  5/10.         Aquatic therapy- 45 min   Date:  2/28 Date:  3/2 Date:         Activity/Exercise Parameters Parameters Parameters       Lunge on step for ROM 5 min 5 min        Step ups/step downs 10 on red step         Knee ext 4# seated 2 x 15         Knee flex 4# standing 2 x 15         Calf stretch 3 x 20 sec         squats 2 x 12         Walking - forward, back , side , march 4 laps each 4 laps each        3 way hip   4# 15x        Deep water  Jog, cross country, scissors x 10 minutes total                                       Stationary bike- Pt worked on recumbant stationary bike for ROM x 7 minutes. HEP: she is to work on quad sets, ankle pumps, and walking putting the R foot on the ground. She is to work on scar massage and hip flexor/quad stretch in supine. Treatment/Session Assessment:    · Response to Treatment: Biggest complaint is of knee cap sticking. · Compliance with Program/Exercises:  Appears complaint   · Recommendations/Intent for next treatment session: \"Next visit will focus on gait, knee ROM\".    Total Treatment Duration: 45 Minutes  PT Patient Time In/Time Out  Time In: 1215  Time Out: 0100    Lorena Evans, PT

## 2018-03-05 ENCOUNTER — HOSPITAL ENCOUNTER (OUTPATIENT)
Dept: PHYSICAL THERAPY | Age: 53
Discharge: HOME | End: 2018-03-05
Payer: COMMERCIAL

## 2018-03-05 PROCEDURE — 97110 THERAPEUTIC EXERCISES: CPT

## 2018-03-05 NOTE — PROGRESS NOTES
Bassam Brandon  : 1965  Payor: [address] [place] [address] Road / Plan: Tabatha Liriano / Product Type: [employer] /  [address]  at [address] [place] [address] Rd  [address] [facility], [address] [place], [place] [address], [address] [place] [address]  [phone]   [phone]       OUTPATIENT PHYSICAL THERAPY:Daily Note 3/5/2018      ICD-10: Treatment Diagnosis: Sprain of posterior cruciate ligament of right knee, sequela (S83.521S); Pain in right knee (M25.561); Stiffness of right knee, not elsewhere classified (M25.661); Difficulty in walking, not elsewhere classified (R26.2)  Precautions/Allergies:   Review of patient's allergies indicates no known allergies. Fall Risk Score: 1 (? 5 = High Risk)  MD Orders:Evaluate and treat, HEP, ROM MEDICAL/REFERRING DIAGNOSIS:  S/p R knee scope, PCL reconstruction   DATE OF ONSET: Surgery 17  REFERRING PHYSICIAN: Rigoberto Aburto MD  RETURN PHYSICIAN APPOINTMENT: 18     INITIAL ASSESSMENT:  Ms. Sherry Castro presents s/p right knee scope with PCL reconstruction. She continues to slowly progress with R knee ROM. She does continue with stiffness with R ankle ROM. She is able to ambulate with increased weight on R LE with brace on and walker and is able to ambulate short distances with no assistive device. She has difficulty with strengthening exercises requiring frequent verbal cueing and putting weight through R LE during ambulation and standing exercises. She will benefit from continuing skilled physical therapy to continue to progress functional mobility. PROBLEM LIST (Impacting functional limitations):  1. Post-op pain and swelling right knee  2. Decreased ROM right knee   3. Decreased functional strength right knee/LE   4. Decreased gait skills INTERVENTIONS PLANNED:  1. aquatic therapy  2. Thermal and electric modalities, manual therapies for pain. 3. Manual therapies and therapeutic exercises for ROM and strength.    4. Therapeutic exercises for gait and balance   TREATMENT PLAN:  Effective Dates: 1-8-18 TO 4-6-18. Frequency/Duration: 3 times a week for 12 weeks  GOALS: (Goals have been discussed and agreed upon with patient.)   Short-Term Functional Goals: Time Frame: 6 weeks  1. Pt will be able to perform a good quad set and SLR with no extensor lag with for improved strength for gait. Progressing towards  2. Pt will increase R knee ROM to 0-90 for improved mobility. GOAL MET  3. Pt will improved R ankle DF AROM to 0 for improved gait. Progressing towards  4. Pt will report pain 5/10 with modified daily activities. GOAL MET  Discharge Goals: Time Frame: 12 weeks progressing towards long term goals   1. Pt will increase R LE strength to 5/5 with manual muscle testing for improved strength for ADLs and work  2. Pt will negotiate 1 flight of stairs with step over step with good control. 3. Pt will increase R knee ROM to 0-120 for mobility. 4. Pt will report pain 3/10 with daily activities. 5. Pt will score 45/80 on LEFS. Rehabilitation Potential For Stated Goals: Good  Regarding [facility]  therapy, I certify that the treatment plan above will be carried out by a therapist or under their direction. Thank you for this referral,    Allie Crane, PT                    HISTORY:   History of Present Injury/Illness (Reason for Referral): She works at [employer] and she was entering the cooler and there was oil on the floor. She slipped and the feet slipped out from under her. She tried to get up and slipped again. Injury was August 14-17. She did go to physical therapy but it was making her worse. They decided to have surgery. Surgery 12-21-17. Stayed 2 nights in the hospital. She did have home health physical therapy 3-4x. Pain increases with walking. Past Medical History/Comorbidities: diabetes type 2 controlled with diet, HTN, L LE varicose vein surgery  Social History/Living Environment: Lives with . 2 steps to get inside.      Prior Level of Function/Work/Activity: Works at [employer]. 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 Job does require lifting. Would like to get back to walking and walking dogs. Current Medications:       Current Outpatient Prescriptions:     magnesium hydroxide (BEAUCHAMP MILK OF MAGNESIA) 400 mg/5 mL suspension, Take 15 mL by mouth as needed for Constipation. as needed daily, Disp: , Rfl:     temazepam (RESTORIL) 15 mg capsule, Take 15 mg by mouth nightly as needed for Sleep., Disp: , Rfl:     promethazine (PHENERGAN) 25 mg tablet, Take 25 mg by mouth every eight (8) hours as needed for Nausea., Disp: , Rfl:     HYDROmorphone (DILAUDID) 2 mg tablet, Take 2-4 mg by mouth as needed for Pain. as needed every 4-6 hours, Disp: , Rfl:     atorvastatin (LIPITOR) 20 mg tablet, Take 20 mg by mouth nightly., Disp: , Rfl:     lisinopril-hydroCHLOROthiazide (PRINZIDE, ZESTORETIC) 10-12.5 mg per tablet, Take  by mouth daily. Indications: hypertension, Disp: , Rfl:     gemfibrozil (LOPID) 600 mg tablet, Take 600 mg by mouth two (2) times a day., Disp: , Rfl:     Omega-3 Fatty Acids 60- mg cpDR, Take  by mouth daily. , Disp: , Rfl:     acetaminophen (TYLENOL) 325 mg tablet, Take 325 mg by mouth every four (4) hours as needed for Pain., Disp: , Rfl:    Date Last Reviewed:  3-5-18   Number of Personal Factors/Comorbidities that affect the Plan of Care: 1-2: MODERATE COMPLEXITY   EXAMINATION:     Observation/Orthostatic Postural Assessment:  Pt arrived to therapy with walker with partial weight bearing on R LE with hinge brace on the R knee    Palpation: no tenderness around incisions     ROM:         RLE PROM  R Knee Flexion: 110      Strength:                   Mobility: Pt ambulates with no assistive device with decreased left step length, right ankle in supination in stance phase, decreased weight on R LE during stance phase.    Balance:n/t        Body Structures Involved:  1. Joints  2. Muscles  3. Ligaments Body Functions Affected:  1. Neuromusculoskeletal Activities and Participation Affected:  1. Mobility  2. Community, Social and [place]   Number of elements (examined above) that affect the Plan of Care: 4+: HIGH COMPLEXITY   CLINICAL PRESENTATION:   Presentation: Evolving clinical presentation with changing clinical characteristics: MODERATE COMPLEXITY   CLINICAL DECISION MAKING:   Outcome Measure: Tool Used: Lower Extremity Functional Scale (LEFS)  Score:  Initial: 5/80 Most Recent: X/80 (Date: -- )   Interpretation of Score: 20 questions each scored on a 5 point scale with 0 representing \"extreme difficulty or unable to perform\" and 4 representing \"no difficulty\". The lower the score, the greater the functional disability. 80/80 represents no disability. Minimal detectable change is 9 points. Score 80 79-63 62-48 47-32 31-16 15-1 0   Modifier CH CI CJ CK CL CM CN     Medical Necessity:   · Patient is expected to demonstrate progress in strength, range of motion and balance to improve gait. Reason for Services/Other Comments:  · Patient continues to require skilled intervention due to post-op R knee, decreased ROM, strength, gait. Use of outcome tool(s) and clinical judgement create a POC that gives a: Questionable prediction of patient's progress: MODERATE COMPLEXITY            TREATMENT:   (In addition to Assessment/Re-Assessment sessions the following treatments were rendered)  Pre-treatment Symptoms/Complaints: Pt reports she feels like she is getting better. Pain: Initial:   4/10 Post Session:  5/10. Therapeutic Exercise (40 Minutes): For improved muscle activation, gait, and knee ROM. sitting and heel slides for knee flexion ROM 3x10 with assistive at end range. Short arc quads in supine 1# 2x10. Seated long arc quad 1# 2x10 with tactile cues on the patella.     Shuttle press with 0# 2x10 with tactile cues on R foot to keep foot flat and verbal cues for pushing through her foot. Shuttle press with 12# with min assist x8 reps. Sit to stand 2x10 with tactile cues on R LE to increase weight on R LE, then 6 reps of sit to stand with L foot on jaron disc to increase weight through the R LE. Standing toe taps on 4\" step with R LE with maximal verbal and visual cues for hip and knee flexion and to avoid hip hiking 3x5. Toe taps with L LE with tactile cues on R LE and verbal cues to increase weight on R LE x3x5 reps. HEP: she is to work on quad sets, ankle pumps, and walking putting the R foot on the ground. She is to work on scar massage and hip flexor/quad stretch in supine. Treatment/Session Assessment:    · Response to Treatment: She continues to progress with knee ROM. Pain and difficulty with sit to stand with L foot on jaron disc and using the R LE to stand. · Compliance with Program/Exercises:  Appears complaint   · Recommendations/Intent for next treatment session: \"Next visit will focus on gait, knee ROM\".    Total Treatment Duration: 40 Minutes  PT Patient Time In/Time Out  Time In: 1305  Time Out: [address]

## 2018-03-07 ENCOUNTER — HOSPITAL ENCOUNTER (OUTPATIENT)
Dept: PHYSICAL THERAPY | Age: 53
Discharge: HOME | End: 2018-03-07
Payer: COMMERCIAL

## 2018-03-07 PROCEDURE — 97113 AQUATIC THERAPY/EXERCISES: CPT

## 2018-03-07 NOTE — PROGRESS NOTES
Marta Peters  : 1965  Payor: Paxton Miss / Plan: Charlene All / Product Type: Vickey Perez /  Mavis Burr at [address] [place] [address] Rd  1101 [facility],  [place].  [phone]   [phone]       OUTPATIENT PHYSICAL THERAPY:Daily Note 3/7/2018      ICD-10: Treatment Diagnosis: Sprain of posterior cruciate ligament of right knee, sequela (S83.521S); Pain in right knee (M25.561); Stiffness of right knee, not elsewhere classified (M25.661); Difficulty in walking, not elsewhere classified (R26.2)  Precautions/Allergies:   Review of patient's allergies indicates no known allergies. Fall Risk Score: 1 (? 5 = High Risk)  MD Orders:Evaluate and treat, HEP, ROM MEDICAL/REFERRING DIAGNOSIS:  S/p R knee scope, PCL reconstruction   DATE OF ONSET: Surgery 17  REFERRING PHYSICIAN: Tai Gupta MD  RETURN PHYSICIAN APPOINTMENT: 18     INITIAL ASSESSMENT:  Ms. Neftali Cook presents s/p right knee scope with PCL reconstruction. She continues to slowly progress with R knee ROM. She does continue with stiffness with R ankle ROM. She is able to ambulate with increased weight on R LE with brace on and walker and is able to ambulate short distances with no assistive device. She has difficulty with strengthening exercises requiring frequent verbal cueing and putting weight through R LE during ambulation and standing exercises. She will benefit from continuing skilled physical therapy to continue to progress functional mobility. PROBLEM LIST (Impacting functional limitations):  1. Post-op pain and swelling right knee  2. Decreased ROM right knee   3. Decreased functional strength right knee/LE   4. Decreased gait skills INTERVENTIONS PLANNED:  1. aquatic therapy  2. Thermal and electric modalities, manual therapies for pain. 3. Manual therapies and therapeutic exercises for ROM and strength.    4. Therapeutic exercises for gait and balance   TREATMENT PLAN:  Effective Dates: 1-8-18 TO 4-6-18. Frequency/Duration: 3 times a week for 12 weeks  GOALS: (Goals have been discussed and agreed upon with patient.)   Short-Term Functional Goals: Time Frame: 6 weeks  1. Pt will be able to perform a good quad set and SLR with no extensor lag with for improved strength for gait. Progressing towards  2. Pt will increase R knee ROM to 0-90 for improved mobility. GOAL MET  3. Pt will improved R ankle DF AROM to 0 for improved gait. Progressing towards  4. Pt will report pain 5/10 with modified daily activities. GOAL MET  Discharge Goals: Time Frame: 12 weeks progressing towards long term goals   1. Pt will increase R LE strength to 5/5 with manual muscle testing for improved strength for ADLs and work  2. Pt will negotiate 1 flight of stairs with step over step with good control. 3. Pt will increase R knee ROM to 0-120 for mobility. 4. Pt will report pain 3/10 with daily activities. 5. Pt will score 45/80 on LEFS. Rehabilitation Potential For Stated Goals: Good  Regarding Perez 87 therapy, I certify that the treatment plan above will be carried out by a therapist or under their direction. Thank you for this referral,    Yaritza Navarrete, PT                    HISTORY:   History of Present Injury/Illness (Reason for Referral): She works at [employer] and she was entering the cooler and there was oil on the floor. She slipped and the feet slipped out from under her. She tried to get up and slipped again. Injury was August 14-17. She did go to physical therapy but it was making her worse. They decided to have surgery. Surgery 12-21-17. Stayed 2 nights in the hospital. She did have home health physical therapy 3-4x. Pain increases with walking. Past Medical History/Comorbidities: diabetes type 2 controlled with diet, HTN, L LE varicose vein surgery  Social History/Living Environment: Lives with . 2 steps to get inside.      Prior Level of Function/Work/Activity: Works at [employer]. 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 Job does require lifting. Would like to get back to walking and walking dogs. Current Medications:       Current Outpatient Prescriptions:     magnesium hydroxide (BEAUCHAMP MILK OF MAGNESIA) 400 mg/5 mL suspension, Take 15 mL by mouth as needed for Constipation. as needed daily, Disp: , Rfl:     temazepam (RESTORIL) 15 mg capsule, Take 15 mg by mouth nightly as needed for Sleep., Disp: , Rfl:     promethazine (PHENERGAN) 25 mg tablet, Take 25 mg by mouth every eight (8) hours as needed for Nausea., Disp: , Rfl:     HYDROmorphone (DILAUDID) 2 mg tablet, Take 2-4 mg by mouth as needed for Pain. as needed every 4-6 hours, Disp: , Rfl:     atorvastatin (LIPITOR) 20 mg tablet, Take 20 mg by mouth nightly., Disp: , Rfl:     lisinopril-hydroCHLOROthiazide (PRINZIDE, ZESTORETIC) 10-12.5 mg per tablet, Take  by mouth daily. Indications: hypertension, Disp: , Rfl:     gemfibrozil (LOPID) 600 mg tablet, Take 600 mg by mouth two (2) times a day., Disp: , Rfl:     Omega-3 Fatty Acids 60- mg cpDR, Take  by mouth daily. , Disp: , Rfl:     acetaminophen (TYLENOL) 325 mg tablet, Take 325 mg by mouth every four (4) hours as needed for Pain., Disp: , Rfl:    Date Last Reviewed:  3/7/2018   Number of Personal Factors/Comorbidities that affect the Plan of Care: 1-2: MODERATE COMPLEXITY   EXAMINATION:     Observation/Orthostatic Postural Assessment:  Pt arrived to therapy with walker with partial weight bearing on R LE with hinge brace on the R knee    Palpation: no tenderness around incisions     ROM:                Strength:                   Mobility: Pt ambulates with no assistive device with decreased left step length, right ankle in supination in stance phase, decreased weight on R LE during stance phase. Balance:n/t        Body Structures Involved:  1. Joints  2. Muscles  3.  Ligaments Body Functions Affected:  1. Neuromusculoskeletal Activities and Participation Affected:  1. Mobility  2. Community, Social and [place]   Number of elements (examined above) that affect the Plan of Care: 4+: HIGH COMPLEXITY   CLINICAL PRESENTATION:   Presentation: Evolving clinical presentation with changing clinical characteristics: MODERATE COMPLEXITY   CLINICAL DECISION MAKING:   Outcome Measure: Tool Used: Lower Extremity Functional Scale (LEFS)  Score:  Initial: 5/80 Most Recent: X/80 (Date: -- )   Interpretation of Score: 20 questions each scored on a 5 point scale with 0 representing \"extreme difficulty or unable to perform\" and 4 representing \"no difficulty\". The lower the score, the greater the functional disability. 80/80 represents no disability. Minimal detectable change is 9 points. Score 80 79-63 62-48 47-32 31-16 15-1 0   Modifier CH CI CJ CK CL CM CN     Medical Necessity:   · Patient is expected to demonstrate progress in strength, range of motion and balance to improve gait. Reason for Services/Other Comments:  · Patient continues to require skilled intervention due to post-op R knee, decreased ROM, strength, gait. Use of outcome tool(s) and clinical judgement create a POC that gives a: Questionable prediction of patient's progress: MODERATE COMPLEXITY            TREATMENT:   (In addition to Assessment/Re-Assessment sessions the following treatments were rendered)  Pre-treatment Symptoms/Complaints: Pt states she saw MD yesterday and is to begin all land therapy. Pain: Initial:   4/10 Post Session:  5/10.         Aquatic therapy- 45 min   Date:  2/28 Date:  3/2 Date:  3/7       Activity/Exercise Parameters Parameters Parameters       Lunge on step for ROM 5 min 5 min 5 min       Step ups/step downs 10 on red step  10 on bottom step   10 on 2nd step  5 on third step       Knee ext 4# seated 2 x 15  4# 2 X 15       Knee flex 4# standing 2 x 15  4# 2 X 15       Calf stretch 3 x 20 sec         squats 2 x 12         Walking - forward, back , side , march 4 laps each 4 laps each 4 laps each       3 way hip   4# 15x        Deep water  Jog, cross country, scissors x 10 minutes total                                           HEP: she is to work on quad sets, ankle pumps, and walking putting the R foot on the ground. She is to work on scar massage and hip flexor/quad stretch in supine. 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 · Compliance with Program/Exercises:  Appears complaint   · Recommendations/Intent for next treatment session: \"Next visit will focus on gait, knee ROM\".    Total Treatment Duration: 45 Minutes  PT Patient Time In/Time Out  Time In: 0845  Time Out: 0930    Sussy Mary, PT

## 2018-03-08 ENCOUNTER — HOSPITAL ENCOUNTER (OUTPATIENT)
Dept: PHYSICAL THERAPY | Age: 53
Discharge: HOME | End: 2018-03-08
Payer: COMMERCIAL

## 2018-03-12 ENCOUNTER — HOSPITAL ENCOUNTER (OUTPATIENT)
Dept: PHYSICAL THERAPY | Age: 53
Discharge: HOME | End: 2018-03-12
Payer: COMMERCIAL

## 2018-03-12 PROCEDURE — 97110 THERAPEUTIC EXERCISES: CPT

## 2018-03-12 NOTE — PROGRESS NOTES
Adrien Gandhi  : 1965  Payor: [address] [place] [address] Road / Plan: [email or web address] [place] / Product Type: Workers Comp /  [address]  at [address] [place] [address] Rd  1101 [facility], [address] [place] [address]  [phone]   [phone]       OUTPATIENT PHYSICAL THERAPY:Daily Note 3/12/2018      ICD-10: Treatment Diagnosis: Sprain of posterior cruciate ligament of right knee, sequela (S83.521S); Pain in right knee (M25.561); Stiffness of right knee, not elsewhere classified (M25.661); Difficulty in walking, not elsewhere classified (R26.2)  Precautions/Allergies:   Review of patient's allergies indicates no known allergies. Fall Risk Score: 1 (? 5 = High Risk)  MD Orders:Evaluate and treat, HEP, ROM MEDICAL/REFERRING DIAGNOSIS:  S/p R knee scope, PCL reconstruction   DATE OF ONSET: Surgery 17  REFERRING PHYSICIAN: Earlene Johnson MD  RETURN PHYSICIAN APPOINTMENT: 18     INITIAL ASSESSMENT:  Ms. Joe Lacey presents s/p right knee scope with PCL reconstruction. She continues to slowly progress with R knee ROM. She does continue with stiffness with R ankle ROM. She is able to ambulate with increased weight on R LE with brace on and walker and is able to ambulate short distances with no assistive device. She has difficulty with strengthening exercises requiring frequent verbal cueing and putting weight through R LE during ambulation and standing exercises. She will benefit from continuing skilled physical therapy to continue to progress functional mobility. PROBLEM LIST (Impacting functional limitations):  1. Post-op pain and swelling right knee  2. Decreased ROM right knee   3. Decreased functional strength right knee/LE   4. Decreased gait skills INTERVENTIONS PLANNED:  1. aquatic therapy  2. Thermal and electric modalities, manual therapies for pain. 3. Manual therapies and therapeutic exercises for ROM and strength.    4. Therapeutic exercises for gait and balance   TREATMENT PLAN:  Effective Dates: 1-8-18 TO 4-6-18. Frequency/Duration: 3 times a week for 12 weeks  GOALS: (Goals have been discussed and agreed upon with patient.)   Short-Term Functional Goals: Time Frame: 6 weeks  1. Pt will be able to perform a good quad set and SLR with no extensor lag with for improved strength for gait. Progressing towards  2. Pt will increase R knee ROM to 0-90 for improved mobility. GOAL MET  3. Pt will improved R ankle DF AROM to 0 for improved gait. Progressing towards  4. Pt will report pain 5/10 with modified daily activities. GOAL MET  Discharge Goals: Time Frame: 12 weeks progressing towards long term goals   1. Pt will increase R LE strength to 5/5 with manual muscle testing for improved strength for ADLs and work  2. Pt will negotiate 1 flight of stairs with step over step with good control. 3. Pt will increase R knee ROM to 0-120 for mobility. 4. Pt will report pain 3/10 with daily activities. 5. Pt will score 45/80 on LEFS. Rehabilitation Potential For Stated Goals: Good  Regarding Perez 87 therapy, I certify that the treatment plan above will be carried out by a therapist or under their direction. Thank you for this referral,    Allie Kurtz, PT                    HISTORY:   History of Present Injury/Illness (Reason for Referral): She works at [employer] and she was entering the cooler and there was oil on the floor. She slipped and the feet slipped out from under her. She tried to get up and slipped again. Injury was August 14-17. She did go to physical therapy but it was making her worse. They decided to have surgery. Surgery 12-21-17. Stayed 2 nights in the hospital. She did have home health physical therapy 3-4x. Pain increases with walking. Past Medical History/Comorbidities: diabetes type 2 controlled with diet, HTN, L LE varicose vein surgery  Social History/Living Environment: Lives with . 2 steps to get inside.      Prior Level of Function/Work/Activity: Works at [employer]. 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 Job does require lifting. Would like to get back to walking and walking dogs. Current Medications:       Current Outpatient Prescriptions:     magnesium hydroxide (BEAUCHAMP MILK OF MAGNESIA) 400 mg/5 mL suspension, Take 15 mL by mouth as needed for Constipation. as needed daily, Disp: , Rfl:     temazepam (RESTORIL) 15 mg capsule, Take 15 mg by mouth nightly as needed for Sleep., Disp: , Rfl:     promethazine (PHENERGAN) 25 mg tablet, Take 25 mg by mouth every eight (8) hours as needed for Nausea., Disp: , Rfl:     HYDROmorphone (DILAUDID) 2 mg tablet, Take 2-4 mg by mouth as needed for Pain. as needed every 4-6 hours, Disp: , Rfl:     atorvastatin (LIPITOR) 20 mg tablet, Take 20 mg by mouth nightly., Disp: , Rfl:     lisinopril-hydroCHLOROthiazide (PRINZIDE, ZESTORETIC) 10-12.5 mg per tablet, Take  by mouth daily. Indications: hypertension, Disp: , Rfl:     gemfibrozil (LOPID) 600 mg tablet, Take 600 mg by mouth two (2) times a day., Disp: , Rfl:     Omega-3 Fatty Acids 60- mg cpDR, Take  by mouth daily. , Disp: , Rfl:     acetaminophen (TYLENOL) 325 mg tablet, Take 325 mg by mouth every four (4) hours as needed for Pain., Disp: , Rfl:    Date Last Reviewed:  3-12-18   Number of Personal Factors/Comorbidities that affect the Plan of Care: 1-2: MODERATE COMPLEXITY   EXAMINATION:     Observation/Orthostatic Postural Assessment:  Pt arrived to therapy with walker with partial weight bearing on R LE with hinge brace on the R knee    Palpation: no tenderness around incisions     ROM:                Strength:                   Mobility: Pt ambulates with no assistive device with decreased left step length, right ankle in supination in stance phase, decreased weight on R LE during stance phase. Balance:n/t        Body Structures Involved:  1. Joints  2. Muscles  3.  Ligaments Body Functions Affected:  1. Neuromusculoskeletal Activities and Participation Affected:  1. Mobility  2. Community, Social and [place]   Number of elements (examined above) that affect the Plan of Care: 4+: HIGH COMPLEXITY   CLINICAL PRESENTATION:   Presentation: Evolving clinical presentation with changing clinical characteristics: MODERATE COMPLEXITY   CLINICAL DECISION MAKING:   Outcome Measure: Tool Used: Lower Extremity Functional Scale (LEFS)  Score:  Initial: 5/80 Most Recent: X/80 (Date: -- )   Interpretation of Score: 20 questions each scored on a 5 point scale with 0 representing \"extreme difficulty or unable to perform\" and 4 representing \"no difficulty\". The lower the score, the greater the functional disability. 80/80 represents no disability. Minimal detectable change is 9 points. Score 80 79-63 62-48 47-32 31-16 15-1 0   Modifier CH CI CJ CK CL CM CN     Medical Necessity:   · Patient is expected to demonstrate progress in strength, range of motion and balance to improve gait. Reason for Services/Other Comments:  · Patient continues to require skilled intervention due to post-op R knee, decreased ROM, strength, gait. Use of outcome tool(s) and clinical judgement create a POC that gives a: Questionable prediction of patient's progress: MODERATE COMPLEXITY            TREATMENT:   (In addition to Assessment/Re-Assessment sessions the following treatments were rendered)  Pre-treatment Symptoms/Complaints: Pt reports she received the kneehab in the mail last week but is not sure how to use it. Pain: Initial:   4/10 Post Session:  5/10. Therapeutic Exercise (40 Minutes): For improved muscle activation, gait, and knee ROM. sitting and heel slides for knee flexion ROM 3x10 with assistive at end range. Short arc quads in supine 2# 2x10. Seated long arc quad 2# 2x10 with tactile cues on the patella.     Shuttle press with 0# x10 with tactile cues on R foot to keep foot flat and verbal cues for pushing through her foot. Shuttle press with 12# with min assist 2x8  reps. Sit to stand with tactile cues on R LE to increase weight and with L foot on jaron disc to increase weight through the R LE 2x8. Standing toe taps on 4\" step with R LE with maximal verbal and visual cues for hip and knee flexion and to avoid hip hiking 2x10. Toe taps with L LE with tactile cues on R LE and verbal cues to increase weight on R LE 2x10 reps. Step ups on 4\" step x10 reps with verbal cues for weight through R LE.      HEP: she is to work on quad sets, ankle pumps, and walking putting the R foot on the ground. She is to work on scar massage and hip flexor/quad stretch in supine. Treatment/Session Assessment:    · Response to Treatment: L LE fatigues with closed chain exercises due to compensations and avoiding weight bearing through R LE. Pain and fearful weight R LE weight bearing. · Compliance with Program/Exercises:  Appears complaint   · Recommendations/Intent for next treatment session: \"Next visit will focus on gait, knee ROM\".  LE strengthening as able  Total Treatment Duration: 40 Minutes  PT Patient Time In/Time Out  Time In: 1300  Time Out: 100 [facility]

## 2018-03-14 ENCOUNTER — APPOINTMENT (OUTPATIENT)
Dept: PHYSICAL THERAPY | Age: 53
End: 2018-03-14
Payer: COMMERCIAL

## 2018-03-14 ENCOUNTER — HOSPITAL ENCOUNTER (OUTPATIENT)
Dept: PHYSICAL THERAPY | Age: 53
Discharge: HOME | End: 2018-03-14
Payer: COMMERCIAL

## 2018-03-14 PROCEDURE — 97110 THERAPEUTIC EXERCISES: CPT

## 2018-03-14 NOTE — PROGRESS NOTES
Ajit Foster  : 1965  Payor: [address] [place] [address] Road / Plan: Joe [employer] / Product Type: Workers Comp /  [address] [employer]  at [facility]  [address] [place] [address],  [place].  [phone]   [phone]       OUTPATIENT PHYSICAL THERAPY:Daily Note 3/14/2018      ICD-10: Treatment Diagnosis: Sprain of posterior cruciate ligament of right knee, sequela (S83.521S); Pain in right knee (M25.561); Stiffness of right knee, not elsewhere classified (M25.661); Difficulty in walking, not elsewhere classified (R26.2)  Precautions/Allergies:   Review of patient's allergies indicates no known allergies. Fall Risk Score: 1 (? 5 = High Risk)  MD Orders:Evaluate and treat, HEP, ROM MEDICAL/REFERRING DIAGNOSIS:  S/p R knee scope, PCL reconstruction   DATE OF ONSET: Surgery 17  REFERRING PHYSICIAN: Clint Augustin MD  RETURN PHYSICIAN APPOINTMENT: 18     INITIAL ASSESSMENT:  Ms. Raya Becerril presents s/p right knee scope with PCL reconstruction. She continues to slowly progress with R knee ROM. She does continue with stiffness with R ankle ROM. She is able to ambulate with increased weight on R LE with brace on and walker and is able to ambulate short distances with no assistive device. She has difficulty with strengthening exercises requiring frequent verbal cueing and putting weight through R LE during ambulation and standing exercises. She will benefit from continuing skilled physical therapy to continue to progress functional mobility. PROBLEM LIST (Impacting functional limitations):  1. Post-op pain and swelling right knee  2. Decreased ROM right knee   3. Decreased functional strength right knee/LE   4. Decreased gait skills INTERVENTIONS PLANNED:  1. aquatic therapy  2. Thermal and electric modalities, manual therapies for pain. 3. Manual therapies and therapeutic exercises for ROM and strength.    4. Therapeutic exercises for gait and balance   TREATMENT PLAN:  Effective Dates: 1-8-18 TO 4-6-18. Frequency/Duration: 3 times a week for 12 weeks  GOALS: (Goals have been discussed and agreed upon with patient.)   Short-Term Functional Goals: Time Frame: 6 weeks  1. Pt will be able to perform a good quad set and SLR with no extensor lag with for improved strength for gait. Progressing towards  2. Pt will increase R knee ROM to 0-90 for improved mobility. GOAL MET  3. Pt will improved R ankle DF AROM to 0 for improved gait. Progressing towards  4. Pt will report pain 5/10 with modified daily activities. GOAL MET  Discharge Goals: Time Frame: 12 weeks progressing towards long term goals   1. Pt will increase R LE strength to 5/5 with manual muscle testing for improved strength for ADLs and work  2. Pt will negotiate 1 flight of stairs with step over step with good control. 3. Pt will increase R knee ROM to 0-120 for mobility. 4. Pt will report pain 3/10 with daily activities. 5. Pt will score 45/80 on LEFS. Rehabilitation Potential For Stated Goals: Good  Regarding [facility]  therapy, I certify that the treatment plan above will be carried out by a therapist or under their direction. Thank you for this referral,    Allie Crane, PT                    HISTORY:   History of Present Injury/Illness (Reason for Referral): She works at [employer] and she was entering the cooler and there was oil on the floor. She slipped and the feet slipped out from under her. She tried to get up and slipped again. Injury was August 14-17. She did go to physical therapy but it was making her worse. They decided to have surgery. Surgery 12-21-17. Stayed 2 nights in the hospital. She did have home health physical therapy 3-4x. Pain increases with walking. Past Medical History/Comorbidities: diabetes type 2 controlled with diet, HTN, L LE varicose vein surgery  Social History/Living Environment: Lives with . 2 steps to get inside.      Prior Level of Function/Work/Activity: Works at [employer]. 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 Job does require lifting. Would like to get back to walking and walking dogs. Current Medications:       Current Outpatient Prescriptions:     magnesium hydroxide (BEAUCHAMP MILK OF MAGNESIA) 400 mg/5 mL suspension, Take 15 mL by mouth as needed for Constipation. as needed daily, Disp: , Rfl:     temazepam (RESTORIL) 15 mg capsule, Take 15 mg by mouth nightly as needed for Sleep., Disp: , Rfl:     promethazine (PHENERGAN) 25 mg tablet, Take 25 mg by mouth every eight (8) hours as needed for Nausea., Disp: , Rfl:     HYDROmorphone (DILAUDID) 2 mg tablet, Take 2-4 mg by mouth as needed for Pain. as needed every 4-6 hours, Disp: , Rfl:     atorvastatin (LIPITOR) 20 mg tablet, Take 20 mg by mouth nightly., Disp: , Rfl:     lisinopril-hydroCHLOROthiazide (PRINZIDE, ZESTORETIC) 10-12.5 mg per tablet, Take  by mouth daily. Indications: hypertension, Disp: , Rfl:     gemfibrozil (LOPID) 600 mg tablet, Take 600 mg by mouth two (2) times a day., Disp: , Rfl:     Omega-3 Fatty Acids 60- mg cpDR, Take  by mouth daily. , Disp: , Rfl:     acetaminophen (TYLENOL) 325 mg tablet, Take 325 mg by mouth every four (4) hours as needed for Pain., Disp: , Rfl:    Date Last Reviewed:  3-12-18   Number of Personal Factors/Comorbidities that affect the Plan of Care: 1-2: MODERATE COMPLEXITY   EXAMINATION:     Observation/Orthostatic Postural Assessment:  Pt arrived to therapy with walker with partial weight bearing on R LE with hinge brace on the R knee    Palpation: no tenderness around incisions     ROM:                Strength:                   Mobility: Pt ambulates with no assistive device with decreased left step length, right ankle in supination in stance phase, decreased weight on R LE during stance phase. Balance:n/t        Body Structures Involved:  1. Joints  2. Muscles  3.  Ligaments Body Functions Affected:  1. Neuromusculoskeletal Activities and Participation Affected:  1. Mobility  2. Community, Social and [place]   Number of elements (examined above) that affect the Plan of Care: 4+: HIGH COMPLEXITY   CLINICAL PRESENTATION:   Presentation: Evolving clinical presentation with changing clinical characteristics: MODERATE COMPLEXITY   CLINICAL DECISION MAKING:   Outcome Measure: Tool Used: Lower Extremity Functional Scale (LEFS)  Score:  Initial: 5/80 Most Recent: X/80 (Date: -- )   Interpretation of Score: 20 questions each scored on a 5 point scale with 0 representing \"extreme difficulty or unable to perform\" and 4 representing \"no difficulty\". The lower the score, the greater the functional disability. 80/80 represents no disability. Minimal detectable change is 9 points. Score 80 79-63 62-48 47-32 31-16 15-1 0   Modifier CH CI CJ CK CL CM CN     Medical Necessity:   · Patient is expected to demonstrate progress in strength, range of motion and balance to improve gait. Reason for Services/Other Comments:  · Patient continues to require skilled intervention due to post-op R knee, decreased ROM, strength, gait. Use of outcome tool(s) and clinical judgement create a POC that gives a: Questionable prediction of patient's progress: MODERATE COMPLEXITY            TREATMENT:   (In addition to Assessment/Re-Assessment sessions the following treatments were rendered)  Pre-treatment Symptoms/Complaints: Pt reports she has been weaning off her pain medicine. Pain: Initial:   4/10 Post Session:  5/10. Therapeutic Exercise (45 Minutes): Instructed patient in how to use the kneehab with set up. For improved muscle activation, gait, and knee ROM. sitting and heel slides for knee flexion ROM 3x10 with assistive at end range. Short arc quads in supine 2# 2x10. Seated long arc quad 2# 2x10 with tactile cues on the patella.     Shuttle press with tactile cues on R foot to keep foot flat and verbal cues for pushing through her foot. Shuttle press with 12# with min assist 2x10  reps. Sit to stand with tactile cues on R LE to increase weight and with L foot held out in front 2x10. Standing at walker and raising the L LE up to put more weight on R LE x10 reps with tactile cues on L LE.      HEP: she is to work on quad sets, ankle pumps, and walking putting the R foot on the ground. She is to work on scar massage and hip flexor/quad stretch in supine. Treatment/Session Assessment:    · Response to Treatment: She was able to put more weight on the R LE with standing exercises today. · Compliance with Program/Exercises:  Appears complaint   · Recommendations/Intent for next treatment session: \"Next visit will focus on gait, knee ROM\".  LE strengthening as able  Total Treatment Duration: 45 Minutes  PT Patient Time In/Time Out  Time In: 1303  Time Out: [address]

## 2018-03-16 ENCOUNTER — APPOINTMENT (OUTPATIENT)
Dept: PHYSICAL THERAPY | Age: 53
End: 2018-03-16
Payer: COMMERCIAL

## 2018-03-19 ENCOUNTER — HOSPITAL ENCOUNTER (OUTPATIENT)
Dept: PHYSICAL THERAPY | Age: 53
Discharge: HOME | End: 2018-03-19
Payer: COMMERCIAL

## 2018-03-19 PROCEDURE — 97110 THERAPEUTIC EXERCISES: CPT

## 2018-03-19 NOTE — PROGRESS NOTES
Jeanette Funez  : 1965  Payor: [address] [place] [address] Road / Plan: Gia Vance / Product Type: [employer] /  [address]  at [address] [place] [address] Rd  [address] [facility] [address]  [phone]   [phone]       OUTPATIENT PHYSICAL THERAPY:Daily Note 3/19/2018      ICD-10: Treatment Diagnosis: Sprain of posterior cruciate ligament of right knee, sequela (S83.521S); Pain in right knee (M25.561); Stiffness of right knee, not elsewhere classified (M25.661); Difficulty in walking, not elsewhere classified (R26.2)  Precautions/Allergies:   Review of patient's allergies indicates no known allergies. Fall Risk Score: 1 (? 5 = High Risk)  MD Orders:Evaluate and treat, HEP, ROM MEDICAL/REFERRING DIAGNOSIS:  S/p R knee scope, PCL reconstruction   DATE OF ONSET: Surgery 17  REFERRING PHYSICIAN: Jose Nunes MD  RETURN PHYSICIAN APPOINTMENT: 18     INITIAL ASSESSMENT:  Ms. Liang Hernandez presents s/p right knee scope with PCL reconstruction. She continues to slowly progress with R knee ROM. She does continue with stiffness with R ankle ROM. She is able to ambulate with increased weight on R LE with brace on and walker and is able to ambulate short distances with no assistive device. She has difficulty with strengthening exercises requiring frequent verbal cueing and putting weight through R LE during ambulation and standing exercises. She will benefit from continuing skilled physical therapy to continue to progress functional mobility. PROBLEM LIST (Impacting functional limitations):  1. Post-op pain and swelling right knee  2. Decreased ROM right knee   3. Decreased functional strength right knee/LE   4. Decreased gait skills INTERVENTIONS PLANNED:  1. aquatic therapy  2. Thermal and electric modalities, manual therapies for pain. 3. Manual therapies and therapeutic exercises for ROM and strength.    4. Therapeutic exercises for gait and balance   TREATMENT PLAN:  Effective Dates: 1-8-18 TO 4-6-18. Frequency/Duration: 3 times a week for 12 weeks  GOALS: (Goals have been discussed and agreed upon with patient.)   Short-Term Functional Goals: Time Frame: 6 weeks  1. Pt will be able to perform a good quad set and SLR with no extensor lag with for improved strength for gait. Progressing towards  2. Pt will increase R knee ROM to 0-90 for improved mobility. GOAL MET  3. Pt will improved R ankle DF AROM to 0 for improved gait. Progressing towards  4. Pt will report pain 5/10 with modified daily activities. GOAL MET  Discharge Goals: Time Frame: 12 weeks progressing towards long term goals   1. Pt will increase R LE strength to 5/5 with manual muscle testing for improved strength for ADLs and work  2. Pt will negotiate 1 flight of stairs with step over step with good control. 3. Pt will increase R knee ROM to 0-120 for mobility. 4. Pt will report pain 3/10 with daily activities. 5. Pt will score 45/80 on LEFS. Rehabilitation Potential For Stated Goals: Good  Regarding [facility] therapy, I certify that the treatment plan above will be carried out by a therapist or under their direction. Thank you for this referral,    Allie Hernandez, PT                    HISTORY:   History of Present Injury/Illness (Reason for Referral): She works at [employer] and she was entering the cooler and there was oil on the floor. She slipped and the feet slipped out from under her. She tried to get up and slipped again. Injury was August 14-17. She did go to physical therapy but it was making her worse. They decided to have surgery. Surgery 12-21-17. Stayed 2 nights in the hospital. She did have home health physical therapy 3-4x. Pain increases with walking. Past Medical History/Comorbidities: diabetes type 2 controlled with diet, HTN, L LE varicose vein surgery  Social History/Living Environment: Lives with . 2 steps to get inside.      Prior Level of Function/Work/Activity: Works at [employer]. 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 Job does require lifting. Would like to get back to walking and walking dogs. Current Medications:       Current Outpatient Prescriptions:     magnesium hydroxide (BEAUCHAMP MILK OF MAGNESIA) 400 mg/5 mL suspension, Take 15 mL by mouth as needed for Constipation. as needed daily, Disp: , Rfl:     temazepam (RESTORIL) 15 mg capsule, Take 15 mg by mouth nightly as needed for Sleep., Disp: , Rfl:     promethazine (PHENERGAN) 25 mg tablet, Take 25 mg by mouth every eight (8) hours as needed for Nausea., Disp: , Rfl:     HYDROmorphone (DILAUDID) 2 mg tablet, Take 2-4 mg by mouth as needed for Pain. as needed every 4-6 hours, Disp: , Rfl:     atorvastatin (LIPITOR) 20 mg tablet, Take 20 mg by mouth nightly., Disp: , Rfl:     lisinopril-hydroCHLOROthiazide (PRINZIDE, ZESTORETIC) 10-12.5 mg per tablet, Take  by mouth daily. Indications: hypertension, Disp: , Rfl:     gemfibrozil (LOPID) 600 mg tablet, Take 600 mg by mouth two (2) times a day., Disp: , Rfl:     Omega-3 Fatty Acids 60- mg cpDR, Take  by mouth daily. , Disp: , Rfl:     acetaminophen (TYLENOL) 325 mg tablet, Take 325 mg by mouth every four (4) hours as needed for Pain., Disp: , Rfl:    Date Last Reviewed:  3-19-18   Number of Personal Factors/Comorbidities that affect the Plan of Care: 1-2: MODERATE COMPLEXITY   EXAMINATION:     Observation/Orthostatic Postural Assessment:  Pt arrived to therapy with no assistive device with brace on the R knee. Palpation: no tenderness around incisions     ROM:                Strength:                   Mobility: n/t  Balance:n/t        Body Structures Involved:  1. Joints  2. Muscles  3. Ligaments Body Functions Affected:  1. Neuromusculoskeletal Activities and Participation Affected:  1. Mobility  2.  Community, Social and [place]   Number of elements (examined above) that affect the Plan of Care: 4+: HIGH COMPLEXITY   CLINICAL PRESENTATION:   Presentation: Evolving clinical presentation with changing clinical characteristics: MODERATE COMPLEXITY   CLINICAL DECISION MAKING:   Outcome Measure: Tool Used: Lower Extremity Functional Scale (LEFS)  Score:  Initial: 5/80 Most Recent: X/80 (Date: -- )   Interpretation of Score: 20 questions each scored on a 5 point scale with 0 representing \"extreme difficulty or unable to perform\" and 4 representing \"no difficulty\". The lower the score, the greater the functional disability. 80/80 represents no disability. Minimal detectable change is 9 points. Score 80 79-63 62-48 47-32 31-16 15-1 0   Modifier CH CI CJ CK CL CM CN     Medical Necessity:   · Patient is expected to demonstrate progress in strength, range of motion and balance to improve gait. Reason for Services/Other Comments:  · Patient continues to require skilled intervention due to post-op R knee, decreased ROM, strength, gait. Use of outcome tool(s) and clinical judgement create a POC that gives a: Questionable prediction of patient's progress: MODERATE COMPLEXITY            TREATMENT:   (In addition to Assessment/Re-Assessment sessions the following treatments were rendered)  Pre-treatment Symptoms/Complaints: Pt reports she has been using her kneehab machine at home and performing her exercises. She reports less sticking pain with the knee with movement. Pain: Initial:   4/10 Post Session:  5/10. Therapeutic Exercise (40 Minutes): For improved muscle activation, gait, and knee ROM. sitting and heel slides for knee flexion ROM 3x10 with assistive at end range. Short arc quads in supine 2# 2x10. Seated long arc quad 2# 2x10 with tactile cues on the patella, hamstring curls blue 2x10. Shuttle press with tactile cues on R foot to keep foot flat and verbal cues for pushing through her foot. Shuttle press with 12# with min assist 2x10  reps.   Sit to stand with tactile cues on R LE to increase weight and with L foot held out in front 2x10. Toe taps on 4\" step 2x10B, step ups with R LE 4\" x10 reps with verbal cues to not jump off the L LE.      HEP: she is to work on quad sets, ankle pumps, and walking putting the R foot on the ground. She is to work on sitting and standing and adjusting her foot position to limit rearfoot varus. Treatment/Session Assessment:    · Response to Treatment: She continues to progress with weight bearing through R LE with activities. She does continue with rearfoot varus in standing and walking even with multiple verbal and tactile cues for correction. · Compliance with Program/Exercises:  Appears complaint   · Recommendations/Intent for next treatment session: \"Next visit will focus on gait, knee ROM\".  LE strengthening as able  Total Treatment Duration: 40 Minutes  PT Patient Time In/Time Out  Time In: 1430  Time Out: [address] [facility]

## 2018-03-21 ENCOUNTER — HOSPITAL ENCOUNTER (OUTPATIENT)
Dept: PHYSICAL THERAPY | Age: 53
Discharge: HOME | End: 2018-03-21
Payer: COMMERCIAL

## 2018-03-21 PROCEDURE — 97110 THERAPEUTIC EXERCISES: CPT

## 2018-03-21 NOTE — PROGRESS NOTES
Tosin Higgins  : 1965  Payor: [address] [place] [address] Road / Plan: Tracie Lin / Product Type: [employer] /  [address] [place]  at [facility] [place] [facility]  [address] [place] [address], [facility], [address] [place] [address]  [phone]   [phone]       OUTPATIENT PHYSICAL THERAPY:Daily Note 3/21/2018      ICD-10: Treatment Diagnosis: Sprain of posterior cruciate ligament of right knee, sequela (S83.521S); Pain in right knee (M25.561); Stiffness of right knee, not elsewhere classified (M25.661); Difficulty in walking, not elsewhere classified (R26.2)  Precautions/Allergies:   Review of patient's allergies indicates no known allergies. Fall Risk Score: 1 (? 5 = High Risk)  MD Orders:Evaluate and treat, HEP, ROM MEDICAL/REFERRING DIAGNOSIS:  S/p R knee scope, PCL reconstruction   DATE OF ONSET: Surgery 17  REFERRING PHYSICIAN: Isabella Martino MD  RETURN PHYSICIAN APPOINTMENT: 18     INITIAL ASSESSMENT:  Ms. Dilshad Flowers presents s/p right knee scope with PCL reconstruction. She continues to slowly progress with R knee ROM. She does continue with stiffness with R ankle ROM. She is able to ambulate with increased weight on R LE with brace on and walker and is able to ambulate short distances with no assistive device. She has difficulty with strengthening exercises requiring frequent verbal cueing and putting weight through R LE during ambulation and standing exercises. She will benefit from continuing skilled physical therapy to continue to progress functional mobility. PROBLEM LIST (Impacting functional limitations):  1. Post-op pain and swelling right knee  2. Decreased ROM right knee   3. Decreased functional strength right knee/LE   4. Decreased gait skills INTERVENTIONS PLANNED:  1. aquatic therapy  2. Thermal and electric modalities, manual therapies for pain. 3. Manual therapies and therapeutic exercises for ROM and strength.    4. Therapeutic exercises for gait and balance   TREATMENT PLAN:  Effective Dates: 1-8-18 TO 4-6-18. Frequency/Duration: 3 times a week for 12 weeks  GOALS: (Goals have been discussed and agreed upon with patient.)   Short-Term Functional Goals: Time Frame: 6 weeks  1. Pt will be able to perform a good quad set and SLR with no extensor lag with for improved strength for gait. Progressing towards  2. Pt will increase R knee ROM to 0-90 for improved mobility. GOAL MET  3. Pt will improved R ankle DF AROM to 0 for improved gait. Progressing towards  4. Pt will report pain 5/10 with modified daily activities. GOAL MET  Discharge Goals: Time Frame: 12 weeks progressing towards long term goals   1. Pt will increase R LE strength to 5/5 with manual muscle testing for improved strength for ADLs and work  2. Pt will negotiate 1 flight of stairs with step over step with good control. 3. Pt will increase R knee ROM to 0-120 for mobility. 4. Pt will report pain 3/10 with daily activities. 5. Pt will score 45/80 on LEFS. Rehabilitation Potential For Stated Goals: Good  Regarding [facility] therapy, I certify that the treatment plan above will be carried out by a therapist or under their direction. Thank you for this referral,    Allie Rosa, PT                    HISTORY:   History of Present Injury/Illness (Reason for Referral): She works at [employer] and she was entering the cooler and there was oil on the floor. She slipped and the feet slipped out from under her. She tried to get up and slipped again. Injury was August 14-17. She did go to physical therapy but it was making her worse. They decided to have surgery. Surgery 12-21-17. Stayed 2 nights in the hospital. She did have home health physical therapy 3-4x. Pain increases with walking. Past Medical History/Comorbidities: diabetes type 2 controlled with diet, HTN, L LE varicose vein surgery  Social History/Living Environment: Lives with . 2 steps to get inside.      Prior Level of Function/Work/Activity: Works at [employer]. 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 Job does require lifting. Would like to get back to walking and walking dogs. Current Medications:       Current Outpatient Prescriptions:     magnesium hydroxide (BEAUCHAMP MILK OF MAGNESIA) 400 mg/5 mL suspension, Take 15 mL by mouth as needed for Constipation. as needed daily, Disp: , Rfl:     temazepam (RESTORIL) 15 mg capsule, Take 15 mg by mouth nightly as needed for Sleep., Disp: , Rfl:     promethazine (PHENERGAN) 25 mg tablet, Take 25 mg by mouth every eight (8) hours as needed for Nausea., Disp: , Rfl:     HYDROmorphone (DILAUDID) 2 mg tablet, Take 2-4 mg by mouth as needed for Pain. as needed every 4-6 hours, Disp: , Rfl:     atorvastatin (LIPITOR) 20 mg tablet, Take 20 mg by mouth nightly., Disp: , Rfl:     lisinopril-hydroCHLOROthiazide (PRINZIDE, ZESTORETIC) 10-12.5 mg per tablet, Take  by mouth daily. Indications: hypertension, Disp: , Rfl:     gemfibrozil (LOPID) 600 mg tablet, Take 600 mg by mouth two (2) times a day., Disp: , Rfl:     Omega-3 Fatty Acids 60- mg cpDR, Take  by mouth daily. , Disp: , Rfl:     acetaminophen (TYLENOL) 325 mg tablet, Take 325 mg by mouth every four (4) hours as needed for Pain., Disp: , Rfl:    Date Last Reviewed:  3-19-18   Number of Personal Factors/Comorbidities that affect the Plan of Care: 1-2: MODERATE COMPLEXITY   EXAMINATION:     Observation/Orthostatic Postural Assessment:  Pt arrived to therapy with no assistive device with brace on the R knee. Palpation: no tenderness around incisions     ROM:                Strength:                   Mobility: n/t  Balance:n/t        Body Structures Involved:  1. Joints  2. Muscles  3. Ligaments Body Functions Affected:  1. Neuromusculoskeletal Activities and Participation Affected:  1. Mobility  2.  Community, Social and [place]   Number of elements (examined above) that affect the Plan of Care: 4+: HIGH COMPLEXITY   CLINICAL PRESENTATION:   Presentation: Evolving clinical presentation with changing clinical characteristics: MODERATE COMPLEXITY   CLINICAL DECISION MAKING:   Outcome Measure: Tool Used: Lower Extremity Functional Scale (LEFS)  Score:  Initial: 5/80 Most Recent: X/80 (Date: -- )   Interpretation of Score: 20 questions each scored on a 5 point scale with 0 representing \"extreme difficulty or unable to perform\" and 4 representing \"no difficulty\". The lower the score, the greater the functional disability. 80/80 represents no disability. Minimal detectable change is 9 points. Score 80 79-63 62-48 47-32 31-16 15-1 0   Modifier CH CI CJ CK CL CM CN     Medical Necessity:   · Patient is expected to demonstrate progress in strength, range of motion and balance to improve gait. Reason for Services/Other Comments:  · Patient continues to require skilled intervention due to post-op R knee, decreased ROM, strength, gait. Use of outcome tool(s) and clinical judgement create a POC that gives a: Questionable prediction of patient's progress: MODERATE COMPLEXITY            TREATMENT:   (In addition to Assessment/Re-Assessment sessions the following treatments were rendered)  Pre-treatment Symptoms/Complaints: Pt reports more soreness in the knee today. She did go for a 20 min walk this morning outside with her  with her walker. Pain: Initial:   4/10 Post Session:  5/10. Therapeutic Exercise (30 Minutes): For improved muscle activation, gait, and knee ROM. sitting and heel slides for knee flexion ROM 3x10 with assistive at end range. Short arc quads in supine 2# 2x10. Seated long arc quad 2# 2x10 with tactile cues on the patella, hamstring curls blue 2x10. Shuttle press with tactile cues on R foot to keep foot flat and verbal cues for pushing through her foot. Shuttle press with 12# with min assist 2x10  reps.   Sit to stand with tactile cues on R LE to increase weight and with L foot held out in front 2x10. Toe taps on 4\" step 2x10B, step ups with R LE 4\" x10 reps with verbal cues to not jump off the L LE.      HEP: she is to work on quad sets, ankle pumps, and walking putting the R foot on the ground. She is to work on sitting and standing and adjusting her foot position to limit rearfoot varus. Treatment/Session Assessment:    · Response to Treatment: Complains of pain when she applies more weight to R LE with exercises. · Compliance with Program/Exercises:  Appears complaint   · Recommendations/Intent for next treatment session: \"Next visit will focus on gait, knee ROM\".  LE strengthening as able  Total Treatment Duration: 30 Minutes  PT Patient Time In/Time Out  Time In: 1310  Time Out: 22 Roma Stoll

## 2018-03-23 ENCOUNTER — HOSPITAL ENCOUNTER (OUTPATIENT)
Dept: PHYSICAL THERAPY | Age: 53
Discharge: HOME | End: 2018-03-23
Payer: COMMERCIAL

## 2018-03-23 PROCEDURE — 97116 GAIT TRAINING THERAPY: CPT

## 2018-03-23 PROCEDURE — 97110 THERAPEUTIC EXERCISES: CPT

## 2018-03-23 NOTE — PROGRESS NOTES
Daniel Howell  : 1965  Payor: [address] [place] [address] Road / Plan: [employer]umper / Product Type: Workers Comp /  [address]  at [facility] [place] [facility]  [address] [place] [address], [address].  [phone]   [phone]       OUTPATIENT PHYSICAL THERAPY:Daily Note 3/23/2018      ICD-10: Treatment Diagnosis: Sprain of posterior cruciate ligament of right knee, sequela (S83.521S); Pain in right knee (M25.561); Stiffness of right knee, not elsewhere classified (M25.661); Difficulty in walking, not elsewhere classified (R26.2)  Precautions/Allergies:   Review of patient's allergies indicates no known allergies. Fall Risk Score: 1 (? 5 = High Risk)  MD Orders:Evaluate and treat, HEP, ROM MEDICAL/REFERRING DIAGNOSIS:  S/p R knee scope, PCL reconstruction   DATE OF ONSET: Surgery 17  REFERRING PHYSICIAN: Erin Romo MD  RETURN PHYSICIAN APPOINTMENT: 18     INITIAL ASSESSMENT:  Ms. Leena Aragon presents s/p right knee scope with PCL reconstruction. She continues to slowly progress with R knee ROM. She does continue with stiffness with R ankle ROM. She is able to ambulate with increased weight on R LE with brace on and walker and is able to ambulate short distances with no assistive device. She has difficulty with strengthening exercises requiring frequent verbal cueing and putting weight through R LE during ambulation and standing exercises. She will benefit from continuing skilled physical therapy to continue to progress functional mobility. PROBLEM LIST (Impacting functional limitations):  1. Post-op pain and swelling right knee  2. Decreased ROM right knee   3. Decreased functional strength right knee/LE   4. Decreased gait skills INTERVENTIONS PLANNED:  1. aquatic therapy  2. Thermal and electric modalities, manual therapies for pain. 3. Manual therapies and therapeutic exercises for ROM and strength.    4. Therapeutic exercises for gait and balance   TREATMENT PLAN:  Effective Dates: 1-8-18 TO 4-6-18. Frequency/Duration: 3 times a week for 12 weeks  GOALS: (Goals have been discussed and agreed upon with patient.)   Short-Term Functional Goals: Time Frame: 6 weeks  1. Pt will be able to perform a good quad set and SLR with no extensor lag with for improved strength for gait. Progressing towards  2. Pt will increase R knee ROM to 0-90 for improved mobility. GOAL MET  3. Pt will improved R ankle DF AROM to 0 for improved gait. Progressing towards  4. Pt will report pain 5/10 with modified daily activities. GOAL MET  Discharge Goals: Time Frame: 12 weeks progressing towards long term goals   1. Pt will increase R LE strength to 5/5 with manual muscle testing for improved strength for ADLs and work  2. Pt will negotiate 1 flight of stairs with step over step with good control. 3. Pt will increase R knee ROM to 0-120 for mobility. 4. Pt will report pain 3/10 with daily activities. 5. Pt will score 45/80 on LEFS. Rehabilitation Potential For Stated Goals: Good  Regarding Perez  therapy, I certify that the treatment plan above will be carried out by a therapist or under their direction. Thank you for this referral,    Allie Heaton, PT                    HISTORY:   History of Present Injury/Illness (Reason for Referral): She works at [employer] and she was entering the cooler and there was oil on the floor. She slipped and the feet slipped out from under her. She tried to get up and slipped again. Injury was August 14-17. She did go to physical therapy but it was making her worse. They decided to have surgery. Surgery 12-21-17. Stayed 2 nights in the hospital. She did have home health physical therapy 3-4x. Pain increases with walking. Past Medical History/Comorbidities: diabetes type 2 controlled with diet, HTN, L LE varicose vein surgery  Social History/Living Environment: Lives with . 2 steps to get inside.      Prior Level of Function/Work/Activity: Works at [employer]. 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 Job does require lifting. Would like to get back to walking and walking dogs. Current Medications:       Current Outpatient Prescriptions:     magnesium hydroxide (BEAUCHAMP MILK OF MAGNESIA) 400 mg/5 mL suspension, Take 15 mL by mouth as needed for Constipation. as needed daily, Disp: , Rfl:     temazepam (RESTORIL) 15 mg capsule, Take 15 mg by mouth nightly as needed for Sleep., Disp: , Rfl:     promethazine (PHENERGAN) 25 mg tablet, Take 25 mg by mouth every eight (8) hours as needed for Nausea., Disp: , Rfl:     HYDROmorphone (DILAUDID) 2 mg tablet, Take 2-4 mg by mouth as needed for Pain. as needed every 4-6 hours, Disp: , Rfl:     atorvastatin (LIPITOR) 20 mg tablet, Take 20 mg by mouth nightly., Disp: , Rfl:     lisinopril-hydroCHLOROthiazide (PRINZIDE, ZESTORETIC) 10-12.5 mg per tablet, Take  by mouth daily. Indications: hypertension, Disp: , Rfl:     gemfibrozil (LOPID) 600 mg tablet, Take 600 mg by mouth two (2) times a day., Disp: , Rfl:     Omega-3 Fatty Acids 60- mg cpDR, Take  by mouth daily. , Disp: , Rfl:     acetaminophen (TYLENOL) 325 mg tablet, Take 325 mg by mouth every four (4) hours as needed for Pain., Disp: , Rfl:    Date Last Reviewed:  3-19-18   Number of Personal Factors/Comorbidities that affect the Plan of Care: 1-2: MODERATE COMPLEXITY   EXAMINATION:     Observation/Orthostatic Postural Assessment:  Pt arrived to therapy with no assistive device with brace on the R knee. Palpation: no tenderness around incisions     ROM:                Strength:                   Mobility: n/t  Balance:n/t        Body Structures Involved:  1. Joints  2. Muscles  3. Ligaments Body Functions Affected:  1. Neuromusculoskeletal Activities and Participation Affected:  1. Mobility  2.  Community, Social and [place]   Number of elements (examined above) that affect the Plan of Care: 4+: HIGH COMPLEXITY   CLINICAL PRESENTATION:   Presentation: Evolving clinical presentation with changing clinical characteristics: MODERATE COMPLEXITY   CLINICAL DECISION MAKING:   Outcome Measure: Tool Used: Lower Extremity Functional Scale (LEFS)  Score:  Initial: 5/80 Most Recent: X/80 (Date: -- )   Interpretation of Score: 20 questions each scored on a 5 point scale with 0 representing \"extreme difficulty or unable to perform\" and 4 representing \"no difficulty\". The lower the score, the greater the functional disability. 80/80 represents no disability. Minimal detectable change is 9 points. Score 80 79-63 62-48 47-32 31-16 15-1 0   Modifier CH CI CJ CK CL CM CN     Medical Necessity:   · Patient is expected to demonstrate progress in strength, range of motion and balance to improve gait. Reason for Services/Other Comments:  · Patient continues to require skilled intervention due to post-op R knee, decreased ROM, strength, gait. Use of outcome tool(s) and clinical judgement create a POC that gives a: Questionable prediction of patient's progress: MODERATE COMPLEXITY            TREATMENT:   (In addition to Assessment/Re-Assessment sessions the following treatments were rendered)  Pre-treatment Symptoms/Complaints: Pt reports she likes the new brace and feels like it gives her good support. Pain: Initial:   4/10 Post Session:  5/10. Therapeutic Exercise (25 Minutes): For improved muscle activation, gait, and knee ROM. Seated long arc quad 2# 2x10 with tactile cues on the patella  Shuttle press with tactile cues on R foot to keep foot flat and verbal cues for pushing through her foot. Shuttle press with 12# with min assist 2x10  reps. Sit to stand with tactile cues on R LE to increase weight and with L foot held out in front 2x10. Toe taps on 4\" step 2x10B, step ups with R LE 4\" x10 reps with verbal cues to not jump off the L LE.     Gait Training ( 15 minutes):  Gait training to improve and/or restore physical functioning as related to mobility. Ambulated   with modified independence and moderate visual, verbal and tactile cues related to their stance phase and ankle position and motionto promote proper body alignment. Applied athletic tape to the right foot to place ankle in more neutral position with gait. Pt ambulated with standard cane with verbal cues for standing up tall, knee flexion during swing phase, and ankle neutral during stance phase. HEP: she is to work on quad sets, ankle pumps, and walking putting the R foot on the ground. She is to work on sitting and standing and adjusting her foot position to limit rearfoot varus. Treatment/Session Assessment:    · Response to Treatment: Worked on gait today with standard cane and tape on the right foot that seemed to help with foot alignment during gait. She did do better with standard cane vs no assistive device and was able to put more weight on the R LE with cane. · Compliance with Program/Exercises:  Appears complaint   · Recommendations/Intent for next treatment session: \"Next visit will focus on gait, knee ROM\".  LE strengthening as able  Total Treatment Duration: 40 Minutes  PT Patient Time In/Time Out  Time In: 1100  Time Out: Martha 5

## 2018-03-27 ENCOUNTER — HOSPITAL ENCOUNTER (OUTPATIENT)
Dept: PHYSICAL THERAPY | Age: 53
Discharge: HOME | End: 2018-03-27
Payer: COMMERCIAL

## 2018-03-27 PROCEDURE — 97110 THERAPEUTIC EXERCISES: CPT

## 2018-03-27 NOTE — PROGRESS NOTES
Carolyn Bills  : 1965  Payor: [address] [place] [address] Road / Plan: Nataly Mainton / Product Type: Workers Comp /  [address]  at [facility] [place] [employer]  [address] [place] [address], [facility], [address] [place] [address]  [phone]   [phone]       OUTPATIENT PHYSICAL THERAPY:Daily Note 3/27/2018      ICD-10: Treatment Diagnosis: Sprain of posterior cruciate ligament of right knee, sequela (S83.521S); Pain in right knee (M25.561); Stiffness of right knee, not elsewhere classified (M25.661); Difficulty in walking, not elsewhere classified (R26.2)  Precautions/Allergies:   Review of patient's allergies indicates no known allergies. Fall Risk Score: 1 (? 5 = High Risk)  MD Orders:Evaluate and treat, HEP, ROM MEDICAL/REFERRING DIAGNOSIS:  S/p R knee scope, PCL reconstruction   DATE OF ONSET: Surgery 17  REFERRING PHYSICIAN: Kumar Ruano MD  RETURN PHYSICIAN APPOINTMENT: 18     INITIAL ASSESSMENT:  Ms. Justin Truong presents s/p right knee scope with PCL reconstruction. She continues to slowly progress with R knee ROM. She does continue with stiffness with R ankle ROM. She is able to ambulate with increased weight on R LE with brace on and walker and is able to ambulate short distances with no assistive device. She has difficulty with strengthening exercises requiring frequent verbal cueing and putting weight through R LE during ambulation and standing exercises. She will benefit from continuing skilled physical therapy to continue to progress functional mobility. PROBLEM LIST (Impacting functional limitations):  1. Post-op pain and swelling right knee  2. Decreased ROM right knee   3. Decreased functional strength right knee/LE   4. Decreased gait skills INTERVENTIONS PLANNED:  1. aquatic therapy  2. Thermal and electric modalities, manual therapies for pain. 3. Manual therapies and therapeutic exercises for ROM and strength.    4. Therapeutic exercises for gait and balance   TREATMENT PLAN:  Effective Dates: 1-8-18 TO 4-6-18. Frequency/Duration: 3 times a week for 12 weeks  GOALS: (Goals have been discussed and agreed upon with patient.)   Short-Term Functional Goals: Time Frame: 6 weeks  1. Pt will be able to perform a good quad set and SLR with no extensor lag with for improved strength for gait. Progressing towards  2. Pt will increase R knee ROM to 0-90 for improved mobility. GOAL MET  3. Pt will improved R ankle DF AROM to 0 for improved gait. Progressing towards  4. Pt will report pain 5/10 with modified daily activities. GOAL MET  Discharge Goals: Time Frame: 12 weeks progressing towards long term goals   1. Pt will increase R LE strength to 5/5 with manual muscle testing for improved strength for ADLs and work  2. Pt will negotiate 1 flight of stairs with step over step with good control. 3. Pt will increase R knee ROM to 0-120 for mobility. 4. Pt will report pain 3/10 with daily activities. 5. Pt will score 45/80 on LEFS. Rehabilitation Potential For Stated Goals: Good  Regarding Perez  therapy, I certify that the treatment plan above will be carried out by a therapist or under their direction. Thank you for this referral,    Allie Swenson, PT                    HISTORY:   History of Present Injury/Illness (Reason for Referral): She works at [email or web address] and she was entering the cooler and there was oil on the floor. She slipped and the feet slipped out from under her. She tried to get up and slipped again. Injury was August 14-17. She did go to physical therapy but it was making her worse. They decided to have surgery. Surgery 12-21-17. Stayed 2 nights in the hospital. She did have home health physical therapy 3-4x. Pain increases with walking. Past Medical History/Comorbidities: diabetes type 2 controlled with diet, HTN, L LE varicose vein surgery  Social History/Living Environment: Lives with . 2 steps to get inside.      Prior Level of Function/Work/Activity: Works at "[employer] 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 Job does require lifting. Would like to get back to walking and walking dogs. Current Medications:       Current Outpatient Prescriptions:     magnesium hydroxide (BEAUCHAMP MILK OF MAGNESIA) 400 mg/5 mL suspension, Take 15 mL by mouth as needed for Constipation. as needed daily, Disp: , Rfl:     temazepam (RESTORIL) 15 mg capsule, Take 15 mg by mouth nightly as needed for Sleep., Disp: , Rfl:     promethazine (PHENERGAN) 25 mg tablet, Take 25 mg by mouth every eight (8) hours as needed for Nausea., Disp: , Rfl:     HYDROmorphone (DILAUDID) 2 mg tablet, Take 2-4 mg by mouth as needed for Pain. as needed every 4-6 hours, Disp: , Rfl:     atorvastatin (LIPITOR) 20 mg tablet, Take 20 mg by mouth nightly., Disp: , Rfl:     lisinopril-hydroCHLOROthiazide (PRINZIDE, ZESTORETIC) 10-12.5 mg per tablet, Take  by mouth daily. Indications: hypertension, Disp: , Rfl:     gemfibrozil (LOPID) 600 mg tablet, Take 600 mg by mouth two (2) times a day., Disp: , Rfl:     Omega-3 Fatty Acids 60- mg cpDR, Take  by mouth daily. , Disp: , Rfl:     acetaminophen (TYLENOL) 325 mg tablet, Take 325 mg by mouth every four (4) hours as needed for Pain., Disp: , Rfl:    Date Last Reviewed:  3-19-18   Number of Personal Factors/Comorbidities that affect the Plan of Care: 1-2: MODERATE COMPLEXITY   EXAMINATION:     Observation/Orthostatic Postural Assessment:  Pt arrived to therapy with no assistive device with brace on the R knee. Palpation: no tenderness around incisions     ROM:                Strength:                   Mobility: n/t  Balance:n/t        Body Structures Involved:  1. Joints  2. Muscles  3. Ligaments Body Functions Affected:  1. Neuromusculoskeletal Activities and Participation Affected:  1. Mobility  2.  Community, Social and [place]   Number of elements (examined above) that affect the Plan of Care: 4+: HIGH COMPLEXITY   CLINICAL PRESENTATION:   Presentation: Evolving clinical presentation with changing clinical characteristics: MODERATE COMPLEXITY   CLINICAL DECISION MAKING:   Outcome Measure: Tool Used: Lower Extremity Functional Scale (LEFS)  Score:  Initial: 5/80 Most Recent: X/80 (Date: -- )   Interpretation of Score: 20 questions each scored on a 5 point scale with 0 representing \"extreme difficulty or unable to perform\" and 4 representing \"no difficulty\". The lower the score, the greater the functional disability. 80/80 represents no disability. Minimal detectable change is 9 points. Score 80 79-63 62-48 47-32 31-16 15-1 0   Modifier CH CI CJ CK CL CM CN     Medical Necessity:   · Patient is expected to demonstrate progress in strength, range of motion and balance to improve gait. Reason for Services/Other Comments:  · Patient continues to require skilled intervention due to post-op R knee, decreased ROM, strength, gait. Use of outcome tool(s) and clinical judgement create a POC that gives a: Questionable prediction of patient's progress: MODERATE COMPLEXITY            TREATMENT:   (In addition to Assessment/Re-Assessment sessions the following treatments were rendered)  Pre-treatment Symptoms/Complaints: Pt reports she returned to MD today and he said everything looked good. She returns to MD in 5 weeks. She reports she is unable to wear her new brace because it is too tight and they are going to send her another one. Pain: Initial:   4/10 Post Session:  5/10. Therapeutic Exercise (40 Minutes): For improved muscle activation, gait, and knee ROM. Short arc quads 3# 3x10, Seated long arc quad 3# 2x10, and seated hamstring curls green 2x10. Shuttle press with tactile cues on R foot to keep foot flat and verbal cues for pushing through her foot. Shuttle press with 12# with min assist 2x10  reps.   Sit to stand with tactile cues on R LE to increase weight and with L foot held out in front x10. Toe taps on 4\" step 2x10B, step ups with R LE 4\" x10 reps with verbal cues to not jump off the L LE. Walking drills with standard cane: forward and backward 10 ft x 2 reps, side stepping 10 ft x2 ea.   leukotape applied to the R ankle for calcaneal rearfoot valgus to limit foot supination during gait. HEP: she is to work on quad sets, ankle pumps, and walking putting the R foot on the ground. She is to work on sitting and standing and adjusting her foot position to limit rearfoot varus. Treatment/Session Assessment:    · Response to Treatment: She had difficulty with walking drills backwards and side stepping. She does seem to do better with more familiar exercises and has trouble with new exercises. · Compliance with Program/Exercises:  Appears complaint   · Recommendations/Intent for next treatment session: \"Next visit will focus on gait, knee ROM\".  LE strengthening as able  Total Treatment Duration: 40 Minutes  PT Patient Time In/Time Out  Time In: 1110  Time Out: [facility]

## 2018-03-28 ENCOUNTER — HOSPITAL ENCOUNTER (OUTPATIENT)
Dept: PHYSICAL THERAPY | Age: 53
Discharge: HOME | End: 2018-03-28
Payer: COMMERCIAL

## 2018-03-28 PROCEDURE — 97116 GAIT TRAINING THERAPY: CPT

## 2018-03-28 PROCEDURE — 97110 THERAPEUTIC EXERCISES: CPT

## 2018-03-28 NOTE — PROGRESS NOTES
Keena Chavira  : 1965  Payor: [address] [place] [address] Road / Plan: Yadiel Mcardle / Product Type: Workers Comp /  [address]  at [facility] [place] [employer]  [address] [facility], [address] [place] [address],[address], [facility] [address]  [phone]   [phone]       OUTPATIENT PHYSICAL THERAPY:Daily Note and Progress Report 3/28/2018      ICD-10: Treatment Diagnosis: Sprain of posterior cruciate ligament of right knee, sequela (S83.521S); Pain in right knee (M25.561); Stiffness of right knee, not elsewhere classified (M25.661); Difficulty in walking, not elsewhere classified (R26.2)  Precautions/Allergies:   Review of patient's allergies indicates no known allergies. Fall Risk Score: 1 (? 5 = High Risk)  MD Orders:Evaluate and treat, HEP, ROM, strengthening MEDICAL/REFERRING DIAGNOSIS:  S/p R knee scope, PCL reconstruction   DATE OF ONSET: Surgery 17  REFERRING PHYSICIAN: Davey Ga MD  RETURN PHYSICIAN APPOINTMENT: 18     INITIAL ASSESSMENT:  Ms. Mary Perez presents s/p right knee scope with PCL reconstruction. She continues to slowly progress with R knee ROM. She is able to ambulate short distances with no assistive device with increased weight on R LE. She does continue with whole R LE weakness limiting mobility. She will benefit from continuing skilled physical therapy to continue to progress functional mobility. PROBLEM LIST (Impacting functional limitations):  1. Post-op pain and swelling right knee  2. Decreased ROM right knee   3. Decreased functional strength right knee/LE   4. Decreased gait skills INTERVENTIONS PLANNED:  1. aquatic therapy  2. Thermal and electric modalities, manual therapies for pain. 3. Manual therapies and therapeutic exercises for ROM and strength. 4. Therapeutic exercises for gait and balance   TREATMENT PLAN:  Effective Dates: 18 TO 18.   Frequency/Duration: 3 times a week for 12 weeks  GOALS: (Goals have been discussed and agreed upon with patient.) Short-Term Functional Goals: Time Frame: 6 weeks  1. Pt will be able to perform a good quad set and SLR with no extensor lag with for improved strength for gait. Progressing towards  2. Pt will increase R knee ROM to 0-90 for improved mobility. GOAL MET  3. Pt will improved R ankle DF AROM to 0 for improved gait. GOAL MET  4. Pt will report pain 5/10 with modified daily activities. GOAL MET  Discharge Goals: Time Frame: 12 weeks   1. Pt will increase R LE strength to 5/5 with manual muscle testing for improved strength for ADLs and work. Progressing towards  2. Pt will negotiate 1 flight of stairs with step over step with good control. Progressing towards  3. Pt will increase R knee ROM to 0-120 for mobility. Progressing towards  4. Pt will report pain 3/10 with daily activities. Progressing towards  5. Pt will score 45/80 on LEFS. Not tested  Rehabilitation Potential For Stated Goals: Good  Regarding Perez  therapy, I certify that the treatment plan above will be carried out by a therapist or under their direction. Thank you for this referral,    Allie Mallory, PT                    HISTORY:   History of Present Injury/Illness (Reason for Referral): She works at [employer] and she was entering the cooler and there was oil on the floor. She slipped and the feet slipped out from under her. She tried to get up and slipped again. Injury was August 14-17. She did go to physical therapy but it was making her worse. They decided to have surgery. Surgery 12-21-17. Stayed 2 nights in the hospital. She did have home health physical therapy 3-4x. Pain increases with walking. Past Medical History/Comorbidities: diabetes type 2 controlled with diet, HTN, L LE varicose vein surgery  Social History/Living Environment: Lives with . 2 steps to get inside. Prior Level of Function/Work/Activity: Works at [employer]. 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  Job does require lifting. Would like to get back to walking and walking dogs. Current Medications:       Current Outpatient Prescriptions:     magnesium hydroxide (BEAUCHAMP MILK OF MAGNESIA) 400 mg/5 mL suspension, Take 15 mL by mouth as needed for Constipation. as needed daily, Disp: , Rfl:     temazepam (RESTORIL) 15 mg capsule, Take 15 mg by mouth nightly as needed for Sleep., Disp: , Rfl:     promethazine (PHENERGAN) 25 mg tablet, Take 25 mg by mouth every eight (8) hours as needed for Nausea., Disp: , Rfl:     HYDROmorphone (DILAUDID) 2 mg tablet, Take 2-4 mg by mouth as needed for Pain. as needed every 4-6 hours, Disp: , Rfl:     atorvastatin (LIPITOR) 20 mg tablet, Take 20 mg by mouth nightly., Disp: , Rfl:     lisinopril-hydroCHLOROthiazide (PRINZIDE, ZESTORETIC) 10-12.5 mg per tablet, Take  by mouth daily. Indications: hypertension, Disp: , Rfl:     gemfibrozil (LOPID) 600 mg tablet, Take 600 mg by mouth two (2) times a day., Disp: , Rfl:     Omega-3 Fatty Acids 60- mg cpDR, Take  by mouth daily. , Disp: , Rfl:     acetaminophen (TYLENOL) 325 mg tablet, Take 325 mg by mouth every four (4) hours as needed for Pain., Disp: , Rfl:    Date Last Reviewed:  3-19-18   Number of Personal Factors/Comorbidities that affect the Plan of Care: 1-2: MODERATE COMPLEXITY   EXAMINATION:     Observation/Orthostatic Postural Assessment:  Pt arrived to therapy with no assistive device with brace on the R knee. Palpation: no tenderness around incisions     ROM:         RLE PROM  R Knee Flexion: 115  R Knee Extension: 0  R Ankle Dorsiflexion: 0      Strength:            RLE Strength  R Knee Flexion: 4-  R Knee Extension: 4-      Mobility: n/t  Balance:n/t        Body Structures Involved:  1. Joints  2. Muscles  3. Ligaments Body Functions Affected:  1. Neuromusculoskeletal Activities and Participation Affected:  1. Mobility  2.  Community, Social and [place]   Number of elements (examined above) that affect the Plan of Care: 4+: HIGH COMPLEXITY   CLINICAL PRESENTATION:   Presentation: Evolving clinical presentation with changing clinical characteristics: MODERATE COMPLEXITY   CLINICAL DECISION MAKING:   Outcome Measure: Tool Used: Lower Extremity Functional Scale (LEFS)  Score:  Initial: 5/80 Most Recent: X/80 (Date: -- )   Interpretation of Score: 20 questions each scored on a 5 point scale with 0 representing \"extreme difficulty or unable to perform\" and 4 representing \"no difficulty\". The lower the score, the greater the functional disability. 80/80 represents no disability. Minimal detectable change is 9 points. Score 80 79-63 62-48 47-32 31-16 15-1 0   Modifier CH CI CJ CK CL CM CN     Medical Necessity:   · Patient is expected to demonstrate progress in strength, range of motion and balance to improve gait. Reason for Services/Other Comments:  · Patient continues to require skilled intervention due to post-op R knee, decreased ROM, strength, gait. Use of outcome tool(s) and clinical judgement create a POC that gives a: Questionable prediction of patient's progress: MODERATE COMPLEXITY            TREATMENT:   (In addition to Assessment/Re-Assessment sessions the following treatments were rendered)  Pre-treatment Symptoms/Complaints: Pt reports she returned to MD today and he said everything looked good. She returns to MD in 5 weeks. She reports she is unable to wear her new brace because it is too tight and they are going to send her another one. Pain: Initial:   4/10 Post Session:  5/10. Therapeutic Exercise (25 Minutes): For improved muscle activation, gait, and knee ROM. Knee flexion ROM with heel slides with overpressure at end range 3x10. Short arc quads 3# 3x10, Seated long arc quad 3# 2x10, and seated hamstring curls green 2x10. Ankle PF and ankle eversion with red t-band 2x10 ea. Sit to stand 2x10 with verbal cues for foot flat during standing.        Gait Training ( 15 minutues):  Gait training to improve and/or restore physical functioning as related to mobility. Ambulated   with modified independence and moderate visual and verbal cues related to their stance phase and ankle position and motionto promote proper body alignment and promote proper body posture. Pt ambulated with standard cane. leukotape applied to the R ankle for calcaneal rearfoot valgus to limit foot supination during gait. HEP: she is to work on quad sets, ankle pumps, and walking putting the R foot on the ground. She is to work on sitting and standing and adjusting her foot position to limit rearfoot varus. Treatment/Session Assessment:    · Response to Treatment: Improved knee ROM today. · Compliance with Program/Exercises:  Appears complaint   · Recommendations/Intent for next treatment session: \"Next visit will focus on gait, knee ROM\".  LE strengthening as able  Total Treatment Duration: 40 Minutes  PT Patient Time In/Time Out  Time In: 1300  Time Out: [address] [facility]

## 2018-03-30 ENCOUNTER — HOSPITAL ENCOUNTER (OUTPATIENT)
Dept: PHYSICAL THERAPY | Age: 53
Discharge: HOME | End: 2018-03-30
Payer: COMMERCIAL

## 2018-03-30 PROCEDURE — 97110 THERAPEUTIC EXERCISES: CPT

## 2018-03-30 NOTE — PROGRESS NOTES
Sabi Donnelly  : 1965  Payor: [address] [place] [address] Road / Plan: [employer] / Product Type: Workers Comp /  [address]  [facility]  [address] [place] [address], [address].  [phone]   [phone]       OUTPATIENT PHYSICAL THERAPY:Daily Note 3/30/2018      ICD-10: Treatment Diagnosis: Sprain of posterior cruciate ligament of right knee, sequela (S83.521S); Pain in right knee (M25.561); Stiffness of right knee, not elsewhere classified (M25.661); Difficulty in walking, not elsewhere classified (R26.2)  Precautions/Allergies:   Review of patient's allergies indicates no known allergies. Fall Risk Score: 1 (? 5 = High Risk)  MD Orders:Evaluate and treat, HEP, ROM, strengthening MEDICAL/REFERRING DIAGNOSIS:  S/p R knee scope, PCL reconstruction   DATE OF ONSET: Surgery 17  REFERRING PHYSICIAN: Guanaco Sanches MD  RETURN PHYSICIAN APPOINTMENT: 18     INITIAL ASSESSMENT:  Ms. Raphael Smith presents s/p right knee scope with PCL reconstruction. She continues to slowly progress with R knee ROM. She is able to ambulate short distances with no assistive device with increased weight on R LE. She does continue with whole R LE weakness limiting mobility. She will benefit from continuing skilled physical therapy to continue to progress functional mobility. PROBLEM LIST (Impacting functional limitations):  1. Post-op pain and swelling right knee  2. Decreased ROM right knee   3. Decreased functional strength right knee/LE   4. Decreased gait skills INTERVENTIONS PLANNED:  1. aquatic therapy  2. Thermal and electric modalities, manual therapies for pain. 3. Manual therapies and therapeutic exercises for ROM and strength. 4. Therapeutic exercises for gait and balance   TREATMENT PLAN:  Effective Dates: 18 TO 18.   Frequency/Duration: 3 times a week for 12 weeks  GOALS: (Goals have been discussed and agreed upon with patient.)   Short-Term Functional Goals: Time Frame: 6 weeks  1. Pt will be able to perform a good quad set and SLR with no extensor lag with for improved strength for gait. Progressing towards  2. Pt will increase R knee ROM to 0-90 for improved mobility. GOAL MET  3. Pt will improved R ankle DF AROM to 0 for improved gait. GOAL MET  4. Pt will report pain 5/10 with modified daily activities. GOAL MET  Discharge Goals: Time Frame: 12 weeks   1. Pt will increase R LE strength to 5/5 with manual muscle testing for improved strength for ADLs and work. Progressing towards  2. Pt will negotiate 1 flight of stairs with step over step with good control. Progressing towards  3. Pt will increase R knee ROM to 0-120 for mobility. Progressing towards  4. Pt will report pain 3/10 with daily activities. Progressing towards  5. Pt will score 45/80 on LEFS. Not tested  Rehabilitation Potential For Stated Goals: Good  Regarding Perez  therapy, I certify that the treatment plan above will be carried out by a therapist or under their direction. Thank you for this referral,    Allie Best, PT                    HISTORY:   History of Present Injury/Illness (Reason for Referral): She works at [employer] and she was entering the cooler and there was oil on the floor. She slipped and the feet slipped out from under her. She tried to get up and slipped again. Injury was August 14-17. She did go to physical therapy but it was making her worse. They decided to have surgery. Surgery 12-21-17. Stayed 2 nights in the hospital. She did have home health physical therapy 3-4x. Pain increases with walking. Past Medical History/Comorbidities: diabetes type 2 controlled with diet, HTN, L LE varicose vein surgery  Social History/Living Environment: Lives with . 2 steps to get inside. Prior Level of Function/Work/Activity: Works at [employer]. 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 Job does require lifting. Would like to get back to walking and walking dogs. Current Medications:       Current Outpatient Prescriptions:     magnesium hydroxide (BEAUCHAMP MILK OF MAGNESIA) 400 mg/5 mL suspension, Take 15 mL by mouth as needed for Constipation. as needed daily, Disp: , Rfl:     temazepam (RESTORIL) 15 mg capsule, Take 15 mg by mouth nightly as needed for Sleep., Disp: , Rfl:     promethazine (PHENERGAN) 25 mg tablet, Take 25 mg by mouth every eight (8) hours as needed for Nausea., Disp: , Rfl:     HYDROmorphone (DILAUDID) 2 mg tablet, Take 2-4 mg by mouth as needed for Pain. as needed every 4-6 hours, Disp: , Rfl:     atorvastatin (LIPITOR) 20 mg tablet, Take 20 mg by mouth nightly., Disp: , Rfl:     lisinopril-hydroCHLOROthiazide (PRINZIDE, ZESTORETIC) 10-12.5 mg per tablet, Take  by mouth daily. Indications: hypertension, Disp: , Rfl:     gemfibrozil (LOPID) 600 mg tablet, Take 600 mg by mouth two (2) times a day., Disp: , Rfl:     Omega-3 Fatty Acids 60- mg cpDR, Take  by mouth daily. , Disp: , Rfl:     acetaminophen (TYLENOL) 325 mg tablet, Take 325 mg by mouth every four (4) hours as needed for Pain., Disp: , Rfl:    Date Last Reviewed:  3-30-18   Number of Personal Factors/Comorbidities that affect the Plan of Care: 1-2: MODERATE COMPLEXITY   EXAMINATION:     Observation/Orthostatic Postural Assessment:  Pt arrived to therapy with no assistive device with brace on the R knee. Palpation: no tenderness around incisions     ROM:                Strength:                   Mobility: n/t  Balance:n/t        Body Structures Involved:  1. Joints  2. Muscles  3. Ligaments Body Functions Affected:  1. Neuromusculoskeletal Activities and Participation Affected:  1. Mobility  2.  Community, Social and [place]   Number of elements (examined above) that affect the Plan of Care: 4+: HIGH COMPLEXITY   CLINICAL PRESENTATION:   Presentation: Evolving clinical presentation with changing clinical characteristics: MODERATE COMPLEXITY   CLINICAL DECISION MAKING:   Outcome Measure: Tool Used: Lower Extremity Functional Scale (LEFS)  Score:  Initial: 5/80 Most Recent: X/80 (Date: -- )   Interpretation of Score: 20 questions each scored on a 5 point scale with 0 representing \"extreme difficulty or unable to perform\" and 4 representing \"no difficulty\". The lower the score, the greater the functional disability. 80/80 represents no disability. Minimal detectable change is 9 points. Score 80 79-63 62-48 47-32 31-16 15-1 0   Modifier CH CI CJ CK CL CM CN     Medical Necessity:   · Patient is expected to demonstrate progress in strength, range of motion and balance to improve gait. Reason for Services/Other Comments:  · Patient continues to require skilled intervention due to post-op R knee, decreased ROM, strength, gait. Use of outcome tool(s) and clinical judgement create a POC that gives a: Questionable prediction of patient's progress: MODERATE COMPLEXITY            TREATMENT:   (In addition to Assessment/Re-Assessment sessions the following treatments were rendered)  Pre-treatment Symptoms/Complaints: Pt reports her knee is a little sore today. Pain: Initial:   4/10 Post Session:  5/10. Therapeutic Exercise (40 Minutes): For improved muscle activation, gait, and knee ROM. Knee flexion ROM with heel slides with overpressure at end range 3x10. Short arc quads 3# 3x10, Seated long arc quad 3# 2x10, and seated hamstring curls green 2x10. Ankle PF and ankle eversion with red t-band 2x10 ea. Shuttle press 12# 3x10, step ups on 4\" step x10 reps, toe taps on 4\" step x10 ea. Walking drills: walking forward, walking backwards 10 ft x2, side stepping 5 steps x2    HEP: she is to work on quad sets, ankle pumps, and walking putting the R foot on the ground. She is to work on sitting and standing and adjusting her foot position to limit rearfoot varus.      Treatment/Session Assessment:    · Response to Treatment: Improved ankle position with ankle today with no tape. · Compliance with Program/Exercises:  Appears complaint   · Recommendations/Intent for next treatment session: \"Next visit will focus on gait, knee ROM\".  LE strengthening as able  Total Treatment Duration: 40 Minutes  PT Patient Time In/Time Out  Time In: 1300  Time Out: [facility]

## 2018-04-02 ENCOUNTER — HOSPITAL ENCOUNTER (OUTPATIENT)
Dept: PHYSICAL THERAPY | Age: 53
Discharge: HOME | End: 2018-04-02
Payer: COMMERCIAL

## 2018-04-02 PROCEDURE — 97110 THERAPEUTIC EXERCISES: CPT

## 2018-04-02 NOTE — PROGRESS NOTES
Jose Rota  : 1965  [facility] [address] / Plan: Chaka Swenson / Product Type: Workers Comp /  [address]  at [address] [place] [address] Rd  [address], [address], [address] [place] [address]  [phone]   [phone]       OUTPATIENT PHYSICAL THERAPY:Daily Note 2018      ICD-10: Treatment Diagnosis: Sprain of posterior cruciate ligament of right knee, sequela (S83.521S); Pain in right knee (M25.561); Stiffness of right knee, not elsewhere classified (M25.661); Difficulty in walking, not elsewhere classified (R26.2)  Precautions/Allergies:   Review of patient's allergies indicates no known allergies. Fall Risk Score: 1 (? 5 = High Risk)  MD Orders:Evaluate and treat, HEP, ROM, strengthening MEDICAL/REFERRING DIAGNOSIS:  S/p R knee scope, PCL reconstruction   DATE OF ONSET: Surgery 17  REFERRING PHYSICIAN: Nupur Pillai MD  RETURN PHYSICIAN APPOINTMENT: 18     INITIAL ASSESSMENT:  Ms. Claus Hooks presents s/p right knee scope with PCL reconstruction. She continues to slowly progress with R knee ROM. She is able to ambulate short distances with no assistive device with increased weight on R LE. She does continue with whole R LE weakness limiting mobility. She will benefit from continuing skilled physical therapy to continue to progress functional mobility. PROBLEM LIST (Impacting functional limitations):  1. Post-op pain and swelling right knee  2. Decreased ROM right knee   3. Decreased functional strength right knee/LE   4. Decreased gait skills INTERVENTIONS PLANNED:  1. aquatic therapy  2. Thermal and electric modalities, manual therapies for pain. 3. Manual therapies and therapeutic exercises for ROM and strength. 4. Therapeutic exercises for gait and balance   TREATMENT PLAN:  Effective Dates: 18 TO 18.   Frequency/Duration: 3 times a week for 12 weeks  GOALS: (Goals have been discussed and agreed upon with patient.)   Short-Term Functional Goals: Time Frame: 6 weeks  1. Pt will be able to perform a good quad set and SLR with no extensor lag with for improved strength for gait. Progressing towards  2. Pt will increase R knee ROM to 0-90 for improved mobility. GOAL MET  3. Pt will improved R ankle DF AROM to 0 for improved gait. GOAL MET  4. Pt will report pain 5/10 with modified daily activities. GOAL MET  Discharge Goals: Time Frame: 12 weeks   1. Pt will increase R LE strength to 5/5 with manual muscle testing for improved strength for ADLs and work. Progressing towards  2. Pt will negotiate 1 flight of stairs with step over step with good control. Progressing towards  3. Pt will increase R knee ROM to 0-120 for mobility. Progressing towards  4. Pt will report pain 3/10 with daily activities. Progressing towards  5. Pt will score 45/80 on LEFS. Not tested  Rehabilitation Potential For Stated Goals: Good  Regarding Perez  therapy, I certify that the treatment plan above will be carried out by a therapist or under their direction. Thank you for this referral,    Allie Vazquez, PT                    HISTORY:   History of Present Injury/Illness (Reason for Referral): She works at [employer] and she was entering the cooler and there was oil on the floor. She slipped and the feet slipped out from under her. She tried to get up and slipped again. Injury was August 14-17. She did go to physical therapy but it was making her worse. They decided to have surgery. Surgery 12-21-17. Stayed 2 nights in the hospital. She did have home health physical therapy 3-4x. Pain increases with walking. Past Medical History/Comorbidities: diabetes type 2 controlled with diet, HTN, L LE varicose vein surgery  Social History/Living Environment: Lives with . 2 steps to get inside. Prior Level of Function/Work/Activity: Works at [employer]. 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 Job does require lifting. Would like to get back to walking and walking dogs. Current Medications:       Current Outpatient Prescriptions:     magnesium hydroxide (BEAUCHAMP MILK OF MAGNESIA) 400 mg/5 mL suspension, Take 15 mL by mouth as needed for Constipation. as needed daily, Disp: , Rfl:     temazepam (RESTORIL) 15 mg capsule, Take 15 mg by mouth nightly as needed for Sleep., Disp: , Rfl:     promethazine (PHENERGAN) 25 mg tablet, Take 25 mg by mouth every eight (8) hours as needed for Nausea., Disp: , Rfl:     HYDROmorphone (DILAUDID) 2 mg tablet, Take 2-4 mg by mouth as needed for Pain. as needed every 4-6 hours, Disp: , Rfl:     atorvastatin (LIPITOR) 20 mg tablet, Take 20 mg by mouth nightly., Disp: , Rfl:     lisinopril-hydroCHLOROthiazide (PRINZIDE, ZESTORETIC) 10-12.5 mg per tablet, Take  by mouth daily. Indications: hypertension, Disp: , Rfl:     gemfibrozil (LOPID) 600 mg tablet, Take 600 mg by mouth two (2) times a day., Disp: , Rfl:     Omega-3 Fatty Acids 60- mg cpDR, Take  by mouth daily. , Disp: , Rfl:     acetaminophen (TYLENOL) 325 mg tablet, Take 325 mg by mouth every four (4) hours as needed for Pain., Disp: , Rfl:    Date Last Reviewed:  3-30-18   Number of Personal Factors/Comorbidities that affect the Plan of Care: 1-2: MODERATE COMPLEXITY   EXAMINATION:     Observation/Orthostatic Postural Assessment:  Pt arrived to therapy with no assistive device with brace on the R knee. Palpation: no tenderness around incisions     ROM:                Strength:                   Mobility: n/t  Balance:n/t        Body Structures Involved:  1. Joints  2. Muscles  3. Ligaments Body Functions Affected:  1. Neuromusculoskeletal Activities and Participation Affected:  1. Mobility  2.  Community, Social and [place]   Number of elements (examined above) that affect the Plan of Care: 4+: HIGH COMPLEXITY   CLINICAL PRESENTATION:   Presentation: Evolving clinical presentation with changing clinical characteristics: MODERATE COMPLEXITY   CLINICAL DECISION MAKING:   Outcome Measure: Tool Used: Lower Extremity Functional Scale (LEFS)  Score:  Initial: 5/80 Most Recent: X/80 (Date: -- )   Interpretation of Score: 20 questions each scored on a 5 point scale with 0 representing \"extreme difficulty or unable to perform\" and 4 representing \"no difficulty\". The lower the score, the greater the functional disability. 80/80 represents no disability. Minimal detectable change is 9 points. Score 80 79-63 62-48 47-32 31-16 15-1 0   Modifier CH CI CJ CK CL CM CN     Medical Necessity:   · Patient is expected to demonstrate progress in strength, range of motion and balance to improve gait. Reason for Services/Other Comments:  · Patient continues to require skilled intervention due to post-op R knee, decreased ROM, strength, gait. Use of outcome tool(s) and clinical judgement create a POC that gives a: Questionable prediction of patient's progress: MODERATE COMPLEXITY            TREATMENT:   (In addition to Assessment/Re-Assessment sessions the following treatments were rendered)  Pre-treatment Symptoms/Complaints: Pt reports she has been putting more weight on the R LE and her knee is more sore. Pain: Initial:   8/10 Post Session:  8/10. Therapeutic Exercise (40 Minutes): For improved muscle activation, gait, and knee ROM. Knee flexion ROM with heel slides with overpressure at end range 3x10. Short arc quads 3# 3x10, Seated long arc quad 3# 2x10, and seated hamstring curls green 2x10. Ankle PF and ankle eversion with red t-band 2x10 ea. Shuttle press 12# 3x10, sit to stand 2x10 with visual cues for foot placement     HEP: she is to work on quad sets, ankle pumps, and walking putting the R foot on the ground. She is to work on sitting and standing and adjusting her foot position to limit rearfoot varus. Treatment/Session Assessment:    · Response to Treatment: Improved ankle alignment with gait.  She was more sore today so did not work on gait. · Compliance with Program/Exercises:  Appears complaint   · Recommendations/Intent for next treatment session: \"Next visit will focus on gait, knee ROM\".  LE strengthening as able  Total Treatment Duration: 40 Minutes  PT Patient Time In/Time Out  Time In: 1350  Time Out: [address]

## 2018-04-04 ENCOUNTER — HOSPITAL ENCOUNTER (OUTPATIENT)
Dept: PHYSICAL THERAPY | Age: 53
Discharge: HOME | End: 2018-04-04
Payer: COMMERCIAL

## 2018-04-04 PROCEDURE — 97110 THERAPEUTIC EXERCISES: CPT

## 2018-04-04 NOTE — PROGRESS NOTES
Faviola Deutshc  : 1965  Payor: [address] [place] [address] Road / Plan: Rowena De Leon / Product Type: [employer] /  [address]  at [address] [place] [address] Rd  [address] [facility],  [place].  [phone]   [phone]       OUTPATIENT PHYSICAL THERAPY:Daily Note 2018      ICD-10: Treatment Diagnosis: Sprain of posterior cruciate ligament of right knee, sequela (S83.521S); Pain in right knee (M25.561); Stiffness of right knee, not elsewhere classified (M25.661); Difficulty in walking, not elsewhere classified (R26.2)  Precautions/Allergies:   Review of patient's allergies indicates no known allergies. Fall Risk Score: 1 (? 5 = High Risk)  MD Orders:Evaluate and treat, HEP, ROM, strengthening MEDICAL/REFERRING DIAGNOSIS:  S/p R knee scope, PCL reconstruction   DATE OF ONSET: Surgery 17  REFERRING PHYSICIAN: Sharri Ward MD  RETURN PHYSICIAN APPOINTMENT: 18     INITIAL ASSESSMENT:  Ms. Allan Lauren presents s/p right knee scope with PCL reconstruction. She continues to slowly progress with R knee ROM. She is able to ambulate short distances with no assistive device with increased weight on R LE. She does continue with whole R LE weakness limiting mobility. She will benefit from continuing skilled physical therapy to continue to progress functional mobility. PROBLEM LIST (Impacting functional limitations):  1. Post-op pain and swelling right knee  2. Decreased ROM right knee   3. Decreased functional strength right knee/LE   4. Decreased gait skills INTERVENTIONS PLANNED:  1. aquatic therapy  2. Thermal and electric modalities, manual therapies for pain. 3. Manual therapies and therapeutic exercises for ROM and strength. 4. Therapeutic exercises for gait and balance   TREATMENT PLAN:  Effective Dates: 18 TO 18.   Frequency/Duration: 3 times a week for 12 weeks  GOALS: (Goals have been discussed and agreed upon with patient.)   Short-Term Functional Goals: Time Frame: 6 weeks  1. Pt will be able to perform a good quad set and SLR with no extensor lag with for improved strength for gait. Progressing towards  2. Pt will increase R knee ROM to 0-90 for improved mobility. GOAL MET  3. Pt will improved R ankle DF AROM to 0 for improved gait. GOAL MET  4. Pt will report pain 5/10 with modified daily activities. GOAL MET  Discharge Goals: Time Frame: 12 weeks   1. Pt will increase R LE strength to 5/5 with manual muscle testing for improved strength for ADLs and work. Progressing towards  2. Pt will negotiate 1 flight of stairs with step over step with good control. Progressing towards  3. Pt will increase R knee ROM to 0-120 for mobility. Progressing towards  4. Pt will report pain 3/10 with daily activities. Progressing towards  5. Pt will score 45/80 on LEFS. Not tested  Rehabilitation Potential For Stated Goals: Good  Regarding [facility] therapy, I certify that the treatment plan above will be carried out by a therapist or under their direction. Thank you for this referral,    Allie Boles, PT                    HISTORY:   History of Present Injury/Illness (Reason for Referral): She works at [employer] and she was entering the cooler and there was oil on the floor. She slipped and the feet slipped out from under her. She tried to get up and slipped again. Injury was August 14-17. She did go to physical therapy but it was making her worse. They decided to have surgery. Surgery 12-21-17. Stayed 2 nights in the hospital. She did have home health physical therapy 3-4x. Pain increases with walking. Past Medical History/Comorbidities: diabetes type 2 controlled with diet, HTN, L LE varicose vein surgery  Social History/Living Environment: Lives with . 2 steps to get inside. Prior Level of Function/Work/Activity: Works at [employer]. 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 Job does require lifting. Would like to get back to walking and walking dogs. Current Medications:       Current Outpatient Prescriptions:     magnesium hydroxide (BEAUCHAMP MILK OF MAGNESIA) 400 mg/5 mL suspension, Take 15 mL by mouth as needed for Constipation. as needed daily, Disp: , Rfl:     temazepam (RESTORIL) 15 mg capsule, Take 15 mg by mouth nightly as needed for Sleep., Disp: , Rfl:     promethazine (PHENERGAN) 25 mg tablet, Take 25 mg by mouth every eight (8) hours as needed for Nausea., Disp: , Rfl:     HYDROmorphone (DILAUDID) 2 mg tablet, Take 2-4 mg by mouth as needed for Pain. as needed every 4-6 hours, Disp: , Rfl:     atorvastatin (LIPITOR) 20 mg tablet, Take 20 mg by mouth nightly., Disp: , Rfl:     lisinopril-hydroCHLOROthiazide (PRINZIDE, ZESTORETIC) 10-12.5 mg per tablet, Take  by mouth daily. Indications: hypertension, Disp: , Rfl:     gemfibrozil (LOPID) 600 mg tablet, Take 600 mg by mouth two (2) times a day., Disp: , Rfl:     Omega-3 Fatty Acids 60- mg cpDR, Take  by mouth daily. , Disp: , Rfl:     acetaminophen (TYLENOL) 325 mg tablet, Take 325 mg by mouth every four (4) hours as needed for Pain., Disp: , Rfl:    Date Last Reviewed:  3-30-18   Number of Personal Factors/Comorbidities that affect the Plan of Care: 1-2: MODERATE COMPLEXITY   EXAMINATION:     Observation/Orthostatic Postural Assessment:  Pt arrived to therapy with no assistive device with brace on the R knee. Palpation: no tenderness around incisions     ROM:                Strength:                   Mobility: n/t  Balance:n/t        Body Structures Involved:  1. Joints  2. Muscles  3. Ligaments Body Functions Affected:  1. Neuromusculoskeletal Activities and Participation Affected:  1. Mobility  2.  Community, Social and [place]   Number of elements (examined above) that affect the Plan of Care: 4+: HIGH COMPLEXITY   CLINICAL PRESENTATION:   Presentation: Evolving clinical presentation with changing clinical characteristics: MODERATE COMPLEXITY   CLINICAL DECISION MAKING:   Outcome Measure: Tool Used: Lower Extremity Functional Scale (LEFS)  Score:  Initial: 5/80 Most Recent: X/80 (Date: -- )   Interpretation of Score: 20 questions each scored on a 5 point scale with 0 representing \"extreme difficulty or unable to perform\" and 4 representing \"no difficulty\". The lower the score, the greater the functional disability. 80/80 represents no disability. Minimal detectable change is 9 points. Score 80 79-63 62-48 47-32 31-16 15-1 0   Modifier CH CI CJ CK CL CM CN     Medical Necessity:   · Patient is expected to demonstrate progress in strength, range of motion and balance to improve gait. Reason for Services/Other Comments:  · Patient continues to require skilled intervention due to post-op R knee, decreased ROM, strength, gait. Use of outcome tool(s) and clinical judgement create a POC that gives a: Questionable prediction of patient's progress: MODERATE COMPLEXITY            TREATMENT:   (In addition to Assessment/Re-Assessment sessions the following treatments were rendered)  Pre-treatment Symptoms/Complaints: Pt reports she has been more sore the past week and thinks it is from walking more at home. Pain: Initial:  5/10 Post Session:  5/10. Therapeutic Exercise (38 Minutes): For improved muscle activation, gait, and knee ROM. Knee flexion ROM with heel slides with overpressure at end range 3x10. Short arc quads 3# 3x10, Seated long arc quad 3# 2x10, and seated hamstring curls green 2x10. Ankle PF and ankle eversion with red t-band 2x10 ea. Added open chain exercises today with SLR 2x5, side lying hip abduction 1#2x5, side lying clam shell 2x8  Also added bridges 2x5 with moderate verbal cueing to push through the R LE to lift hips in the air. HEP: she is to work on quad sets, ankle pumps, and walking putting the R foot on the ground.  She is to work on sitting and standing and adjusting her foot position to limit rearfoot varus. Treatment/Session Assessment:    · Response to Treatment: Added open chain exercises today and they were hard for patient to perform with complaints of knee pain. She had a lot of difficulty with bridging and putting weight through R LE to lift hips. · Compliance with Program/Exercises:  Appears complaint   · Recommendations/Intent for next treatment session: \"Next visit will focus on gait, knee ROM\".  LE strengthening as able  Total Treatment Duration: 38 Minutes  PT Patient Time In/Time Out  Time In: 4335  Time Out: Jw [place]burn

## 2018-04-05 ENCOUNTER — HOSPITAL ENCOUNTER (OUTPATIENT)
Dept: PHYSICAL THERAPY | Age: 53
Discharge: HOME | End: 2018-04-05
Payer: COMMERCIAL

## 2018-04-05 PROCEDURE — 97110 THERAPEUTIC EXERCISES: CPT

## 2018-04-05 NOTE — PROGRESS NOTES
Kenneth Aguillon  : 1965  Payor: [address] [place] [address] Road / Plan: Danita Gallegos / Product Type: Workers Comp /  [address]  at [facility] [place] [facility]  [address] [place] [address], [address] [place].  [phone]   [phone]       OUTPATIENT PHYSICAL [address] Note 2018      ICD-10: Treatment Diagnosis: Sprain of posterior cruciate ligament of right knee, sequela (S83.521S); Pain in right knee (M25.561); Stiffness of right knee, not elsewhere classified (M25.661); Difficulty in walking, not elsewhere classified (R26.2)  Precautions/Allergies:   Review of patient's allergies indicates no known allergies. Fall Risk Score: 1 (? 5 = High Risk)  MD Orders:Evaluate and treat, HEP, ROM, strengthening MEDICAL/REFERRING DIAGNOSIS:  S/p R knee scope, PCL reconstruction   DATE OF ONSET: Surgery 17  REFERRING PHYSICIAN: Kian Tracy MD  RETURN PHYSICIAN APPOINTMENT: 18     INITIAL ASSESSMENT:  Ms. Gurmeet Merino presents s/p right knee scope with PCL reconstruction. She continues to slowly progress with R knee ROM. She is able to ambulate short distances with no assistive device with increased weight on R LE. She does continue with whole R LE weakness limiting mobility. She will benefit from continuing skilled physical therapy to continue to progress functional mobility. PROBLEM LIST (Impacting functional limitations):  1. Post-op pain and swelling right knee  2. Decreased ROM right knee   3. Decreased functional strength right knee/LE   4. Decreased gait skills INTERVENTIONS PLANNED:  1. aquatic therapy  2. Thermal and electric modalities, manual therapies for pain. 3. Manual therapies and therapeutic exercises for ROM and strength. 4. Therapeutic exercises for gait and balance   TREATMENT PLAN:  Effective Dates: 18 TO 18.   Frequency/Duration: 3 times a week for 12 weeks  GOALS: (Goals have been discussed and agreed upon with patient.)   Short-Term Functional Goals: Time Frame: 6 weeks  1. Pt will be able to perform a good quad set and SLR with no extensor lag with for improved strength for gait. Progressing towards  2. Pt will increase R knee ROM to 0-90 for improved mobility. GOAL MET  3. Pt will improved R ankle DF AROM to 0 for improved gait. GOAL MET  4. Pt will report pain 5/10 with modified daily activities. GOAL MET  Discharge Goals: Time Frame: 12 weeks   1. Pt will increase R LE strength to 5/5 with manual muscle testing for improved strength for ADLs and work. Progressing towards  2. Pt will negotiate 1 flight of stairs with step over step with good control. Progressing towards  3. Pt will increase R knee ROM to 0-120 for mobility. Progressing towards  4. Pt will report pain 3/10 with daily activities. Progressing towards  5. Pt will score 45/80 on LEFS. Not tested  Rehabilitation Potential For Stated Goals: Good  Regarding Perez  therapy, I certify that the treatment plan above will be carried out by a therapist or under their direction. Thank you for this referral,    Allie Hood, PT                    HISTORY:   History of Present Injury/Illness (Reason for Referral): She works at [employer] and she was entering the cooler and there was oil on the floor. She slipped and the feet slipped out from under her. She tried to get up and slipped again. Injury was August 14-17. She did go to physical therapy but it was making her worse. They decided to have surgery. Surgery 12-21-17. Stayed 2 nights in the hospital. She did have home health physical therapy 3-4x. Pain increases with walking. Past Medical History/Comorbidities: diabetes type 2 controlled with diet, HTN, L LE varicose vein surgery  Social History/Living Environment: Lives with . 2 steps to get inside. Prior Level of Function/Work/Activity: Works at [employer]. 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 Job does require lifting. Would like to get back to walking and walking dogs. Current Medications:       Current Outpatient Prescriptions:     magnesium hydroxide (BEAUCHAMP MILK OF MAGNESIA) 400 mg/5 mL suspension, Take 15 mL by mouth as needed for Constipation. as needed daily, Disp: , Rfl:     temazepam (RESTORIL) 15 mg capsule, Take 15 mg by mouth nightly as needed for Sleep., Disp: , Rfl:     promethazine (PHENERGAN) 25 mg tablet, Take 25 mg by mouth every eight (8) hours as needed for Nausea., Disp: , Rfl:     HYDROmorphone (DILAUDID) 2 mg tablet, Take 2-4 mg by mouth as needed for Pain. as needed every 4-6 hours, Disp: , Rfl:     atorvastatin (LIPITOR) 20 mg tablet, Take 20 mg by mouth nightly., Disp: , Rfl:     lisinopril-hydroCHLOROthiazide (PRINZIDE, ZESTORETIC) 10-12.5 mg per tablet, Take  by mouth daily. Indications: hypertension, Disp: , Rfl:     gemfibrozil (LOPID) 600 mg tablet, Take 600 mg by mouth two (2) times a day., Disp: , Rfl:     Omega-3 Fatty Acids 60- mg cpDR, Take  by mouth daily. , Disp: , Rfl:     acetaminophen (TYLENOL) 325 mg tablet, Take 325 mg by mouth every four (4) hours as needed for Pain., Disp: , Rfl:    Date Last Reviewed:  3-30-18   Number of Personal Factors/Comorbidities that affect the Plan of Care: 1-2: MODERATE COMPLEXITY   EXAMINATION:     Observation/Orthostatic Postural Assessment:  Pt arrived to therapy with no assistive device with brace on the R knee. Palpation: no tenderness around incisions     ROM:                Strength:                   Mobility: n/t  Balance:n/t        Body Structures Involved:  1. Joints  2. Muscles  3. Ligaments Body Functions Affected:  1. Neuromusculoskeletal Activities and Participation Affected:  1. Mobility  2.  Community, Social and [place]   Number of elements (examined above) that affect the Plan of Care: 4+: HIGH COMPLEXITY   CLINICAL PRESENTATION:   Presentation: Evolving clinical presentation with changing clinical characteristics: MODERATE COMPLEXITY   CLINICAL DECISION MAKING:   Outcome Measure: Tool Used: Lower Extremity Functional Scale (LEFS)  Score:  Initial: 5/80 Most Recent: X/80 (Date: -- )   Interpretation of Score: 20 questions each scored on a 5 point scale with 0 representing \"extreme difficulty or unable to perform\" and 4 representing \"no difficulty\". The lower the score, the greater the functional disability. 80/80 represents no disability. Minimal detectable change is 9 points. Score 80 79-63 62-48 47-32 31-16 15-1 0   Modifier CH CI CJ CK CL CM CN     Medical Necessity:   · Patient is expected to demonstrate progress in strength, range of motion and balance to improve gait. Reason for Services/Other Comments:  · Patient continues to require skilled intervention due to post-op R knee, decreased ROM, strength, gait. Use of outcome tool(s) and clinical judgement create a POC that gives a: Questionable prediction of patient's progress: MODERATE COMPLEXITY            TREATMENT:   (In addition to Assessment/Re-Assessment sessions the following treatments were rendered)  Pre-treatment Symptoms/Complaints: Pt reports she has not done anything today so her knee is   Pain: Initial:  5/10 Post Session:  5/10. Therapeutic Exercise (40 Minutes): For improved muscle activation, gait, and knee ROM. Knee flexion ROM with heel slides with overpressure at end range 3x10. Short arc quads 3# 3x10, Seated long arc quad 3# 2x10, and seated hamstring curls green 2x10. Ankle PF and ankle eversion with red t-band 2x10 ea. Shuttle press 12# 2x15, toe taps on 4\"step 2x10, step ups on 4\" step 2x10  Walking with standard cane with visual and verbal cues for foot and knee placement. HEP: she is to work on quad sets, ankle pumps, and walking putting the R foot on the ground. She is to work on sitting and standing and adjusting her foot position to limit rearfoot varus.      Treatment/Session Assessment:    · Response to Treatment: Improved ankle/foot alignment with gait. · Compliance with Program/Exercises:  Appears complaint   · Recommendations/Intent for next treatment session: \"Next visit will focus on gait, knee ROM\".  LE strengthening as able  Total Treatment Duration: 40 Minutes  PT Patient Time In/Time Out  Time In: 1345  Time Out: [address]

## 2018-04-09 ENCOUNTER — HOSPITAL ENCOUNTER (OUTPATIENT)
Dept: PHYSICAL THERAPY | Age: 53
Discharge: HOME | End: 2018-04-09
Payer: COMMERCIAL

## 2018-04-09 PROCEDURE — 97110 THERAPEUTIC EXERCISES: CPT

## 2018-04-09 NOTE — PROGRESS NOTES
Kaylan Blount  : 1965  Payor: [address] [place] [address] Road / Plan: Kalin Ibrahim / Product Type: Workers Comp /  [address]  at [facility] [place] [employer]  [address] [place] [address], [address], [address] [place] [address]  [phone]   [phone]       OUTPATIENT PHYSICAL THERAPY:Daily Note 2018      ICD-10: Treatment Diagnosis: Sprain of posterior cruciate ligament of right knee, sequela (S83.521S); Pain in right knee (M25.561); Stiffness of right knee, not elsewhere classified (M25.661); Difficulty in walking, not elsewhere classified (R26.2)  Precautions/Allergies:   Review of patient's allergies indicates no known allergies. Fall Risk Score: 1 (? 5 = High Risk)  MD Orders:Evaluate and treat, HEP, ROM, strengthening MEDICAL/REFERRING DIAGNOSIS:  S/p R knee scope, PCL reconstruction   DATE OF ONSET: Surgery 17  REFERRING PHYSICIAN: Katalina Holley MD  RETURN PHYSICIAN APPOINTMENT: 18     INITIAL ASSESSMENT:  Ms. Elyssa Ann presents s/p right knee scope with PCL reconstruction. She continues to slowly progress with R knee ROM. She is able to ambulate short distances with no assistive device with increased weight on R LE. She does continue with whole R LE weakness limiting mobility. She will benefit from continuing skilled physical therapy to continue to progress functional mobility. PROBLEM LIST (Impacting functional limitations):  1. Post-op pain and swelling right knee  2. Decreased ROM right knee   3. Decreased functional strength right knee/LE   4. Decreased gait skills INTERVENTIONS PLANNED:  1. aquatic therapy  2. Thermal and electric modalities, manual therapies for pain. 3. Manual therapies and therapeutic exercises for ROM and strength. 4. Therapeutic exercises for gait and balance   TREATMENT PLAN:  Effective Dates: 18 TO 18.   Frequency/Duration: 3 times a week for 12 weeks  GOALS: (Goals have been discussed and agreed upon with patient.)   Short-Term Functional Goals: Time Frame: 6 weeks  1. Pt will be able to perform a good quad set and SLR with no extensor lag with for improved strength for gait. Progressing towards  2. Pt will increase R knee ROM to 0-90 for improved mobility. GOAL MET  3. Pt will improved R ankle DF AROM to 0 for improved gait. GOAL MET  4. Pt will report pain 5/10 with modified daily activities. GOAL MET  Discharge Goals: Time Frame: 12 weeks   1. Pt will increase R LE strength to 5/5 with manual muscle testing for improved strength for ADLs and work. Progressing towards  2. Pt will negotiate 1 flight of stairs with step over step with good control. Progressing towards  3. Pt will increase R knee ROM to 0-120 for mobility. Progressing towards  4. Pt will report pain 3/10 with daily activities. Progressing towards  5. Pt will score 45/80 on LEFS. Not tested  Rehabilitation Potential For Stated Goals: Good  Regarding Perez  therapy, I certify that the treatment plan above will be carried out by a therapist or under their direction. Thank you for this referral,    Allie Romero, PT                    HISTORY:   History of Present Injury/Illness (Reason for Referral): She works at [employer] and she was entering the cooler and there was oil on the floor. She slipped and the feet slipped out from under her. She tried to get up and slipped again. Injury was August 14-17. She did go to physical therapy but it was making her worse. They decided to have surgery. Surgery 12-21-17. Stayed 2 nights in the hospital. She did have home health physical therapy 3-4x. Pain increases with walking. Past Medical History/Comorbidities: diabetes type 2 controlled with diet, HTN, L LE varicose vein surgery  Social History/Living Environment: Lives with . 2 steps to get inside. Prior Level of Function/Work/Activity: Works at [employer]. 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 Job does require lifting. Would like to get back to walking and walking dogs. Current Medications:       Current Outpatient Prescriptions:     magnesium hydroxide (BEAUCHAMP MILK OF MAGNESIA) 400 mg/5 mL suspension, Take 15 mL by mouth as needed for Constipation. as needed daily, Disp: , Rfl:     temazepam (RESTORIL) 15 mg capsule, Take 15 mg by mouth nightly as needed for Sleep., Disp: , Rfl:     promethazine (PHENERGAN) 25 mg tablet, Take 25 mg by mouth every eight (8) hours as needed for Nausea., Disp: , Rfl:     HYDROmorphone (DILAUDID) 2 mg tablet, Take 2-4 mg by mouth as needed for Pain. as needed every 4-6 hours, Disp: , Rfl:     atorvastatin (LIPITOR) 20 mg tablet, Take 20 mg by mouth nightly., Disp: , Rfl:     lisinopril-hydroCHLOROthiazide (PRINZIDE, ZESTORETIC) 10-12.5 mg per tablet, Take  by mouth daily. Indications: hypertension, Disp: , Rfl:     gemfibrozil (LOPID) 600 mg tablet, Take 600 mg by mouth two (2) times a day., Disp: , Rfl:     Omega-3 Fatty Acids 60- mg cpDR, Take  by mouth daily. , Disp: , Rfl:     acetaminophen (TYLENOL) 325 mg tablet, Take 325 mg by mouth every four (4) hours as needed for Pain., Disp: , Rfl:    Date Last Reviewed:  3-30-18   Number of Personal Factors/Comorbidities that affect the Plan of Care: 1-2: MODERATE COMPLEXITY   EXAMINATION:     Observation/Orthostatic Postural Assessment:  Pt arrived to therapy with no assistive device with brace on the R knee. Palpation: no tenderness around incisions     ROM:                Strength:                   Mobility: n/t  Balance:n/t        Body Structures Involved:  1. Joints  2. Muscles  3. Ligaments Body Functions Affected:  1. Neuromusculoskeletal Activities and Participation Affected:  1. Mobility  2.  Community, Social and [place]   Number of elements (examined above) that affect the Plan of Care: 4+: HIGH COMPLEXITY   CLINICAL PRESENTATION:   Presentation: Evolving clinical presentation with changing clinical characteristics: MODERATE COMPLEXITY   CLINICAL DECISION MAKING:   Outcome Measure: Tool Used: Lower Extremity Functional Scale (LEFS)  Score:  Initial: 5/80 Most Recent: X/80 (Date: -- )   Interpretation of Score: 20 questions each scored on a 5 point scale with 0 representing \"extreme difficulty or unable to perform\" and 4 representing \"no difficulty\". The lower the score, the greater the functional disability. 80/80 represents no disability. Minimal detectable change is 9 points. Score 80 79-63 62-48 47-32 31-16 15-1 0   Modifier CH CI CJ CK CL CM CN     Medical Necessity:   · Patient is expected to demonstrate progress in strength, range of motion and balance to improve gait. Reason for Services/Other Comments:  · Patient continues to require skilled intervention due to post-op R knee, decreased ROM, strength, gait. Use of outcome tool(s) and clinical judgement create a POC that gives a: Questionable prediction of patient's progress: MODERATE COMPLEXITY            TREATMENT:   (In addition to Assessment/Re-Assessment sessions the following treatments were rendered)  Pre-treatment Symptoms/Complaints: Pt reports her knee is sore today. Pain: Initial:  5/10 Post Session:  5/10. Therapeutic Exercise (40 Minutes): For improved muscle activation, gait, and knee ROM. Knee flexion ROM with heel slides with overpressure at end range 3x10. Short arc quads 4# 3x10, Seated long arc quad 4# 2x10, and seated hamstring curls green 2x10. Ankle PF and ankle eversion with red t-band 2x10 ea. Bridges supine 2x10 with verbal cues to put weight through R LE. Kinesio tape applied around patella for support during exercises. Shuttle press 12# 2x15 25# 5 reps, toe taps on 4\"step 2x10, step ups on 4\" step 2x10  Walking with standard cane with visual and verbal cues for foot and knee placement. Walking forward and backward 10 ft x3, side stepping 10 ft x3 with no assistive device.      HEP: she is to work on quad sets, ankle pumps, and walking putting the R foot on the ground. She is to work on sitting and standing and adjusting her foot position to limit rearfoot varus. Treatment/Session Assessment:    · Response to Treatment:  She seems to be slowly progressing with LE strength. · Compliance with Program/Exercises:  Appears complaint   · Recommendations/Intent for next treatment session: \"Next visit will focus on gait, knee ROM\".  LE strengthening as able  Total Treatment Duration: 40 Minutes  PT Patient Time In/Time Out  Time In: 1115  Time Out: [facility] JORGE Sosa

## 2018-04-11 ENCOUNTER — HOSPITAL ENCOUNTER (OUTPATIENT)
Dept: PHYSICAL THERAPY | Age: 53
Discharge: HOME | End: 2018-04-11
Payer: COMMERCIAL

## 2018-04-11 PROCEDURE — 97140 MANUAL THERAPY 1/> REGIONS: CPT

## 2018-04-11 PROCEDURE — 97110 THERAPEUTIC EXERCISES: CPT

## 2018-04-11 PROCEDURE — 97116 GAIT TRAINING THERAPY: CPT

## 2018-04-11 NOTE — PROGRESS NOTES
Kenneth Aguillon  : 1965  Payor: Evan Garcia / Plan: Danita Gallegos / Product Type: Workers Comp /  [address]  at [facility] [place] [employer]  [address] [place] [address], [address].  [phone]   [phone]       OUTPATIENT PHYSICAL THERAPY:Daily Note 2018      ICD-10: Treatment Diagnosis: Sprain of posterior cruciate ligament of right knee, sequela (S83.521S); Pain in right knee (M25.561); Stiffness of right knee, not elsewhere classified (M25.661); Difficulty in walking, not elsewhere classified (R26.2)  Precautions/Allergies:   Review of patient's allergies indicates no known allergies. Fall Risk Score: 1 (? 5 = High Risk)  MD Orders:Evaluate and treat, HEP, ROM, strengthening MEDICAL/REFERRING DIAGNOSIS:  S/p R knee scope, PCL reconstruction   DATE OF ONSET: Surgery 17  REFERRING PHYSICIAN: Kian Tracy MD  RETURN PHYSICIAN APPOINTMENT: 18     INITIAL ASSESSMENT:  Ms. Gurmeet Merino presents s/p right knee scope with PCL reconstruction. She continues to slowly progress with R knee ROM. She is able to ambulate short distances with no assistive device with increased weight on R LE. She does continue with whole R LE weakness limiting mobility. She will benefit from continuing skilled physical therapy to continue to progress functional mobility. PROBLEM LIST (Impacting functional limitations):  1. Post-op pain and swelling right knee  2. Decreased ROM right knee   3. Decreased functional strength right knee/LE   4. Decreased gait skills INTERVENTIONS PLANNED:  1. aquatic therapy  2. Thermal and electric modalities, manual therapies for pain. 3. Manual therapies and therapeutic exercises for ROM and strength. 4. Therapeutic exercises for gait and balance   TREATMENT PLAN:  Effective Dates: 18 TO 18.   Frequency/Duration: 3 times a week for 12 weeks  GOALS: (Goals have been discussed and agreed upon with patient.)   Short-Term Functional Goals: Time Frame: 6 weeks  1. Pt will be able to perform a good quad set and SLR with no extensor lag with for improved strength for gait. Progressing towards  2. Pt will increase R knee ROM to 0-90 for improved mobility. GOAL MET  3. Pt will improved R ankle DF AROM to 0 for improved gait. GOAL MET  4. Pt will report pain 5/10 with modified daily activities. GOAL MET  Discharge Goals: Time Frame: 12 weeks   1. Pt will increase R LE strength to 5/5 with manual muscle testing for improved strength for ADLs and work. Progressing towards  2. Pt will negotiate 1 flight of stairs with step over step with good control. Progressing towards  3. Pt will increase R knee ROM to 0-120 for mobility. Progressing towards  4. Pt will report pain 3/10 with daily activities. Progressing towards  5. Pt will score 45/80 on LEFS. Not tested  Rehabilitation Potential For Stated Goals: Good  Regarding Perez  therapy, I certify that the treatment plan above will be carried out by a therapist or under their direction. Thank you for this referral,    Allie Hood, PT                    HISTORY:   History of Present Injury/Illness (Reason for Referral): She works at [employer] and she was entering the cooler and there was oil on the floor. She slipped and the feet slipped out from under her. She tried to get up and slipped again. Injury was August 14-17. She did go to physical therapy but it was making her worse. They decided to have surgery. Surgery 12-21-17. Stayed 2 nights in the hospital. She did have home health physical therapy 3-4x. Pain increases with walking. Past Medical History/Comorbidities: diabetes type 2 controlled with diet, HTN, L LE varicose vein surgery  Social History/Living Environment: Lives with . 2 steps to get inside. Prior Level of Function/Work/Activity: Works at [employer]. 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 Job does require lifting. Would like to get back to walking and walking dogs. Current Medications:       Current Outpatient Prescriptions:     magnesium hydroxide (BEAUCHAMP MILK OF MAGNESIA) 400 mg/5 mL suspension, Take 15 mL by mouth as needed for Constipation. as needed daily, Disp: , Rfl:     temazepam (RESTORIL) 15 mg capsule, Take 15 mg by mouth nightly as needed for Sleep., Disp: , Rfl:     promethazine (PHENERGAN) 25 mg tablet, Take 25 mg by mouth every eight (8) hours as needed for Nausea., Disp: , Rfl:     HYDROmorphone (DILAUDID) 2 mg tablet, Take 2-4 mg by mouth as needed for Pain. as needed every 4-6 hours, Disp: , Rfl:     atorvastatin (LIPITOR) 20 mg tablet, Take 20 mg by mouth nightly., Disp: , Rfl:     lisinopril-hydroCHLOROthiazide (PRINZIDE, ZESTORETIC) 10-12.5 mg per tablet, Take  by mouth daily. Indications: hypertension, Disp: , Rfl:     gemfibrozil (LOPID) 600 mg tablet, Take 600 mg by mouth two (2) times a day., Disp: , Rfl:     Omega-3 Fatty Acids 60- mg cpDR, Take  by mouth daily. , Disp: , Rfl:     acetaminophen (TYLENOL) 325 mg tablet, Take 325 mg by mouth every four (4) hours as needed for Pain., Disp: , Rfl:    Date Last Reviewed:  4-11-18   Number of Personal Factors/Comorbidities that affect the Plan of Care: 1-2: MODERATE COMPLEXITY   EXAMINATION:     Observation/Orthostatic Postural Assessment:  Pt arrived to therapy with no assistive device with brace on the R knee. Palpation: no tenderness around incisions     ROM:                Strength:                   Mobility: n/t  Balance:n/t        Body Structures Involved:  1. Joints  2. Muscles  3. Ligaments Body Functions Affected:  1. Neuromusculoskeletal Activities and Participation Affected:  1. Mobility  2.  Community, Social and [place]   Number of elements (examined above) that affect the Plan of Care: 4+: HIGH COMPLEXITY   CLINICAL PRESENTATION:   Presentation: Evolving clinical presentation with changing clinical characteristics: MODERATE COMPLEXITY   CLINICAL DECISION MAKING:   Outcome Measure: Tool Used: Lower Extremity Functional Scale (LEFS)  Score:  Initial: 5/80 Most Recent: X/80 (Date: -- )   Interpretation of Score: 20 questions each scored on a 5 point scale with 0 representing \"extreme difficulty or unable to perform\" and 4 representing \"no difficulty\". The lower the score, the greater the functional disability. 80/80 represents no disability. Minimal detectable change is 9 points. Score 80 79-63 62-48 47-32 31-16 15-1 0   Modifier CH CI CJ CK CL CM CN     Medical Necessity:   · Patient is expected to demonstrate progress in strength, range of motion and balance to improve gait. Reason for Services/Other Comments:  · Patient continues to require skilled intervention due to post-op R knee, decreased ROM, strength, gait. Use of outcome tool(s) and clinical judgement create a POC that gives a: Questionable prediction of patient's progress: MODERATE COMPLEXITY            TREATMENT:   (In addition to Assessment/Re-Assessment sessions the following treatments were rendered)  Pre-treatment Symptoms/Complaints: Pt reports she laid in bed all day yesterday and her knee is feeling better today. Pain: Initial:  5/10 Post Session:  5/10. Therapeutic Exercise (25 Minutes): For improved muscle activation, gait, and knee ROM. Knee flexion ROM with heel slides with overpressure at end range 3x10. Short arc quads 4# 3x10, Seated long arc quad 4# 2x10, and seated hamstring curls green 2x10. Ankle PF and ankle eversion with red t-band 2x10 ea. Sit to stand 2x10 reps. Gait Training ( 15 minutes):  Gait training to improve and/or restore physical functioning as related to mobility. Ambulated   with modified independence and moderate visual and verbal cues related to their stance phase and ankle position and motionto promote proper body alignment and promote proper body posture.  Practiced gait with standard cane with visual and verbal cues for foot placement during stance phase. Multiple rest breaks in between gait. HEP: she is to work on quad sets, ankle pumps, and walking putting the R foot on the ground. She is to work on sitting and standing and adjusting her foot position to limit rearfoot varus. Treatment/Session Assessment:    · Response to Treatment:  Gait seems to be slowly improving, improved foot and ankle placement after cueing and practice. · Compliance with Program/Exercises:  Appears complaint   · Recommendations/Intent for next treatment session: \"Next visit will focus on gait, knee ROM\".  LE strengthening as able  Total Treatment Duration: 40 Minutes  PT Patient Time In/Time Out  Time In: 0857  Time Out: [address] [place] Tori, PT

## 2018-04-12 ENCOUNTER — HOSPITAL ENCOUNTER (OUTPATIENT)
Dept: PHYSICAL THERAPY | Age: 53
Discharge: HOME | End: 2018-04-12
Payer: COMMERCIAL

## 2018-04-12 PROCEDURE — 97110 THERAPEUTIC EXERCISES: CPT

## 2018-04-12 PROCEDURE — 97116 GAIT TRAINING THERAPY: CPT

## 2018-04-12 NOTE — PROGRESS NOTES
Elder Moe  : 1965  Payor: [address] [place] [address] Road / Plan: Mardee Odor / Product Type: Workers Comp /  [address]  at [facility] [place] [employer]  [address] [place] [address], [address].  [phone]   [phone]       OUTPATIENT PHYSICAL THERAPY:Daily Note 2018      ICD-10: Treatment Diagnosis: Sprain of posterior cruciate ligament of right knee, sequela (S83.521S); Pain in right knee (M25.561); Stiffness of right knee, not elsewhere classified (M25.661); Difficulty in walking, not elsewhere classified (R26.2)  Precautions/Allergies:   Review of patient's allergies indicates no known allergies. Fall Risk Score: 1 (? 5 = High Risk)  MD Orders:Evaluate and treat, HEP, ROM, strengthening MEDICAL/REFERRING DIAGNOSIS:  S/p R knee scope, PCL reconstruction   DATE OF ONSET: Surgery 17  REFERRING PHYSICIAN: Denise Arnold MD  RETURN PHYSICIAN APPOINTMENT: 18     INITIAL ASSESSMENT:  Ms. Carlin Villarreal presents s/p right knee scope with PCL reconstruction. She continues to slowly progress with R knee ROM. She is able to ambulate short distances with no assistive device with increased weight on R LE. She does continue with whole R LE weakness limiting mobility. She will benefit from continuing skilled physical therapy to continue to progress functional mobility. PROBLEM LIST (Impacting functional limitations):  1. Post-op pain and swelling right knee  2. Decreased ROM right knee   3. Decreased functional strength right knee/LE   4. Decreased gait skills INTERVENTIONS PLANNED:  1. aquatic therapy  2. Thermal and electric modalities, manual therapies for pain. 3. Manual therapies and therapeutic exercises for ROM and strength. 4. Therapeutic exercises for gait and balance   TREATMENT PLAN:  Effective Dates: 18 TO 18.   Frequency/Duration: 3 times a week for 12 weeks  GOALS: (Goals have been discussed and agreed upon with patient.)   Short-Term Functional Goals: Time Frame: 6 weeks  1. Pt will be able to perform a good quad set and SLR with no extensor lag with for improved strength for gait. Progressing towards  2. Pt will increase R knee ROM to 0-90 for improved mobility. GOAL MET  3. Pt will improved R ankle DF AROM to 0 for improved gait. GOAL MET  4. Pt will report pain 5/10 with modified daily activities. GOAL MET  Discharge Goals: Time Frame: 12 weeks   1. Pt will increase R LE strength to 5/5 with manual muscle testing for improved strength for ADLs and work. Progressing towards  2. Pt will negotiate 1 flight of stairs with step over step with good control. Progressing towards  3. Pt will increase R knee ROM to 0-120 for mobility. Progressing towards  4. Pt will report pain 3/10 with daily activities. Progressing towards  5. Pt will score 45/80 on LEFS. Not tested  Rehabilitation Potential For Stated Goals: Good  Regarding [facility] [address] therapy, I certify that the treatment plan above will be carried out by a therapist or under their direction. Thank you for this referral,    Jeannie Fernandes, PT                    HISTORY:   History of Present Injury/Illness (Reason for Referral): She works at "[employer] and she was entering the cooler and there was oil on the floor. She slipped and the feet slipped out from under her. She tried to get up and slipped again. Injury was August 14-17. She did go to physical therapy but it was making her worse. They decided to have surgery. Surgery 12-21-17. Stayed 2 nights in the hospital. She did have home health physical therapy 3-4x. Pain increases with walking. Past Medical History/Comorbidities: diabetes type 2 controlled with diet, HTN, L LE varicose vein surgery  Social History/Living Environment: Lives with . 2 steps to get inside. Prior Level of Function/Work/Activity: Works at "[employer] 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 Job does require lifting.    Would like to get back to walking and walking dogs. Current Medications:       Current Outpatient Prescriptions:     magnesium hydroxide (BEAUCHAMP MILK OF MAGNESIA) 400 mg/5 mL suspension, Take 15 mL by mouth as needed for Constipation. as needed daily, Disp: , Rfl:     temazepam (RESTORIL) 15 mg capsule, Take 15 mg by mouth nightly as needed for Sleep., Disp: , Rfl:     promethazine (PHENERGAN) 25 mg tablet, Take 25 mg by mouth every eight (8) hours as needed for Nausea., Disp: , Rfl:     HYDROmorphone (DILAUDID) 2 mg tablet, Take 2-4 mg by mouth as needed for Pain. as needed every 4-6 hours, Disp: , Rfl:     atorvastatin (LIPITOR) 20 mg tablet, Take 20 mg by mouth nightly., Disp: , Rfl:     lisinopril-hydroCHLOROthiazide (PRINZIDE, ZESTORETIC) 10-12.5 mg per tablet, Take  by mouth daily. Indications: hypertension, Disp: , Rfl:     gemfibrozil (LOPID) 600 mg tablet, Take 600 mg by mouth two (2) times a day., Disp: , Rfl:     Omega-3 Fatty Acids 60- mg cpDR, Take  by mouth daily. , Disp: , Rfl:     acetaminophen (TYLENOL) 325 mg tablet, Take 325 mg by mouth every four (4) hours as needed for Pain., Disp: , Rfl:    Date Last Reviewed:  4-11-18   Number of Personal Factors/Comorbidities that affect the Plan of Care: 1-2: MODERATE COMPLEXITY   EXAMINATION:     Observation/Orthostatic Postural Assessment:  Pt arrived to therapy with no assistive device with brace on the R knee. Palpation: no tenderness around incisions     ROM:                Strength:                   Mobility: n/t  Balance:n/t        Body Structures Involved:  1. Joints  2. Muscles  3. Ligaments Body Functions Affected:  1. Neuromusculoskeletal Activities and Participation Affected:  1. Mobility  2.  Community, Social and [place]   Number of elements (examined above) that affect the Plan of Care: 4+: HIGH COMPLEXITY   CLINICAL PRESENTATION:   Presentation: Evolving clinical presentation with changing clinical characteristics: MODERATE COMPLEXITY   CLINICAL DECISION MAKING:   Outcome Measure: Tool Used: Lower Extremity Functional Scale (LEFS)  Score:  Initial: 5/80 Most Recent: X/80 (Date: -- )   Interpretation of Score: 20 questions each scored on a 5 point scale with 0 representing \"extreme difficulty or unable to perform\" and 4 representing \"no difficulty\". The lower the score, the greater the functional disability. 80/80 represents no disability. Minimal detectable change is 9 points. Score 80 79-63 62-48 47-32 31-16 15-1 0   Modifier CH CI CJ CK CL CM CN     Medical Necessity:   · Patient is expected to demonstrate progress in strength, range of motion and balance to improve gait. Reason for Services/Other Comments:  · Patient continues to require skilled intervention due to post-op R knee, decreased ROM, strength, gait. Use of outcome tool(s) and clinical judgement create a POC that gives a: Questionable prediction of patient's progress: MODERATE COMPLEXITY            TREATMENT:   (In addition to Assessment/Re-Assessment sessions the following treatments were rendered)  Pre-treatment Symptoms/Complaints: Pt reports her knee is feeling pretty good today. Hurts a little. Reports that her knee feels good when walking with the brace. Pain: Initial:  5/10 Post Session:  5/10. [employer] representative brought brace to patient during PT treatment and instructed patient on it's fit and proper use. Pt ambulated in brace per fitting with representative. Procedure required 15 minutes of scheduled treatment time. Therapeutic Exercise ( 18 minutes): For improved muscle activation, gait, and knee ROM. Heel slides performed in supine (3 x 10) with manual overpressure at end of ROM for improved R knee ROM. Short arc quads (4# 3 x 10) with min A, and long arc quads (4# 3 x 10) performed for improved R quadriceps strength. Seated hamstring curls (green, 2 x 12) performed for improved R LE strength and knee flexion ROM.  Sit to stands (2 x 10) performed with verbal cues to put weight through R LE. Gait Training ( 27 minutes):  Gait training to improve and/or restore physical functioning as related to mobility. Pre-gait activity with stepping with L LE while in weightbearing on R LE in brace. Tactile cues to provide support and facilitate extension and flexion when stepping with L LE. Multiple rest breaks in between gait. Weight shifts in standing while in brace on R LE to improve weightbearing through R LE. Ambulated with modified independence using single point cane while in brace for 50' x 4, 200' x 1. Verbal cues to increase loading through R LE when ambulating with single point cane. Verbal cues also provided to increase R arm swing and relax R upper extremity during gait, to straighten R knee at heel strike and flex R knee during swing phase. Also cued to not invert R foot when stepping when L LE as patient has tendency to bear weight through outside of R foot while in inversion. HEP: No changes made to existing HEP. Treatment/Session Assessment:    · Response to Treatment:  Patient able to ambulate with improved technique and stance time on R LE when ambulating in new brace. Patient hesitant to place weight through R LE with weight shifts and sit to stands, in addition to decreased weightbearing through R LE when ambulating. · Compliance with Program/Exercises:  Appears complaint   · Recommendations/Intent for next treatment session: \"Next visit will focus on gait, knee ROM\".  LE strengthening as able  Total Treatment Duration: 45 Minutes (15 minutes spent with Mara jackson not included in total treatment time)  PT Patient Time In/Time Out  Time In: 1345  Time Out: 1309 [facility], PT

## 2018-04-13 ENCOUNTER — APPOINTMENT (OUTPATIENT)
Dept: PHYSICAL THERAPY | Age: 53
End: 2018-04-13
Payer: COMMERCIAL

## 2018-04-16 ENCOUNTER — HOSPITAL ENCOUNTER (OUTPATIENT)
Dept: PHYSICAL THERAPY | Age: 53
Discharge: HOME | End: 2018-04-16
Payer: COMMERCIAL

## 2018-04-16 PROCEDURE — 97116 GAIT TRAINING THERAPY: CPT

## 2018-04-16 PROCEDURE — 97110 THERAPEUTIC EXERCISES: CPT

## 2018-04-16 NOTE — THERAPY RECERTIFICATION
Jorge Alberto Boogie  : 1965  Payor: [address] [place] [address] Road / Plan: Jeannine Poet / Product Type: Workers Comp /  [address]  at [address] [place] [address] Rd  1101 [facility], [address] [place] [address],[address], [facility] [address].  [phone]   [phone]       OUTPATIENT PHYSICAL [address] Note and Recertification       ICD-10: Treatment Diagnosis: Sprain of posterior cruciate ligament of right knee, sequela (S83.521S); Pain in right knee (M25.561); Stiffness of right knee, not elsewhere classified (M25.661); Difficulty in walking, not elsewhere classified (R26.2)  Precautions/Allergies:   Review of patient's allergies indicates no known allergies. Fall Risk Score: 1 (? 5 = High Risk)  MD Orders:Evaluate and treat, HEP, ROM, strengthening MEDICAL/REFERRING DIAGNOSIS:  S/p R knee scope, PCL reconstruction   DATE OF ONSET: Surgery 17  REFERRING PHYSICIAN: Ryan Marquez MD  RETURN PHYSICIAN APPOINTMENT: 18     INITIAL ASSESSMENT:  Ms. Daryn Jeffery presents s/p right knee scope with PCL reconstruction. She continues to slowly progress with R knee ROM. She is able to ambulate short distances with no assistive device with increased weight on R LE. She does continue with whole R LE weakness limiting mobility. She does report pain in R knee with increased weight on R LE with walking and exercises and continued swelling at the end of the day in knee in R knee and ankle. She will benefit from continuing skilled physical therapy to continue to progress functional mobility. PROBLEM LIST (Impacting functional limitations):  1. Post-op pain and swelling right knee  2. Decreased ROM right knee   3. Decreased functional strength right knee/LE   4. Decreased gait skills INTERVENTIONS PLANNED:  1. aquatic therapy  2. Thermal and electric modalities, manual therapies for pain. 3. Manual therapies and therapeutic exercises for ROM and strength.    4. Therapeutic exercises for gait and balance   TREATMENT PLAN:  Effective Dates: 4-6-18 to 6-29-18. Frequency/Duration: 3 times a week for 12 more weeks  GOALS: (Goals have been discussed and agreed upon with patient.)   Short-Term Functional Goals: Time Frame: 6 weeks  1. Pt will be able to perform a good quad set and SLR with no extensor lag with for improved strength for gait. Progressing towards  2. Pt will increase R knee ROM to 0-90 for improved mobility. GOAL MET  3. Pt will improved R ankle DF AROM to 0 for improved gait. GOAL MET  4. Pt will report pain 5/10 with modified daily activities. GOAL MET  Discharge Goals: Time Frame: 12 weeks   1. Pt will increase R LE strength to 5/5 with manual muscle testing for improved strength for ADLs and work. Progressing towards  2. Pt will negotiate 1 flight of stairs with step over step with good control. Progressing towards  3. Pt will increase R knee ROM to 0-120 for mobility. GOAL MET  4. Pt will report pain 3/10 with daily activities. Progressing towards  5. Pt will score 45/80 on LEFS. Not tested  Rehabilitation Potential For Stated Goals: Good  Regarding [facility] [address] therapy, I certify that the treatment plan above will be carried out by a therapist or under their direction. Thank you for this referral,    Tammy Sheldon, PT       Referring Physician Signature: Jessica Lambert MD          Date                            HISTORY:   History of Present Injury/Illness (Reason for Referral): She works at [employer] and she was entering the cooler and there was oil on the floor. She slipped and the feet slipped out from under her. She tried to get up and slipped again. Injury was August 14-17. She did go to physical therapy but it was making her worse. They decided to have surgery. Surgery 12-21-17. Stayed 2 nights in the hospital. She did have home health physical therapy 3-4x. Pain increases with walking.    Past Medical History/Comorbidities: diabetes type 2 controlled with diet, HTN, L LE varicose vein surgery  Social History/Living Environment: Lives with . 2 steps to get inside. Prior Level of Function/Work/Activity: Works at [employer] 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 Job does require lifting. Would like to get back to walking and walking dogs. Current Medications:       Current Outpatient Prescriptions:     magnesium hydroxide (BEAUCHAMP MILK OF MAGNESIA) 400 mg/5 mL suspension, Take 15 mL by mouth as needed for Constipation. as needed daily, Disp: , Rfl:     temazepam (RESTORIL) 15 mg capsule, Take 15 mg by mouth nightly as needed for Sleep., Disp: , Rfl:     promethazine (PHENERGAN) 25 mg tablet, Take 25 mg by mouth every eight (8) hours as needed for Nausea., Disp: , Rfl:     HYDROmorphone (DILAUDID) 2 mg tablet, Take 2-4 mg by mouth as needed for Pain. as needed every 4-6 hours, Disp: , Rfl:     atorvastatin (LIPITOR) 20 mg tablet, Take 20 mg by mouth nightly., Disp: , Rfl:     lisinopril-hydroCHLOROthiazide (PRINZIDE, ZESTORETIC) 10-12.5 mg per tablet, Take  by mouth daily. Indications: hypertension, Disp: , Rfl:     gemfibrozil (LOPID) 600 mg tablet, Take 600 mg by mouth two (2) times a day., Disp: , Rfl:     Omega-3 Fatty Acids 60- mg cpDR, Take  by mouth daily. , Disp: , Rfl:     acetaminophen (TYLENOL) 325 mg tablet, Take 325 mg by mouth every four (4) hours as needed for Pain., Disp: , Rfl:    Date Last Reviewed:  4-11-18   Number of Personal Factors/Comorbidities that affect the Plan of Care: 1-2: MODERATE COMPLEXITY   EXAMINATION:     Observation/Orthostatic Postural Assessment:  Pt arrived to therapy with no assistive device with brace on the R knee.     Palpation: no tenderness around incisions     ROM:         RLE PROM  R Knee Flexion: 125  R Knee Extension: 0  R Ankle Dorsiflexion: 5      Strength:            RLE Strength  R Knee Flexion: 4-  R Knee Extension: 4-      Mobility: Pt ambulates with no assistive device with decreased weight on R LE, no heel strike at stance phase on R LE, slight ankle inversion during stance phase, decreased left step length, slight knee flexion during R stance phase. Balance:Unable to balance on R single leg       Body Structures Involved:  1. Joints  2. Muscles  3. Ligaments Body Functions Affected:  1. Neuromusculoskeletal Activities and Participation Affected:  1. Mobility  2. Community, Social and [place]   Number of elements (examined above) that affect the Plan of Care: 4+: HIGH COMPLEXITY   CLINICAL PRESENTATION:   Presentation: Evolving clinical presentation with changing clinical characteristics: MODERATE COMPLEXITY   CLINICAL DECISION MAKING:   Outcome Measure: Tool Used: Lower Extremity Functional Scale (LEFS)  Score:  Initial: 5/80 Most Recent: 20/80 (Date: 4-16-18 )   Interpretation of Score: 20 questions each scored on a 5 point scale with 0 representing \"extreme difficulty or unable to perform\" and 4 representing \"no difficulty\". The lower the score, the greater the functional disability. 80/80 represents no disability. Minimal detectable change is 9 points. Score 80 79-63 62-48 47-32 31-16 15-1 0   Modifier CH CI CJ CK CL CM CN     Medical Necessity:   · Patient is expected to demonstrate progress in strength, range of motion and balance to improve gait. Reason for Services/Other Comments:  · Patient continues to require skilled intervention due to post-op R knee, decreased ROM, strength, gait. Use of outcome tool(s) and clinical judgement create a POC that gives a: Questionable prediction of patient's progress: MODERATE COMPLEXITY            TREATMENT:   (In addition to Assessment/Re-Assessment sessions the following treatments were rendered)  Pre-treatment Symptoms/Complaints: Pt reports her knee is feeling pretty good today. Hurts a little. Reports that her knee feels good when walking with the brace. Pain: Initial:  5/10 Post Session:  5/10.     Osvaldo davis brought brace to patient during PT treatment and instructed patient on it's fit and proper use. Pt ambulated in brace per fitting with representative. Procedure required 15 minutes of scheduled treatment time. Therapeutic Exercise ( 15 minutes): For improved muscle activation, gait, and knee ROM. Heel slides performed in supine (3 x 10) with manual overpressure at end of ROM for improved R knee ROM. Short arc quads (4# 3 x 10) with min A, and long arc quads (4# 3 x 10) performed for improved R quadriceps strength. Gait Training ( 25 minutes):  Gait training to improve and/or restore physical functioning as related to mobility. Pre-gait activity with stepping with L LE while in weightbearing on R LE in brace. Tactile cues to provide support and facilitate extension and flexion when stepping with L LE. Multiple rest breaks in between gait. Weight shifts in standing with taking a step forward and backward while in brace on R LE to improve weightbearing through R LE. Practiced gait with no assistive device with verbal and visual cues for R heel strike then foot flat during stance phase. HEP: No changes made to existing HEP. Treatment/Session Assessment:    · Response to Treatment:  Improved R knee and ankle ROM today. She continues to have difficulty putting full weight on R LE during gait. · Compliance with Program/Exercises:  Appears complaint   · Recommendations/Intent for next treatment session: \"Next visit will focus on gait, knee ROM\".  LE strengthening as able  Total Treatment Duration: 40 Minutes   PT Patient Time In/Time Out  Time In: 1300  Time Out: 2439 [facility]

## 2018-04-17 ENCOUNTER — HOSPITAL ENCOUNTER (OUTPATIENT)
Dept: PHYSICAL THERAPY | Age: 53
Discharge: HOME | End: 2018-04-17
Payer: COMMERCIAL

## 2018-04-17 PROCEDURE — 97140 MANUAL THERAPY 1/> REGIONS: CPT

## 2018-04-17 PROCEDURE — 97110 THERAPEUTIC EXERCISES: CPT

## 2018-04-17 PROCEDURE — 97116 GAIT TRAINING THERAPY: CPT

## 2018-04-17 NOTE — PROGRESS NOTES
Lisa Dooley  : 1965  Payor: [address] [place] [address] Road / Plan: Barbara Crate / Product Type: Workers Comp /  [address]  at [address] [place] [address] Rd  1101 [facility],  Kings Reyna.  [phone]   [phone]       OUTPATIENT PHYSICAL THERAPY:Daily Note 2018      ICD-10: Treatment Diagnosis: Sprain of posterior cruciate ligament of right knee, sequela (S83.521S); Pain in right knee (M25.561); Stiffness of right knee, not elsewhere classified (M25.661); Difficulty in walking, not elsewhere classified (R26.2)  Precautions/Allergies:   Review of patient's allergies indicates no known allergies. Fall Risk Score: 1 (? 5 = High Risk)  MD Orders:Evaluate and treat, HEP, ROM, strengthening MEDICAL/REFERRING DIAGNOSIS:  S/p R knee scope, PCL reconstruction   DATE OF ONSET: Surgery 17  REFERRING PHYSICIAN: Te Caldwell MD  RETURN PHYSICIAN APPOINTMENT: 18     INITIAL ASSESSMENT:  Ms. Kathern Apley presents s/p right knee scope with PCL reconstruction. She continues to slowly progress with R knee ROM. She is able to ambulate short distances with no assistive device with increased weight on R LE. She does continue with whole R LE weakness limiting mobility. She does report pain in R knee with increased weight on R LE with walking and exercises and continued swelling at the end of the day in knee in R knee and ankle. She will benefit from continuing skilled physical therapy to continue to progress functional mobility. PROBLEM LIST (Impacting functional limitations):  1. Post-op pain and swelling right knee  2. Decreased ROM right knee   3. Decreased functional strength right knee/LE   4. Decreased gait skills INTERVENTIONS PLANNED:  1. aquatic therapy  2. Thermal and electric modalities, manual therapies for pain. 3. Manual therapies and therapeutic exercises for ROM and strength.    4. Therapeutic exercises for gait and balance   TREATMENT PLAN:  Effective Dates: 4-6-18 to 6-29-18. Frequency/Duration: 3 times a week for 12 more weeks  GOALS: (Goals have been discussed and agreed upon with patient.)   Short-Term Functional Goals: Time Frame: 6 weeks  1. Pt will be able to perform a good quad set and SLR with no extensor lag with for improved strength for gait. Progressing towards  2. Pt will increase R knee ROM to 0-90 for improved mobility. GOAL MET  3. Pt will improved R ankle DF AROM to 0 for improved gait. GOAL MET  4. Pt will report pain 5/10 with modified daily activities. GOAL MET  Discharge Goals: Time Frame: 12 weeks   1. Pt will increase R LE strength to 5/5 with manual muscle testing for improved strength for ADLs and work. Progressing towards  2. Pt will negotiate 1 flight of stairs with step over step with good control. Progressing towards  3. Pt will increase R knee ROM to 0-120 for mobility. GOAL MET  4. Pt will report pain 3/10 with daily activities. Progressing towards  5. Pt will score 45/80 on LEFS. Not tested  Rehabilitation Potential For Stated Goals: Good  Regarding [facility] therapy, I certify that the treatment plan above will be carried out by a therapist or under their direction. Thank you for this referral,    Allie Lagunas, PT                       HISTORY:   History of Present Injury/Illness (Reason for Referral): She works at [employer] and she was entering the cooler and there was oil on the floor. She slipped and the feet slipped out from under her. She tried to get up and slipped again. Injury was August 14-17. She did go to physical therapy but it was making her worse. They decided to have surgery. Surgery 12-21-17. Stayed 2 nights in the hospital. She did have home health physical therapy 3-4x. Pain increases with walking. Past Medical History/Comorbidities: diabetes type 2 controlled with diet, HTN, L LE varicose vein surgery  Social History/Living Environment: Lives with .  2 steps to get inside. Prior Level of Function/Work/Activity: Works at [employer]. 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 Job does require lifting. Would like to get back to walking and walking dogs. Current Medications:       Current Outpatient Prescriptions:     magnesium hydroxide (BEAUCHAMP MILK OF MAGNESIA) 400 mg/5 mL suspension, Take 15 mL by mouth as needed for Constipation. as needed daily, Disp: , Rfl:     temazepam (RESTORIL) 15 mg capsule, Take 15 mg by mouth nightly as needed for Sleep., Disp: , Rfl:     promethazine (PHENERGAN) 25 mg tablet, Take 25 mg by mouth every eight (8) hours as needed for Nausea., Disp: , Rfl:     HYDROmorphone (DILAUDID) 2 mg tablet, Take 2-4 mg by mouth as needed for Pain. as needed every 4-6 hours, Disp: , Rfl:     atorvastatin (LIPITOR) 20 mg tablet, Take 20 mg by mouth nightly., Disp: , Rfl:     lisinopril-hydroCHLOROthiazide (PRINZIDE, ZESTORETIC) 10-12.5 mg per tablet, Take  by mouth daily. Indications: hypertension, Disp: , Rfl:     gemfibrozil (LOPID) 600 mg tablet, Take 600 mg by mouth two (2) times a day., Disp: , Rfl:     Omega-3 Fatty Acids 60- mg cpDR, Take  by mouth daily. , Disp: , Rfl:     acetaminophen (TYLENOL) 325 mg tablet, Take 325 mg by mouth every four (4) hours as needed for Pain., Disp: , Rfl:    Date Last Reviewed:  4-17-18   Number of Personal Factors/Comorbidities that affect the Plan of Care: 1-2: MODERATE COMPLEXITY   EXAMINATION:     Observation/Orthostatic Postural Assessment:  Pt arrived to therapy with no assistive device with brace on the R knee. Palpation: no tenderness around incisions     ROM:                Strength:                   Mobility: n/t  Balance:Unable to balance on R single leg       Body Structures Involved:  1. Joints  2. Muscles  3. Ligaments Body Functions Affected:  1. Neuromusculoskeletal Activities and Participation Affected:  1. Mobility  2.  Community, Social and [place]   Number of elements (examined above) that affect the Plan of Care: 4+: HIGH COMPLEXITY   CLINICAL PRESENTATION:   Presentation: Evolving clinical presentation with changing clinical characteristics: MODERATE COMPLEXITY   CLINICAL DECISION MAKING:   Outcome Measure: Tool Used: Lower Extremity Functional Scale (LEFS)  Score:  Initial: 5/80 Most Recent: 20/80 (Date: 4-16-18 )   Interpretation of Score: 20 questions each scored on a 5 point scale with 0 representing \"extreme difficulty or unable to perform\" and 4 representing \"no difficulty\". The lower the score, the greater the functional disability. 80/80 represents no disability. Minimal detectable change is 9 points. Score 80 79-63 62-48 47-32 31-16 15-1 0   Modifier CH CI CJ CK CL CM CN     Medical Necessity:   · Patient is expected to demonstrate progress in strength, range of motion and balance to improve gait. Reason for Services/Other Comments:  · Patient continues to require skilled intervention due to post-op R knee, decreased ROM, strength, gait. Use of outcome tool(s) and clinical judgement create a POC that gives a: Questionable prediction of patient's progress: MODERATE COMPLEXITY            TREATMENT:   (In addition to Assessment/Re-Assessment sessions the following treatments were rendered)  Pre-treatment Symptoms/Complaints: Pt reports her knee did not swell too much yesterday. Pain: Initial:  5/10 Post Session:  5/10. Jacky Clark representative brought brace to patient during PT treatment and instructed patient on it's fit and proper use. Pt ambulated in brace per fitting with representative. Procedure required 15 minutes of scheduled treatment time. Therapeutic Exercise (25 Minutes): For improved muscle activation, gait, and knee ROM. Heel slides performed in supine (3 x 10) with manual overpressure at end of ROM for improved R knee ROM. Standing at wall and bring knee and hip into flexion 2x10 B LE.  Verbal cues for maintaining trunk position/not leaning. Shuttle press single leg 12 # 2x10, 25# 10x, shuttle single leg heel raise 12# 10x, 25# 10x. Step ups 4\" 2x10 with visual and verbal cues for R knee extension during step up. Standing on R LE single leg with UE support 10\"x3. Gait Training ( 15 minutes):  Gait training to improve and/or restore physical functioning as related to mobility. Pre-gait activity with stepping with L LE while in weightbearing on R LE in brace. Tactile cues to provide support and facilitate extension and flexion when stepping with L LE. Multiple rest breaks in between gait. Weight shifts in standing with taking a step forward and backward while in brace on R LE to improve weightbearing through R LE. Practiced gait with no assistive device with verbal and visual cues for R heel strike then foot flat during stance phase. HEP: No changes made to existing HEP. Treatment/Session Assessment:    · Response to Treatment:  She was able to put more weight on R LE during exercises today. She continues to require verbal, tactile, and visual cueing for foot and ankle position during gait and exercises. · Compliance with Program/Exercises:  Appears complaint   · Recommendations/Intent for next treatment session: \"Next visit will focus on gait, knee ROM\".  LE strengthening as able  Total Treatment Duration: 40 Minutes   PT Patient Time In/Time Out  Time In: 6435  Time Out: Phylicia Ni

## 2018-04-19 ENCOUNTER — HOSPITAL ENCOUNTER (OUTPATIENT)
Dept: PHYSICAL THERAPY | Age: 53
Discharge: HOME | End: 2018-04-19
Payer: COMMERCIAL

## 2018-04-19 PROCEDURE — 97116 GAIT TRAINING THERAPY: CPT

## 2018-04-19 PROCEDURE — 97110 THERAPEUTIC EXERCISES: CPT

## 2018-04-20 NOTE — PROGRESS NOTES
Kianna Barnett  : 1965  Payor: [address] [place] [address] Road / Plan: Julieth Garcia / Product Type: [employer] /  [address]  at [address] [place] [address] Rd  1101 [facility],  Kings Reyna.  [phone]   [phone]       OUTPATIENT PHYSICAL THERAPY:Daily Note 2018      ICD-10: Treatment Diagnosis: Sprain of posterior cruciate ligament of right knee, sequela (S83.521S); Pain in right knee (M25.561); Stiffness of right knee, not elsewhere classified (M25.661); Difficulty in walking, not elsewhere classified (R26.2)  Precautions/Allergies:   Review of patient's allergies indicates no known allergies. Fall Risk Score: 1 (? 5 = High Risk)  MD Orders:Evaluate and treat, HEP, ROM, strengthening MEDICAL/REFERRING DIAGNOSIS:  S/p R knee scope, PCL reconstruction   DATE OF ONSET: Surgery 17  REFERRING PHYSICIAN: Lucho Bonilla MD  RETURN PHYSICIAN APPOINTMENT: 18     INITIAL ASSESSMENT:  Ms. Hollins Corporal presents s/p right knee scope with PCL reconstruction. She continues to slowly progress with R knee ROM. She is able to ambulate short distances with no assistive device with increased weight on R LE. She does continue with whole R LE weakness limiting mobility. She does report pain in R knee with increased weight on R LE with walking and exercises and continued swelling at the end of the day in knee in R knee and ankle. She will benefit from continuing skilled physical therapy to continue to progress functional mobility. PROBLEM LIST (Impacting functional limitations):  1. Post-op pain and swelling right knee  2. Decreased ROM right knee   3. Decreased functional strength right knee/LE   4. Decreased gait skills INTERVENTIONS PLANNED:  1. aquatic therapy  2. Thermal and electric modalities, manual therapies for pain. 3. Manual therapies and therapeutic exercises for ROM and strength.    4. Therapeutic exercises for gait and balance   TREATMENT PLAN:  Effective Dates: 4-6-18 to 6-29-18. Frequency/Duration: 3 times a week for 12 more weeks  GOALS: (Goals have been discussed and agreed upon with patient.)   Short-Term Functional Goals: Time Frame: 6 weeks  1. Pt will be able to perform a good quad set and SLR with no extensor lag with for improved strength for gait. Progressing towards  2. Pt will increase R knee ROM to 0-90 for improved mobility. GOAL MET  3. Pt will improved R ankle DF AROM to 0 for improved gait. GOAL MET  4. Pt will report pain 5/10 with modified daily activities. GOAL MET  Discharge Goals: Time Frame: 12 weeks   1. Pt will increase R LE strength to 5/5 with manual muscle testing for improved strength for ADLs and work. Progressing towards  2. Pt will negotiate 1 flight of stairs with step over step with good control. Progressing towards  3. Pt will increase R knee ROM to 0-120 for mobility. GOAL MET  4. Pt will report pain 3/10 with daily activities. Progressing towards  5. Pt will score 45/80 on LEFS. Not tested  Rehabilitation Potential For Stated Goals: Good  Regarding [facility] [address] therapy, I certify that the treatment plan above will be carried out by a therapist or under their direction. Thank you for this referral,    Yaritza Navarrete, PT                       HISTORY:   History of Present Injury/Illness (Reason for Referral): She works at [employer] and she was entering the cooler and there was oil on the floor. She slipped and the feet slipped out from under her. She tried to get up and slipped again. Injury was August 14-17. She did go to physical therapy but it was making her worse. They decided to have surgery. Surgery 12-21-17. Stayed 2 nights in the hospital. She did have home health physical therapy 3-4x. Pain increases with walking. Past Medical History/Comorbidities: diabetes type 2 controlled with diet, HTN, L LE varicose vein surgery  Social History/Living Environment: Lives with . 2 steps to get inside. Prior Level of Function/Work/Activity: Works at [employer]. 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 Job does require lifting. Would like to get back to walking and walking dogs. Current Medications:       Current Outpatient Prescriptions:     magnesium hydroxide (BEAUCHAMP MILK OF MAGNESIA) 400 mg/5 mL suspension, Take 15 mL by mouth as needed for Constipation. as needed daily, Disp: , Rfl:     temazepam (RESTORIL) 15 mg capsule, Take 15 mg by mouth nightly as needed for Sleep., Disp: , Rfl:     promethazine (PHENERGAN) 25 mg tablet, Take 25 mg by mouth every eight (8) hours as needed for Nausea., Disp: , Rfl:     HYDROmorphone (DILAUDID) 2 mg tablet, Take 2-4 mg by mouth as needed for Pain. as needed every 4-6 hours, Disp: , Rfl:     atorvastatin (LIPITOR) 20 mg tablet, Take 20 mg by mouth nightly., Disp: , Rfl:     lisinopril-hydroCHLOROthiazide (PRINZIDE, ZESTORETIC) 10-12.5 mg per tablet, Take  by mouth daily. Indications: hypertension, Disp: , Rfl:     gemfibrozil (LOPID) 600 mg tablet, Take 600 mg by mouth two (2) times a day., Disp: , Rfl:     Omega-3 Fatty Acids 60- mg cpDR, Take  by mouth daily. , Disp: , Rfl:     acetaminophen (TYLENOL) 325 mg tablet, Take 325 mg by mouth every four (4) hours as needed for Pain., Disp: , Rfl:    Date Last Reviewed:  4-17-18   Number of Personal Factors/Comorbidities that affect the Plan of Care: 1-2: MODERATE COMPLEXITY   EXAMINATION:     Observation/Orthostatic Postural Assessment:  Pt arrived to therapy with no assistive device with brace on the R knee. Palpation: no tenderness around incisions     ROM:                Strength:                   Mobility: n/t  Balance:Unable to balance on R single leg       Body Structures Involved:  1. Joints  2. Muscles  3. Ligaments Body Functions Affected:  1. Neuromusculoskeletal Activities and Participation Affected:  1. Mobility  2.  Community, Social and [place]   Number of elements (examined above) that affect the Plan of Care: 4+: HIGH COMPLEXITY   CLINICAL PRESENTATION:   Presentation: Evolving clinical presentation with changing clinical characteristics: MODERATE COMPLEXITY   CLINICAL DECISION MAKING:   Outcome Measure: Tool Used: Lower Extremity Functional Scale (LEFS)  Score:  Initial: 5/80 Most Recent: 20/80 (Date: 4-16-18 )   Interpretation of Score: 20 questions each scored on a 5 point scale with 0 representing \"extreme difficulty or unable to perform\" and 4 representing \"no difficulty\". The lower the score, the greater the functional disability. 80/80 represents no disability. Minimal detectable change is 9 points. Score 80 79-63 62-48 47-32 31-16 15-1 0   Modifier CH CI CJ CK CL CM CN     Medical Necessity:   · Patient is expected to demonstrate progress in strength, range of motion and balance to improve gait. Reason for Services/Other Comments:  · Patient continues to require skilled intervention due to post-op R knee, decreased ROM, strength, gait. Use of outcome tool(s) and clinical judgement create a POC that gives a: Questionable prediction of patient's progress: MODERATE COMPLEXITY            TREATMENT:   (In addition to Assessment/Re-Assessment sessions the following treatments were rendered)  Pre-treatment Symptoms/Complaints: Pt states she is not walking as much as her knee was swelling more with walking. Pain: Initial:  5/10 Post Session:  5/10. Therapeutic Exercise ( ): 30 min  For improved muscle activation, gait, and knee and ankle ROM.    Date:  4/20 Date:   Date:     Activity/Exercise Parameters Parameters Parameters   Ankle pumps Blue band x 30  Manual resist x 30     Seated knee ext 4#  2 x 10     Single leg bridge 2 x 10     Seated march 2 x 10     Single leg stance 4 x 5 sec     Heel slides 2 x 10     Seated plantarflexion 2 x 10     Mini squat 1 x 10                                       Gait Training ( 15 minutes):  Gait training to improve and/or restore physical functioning as related to mobility. Pre-gait activity with stepping with L LE while in weightbearing on R LE in brace. Tactile cues to provide support and facilitate extension and flexion when stepping with L LE. Practice stepping for R heel strike then foot flat during stance phase. Single leg stance x 5 min to work on wt bearing through [facility]: Printed HEP with seated pflexion, mini squat, heel slides, single leg stance and single leg bridging    Treatment/Session Assessment:    · Response to Treatment:  Continues to have problems with wt bearing and placement of foot. She appears to be having pain with wt bearing on the R LE. She also continues to be anxious about movement even of the ankle during session today. · Compliance with Program/Exercises:  Appears complaint   · Recommendations/Intent for next treatment session: \"Next visit will focus on gait, knee ROM\".  LE strengthening as able  Total Treatment Duration: 45 Minutes   PT Patient Time In/Time Out  Time In: 1115  Time Out: 1333 [facility], PT

## 2018-04-23 ENCOUNTER — HOSPITAL ENCOUNTER (OUTPATIENT)
Dept: PHYSICAL THERAPY | Age: 53
Discharge: HOME | End: 2018-04-23
Payer: COMMERCIAL

## 2018-04-23 PROCEDURE — 97110 THERAPEUTIC EXERCISES: CPT

## 2018-04-25 ENCOUNTER — HOSPITAL ENCOUNTER (OUTPATIENT)
Dept: PHYSICAL THERAPY | Age: 53
Discharge: HOME | End: 2018-04-25
Payer: COMMERCIAL

## 2018-04-25 PROCEDURE — 97110 THERAPEUTIC EXERCISES: CPT

## 2018-04-25 NOTE — PROGRESS NOTES
Azam Stone  : 1965  Payor: [address] [place] [address] Road / Plan: Luisa Elizabeth / Product Type: Workers Comp /  [address]  at [facility]  [address] [place] [address], [facility], [address] [place] [address]  [phone]   [phone]       OUTPATIENT PHYSICAL THERAPY:Daily Note 2018      ICD-10: Treatment Diagnosis: Sprain of posterior cruciate ligament of right knee, sequela (S83.521S); Pain in right knee (M25.561); Stiffness of right knee, not elsewhere classified (M25.661); Difficulty in walking, not elsewhere classified (R26.2)  Precautions/Allergies:   Review of patient's allergies indicates no known allergies. Fall Risk Score: 1 (? 5 = High Risk)  MD Orders:Evaluate and treat, HEP, ROM, strengthening MEDICAL/REFERRING DIAGNOSIS:  S/p R knee scope, PCL reconstruction   DATE OF ONSET: Surgery 17  REFERRING PHYSICIAN: Omer Costa MD  RETURN PHYSICIAN APPOINTMENT: 18     INITIAL ASSESSMENT:  Ms. Maria M Don presents s/p right knee scope with PCL reconstruction. She continues to slowly progress with R knee ROM. She is able to ambulate short distances with no assistive device with increased weight on R LE. She does continue with whole R LE weakness limiting mobility. She does report pain in R knee with increased weight on R LE with walking and exercises and continued swelling at the end of the day in knee in R knee and ankle. She will benefit from continuing skilled physical therapy to continue to progress functional mobility. PROBLEM LIST (Impacting functional limitations):  1. Post-op pain and swelling right knee  2. Decreased ROM right knee   3. Decreased functional strength right knee/LE   4. Decreased gait skills INTERVENTIONS PLANNED:  1. aquatic therapy  2. Thermal and electric modalities, manual therapies for pain. 3. Manual therapies and therapeutic exercises for ROM and strength.    4. Therapeutic exercises for gait and balance   TREATMENT PLAN:  Effective Dates: 4-6-18 to 6-29-18. Frequency/Duration: 3 times a week for 12 more weeks  GOALS: (Goals have been discussed and agreed upon with patient.)   Short-Term Functional Goals: Time Frame: 6 weeks  1. Pt will be able to perform a good quad set and SLR with no extensor lag with for improved strength for gait. Progressing towards  2. Pt will increase R knee ROM to 0-90 for improved mobility. GOAL MET  3. Pt will improved R ankle DF AROM to 0 for improved gait. GOAL MET  4. Pt will report pain 5/10 with modified daily activities. GOAL MET  Discharge Goals: Time Frame: 12 weeks   1. Pt will increase R LE strength to 5/5 with manual muscle testing for improved strength for ADLs and work. Progressing towards  2. Pt will negotiate 1 flight of stairs with step over step with good control. Progressing towards  3. Pt will increase R knee ROM to 0-120 for mobility. GOAL MET  4. Pt will report pain 3/10 with daily activities. Progressing towards  5. Pt will score 45/80 on LEFS. Not tested  Rehabilitation Potential For Stated Goals: Good  Regarding Perez  therapy, I certify that the treatment plan above will be carried out by a therapist or under their direction. Thank you for this referral,    Allie Mack, PT                       HISTORY:   History of Present Injury/Illness (Reason for Referral): She works at [employer] and she was entering the cooler and there was oil on the floor. She slipped and the feet slipped out from under her. She tried to get up and slipped again. Injury was August 14-17. She did go to physical therapy but it was making her worse. They decided to have surgery. Surgery 12-21-17. Stayed 2 nights in the hospital. She did have home health physical therapy 3-4x. Pain increases with walking. Past Medical History/Comorbidities: diabetes type 2 controlled with diet, HTN, L LE varicose vein surgery  Social History/Living Environment: Lives with .  2 steps to get inside. Prior Level of Function/Work/Activity: Works at [employer]. 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 Job does require lifting. Would like to get back to walking and walking dogs. Current Medications:       Current Outpatient Prescriptions:     magnesium hydroxide (BEAUCHAMP MILK OF MAGNESIA) 400 mg/5 mL suspension, Take 15 mL by mouth as needed for Constipation. as needed daily, Disp: , Rfl:     temazepam (RESTORIL) 15 mg capsule, Take 15 mg by mouth nightly as needed for Sleep., Disp: , Rfl:     promethazine (PHENERGAN) 25 mg tablet, Take 25 mg by mouth every eight (8) hours as needed for Nausea., Disp: , Rfl:     HYDROmorphone (DILAUDID) 2 mg tablet, Take 2-4 mg by mouth as needed for Pain. as needed every 4-6 hours, Disp: , Rfl:     atorvastatin (LIPITOR) 20 mg tablet, Take 20 mg by mouth nightly., Disp: , Rfl:     lisinopril-hydroCHLOROthiazide (PRINZIDE, ZESTORETIC) 10-12.5 mg per tablet, Take  by mouth daily. Indications: hypertension, Disp: , Rfl:     gemfibrozil (LOPID) 600 mg tablet, Take 600 mg by mouth two (2) times a day., Disp: , Rfl:     Omega-3 Fatty Acids 60- mg cpDR, Take  by mouth daily. , Disp: , Rfl:     acetaminophen (TYLENOL) 325 mg tablet, Take 325 mg by mouth every four (4) hours as needed for Pain., Disp: , Rfl:    Date Last Reviewed:  4-23-18   Number of Personal Factors/Comorbidities that affect the Plan of Care: 1-2: MODERATE COMPLEXITY   EXAMINATION:     Observation/Orthostatic Postural Assessment:  Pt arrived to therapy with no assistive device with brace on the R knee. Palpation: no tenderness around incisions     ROM:                Strength:                   Mobility: n/t  Balance:Unable to balance on R single leg       Body Structures Involved:  1. Joints  2. Muscles  3. Ligaments Body Functions Affected:  1. Neuromusculoskeletal Activities and Participation Affected:  1. Mobility  2.  Community, Social and [place]   Number of elements (examined above) that affect the Plan of Care: 4+: HIGH COMPLEXITY   CLINICAL PRESENTATION:   Presentation: Evolving clinical presentation with changing clinical characteristics: MODERATE COMPLEXITY   CLINICAL DECISION MAKING:   Outcome Measure: Tool Used: Lower Extremity Functional Scale (LEFS)  Score:  Initial: 5/80 Most Recent: 20/80 (Date: 4-16-18 )   Interpretation of Score: 20 questions each scored on a 5 point scale with 0 representing \"extreme difficulty or unable to perform\" and 4 representing \"no difficulty\". The lower the score, the greater the functional disability. 80/80 represents no disability. Minimal detectable change is 9 points. Score 80 79-63 62-48 47-32 31-16 15-1 0   Modifier CH CI CJ CK CL CM CN     Medical Necessity:   · Patient is expected to demonstrate progress in strength, range of motion and balance to improve gait. Reason for Services/Other Comments:  · Patient continues to require skilled intervention due to post-op R knee, decreased ROM, strength, gait. Use of outcome tool(s) and clinical judgement create a POC that gives a: Questionable prediction of patient's progress: MODERATE COMPLEXITY            TREATMENT:   (In addition to Assessment/Re-Assessment sessions the following treatments were rendered)  Pre-treatment Symptoms/Complaints: Pt reports she has an upset stomach today from taking a laxative this morning. Pain medication makes her constipated. Pain: Initial:  4/10 Post Session:  4/10. Therapeutic Exercise (40 Minutes): For improved muscle activation, gait, and knee ROM. Short acr quads 4# 3x10, long arc quads 5# 2x10, standing hamstring curls 4# 2x10. Supine bridges 2x10. Standing at wall and bring knee and hip into flexion 2x10 B LE. Verbal cues for maintaining trunk position/not leaning. Shuttle press single leg 25# 2x10, shuttle single leg heel raise25# 2x10. Step ups 6\" 2x10 with visual and verbal cues for R knee extension during step up. Walking with visual cues from mirror for foot and ankle alignment with no assistive device. Gait Training ( 0 minutes):         HEP: No changes made to existing HEP. Treatment/Session Assessment:    · Response to Treatment: Pain with all exercises today. · Compliance with Program/Exercises:  Appears complaint   · Recommendations/Intent for next treatment session: \"Next visit will focus on gait, knee ROM\".  LE strengthening as able  Total Treatment Duration: 40 Minutes   PT Patient Time In/Time Out  Time In: 1300  Time Out: [address]

## 2018-04-27 ENCOUNTER — HOSPITAL ENCOUNTER (OUTPATIENT)
Dept: PHYSICAL THERAPY | Age: 53
Discharge: HOME | End: 2018-04-27
Payer: COMMERCIAL

## 2018-04-27 PROCEDURE — 97116 GAIT TRAINING THERAPY: CPT

## 2018-04-27 PROCEDURE — 97110 THERAPEUTIC EXERCISES: CPT

## 2018-04-27 NOTE — PROGRESS NOTES
Lisa Dooley  : 1965  Payor: [address] [place] [address] Road / Plan: Barbara Crate / Product Type: Workers Comp /  [address]  at [address] [place] [address] Rd  [address] [facility],  K[place]a.  [phone]   [phone]       OUTPATIENT PHYSICAL THERAPY:Daily Note 2018      ICD-10: Treatment Diagnosis: Sprain of posterior cruciate ligament of right knee, sequela (S83.521S); Pain in right knee (M25.561); Stiffness of right knee, not elsewhere classified (M25.661); Difficulty in walking, not elsewhere classified (R26.2)  Precautions/Allergies:   Review of patient's allergies indicates no known allergies. Fall Risk Score: 1 (? 5 = High Risk)  MD Orders:Evaluate and treat, HEP, ROM, strengthening MEDICAL/REFERRING DIAGNOSIS:  S/p R knee scope, PCL reconstruction   DATE OF ONSET: Surgery 17  REFERRING PHYSICIAN: Te Caldwell MD  RETURN PHYSICIAN APPOINTMENT: 18     INITIAL ASSESSMENT:  Ms. Kathern Apley presents s/p right knee scope with PCL reconstruction. She continues to slowly progress with R knee ROM. She is able to ambulate short distances with no assistive device with increased weight on R LE. She does continue with whole R LE weakness limiting mobility. She does report pain in R knee with increased weight on R LE with walking and exercises and continued swelling at the end of the day in knee in R knee and ankle. She will benefit from continuing skilled physical therapy to continue to progress functional mobility. PROBLEM LIST (Impacting functional limitations):  1. Post-op pain and swelling right knee  2. Decreased ROM right knee   3. Decreased functional strength right knee/LE   4. Decreased gait skills INTERVENTIONS PLANNED:  1. aquatic therapy  2. Thermal and electric modalities, manual therapies for pain. 3. Manual therapies and therapeutic exercises for ROM and strength.    4. Therapeutic exercises for gait and balance   TREATMENT PLAN:  Effective Dates: 4-6-18 to 6-29-18. Frequency/Duration: 3 times a week for 12 more weeks  GOALS: (Goals have been discussed and agreed upon with patient.)   Short-Term Functional Goals: Time Frame: 6 weeks  1. Pt will be able to perform a good quad set and SLR with no extensor lag with for improved strength for gait. Progressing towards  2. Pt will increase R knee ROM to 0-90 for improved mobility. GOAL MET  3. Pt will improved R ankle DF AROM to 0 for improved gait. GOAL MET  4. Pt will report pain 5/10 with modified daily activities. GOAL MET  Discharge Goals: Time Frame: 12 weeks   1. Pt will increase R LE strength to 5/5 with manual muscle testing for improved strength for ADLs and work. Progressing towards  2. Pt will negotiate 1 flight of stairs with step over step with good control. Progressing towards  3. Pt will increase R knee ROM to 0-120 for mobility. GOAL MET  4. Pt will report pain 3/10 with daily activities. Progressing towards  5. Pt will score 45/80 on LEFS. Not tested  Rehabilitation Potential For Stated Goals: Good  Regarding Perez  therapy, I certify that the treatment plan above will be carried out by a therapist or under their direction. Thank you for this referral,    Allie Shelby, PT                       HISTORY:   History of Present Injury/Illness (Reason for Referral): She works at [employer] and she was entering the cooler and there was oil on the floor. She slipped and the feet slipped out from under her. She tried to get up and slipped again. Injury was August 14-17. She did go to physical therapy but it was making her worse. They decided to have surgery. Surgery 12-21-17. Stayed 2 nights in the hospital. She did have home health physical therapy 3-4x. Pain increases with walking. Past Medical History/Comorbidities: diabetes type 2 controlled with diet, HTN, L LE varicose vein surgery  Social History/Living Environment: Lives with .  2 steps to get inside. Prior Level of Function/Work/Activity: Works at [employer]. 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 Job does require lifting. Would like to get back to walking and walking dogs. Current Medications:       Current Outpatient Prescriptions:     magnesium hydroxide (BEAUCHAMP MILK OF MAGNESIA) 400 mg/5 mL suspension, Take 15 mL by mouth as needed for Constipation. as needed daily, Disp: , Rfl:     temazepam (RESTORIL) 15 mg capsule, Take 15 mg by mouth nightly as needed for Sleep., Disp: , Rfl:     promethazine (PHENERGAN) 25 mg tablet, Take 25 mg by mouth every eight (8) hours as needed for Nausea., Disp: , Rfl:     HYDROmorphone (DILAUDID) 2 mg tablet, Take 2-4 mg by mouth as needed for Pain. as needed every 4-6 hours, Disp: , Rfl:     atorvastatin (LIPITOR) 20 mg tablet, Take 20 mg by mouth nightly., Disp: , Rfl:     lisinopril-hydroCHLOROthiazide (PRINZIDE, ZESTORETIC) 10-12.5 mg per tablet, Take  by mouth daily. Indications: hypertension, Disp: , Rfl:     gemfibrozil (LOPID) 600 mg tablet, Take 600 mg by mouth two (2) times a day., Disp: , Rfl:     Omega-3 Fatty Acids 60- mg cpDR, Take  by mouth daily. , Disp: , Rfl:     acetaminophen (TYLENOL) 325 mg tablet, Take 325 mg by mouth every four (4) hours as needed for Pain., Disp: , Rfl:    Date Last Reviewed:  4-23-18   Number of Personal Factors/Comorbidities that affect the Plan of Care: 1-2: MODERATE COMPLEXITY   EXAMINATION:     Observation/Orthostatic Postural Assessment:  Pt arrived to therapy with no assistive device with brace on the R knee. Palpation: no tenderness around incisions     ROM:                Strength:                   Mobility: n/t  Balance:Unable to balance on R single leg       Body Structures Involved:  1. Joints  2. Muscles  3. Ligaments Body Functions Affected:  1. Neuromusculoskeletal Activities and Participation Affected:  1. Mobility  2.  Community, Social and [place]ander [place]   Number of elements (examined above) that affect the Plan of Care: 4+: HIGH COMPLEXITY   CLINICAL PRESENTATION:   Presentation: Evolving clinical presentation with changing clinical characteristics: MODERATE COMPLEXITY   CLINICAL DECISION MAKING:   Outcome Measure: Tool Used: Lower Extremity Functional Scale (LEFS)  Score:  Initial: 5/80 Most Recent: 20/80 (Date: 4-16-18 )   Interpretation of Score: 20 questions each scored on a 5 point scale with 0 representing \"extreme difficulty or unable to perform\" and 4 representing \"no difficulty\". The lower the score, the greater the functional disability. 80/80 represents no disability. Minimal detectable change is 9 points. Score 80 79-63 62-48 47-32 31-16 15-1 0   Modifier CH CI CJ CK CL CM CN     Medical Necessity:   · Patient is expected to demonstrate progress in strength, range of motion and balance to improve gait. Reason for Services/Other Comments:  · Patient continues to require skilled intervention due to post-op R knee, decreased ROM, strength, gait. Use of outcome tool(s) and clinical judgement create a POC that gives a: Questionable prediction of patient's progress: MODERATE COMPLEXITY            TREATMENT:   (In addition to Assessment/Re-Assessment sessions the following treatments were rendered)  Pre-treatment Symptoms/Complaints: Pt reports she has an upset stomach today from taking a laxative this morning. Pain medication makes her constipated. Pain: Initial:  4/10 Post Session:  4/10. Therapeutic Exercise (25 Minutes): For improved muscle activation, gait, and knee ROM. Short acr quads 4# 3x10, long arc quads 5# 2x10, standing hamstring curls 4# 2x10. Supine bridges 2x10. Ankle PF and ankle eversion red band 3x10 ea. Standing at wall and bring knee and hip into flexion 2x10 B LE. Verbal cues for maintaining trunk position/not leaning. Walking with visual cues from mirror for foot and ankle alignment with no assistive device.      Gait Training ( 15 minutes): for improved gait. Moderate verbal and visual cues for body alignment and ankle alignment. Weight shifts forward and backward x10 reps each working on shifting weight onto R LE. Walking with moderate visual and verbal cues for weight shifting onto R LE during gait. Tactile cues on R knee during stance phase for improved weight shift. Frequent rest breaks needed. HEP: No changes made to existing HEP. Treatment/Session Assessment:    · Response to Treatment: Improved gait today with moderate verbal, visual, tactile cueing and improved weight shift onto R LE.   · Compliance with Program/Exercises:  Appears complaint   · Recommendations/Intent for next treatment session: \"Next visit will focus on gait, knee ROM\".  LE strengthening as able  Total Treatment Duration: 40 Minutes   PT Patient Time In/Time Out  Time In: 1118  Time Out: [address] [place]

## 2018-05-02 ENCOUNTER — HOSPITAL ENCOUNTER (OUTPATIENT)
Dept: PHYSICAL THERAPY | Age: 53
Discharge: HOME | End: 2018-05-02
Payer: COMMERCIAL

## 2018-05-02 PROCEDURE — 97116 GAIT TRAINING THERAPY: CPT

## 2018-05-02 PROCEDURE — 97110 THERAPEUTIC EXERCISES: CPT

## 2018-05-02 NOTE — PROGRESS NOTES
Alem Mack  : 1965  Payor: [facility] / Plan: Amish Mcgrath / Product Type: Workers Comp /  [address]  at [facility] [place] [facility]  [address] [place] [address], [address].  [phone]   [phone]       OUTPATIENT PHYSICAL THERAPY:Daily Note 2018      ICD-10: Treatment Diagnosis: Sprain of posterior cruciate ligament of right knee, sequela (S83.521S); Pain in right knee (M25.561); Stiffness of right knee, not elsewhere classified (M25.661); Difficulty in walking, not elsewhere classified (R26.2)  Precautions/Allergies:   Review of patient's allergies indicates no known allergies. Fall Risk Score: 1 (? 5 = High Risk)  MD Orders:Evaluate and treat, HEP, ROM, strengthening MEDICAL/REFERRING DIAGNOSIS:  S/p R knee scope, PCL reconstruction   DATE OF ONSET: Surgery 17  REFERRING PHYSICIAN: Laura Lubin MD  RETURN PHYSICIAN APPOINTMENT: 18     INITIAL ASSESSMENT:  Ms. Teddy Villegas presents s/p right knee scope with PCL reconstruction. She continues to slowly progress with R knee ROM. She is able to ambulate short distances with no assistive device with increased weight on R LE. She does continue with whole R LE weakness limiting mobility. She does report pain in R knee with increased weight on R LE with walking and exercises and continued swelling at the end of the day in knee in R knee and ankle. She will benefit from continuing skilled physical therapy to continue to progress functional mobility. PROBLEM LIST (Impacting functional limitations):  1. Post-op pain and swelling right knee  2. Decreased ROM right knee   3. Decreased functional strength right knee/LE   4. Decreased gait skills INTERVENTIONS PLANNED:  1. aquatic therapy  2. Thermal and electric modalities, manual therapies for pain. 3. Manual therapies and therapeutic exercises for ROM and strength.    4. Therapeutic exercises for gait and balance   TREATMENT PLAN:  Effective Dates: 4-6-18 to 6-29-18. Frequency/Duration: 3 times a week for 12 more weeks  GOALS: (Goals have been discussed and agreed upon with patient.)   Short-Term Functional Goals: Time Frame: 6 weeks  1. Pt will be able to perform a good quad set and SLR with no extensor lag with for improved strength for gait. Progressing towards  2. Pt will increase R knee ROM to 0-90 for improved mobility. GOAL MET  3. Pt will improved R ankle DF AROM to 0 for improved gait. GOAL MET  4. Pt will report pain 5/10 with modified daily activities. GOAL MET  Discharge Goals: Time Frame: 12 weeks   1. Pt will increase R LE strength to 5/5 with manual muscle testing for improved strength for ADLs and work. Progressing towards  2. Pt will negotiate 1 flight of stairs with step over step with good control. Progressing towards  3. Pt will increase R knee ROM to 0-120 for mobility. GOAL MET  4. Pt will report pain 3/10 with daily activities. Progressing towards  5. Pt will score 45/80 on LEFS. Not tested  Rehabilitation Potential For Stated Goals: Good  Regarding [facility] [address] therapy, I certify that the treatment plan above will be carried out by a therapist or under their direction. Thank you for this referral,    Allie Polo, PT                       HISTORY:   History of Present Injury/Illness (Reason for Referral): She works at [employer] and she was entering the cooler and there was oil on the floor. She slipped and the feet slipped out from under her. She tried to get up and slipped again. Injury was August 14-17. She did go to physical therapy but it was making her worse. They decided to have surgery. Surgery 12-21-17. Stayed 2 nights in the hospital. She did have home health physical therapy 3-4x. Pain increases with walking. Past Medical History/Comorbidities: diabetes type 2 controlled with diet, HTN, L LE varicose vein surgery  Social History/Living Environment: Lives with . 2 steps to get inside. Prior Level of Function/Work/Activity: Works at [employer]. 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 Job does require lifting. Would like to get back to walking and walking dogs. Current Medications:       Current Outpatient Prescriptions:     magnesium hydroxide (BEAUCHAMP MILK OF MAGNESIA) 400 mg/5 mL suspension, Take 15 mL by mouth as needed for Constipation. as needed daily, Disp: , Rfl:     temazepam (RESTORIL) 15 mg capsule, Take 15 mg by mouth nightly as needed for Sleep., Disp: , Rfl:     promethazine (PHENERGAN) 25 mg tablet, Take 25 mg by mouth every eight (8) hours as needed for Nausea., Disp: , Rfl:     HYDROmorphone (DILAUDID) 2 mg tablet, Take 2-4 mg by mouth as needed for Pain. as needed every 4-6 hours, Disp: , Rfl:     atorvastatin (LIPITOR) 20 mg tablet, Take 20 mg by mouth nightly., Disp: , Rfl:     lisinopril-hydroCHLOROthiazide (PRINZIDE, ZESTORETIC) 10-12.5 mg per tablet, Take  by mouth daily. Indications: hypertension, Disp: , Rfl:     gemfibrozil (LOPID) 600 mg tablet, Take 600 mg by mouth two (2) times a day., Disp: , Rfl:     Omega-3 Fatty Acids 60- mg cpDR, Take  by mouth daily. , Disp: , Rfl:     acetaminophen (TYLENOL) 325 mg tablet, Take 325 mg by mouth every four (4) hours as needed for Pain., Disp: , Rfl:    Date Last Reviewed:  5-2-18   Number of Personal Factors/Comorbidities that affect the Plan of Care: 1-2: MODERATE COMPLEXITY   EXAMINATION:     Observation/Orthostatic Postural Assessment:  Pt arrived to therapy with no assistive device with brace on the R knee. Palpation: no tenderness around incisions     ROM:                Strength:                   Mobility: n/t  Balance:Unable to balance on R single leg       Body Structures Involved:  1. Joints  2. Muscles  3. Ligaments Body Functions Affected:  1. Neuromusculoskeletal Activities and Participation Affected:  1. Mobility  2.  Community, Social and [place]   Number of elements (examined above) that affect the Plan of Care: 4+: HIGH COMPLEXITY   CLINICAL PRESENTATION:   Presentation: Evolving clinical presentation with changing clinical characteristics: MODERATE COMPLEXITY   CLINICAL DECISION MAKING:   Outcome Measure: Tool Used: Lower Extremity Functional Scale (LEFS)  Score:  Initial: 5/80 Most Recent: 20/80 (Date: 4-16-18 )   Interpretation of Score: 20 questions each scored on a 5 point scale with 0 representing \"extreme difficulty or unable to perform\" and 4 representing \"no difficulty\". The lower the score, the greater the functional disability. 80/80 represents no disability. Minimal detectable change is 9 points. Score 80 79-63 62-48 47-32 31-16 15-1 0   Modifier CH CI CJ CK CL CM CN     Medical Necessity:   · Patient is expected to demonstrate progress in strength, range of motion and balance to improve gait. Reason for Services/Other Comments:  · Patient continues to require skilled intervention due to post-op R knee, decreased ROM, strength, gait. Use of outcome tool(s) and clinical judgement create a POC that gives a: Questionable prediction of patient's progress: MODERATE COMPLEXITY            TREATMENT:   (In addition to Assessment/Re-Assessment sessions the following treatments were rendered)  Pre-treatment Symptoms/Complaints: Pt reports she saw MD yesterday and he is going to order a test to check the strength. Pain: Initial:  0/10 Post Session:  6/10. Therapeutic Exercise (30 Minutes): For improved muscle activation, gait, and knee ROM. Attempted knee extension on machine with 10# but unable due to weakness. long arc quads 5# 2x10 slow, 2x10 fast , standing hamstring on machine 10# 2x10. Shuttle press 25# 2x10, 38# 10x, heel raises on shuttle single leg 38# 2x10. Single leg Sit to stand with table elevated and use of cane for UE assist 3x5, mini lunge slides 2x5. Gait Training ( 10 minutes): for improved gait.  Moderate verbal and visual cues for body alignment and ankle alignment. Weight shifts forward and backward x10 reps each working on shifting weight onto R LE. Walking with moderate visual and verbal cues for weight shifting onto R LE during gait. Tactile cues on R knee during stance phase for improved weight shift. Frequent rest breaks needed. HEP: No changes made to existing HEP. Treatment/Session Assessment:    · Response to Treatment: Increased pain at end of treatment due to increase in exercises and weight bearing through R LE. Pain with knee extension with 5#.  · Compliance with Program/Exercises:  Appears complaint   · Recommendations/Intent for next treatment session: \"Next visit will focus on gait, knee ROM\".  LE strengthening as able  Total Treatment Duration: 40 Minutes   PT Patient Time In/Time Out  Time In: 1302  Time Out: [address]

## 2018-05-04 ENCOUNTER — HOSPITAL ENCOUNTER (OUTPATIENT)
Dept: PHYSICAL THERAPY | Age: 53
Discharge: HOME | End: 2018-05-04
Payer: COMMERCIAL

## 2018-05-04 PROCEDURE — 97116 GAIT TRAINING THERAPY: CPT

## 2018-05-04 PROCEDURE — 97110 THERAPEUTIC EXERCISES: CPT

## 2018-05-04 NOTE — PROGRESS NOTES
Pinky Vicente  : 1965  Payor: Benjaman Kocher / Plan: Skip Byrd / Product Type: Workers Comp /  [address]  [facility] [place] [facility]  [address] [place] [address],  [address].  [phone]   [phone]       OUTPATIENT PHYSICAL THERAPY:Daily Note 2018      ICD-10: Treatment Diagnosis: Sprain of posterior cruciate ligament of right knee, sequela (S83.521S); Pain in right knee (M25.561); Stiffness of right knee, not elsewhere classified (M25.661); Difficulty in walking, not elsewhere classified (R26.2)  Precautions/Allergies:   Review of patient's allergies indicates no known allergies. Fall Risk Score: 1 (? 5 = High Risk)  MD Orders:Evaluate and treat, HEP, ROM, strengthening MEDICAL/REFERRING DIAGNOSIS:  S/p R knee scope, PCL reconstruction   DATE OF ONSET: Surgery 17  REFERRING PHYSICIAN: Jessica Lambert MD  RETURN PHYSICIAN APPOINTMENT: 18     INITIAL ASSESSMENT:  Ms. Farhana Conn presents s/p right knee scope with PCL reconstruction. She continues to slowly progress with R knee ROM. She is able to ambulate short distances with no assistive device with increased weight on R LE. She does continue with whole R LE weakness limiting mobility. She does report pain in R knee with increased weight on R LE with walking and exercises and continued swelling at the end of the day in knee in R knee and ankle. She will benefit from continuing skilled physical therapy to continue to progress functional mobility. PROBLEM LIST (Impacting functional limitations):  1. Post-op pain and swelling right knee  2. Decreased ROM right knee   3. Decreased functional strength right knee/LE   4. Decreased gait skills INTERVENTIONS PLANNED:  1. aquatic therapy  2. Thermal and electric modalities, manual therapies for pain. 3. Manual therapies and therapeutic exercises for ROM and strength.    4. Therapeutic exercises for gait and balance   TREATMENT PLAN:  Effective Dates: 4-6-18 to 6-29-18. Frequency/Duration: 3 times a week for 12 more weeks  GOALS: (Goals have been discussed and agreed upon with patient.)   Short-Term Functional Goals: Time Frame: 6 weeks  1. Pt will be able to perform a good quad set and SLR with no extensor lag with for improved strength for gait. Progressing towards  2. Pt will increase R knee ROM to 0-90 for improved mobility. GOAL MET  3. Pt will improved R ankle DF AROM to 0 for improved gait. GOAL MET  4. Pt will report pain 5/10 with modified daily activities. GOAL MET  Discharge Goals: Time Frame: 12 weeks   1. Pt will increase R LE strength to 5/5 with manual muscle testing for improved strength for ADLs and work. Progressing towards  2. Pt will negotiate 1 flight of stairs with step over step with good control. Progressing towards  3. Pt will increase R knee ROM to 0-120 for mobility. GOAL MET  4. Pt will report pain 3/10 with daily activities. Progressing towards  5. Pt will score 45/80 on LEFS. Not tested  Rehabilitation Potential For Stated Goals: Good  Regarding [address] therapy, I certify that the treatment plan above will be carried out by a therapist or under their direction. Thank you for this referral,    Allie Tomas, PT                       HISTORY:   History of Present Injury/Illness (Reason for Referral): She works at [employer] and she was entering the cooler and there was oil on the floor. She slipped and the feet slipped out from under her. She tried to get up and slipped again. Injury was August 14-17. She did go to physical therapy but it was making her worse. They decided to have surgery. Surgery 12-21-17. Stayed 2 nights in the hospital. She did have home health physical therapy 3-4x. Pain increases with walking. Past Medical History/Comorbidities: diabetes type 2 controlled with diet, HTN, L LE varicose vein surgery  Social History/Living Environment: Lives with . 2 steps to get inside. Prior Level of Function/Work/Activity: Works at [employer]. 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 Job does require lifting. Would like to get back to walking and walking dogs. Current Medications:       Current Outpatient Prescriptions:     magnesium hydroxide (BEAUCHAMP MILK OF MAGNESIA) 400 mg/5 mL suspension, Take 15 mL by mouth as needed for Constipation. as needed daily, Disp: , Rfl:     temazepam (RESTORIL) 15 mg capsule, Take 15 mg by mouth nightly as needed for Sleep., Disp: , Rfl:     promethazine (PHENERGAN) 25 mg tablet, Take 25 mg by mouth every eight (8) hours as needed for Nausea., Disp: , Rfl:     HYDROmorphone (DILAUDID) 2 mg tablet, Take 2-4 mg by mouth as needed for Pain. as needed every 4-6 hours, Disp: , Rfl:     atorvastatin (LIPITOR) 20 mg tablet, Take 20 mg by mouth nightly., Disp: , Rfl:     lisinopril-hydroCHLOROthiazide (PRINZIDE, ZESTORETIC) 10-12.5 mg per tablet, Take  by mouth daily. Indications: hypertension, Disp: , Rfl:     gemfibrozil (LOPID) 600 mg tablet, Take 600 mg by mouth two (2) times a day., Disp: , Rfl:     Omega-3 Fatty Acids 60- mg cpDR, Take  by mouth daily. , Disp: , Rfl:     acetaminophen (TYLENOL) 325 mg tablet, Take 325 mg by mouth every four (4) hours as needed for Pain., Disp: , Rfl:    Date Last Reviewed:  5-2-18   Number of Personal Factors/Comorbidities that affect the Plan of Care: 1-2: MODERATE COMPLEXITY   EXAMINATION:     Observation/Orthostatic Postural Assessment:  Pt arrived to therapy with no assistive device with brace on the R knee. Palpation: no tenderness around incisions     ROM:                Strength:                   Mobility: n/t  Balance:Unable to balance on R single leg       Body Structures Involved:  1. Joints  2. Muscles  3. Ligaments Body Functions Affected:  1. Neuromusculoskeletal Activities and Participation Affected:  1. Mobility  2.  Community, Social and [place]   Number of elements (examined above) that affect the Plan of Care: 4+: HIGH COMPLEXITY   CLINICAL PRESENTATION:   Presentation: Evolving clinical presentation with changing clinical characteristics: MODERATE COMPLEXITY   CLINICAL DECISION MAKING:   Outcome Measure: Tool Used: Lower Extremity Functional Scale (LEFS)  Score:  Initial: 5/80 Most Recent: 20/80 (Date: 4-16-18 )   Interpretation of Score: 20 questions each scored on a 5 point scale with 0 representing \"extreme difficulty or unable to perform\" and 4 representing \"no difficulty\". The lower the score, the greater the functional disability. 80/80 represents no disability. Minimal detectable change is 9 points. Score 80 79-63 62-48 47-32 31-16 15-1 0   Modifier CH CI CJ CK CL CM CN     Medical Necessity:   · Patient is expected to demonstrate progress in strength, range of motion and balance to improve gait. Reason for Services/Other Comments:  · Patient continues to require skilled intervention due to post-op R knee, decreased ROM, strength, gait. Use of outcome tool(s) and clinical judgement create a POC that gives a: Questionable prediction of patient's progress: MODERATE COMPLEXITY            TREATMENT:   (In addition to Assessment/Re-Assessment sessions the following treatments were rendered)  Pre-treatment Symptoms/Complaints: Pt reports her knee was swollen yesterday and painful. Pain: Initial:  5/10 Post Session:  6/10. Therapeutic Exercise (30 Minutes): For improved muscle activation, gait, and knee ROM. long arc quads 5# 2x10 slow, 2x10 fast , hamstring curls on machine 10# 2x10 single leg, knee extension machine with bilateral LE 10# 2x5,   Shuttle press 25# x10, 38# 2x10x, heel raises on shuttle single leg 38# 2x10. Single leg Sit to stand with table elevated and use of cane for UE assist 3x5, mini lunge slides 3x5. Gait Training ( 10 minutes): for improved gait. Moderate verbal and visual cues for body alignment and ankle alignment.  Weight shifts forward and backward x10 reps each working on shifting weight onto R LE. Walking with moderate visual and verbal cues for weight shifting onto R LE during gait. Tactile cues on R knee during stance phase for improved weight shift. Frequent rest breaks needed. HEP: No changes made to existing HEP. Treatment/Session Assessment:    · Response to Treatment: She was able to perform knee extensions on machine today with bilateral knee pain. · Compliance with Program/Exercises:  Appears complaint   · Recommendations/Intent for next treatment session: \"Next visit will focus on gait, knee ROM\".  LE strengthening as able  Total Treatment Duration: 40 Minutes   PT Patient Time In/Time Out  Time In: 1285  Time Out: [place] 108

## 2018-05-07 ENCOUNTER — HOSPITAL ENCOUNTER (OUTPATIENT)
Dept: PHYSICAL THERAPY | Age: 53
Discharge: HOME | End: 2018-05-07
Payer: COMMERCIAL

## 2018-05-07 PROCEDURE — 97110 THERAPEUTIC EXERCISES: CPT

## 2018-05-07 NOTE — PROGRESS NOTES
Pinky Vicente  : 1965  Payor: [address] [place] [address] Road / Plan: Skip Byrd / Product Type: Workers Comp /  [address]  at [facility] [place] [employer]  [address] [place] [address], [facility], [address] [place] [address]  [phone]   [phone]       OUTPATIENT PHYSICAL THERAPY:Daily Note 2018      ICD-10: Treatment Diagnosis: Sprain of posterior cruciate ligament of right knee, sequela (S83.521S); Pain in right knee (M25.561); Stiffness of right knee, not elsewhere classified (M25.661); Difficulty in walking, not elsewhere classified (R26.2)  Precautions/Allergies:   Review of patient's allergies indicates no known allergies. Fall Risk Score: 1 (? 5 = High Risk)  MD Orders:Evaluate and treat, HEP, ROM, strengthening MEDICAL/REFERRING DIAGNOSIS:  S/p R knee scope, PCL reconstruction   DATE OF ONSET: Surgery 17  REFERRING PHYSICIAN: Jessica Lambert MD  RETURN PHYSICIAN APPOINTMENT: 18     INITIAL ASSESSMENT:  Ms. Farhana Conn presents s/p right knee scope with PCL reconstruction. She continues to slowly progress with R knee ROM. She is able to ambulate short distances with no assistive device with increased weight on R LE. She does continue with whole R LE weakness limiting mobility. She does report pain in R knee with increased weight on R LE with walking and exercises and continued swelling at the end of the day in knee in R knee and ankle. She will benefit from continuing skilled physical therapy to continue to progress functional mobility. PROBLEM LIST (Impacting functional limitations):  1. Post-op pain and swelling right knee  2. Decreased ROM right knee   3. Decreased functional strength right knee/LE   4. Decreased gait skills INTERVENTIONS PLANNED:  1. aquatic therapy  2. Thermal and electric modalities, manual therapies for pain. 3. Manual therapies and therapeutic exercises for ROM and strength.    4. Therapeutic exercises for gait and balance   TREATMENT PLAN:  Effective Dates: 4-6-18 to 6-29-18. Frequency/Duration: 3 times a week for 12 more weeks  GOALS: (Goals have been discussed and agreed upon with patient.)   Short-Term Functional Goals: Time Frame: 6 weeks  1. Pt will be able to perform a good quad set and SLR with no extensor lag with for improved strength for gait. Progressing towards  2. Pt will increase R knee ROM to 0-90 for improved mobility. GOAL MET  3. Pt will improved R ankle DF AROM to 0 for improved gait. GOAL MET  4. Pt will report pain 5/10 with modified daily activities. GOAL MET  Discharge Goals: Time Frame: 12 weeks   1. Pt will increase R LE strength to 5/5 with manual muscle testing for improved strength for ADLs and work. Progressing towards  2. Pt will negotiate 1 flight of stairs with step over step with good control. Progressing towards  3. Pt will increase R knee ROM to 0-120 for mobility. GOAL MET  4. Pt will report pain 3/10 with daily activities. Progressing towards  5. Pt will score 45/80 on LEFS. Not tested  Rehabilitation Potential For Stated Goals: Good  Regarding Perez  therapy, I certify that the treatment plan above will be carried out by a therapist or under their direction. Thank you for this referral,    Allie Lugo, PT                       HISTORY:   History of Present Injury/Illness (Reason for Referral): She works at [employer] and she was entering the cooler and there was oil on the floor. She slipped and the feet slipped out from under her. She tried to get up and slipped again. Injury was August 14-17. She did go to physical therapy but it was making her worse. They decided to have surgery. Surgery 12-21-17. Stayed 2 nights in the hospital. She did have home health physical therapy 3-4x. Pain increases with walking. Past Medical History/Comorbidities: diabetes type 2 controlled with diet, HTN, L LE varicose vein surgery  Social History/Living Environment: Lives with .  2 steps to get inside. Prior Level of Function/Work/Activity: Works at [employer]. 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 Job does require lifting. Would like to get back to walking and walking dogs. Current Medications:       Current Outpatient Prescriptions:     magnesium hydroxide (BEAUCHAMP MILK OF MAGNESIA) 400 mg/5 mL suspension, Take 15 mL by mouth as needed for Constipation. as needed daily, Disp: , Rfl:     temazepam (RESTORIL) 15 mg capsule, Take 15 mg by mouth nightly as needed for Sleep., Disp: , Rfl:     promethazine (PHENERGAN) 25 mg tablet, Take 25 mg by mouth every eight (8) hours as needed for Nausea., Disp: , Rfl:     HYDROmorphone (DILAUDID) 2 mg tablet, Take 2-4 mg by mouth as needed for Pain. as needed every 4-6 hours, Disp: , Rfl:     atorvastatin (LIPITOR) 20 mg tablet, Take 20 mg by mouth nightly., Disp: , Rfl:     lisinopril-hydroCHLOROthiazide (PRINZIDE, ZESTORETIC) 10-12.5 mg per tablet, Take  by mouth daily. Indications: hypertension, Disp: , Rfl:     gemfibrozil (LOPID) 600 mg tablet, Take 600 mg by mouth two (2) times a day., Disp: , Rfl:     Omega-3 Fatty Acids 60- mg cpDR, Take  by mouth daily. , Disp: , Rfl:     acetaminophen (TYLENOL) 325 mg tablet, Take 325 mg by mouth every four (4) hours as needed for Pain., Disp: , Rfl:    Date Last Reviewed:  5-7-18   Number of Personal Factors/Comorbidities that affect the Plan of Care: 1-2: MODERATE COMPLEXITY   EXAMINATION:     Observation/Orthostatic Postural Assessment:  Pt arrived to therapy with no assistive device with brace on the R knee. Palpation: no tenderness around incisions     ROM:                Strength:                   Mobility: n/t  Balance:Unable to balance on R single leg       Body Structures Involved:  1. Joints  2. Muscles  3. Ligaments Body Functions Affected:  1. Neuromusculoskeletal Activities and Participation Affected:  1. Mobility  2.  Community, Social and [place]   Number of elements (examined above) that affect the Plan of Care: 4+: HIGH COMPLEXITY   CLINICAL PRESENTATION:   Presentation: Evolving clinical presentation with changing clinical characteristics: MODERATE COMPLEXITY   CLINICAL DECISION MAKING:   Outcome Measure: Tool Used: Lower Extremity Functional Scale (LEFS)  Score:  Initial: 5/80 Most Recent: 20/80 (Date: 4-16-18 )   Interpretation of Score: 20 questions each scored on a 5 point scale with 0 representing \"extreme difficulty or unable to perform\" and 4 representing \"no difficulty\". The lower the score, the greater the functional disability. 80/80 represents no disability. Minimal detectable change is 9 points. Score 80 79-63 62-48 47-32 31-16 15-1 0   Modifier CH CI CJ CK CL CM CN     Medical Necessity:   · Patient is expected to demonstrate progress in strength, range of motion and balance to improve gait. Reason for Services/Other Comments:  · Patient continues to require skilled intervention due to post-op R knee, decreased ROM, strength, gait. Use of outcome tool(s) and clinical judgement create a POC that gives a: Questionable prediction of patient's progress: MODERATE COMPLEXITY            TREATMENT:   (In addition to Assessment/Re-Assessment sessions the following treatments were rendered)  Pre-treatment Symptoms/Complaints: Pt reports her knee was swollen yesterday and painful. Pain: Initial:  4/10 Post Session:  6/10. Therapeutic Exercise (40 Minutes): For improved muscle activation, gait, and knee ROM. long arc quads 5# 2x10 slow, 2x10 fast , hamstring curls on machine 10# 2x10 single leg, knee extension machine with bilateral LE 10# 2x5,   Shuttle press 25# x10, 38# 2x10x, heel raises on shuttle single leg 38# 2x10. Single leg Sit to stand with table elevated and use of cane for UE assist 3x5, mini lunge slides 4x5. Step ups on 6\" step 2x 10, slant board calf stretch 10\"x3. HEP: No changes made to existing HEP.     Treatment/Session Assessment:    · Response to Treatment: Very tight in right gastroc with slant board calf stretch. Pain with knee extension on machine. · Compliance with Program/Exercises:  Appears complaint   · Recommendations/Intent for next treatment session: \"Next visit will focus on gait, knee ROM\".  LE strengthening as able  Total Treatment Duration: 40 Minutes   PT Patient Time In/Time Out  Time In: 1300  Time Out: 55[facility]

## 2018-05-09 ENCOUNTER — HOSPITAL ENCOUNTER (OUTPATIENT)
Dept: PHYSICAL THERAPY | Age: 53
Discharge: HOME | End: 2018-05-09
Payer: COMMERCIAL

## 2018-05-09 PROCEDURE — 97110 THERAPEUTIC EXERCISES: CPT

## 2018-05-09 NOTE — PROGRESS NOTES
Stephanie Ulloa  : 1965  Payor: [address] [place] [address] Road / Plan: Elia Asencio / Product Type: Workers Comp /  [address]  at [address] [place] [address] Rd  [address] [facility], [address] [place] [address] [address], [address] [facility]  [phone]   [phone]       OUTPATIENT PHYSICAL [address] [facility] Note and Progress Report 2018      ICD-10: Treatment Diagnosis: Sprain of posterior cruciate ligament of right knee, sequela (S83.521S); Pain in right knee (M25.561); Stiffness of right knee, not elsewhere classified (M25.661); Difficulty in walking, not elsewhere classified (R26.2)  Precautions/Allergies:   Review of patient's allergies indicates no known allergies. Fall Risk Score: 1 (? 5 = High Risk)  MD Orders:Evaluate and treat, HEP, ROM, strengthening MEDICAL/REFERRING DIAGNOSIS:  S/p R knee scope, PCL reconstruction   DATE OF ONSET: Surgery 17  REFERRING PHYSICIAN: Ibeth Broussard MD  RETURN PHYSICIAN APPOINTMENT: 18     INITIAL ASSESSMENT:  Ms. Earlene Flores presents s/p right knee scope with PCL reconstruction. She has good R knee ROM. She is slowly progressing with LE strength. She has improved with her ambulation and improved weight shift onto R LE during gait. She does continue to report pain in R knee with increased weight on R LE with walking and exercises and continued swelling at the end of the day in knee in R knee and ankle. She will benefit from continuing skilled physical therapy to continue to progress functional mobility. PROBLEM LIST (Impacting functional limitations):  1. Post-op pain and swelling right knee  2. Decreased ROM right knee   3. Decreased functional strength right knee/LE   4. Decreased gait skills INTERVENTIONS PLANNED:  1. aquatic therapy  2. Thermal and electric modalities, manual therapies for pain. 3. Manual therapies and therapeutic exercises for ROM and strength. 4. Therapeutic exercises for gait and balance   TREATMENT PLAN:  Effective Dates: 18 to 18. Frequency/Duration: 3 times a week for 12 more weeks  GOALS: (Goals have been discussed and agreed upon with patient.)   Short-Term Functional Goals: Time Frame: 6 weeks  1. Pt will be able to perform a good quad set and SLR with no extensor lag with for improved strength for gait. Progressing towards  2. Pt will increase R knee ROM to 0-90 for improved mobility. GOAL MET  3. Pt will improved R ankle DF AROM to 0 for improved gait. GOAL MET  4. Pt will report pain 5/10 with modified daily activities. GOAL MET  Discharge Goals: Time Frame: 12 weeks   1. Pt will increase R LE strength to 5/5 with manual muscle testing for improved strength for ADLs and work. Progressing towards  2. Pt will negotiate 1 flight of stairs with step over step with good control. Progressing towards  3. Pt will increase R knee ROM to 0-120 for mobility. GOAL MET  4. Pt will report pain 3/10 with daily activities. Progressing towards  5. Pt will score 45/80 on LEFS. Progressing towards  Rehabilitation Potential For Stated Goals: Good  Regarding Perez  therapy, I certify that the treatment plan above will be carried out by a therapist or under their direction. Thank you for this referral,    Allie Saba, PT                       HISTORY:   History of Present Injury/Illness (Reason for Referral): She works at [employer] and she was entering the cooler and there was oil on the floor. She slipped and the feet slipped out from under her. She tried to get up and slipped again. Injury was August 14-17. She did go to physical therapy but it was making her worse. They decided to have surgery. Surgery 12-21-17. Stayed 2 nights in the hospital. She did have home health physical therapy 3-4x. Pain increases with walking. Past Medical History/Comorbidities: diabetes type 2 controlled with diet, HTN, L LE varicose vein surgery  Social History/Living Environment: Lives with . 2 steps to get inside.      Prior Level of Function/Work/Activity: Works at [employer]. 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 Job does require lifting. Would like to get back to walking and walking dogs. Current Medications:       Current Outpatient Prescriptions:     magnesium hydroxide (BEAUCHAMP MILK OF MAGNESIA) 400 mg/5 mL suspension, Take 15 mL by mouth as needed for Constipation. as needed daily, Disp: , Rfl:     temazepam (RESTORIL) 15 mg capsule, Take 15 mg by mouth nightly as needed for Sleep., Disp: , Rfl:     promethazine (PHENERGAN) 25 mg tablet, Take 25 mg by mouth every eight (8) hours as needed for Nausea., Disp: , Rfl:     HYDROmorphone (DILAUDID) 2 mg tablet, Take 2-4 mg by mouth as needed for Pain. as needed every 4-6 hours, Disp: , Rfl:     atorvastatin (LIPITOR) 20 mg tablet, Take 20 mg by mouth nightly., Disp: , Rfl:     lisinopril-hydroCHLOROthiazide (PRINZIDE, ZESTORETIC) 10-12.5 mg per tablet, Take  by mouth daily. Indications: hypertension, Disp: , Rfl:     gemfibrozil (LOPID) 600 mg tablet, Take 600 mg by mouth two (2) times a day., Disp: , Rfl:     Omega-3 Fatty Acids 60- mg cpDR, Take  by mouth daily. , Disp: , Rfl:     acetaminophen (TYLENOL) 325 mg tablet, Take 325 mg by mouth every four (4) hours as needed for Pain., Disp: , Rfl:    Date Last Reviewed:  5-7-18   Number of Personal Factors/Comorbidities that affect the Plan of Care: 1-2: MODERATE COMPLEXITY   EXAMINATION:     Observation/Orthostatic Postural Assessment:  Pt arrived to therapy with no assistive device with brace on the R knee.     Palpation: no tenderness around incisions     ROM:         RLE PROM  R Knee Flexion: 125  R Knee Extension: 0  R Ankle Dorsiflexion: 5      Strength:            RLE Strength  R Hip Flexion: 4-  R Hip Extension: 4  R Hip ABduction: 4+  R Knee Flexion: 4-  R Knee Extension: 4-      Mobility: n/t  Balance:Unable to balance on R single leg       Body Structures Involved:  1. Joints  2. Muscles  3. Ligaments Body Functions Affected:  1. Neuromusculoskeletal Activities and Participation Affected:  1. Mobility  2. Community, Social and [place]   Number of elements (examined above) that affect the Plan of Care: 4+: HIGH COMPLEXITY   CLINICAL PRESENTATION:   Presentation: Evolving clinical presentation with changing clinical characteristics: MODERATE COMPLEXITY   CLINICAL DECISION MAKING:   Outcome Measure: Tool Used: Lower Extremity Functional Scale (LEFS)  Score:  Initial: 5/80 Most Recent: 20/80 (Date: 4-16-18 )   Interpretation of Score: 20 questions each scored on a 5 point scale with 0 representing \"extreme difficulty or unable to perform\" and 4 representing \"no difficulty\". The lower the score, the greater the functional disability. 80/80 represents no disability. Minimal detectable change is 9 points. Score 80 79-63 62-48 47-32 31-16 15-1 0   Modifier CH CI CJ CK CL CM CN     Medical Necessity:   · Patient is expected to demonstrate progress in strength, range of motion and balance to improve gait. Reason for Services/Other Comments:  · Patient continues to require skilled intervention due to post-op R knee, decreased ROM, strength, gait. Use of outcome tool(s) and clinical judgement create a POC that gives a: Questionable prediction of patient's progress: MODERATE COMPLEXITY            TREATMENT:   (In addition to Assessment/Re-Assessment sessions the following treatments were rendered)  Pre-treatment Symptoms/Complaints: Pt reports she has been sleeping more since taking the pain medicine. Pain: Initial:   0/10 Post Session:  5/10. Therapeutic Exercise (40 Minutes): For improved muscle activation, gait, and knee ROM.  long arc quads 5# 2x10 slow, 2x10 fast. Bridges 2x10, prone hip extension 2x10, ankle inversion and DF red 2x10, ankle PF blue 2x10,  hamstring curls on machine 10# 2x10 single leg   Shuttle press 25# x10, 38# 2x10x, heel raises on shuttle single leg 38# 2x10. Cable column hip ext, hip abduction, and hip flex 3\" x10 ea with R LE, L LE 10 ea way with 0# working on standing on R LE with good posture. Static stretches in supine for gastroc, hamstring, gluts, hip adductors, prone quad stretch 20\"x3 ea. HEP: No changes made to existing HEP. Treatment/Session Assessment:    · Response to Treatment: she is slowly progressing with LE strength. · Compliance with Program/Exercises:  Appears complaint   · Recommendations/Intent for next treatment session: \"Next visit will focus on gait, knee ROM\".  LE strengthening as able  Total Treatment Duration: 40 Minutes   PT Patient Time In/Time Out  Time In: 1300  Time Out: 9497 [facility]

## 2018-05-11 ENCOUNTER — HOSPITAL ENCOUNTER (OUTPATIENT)
Dept: PHYSICAL THERAPY | Age: 53
Discharge: HOME | End: 2018-05-11
Payer: COMMERCIAL

## 2018-05-11 PROCEDURE — 97110 THERAPEUTIC EXERCISES: CPT

## 2018-05-11 NOTE — PROGRESS NOTES
Jose Rota  : 1965  Payor: [address] [place] [address] Road / Plan: Chaka Swenson / Product Type: [employer] /  [address]  at [address] [place] [address] Rd  [facility], [address].  [phone]   [phone]       OUTPATIENT PHYSICAL THERAPY:Daily Note 2018      ICD-10: Treatment Diagnosis: Sprain of posterior cruciate ligament of right knee, sequela (S83.521S); Pain in right knee (M25.561); Stiffness of right knee, not elsewhere classified (M25.661); Difficulty in walking, not elsewhere classified (R26.2)  Precautions/Allergies:   Review of patient's allergies indicates no known allergies. Fall Risk Score: 1 (? 5 = High Risk)  MD Orders:Evaluate and treat, HEP, ROM, strengthening MEDICAL/REFERRING DIAGNOSIS:  S/p R knee scope, PCL reconstruction   DATE OF ONSET: Surgery 17  REFERRING PHYSICIAN: Nupur Pillai MD  RETURN PHYSICIAN APPOINTMENT: 18     INITIAL ASSESSMENT:  Ms. Claus Hooks presents s/p right knee scope with PCL reconstruction. She has good R knee ROM. She is slowly progressing with LE strength. She has improved with her ambulation and improved weight shift onto R LE during gait. She does continue to report pain in R knee with increased weight on R LE with walking and exercises and continued swelling at the end of the day in knee in R knee and ankle. She will benefit from continuing skilled physical therapy to continue to progress functional mobility. PROBLEM LIST (Impacting functional limitations):  1. Post-op pain and swelling right knee  2. Decreased ROM right knee   3. Decreased functional strength right knee/LE   4. Decreased gait skills INTERVENTIONS PLANNED:  1. aquatic therapy  2. Thermal and electric modalities, manual therapies for pain. 3. Manual therapies and therapeutic exercises for ROM and strength. 4. Therapeutic exercises for gait and balance   TREATMENT PLAN:  Effective Dates: 18 to 18.   Frequency/Duration: 3 times a week for 12 more weeks  GOALS: (Goals have been discussed and agreed upon with patient.)   Short-Term Functional Goals: Time Frame: 6 weeks  1. Pt will be able to perform a good quad set and SLR with no extensor lag with for improved strength for gait. Progressing towards  2. Pt will increase R knee ROM to 0-90 for improved mobility. GOAL MET  3. Pt will improved R ankle DF AROM to 0 for improved gait. GOAL MET  4. Pt will report pain 5/10 with modified daily activities. GOAL MET  Discharge Goals: Time Frame: 12 weeks   1. Pt will increase R LE strength to 5/5 with manual muscle testing for improved strength for ADLs and work. Progressing towards  2. Pt will negotiate 1 flight of stairs with step over step with good control. Progressing towards  3. Pt will increase R knee ROM to 0-120 for mobility. GOAL MET  4. Pt will report pain 3/10 with daily activities. Progressing towards  5. Pt will score 45/80 on LEFS. Progressing towards  Rehabilitation Potential For Stated Goals: Good  Regarding Perez  therapy, I certify that the treatment plan above will be carried out by a therapist or under their direction. Thank you for this referral,    Allie Bailey, PT                       HISTORY:   History of Present Injury/Illness (Reason for Referral): She works at [employer] and she was entering the cooler and there was oil on the floor. She slipped and the feet slipped out from under her. She tried to get up and slipped again. Injury was August 14-17. She did go to physical therapy but it was making her worse. They decided to have surgery. Surgery 12-21-17. Stayed 2 nights in the hospital. She did have home health physical therapy 3-4x. Pain increases with walking. Past Medical History/Comorbidities: diabetes type 2 controlled with diet, HTN, L LE varicose vein surgery  Social History/Living Environment: Lives with . 2 steps to get inside.      Prior Level of Function/Work/Activity: Works at [employer]. 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 Job does require lifting. Would like to get back to walking and walking dogs. Current Medications:       Current Outpatient Prescriptions:     magnesium hydroxide (BEAUCHAMP MILK OF MAGNESIA) 400 mg/5 mL suspension, Take 15 mL by mouth as needed for Constipation. as needed daily, Disp: , Rfl:     temazepam (RESTORIL) 15 mg capsule, Take 15 mg by mouth nightly as needed for Sleep., Disp: , Rfl:     promethazine (PHENERGAN) 25 mg tablet, Take 25 mg by mouth every eight (8) hours as needed for Nausea., Disp: , Rfl:     HYDROmorphone (DILAUDID) 2 mg tablet, Take 2-4 mg by mouth as needed for Pain. as needed every 4-6 hours, Disp: , Rfl:     atorvastatin (LIPITOR) 20 mg tablet, Take 20 mg by mouth nightly., Disp: , Rfl:     lisinopril-hydroCHLOROthiazide (PRINZIDE, ZESTORETIC) 10-12.5 mg per tablet, Take  by mouth daily. Indications: hypertension, Disp: , Rfl:     gemfibrozil (LOPID) 600 mg tablet, Take 600 mg by mouth two (2) times a day., Disp: , Rfl:     Omega-3 Fatty Acids 60- mg cpDR, Take  by mouth daily. , Disp: , Rfl:     acetaminophen (TYLENOL) 325 mg tablet, Take 325 mg by mouth every four (4) hours as needed for Pain., Disp: , Rfl:    Date Last Reviewed:  5-7-18   Number of Personal Factors/Comorbidities that affect the Plan of Care: 1-2: MODERATE COMPLEXITY   EXAMINATION:     Observation/Orthostatic Postural Assessment:  Pt arrived to therapy with no assistive device with brace on the R knee. Palpation: no tenderness around incisions     ROM:                Strength:                   Mobility: n/t  Balance:Unable to balance on R single leg       Body Structures Involved:  1. Joints  2. Muscles  3. Ligaments Body Functions Affected:  1. Neuromusculoskeletal Activities and Participation Affected:  1. Mobility  2.  Community, Social and [place]   Number of elements (examined above) that affect the Plan of Care: 4+: HIGH COMPLEXITY   CLINICAL PRESENTATION:   Presentation: Evolving clinical presentation with changing clinical characteristics: MODERATE COMPLEXITY   CLINICAL DECISION MAKING:   Outcome Measure: Tool Used: Lower Extremity Functional Scale (LEFS)  Score:  Initial: 5/80 Most Recent: 20/80 (Date: 4-16-18 )   Interpretation of Score: 20 questions each scored on a 5 point scale with 0 representing \"extreme difficulty or unable to perform\" and 4 representing \"no difficulty\". The lower the score, the greater the functional disability. 80/80 represents no disability. Minimal detectable change is 9 points. Score 80 79-63 62-48 47-32 31-16 15-1 0   Modifier CH CI CJ CK CL CM CN     Medical Necessity:   · Patient is expected to demonstrate progress in strength, range of motion and balance to improve gait. Reason for Services/Other Comments:  · Patient continues to require skilled intervention due to post-op R knee, decreased ROM, strength, gait. Use of outcome tool(s) and clinical judgement create a POC that gives a: Questionable prediction of patient's progress: MODERATE COMPLEXITY            TREATMENT:   (In addition to Assessment/Re-Assessment sessions the following treatments were rendered)  Pre-treatment Symptoms/Complaints: Pt reports her knee hurts. Pain: Initial:   4/10 Post Session:  5/10. Therapeutic Exercise (40 Minutes): For improved muscle activation, gait, and knee ROM. long arc quads 5# 2x10 slow, 2x10 fast.  hamstring curls on machine 10# 2x10 single leg   Shuttle press 38# 3x10 with tactile cues on R foot for neutral foot placement and verbal cueing to keep hips in alignment with pressing, side lying single leg shuttle press 12# 10x with verbal and tactile cueing for body alignment . Standing on R LE and L LE hip flexion, hip abduction, and hip extension x10 reps ea working on standing with full weight on R LE. gastroc stretch on slant board 10\"x5.  Step ups on 6\" step 2x10. HEP: No changes made to existing HEP. Treatment/Session Assessment:    · Response to Treatment: Pain with knee extension on R LE. Able to stand on R LE with full weight which has improved. · Compliance with Program/Exercises:  Appears complaint   · Recommendations/Intent for next treatment session: \"Next visit will focus on gait, knee ROM\".  LE strengthening as able  Total Treatment Duration: 40 Minutes   PT Patient Time In/Time Out  Time In: 1115  Time Out: [address] [facility] [address]

## 2018-05-14 ENCOUNTER — HOSPITAL ENCOUNTER (OUTPATIENT)
Dept: PHYSICAL THERAPY | Age: 53
Discharge: HOME | End: 2018-05-14
Payer: COMMERCIAL

## 2018-05-14 PROCEDURE — 97110 THERAPEUTIC EXERCISES: CPT

## 2018-05-14 NOTE — PROGRESS NOTES
Reigali Manishke  : 1965  Payor: /  [id number]  at [facility] [place] [facility]  [address] [facility], [address] [place] [address] [address], [address] [facility]  [phone]   [phone]       OUTPATIENT PHYSICAL THERAPY:Daily Note 2018      ICD-10: Treatment Diagnosis: Sprain of posterior cruciate ligament of right knee, sequela (S83.521S); Pain in right knee (M25.561); Stiffness of right knee, not elsewhere classified (M25.661); Difficulty in walking, not elsewhere classified (R26.2)  Precautions/Allergies:   Review of patient's allergies indicates no known allergies. Fall Risk Score: 1 (? 5 = High Risk)  MD Orders:Evaluate and treat, HEP, ROM, strengthening MEDICAL/REFERRING DIAGNOSIS:  S/p R knee scope, PCL reconstruction   DATE OF ONSET: Surgery 17  REFERRING PHYSICIAN: Maynor Fletcher MD  RETURN PHYSICIAN APPOINTMENT: 18     INITIAL ASSESSMENT:  Ms. Kyle Retana presents s/p right knee scope with PCL reconstruction. She has good R knee ROM. She is slowly progressing with LE strength. She has improved with her ambulation and improved weight shift onto R LE during gait. She does continue to report pain in R knee with increased weight on R LE with walking and exercises and continued swelling at the end of the day in knee in R knee and ankle. She will benefit from continuing skilled physical therapy to continue to progress functional mobility. PROBLEM LIST (Impacting functional limitations):  1. Post-op pain and swelling right knee  2. Decreased ROM right knee   3. Decreased functional strength right knee/LE   4. Decreased gait skills INTERVENTIONS PLANNED:  1. aquatic therapy  2. Thermal and electric modalities, manual therapies for pain. 3. Manual therapies and therapeutic exercises for ROM and strength. 4. Therapeutic exercises for gait and balance   TREATMENT PLAN:  Effective Dates: 18 to 18.   Frequency/Duration: 3 times a week for 12 more weeks  GOALS: (Goals have been discussed and agreed upon with patient.)   Short-Term Functional Goals: Time Frame: 6 weeks  1. Pt will be able to perform a good quad set and SLR with no extensor lag with for improved strength for gait. Progressing towards  2. Pt will increase R knee ROM to 0-90 for improved mobility. GOAL MET  3. Pt will improved R ankle DF AROM to 0 for improved gait. GOAL MET  4. Pt will report pain 5/10 with modified daily activities. GOAL MET  Discharge Goals: Time Frame: 12 weeks   1. Pt will increase R LE strength to 5/5 with manual muscle testing for improved strength for ADLs and work. Progressing towards  2. Pt will negotiate 1 flight of stairs with step over step with good control. Progressing towards  3. Pt will increase R knee ROM to 0-120 for mobility. GOAL MET  4. Pt will report pain 3/10 with daily activities. Progressing towards  5. Pt will score 45/80 on LEFS. Progressing towards  Rehabilitation Potential For Stated Goals: Good                HISTORY:   History of Present Injury/Illness (Reason for Referral): She works at [employer] and she was entering the cooler and there was oil on the floor. She slipped and the feet slipped out from under her. She tried to get up and slipped again. Injury was August 14-17. She did go to physical therapy but it was making her worse. They decided to have surgery. Surgery 12-21-17. Stayed 2 nights in the hospital. She did have home health physical therapy 3-4x. Pain increases with walking. Past Medical History/Comorbidities: diabetes type 2 controlled with diet, HTN, L LE varicose vein surgery  Social History/Living Environment: Lives with . 2 steps to get inside. Prior Level of Function/Work/Activity: Works at [employer]. 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 Job does require lifting. Would like to get back to walking and walking dogs.    Current Medications:       Current Outpatient Prescriptions:     magnesium hydroxide (BEAUCHAMP MILK OF MAGNESIA) 400 mg/5 mL suspension, Take 15 mL by mouth as needed for Constipation. as needed daily, Disp: , Rfl:     temazepam (RESTORIL) 15 mg capsule, Take 15 mg by mouth nightly as needed for Sleep., Disp: , Rfl:     promethazine (PHENERGAN) 25 mg tablet, Take 25 mg by mouth every eight (8) hours as needed for Nausea., Disp: , Rfl:     HYDROmorphone (DILAUDID) 2 mg tablet, Take 2-4 mg by mouth as needed for Pain. as needed every 4-6 hours, Disp: , Rfl:     atorvastatin (LIPITOR) 20 mg tablet, Take 20 mg by mouth nightly., Disp: , Rfl:     lisinopril-hydroCHLOROthiazide (PRINZIDE, ZESTORETIC) 10-12.5 mg per tablet, Take  by mouth daily. Indications: hypertension, Disp: , Rfl:     gemfibrozil (LOPID) 600 mg tablet, Take 600 mg by mouth two (2) times a day., Disp: , Rfl:     Omega-3 Fatty Acids 60- mg cpDR, Take  by mouth daily. , Disp: , Rfl:     acetaminophen (TYLENOL) 325 mg tablet, Take 325 mg by mouth every four (4) hours as needed for Pain., Disp: , Rfl:    Date Last Reviewed:  5-14-18   Number of Personal Factors/Comorbidities that affect the Plan of Care: 1-2: MODERATE COMPLEXITY   EXAMINATION:   5/14/2018  Observation/Orthostatic Postural Assessment: Walks into clinic with functional brace on R knee, cane on R. Comes in with interpretor. Palpation: Not assessed. ROM:         RLE PROM  R Knee Flexion: 130  R Knee Extension: 0      Strength: Not measured. Functional Mobility: Walking on level ground: walking with functional brace R knee, straight cane on R, limp on R, slow speed. .  Balance:Not assessed. Body Structures Involved:  1. Joints  2. Muscles  3. Ligaments Body Functions Affected:  1. Neuromusculoskeletal Activities and Participation Affected:  1. Mobility  2.  Community, Social and [place]   Number of elements (examined above) that affect the Plan of Care: 4+: HIGH COMPLEXITY   CLINICAL PRESENTATION:   Presentation: Evolving clinical presentation with changing clinical characteristics: MODERATE COMPLEXITY   CLINICAL DECISION MAKING:   Outcome Measure: Tool Used: Lower Extremity Functional Scale (LEFS)  Score:  Initial: 5/80 Most Recent: 20/80 (Date: 4-16-18 )   Interpretation of Score: 20 questions each scored on a 5 point scale with 0 representing \"extreme difficulty or unable to perform\" and 4 representing \"no difficulty\". The lower the score, the greater the functional disability. 80/80 represents no disability. Minimal detectable change is 9 points. Score 80 79-63 62-48 47-32 31-16 15-1 0   Modifier CH CI CJ CK CL CM CN     Medical Necessity:   · Patient is expected to demonstrate progress in strength, range of motion and balance to improve gait. Reason for Services/Other Comments:  · Patient continues to require skilled intervention due to post-op R knee, decreased ROM, strength, gait. Use of outcome tool(s) and clinical judgement create a POC that gives a: Questionable prediction of patient's progress: MODERATE COMPLEXITY          TREATMENT:   (In addition to Assessment/Re-Assessment sessions the following treatments were rendered)  5/14/2018  Pre-treatment Symptoms/Complaints: Knee is \"ok\". A little pain. Pain: Initial: Pain Intensity 1: 4 (/10 R anterior knee)  Post Session:        Therapeutic Exercise: (50 Minutes): Performed exercises for R knee and whole LE motion with assisted Active Isolated Stretching to gastroc., posterior hip, lateral hip, hamstrings in 90/90 and SLR, and hip flexors and quads in side-lying and Irvin positions, 3\"x10 each.    Exercises for R knee and whole LQ strength and control with instruction and performance in hook-lying hip shift scissor slides x10, progressing to hip shift with contralat hip flex x10, progressing to bridge with hip shift and elisha-bridge 3x10; dynamic control with step-to 4-in step with emphasis on R LE stance alignment 4x5 each, and added Rocker board in double leg stance with UE support x15-20 and no UE support x15; Closed chain LE strength with Shuttle Press 32.5# bilat to unilat crossed extension hold 3x15; seated hip flexion 10# 2x15; and seated knee extension 5# 3x10. Verbal and visual cues for R LE muscle action and use in stance phase. HEP: No changes made to existing HEP. Treatment/Session Assessment:    · Response to Treatment: 5 months post-op. Good knee PROM, but a little stiff on extension. Weak to the whole R LQ. Good effort with the exercises. Pain to the knee noted. · Compliance with Program/Exercises:  Appears complaint   Recommendations/Intent for next treatment session: Continue with functional and specific strength, balance and control, and gait mechanics R knee.    Total Treatment Duration: 50 Minutes   PT Patient Time In/Time Out  Time In: 1040  Time Out: [address], PT, MSPT, OCS

## 2018-05-16 ENCOUNTER — HOSPITAL ENCOUNTER (OUTPATIENT)
Dept: PHYSICAL THERAPY | Age: 53
Discharge: HOME | End: 2018-05-16
Payer: COMMERCIAL

## 2018-05-16 PROCEDURE — 97110 THERAPEUTIC EXERCISES: CPT

## 2018-05-16 NOTE — PROGRESS NOTES
Robert Diss  : 1965  Payor: [address] [place] [address] Road / Plan: Verner Born / Product Type: Workers Comp /  [address]  at [facility] [place] [employer]  [address] [place] [address], [address].  [phone]   [phone]       OUTPATIENT PHYSICAL [address] Note 2018      ICD-10: Treatment Diagnosis: Sprain of posterior cruciate ligament of right knee, sequela (S83.521S); Pain in right knee (M25.561); Stiffness of right knee, not elsewhere classified (M25.661); Difficulty in walking, not elsewhere classified (R26.2)  Precautions/Allergies:   Review of patient's allergies indicates no known allergies. Fall Risk Score: 1 (? 5 = High Risk)  MD Orders:Evaluate and treat, HEP, ROM, strengthening MEDICAL/REFERRING DIAGNOSIS:  S/p R knee scope, PCL reconstruction   DATE OF ONSET: Surgery 17  REFERRING PHYSICIAN: Celso Orozco MD  RETURN PHYSICIAN APPOINTMENT: 18     INITIAL ASSESSMENT:  Ms. [place] Mandel presents s/p right knee scope with PCL reconstruction. She has good R knee ROM. She is slowly progressing with LE strength. She has improved with her ambulation and improved weight shift onto R LE during gait. She does continue to report pain in R knee with increased weight on R LE with walking and exercises and continued swelling at the end of the day in knee in R knee and ankle. She will benefit from continuing skilled physical therapy to continue to progress functional mobility. PROBLEM LIST (Impacting functional limitations):  1. Post-op pain and swelling right knee  2. Decreased ROM right knee   3. Decreased functional strength right knee/LE   4. Decreased gait skills INTERVENTIONS PLANNED:  1. aquatic therapy  2. Thermal and electric modalities, manual therapies for pain. 3. Manual therapies and therapeutic exercises for ROM and strength. 4. Therapeutic exercises for gait and balance   TREATMENT PLAN:  Effective Dates: 18 to 18.   Frequency/Duration: 3 times a week for 12 more weeks  GOALS: (Goals have been discussed and agreed upon with patient.)   Short-Term Functional Goals: Time Frame: 6 weeks  1. Pt will be able to perform a good quad set and SLR with no extensor lag with for improved strength for gait. Progressing towards  2. Pt will increase R knee ROM to 0-90 for improved mobility. GOAL MET  3. Pt will improved R ankle DF AROM to 0 for improved gait. GOAL MET  4. Pt will report pain 5/10 with modified daily activities. GOAL MET  Discharge Goals: Time Frame: 12 weeks   1. Pt will increase R LE strength to 5/5 with manual muscle testing for improved strength for ADLs and work. Progressing towards  2. Pt will negotiate 1 flight of stairs with step over step with good control. Progressing towards  3. Pt will increase R knee ROM to 0-120 for mobility. GOAL MET  4. Pt will report pain 3/10 with daily activities. Progressing towards  5. Pt will score 45/80 on LEFS. Progressing towards  Rehabilitation Potential For Stated Goals: Good                HISTORY:   History of Present Injury/Illness (Reason for Referral): She works at [employer] and she was entering the cooler and there was oil on the floor. She slipped and the feet slipped out from under her. She tried to get up and slipped again. Injury was August 14-17. She did go to physical therapy but it was making her worse. They decided to have surgery. Surgery 12-21-17. Stayed 2 nights in the hospital. She did have home health physical therapy 3-4x. Pain increases with walking. Past Medical History/Comorbidities: diabetes type 2 controlled with diet, HTN, L LE varicose vein surgery  Social History/Living Environment: Lives with . 2 steps to get inside. Prior Level of Function/Work/Activity: Works at [employer]. 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 Job does require lifting. Would like to get back to walking and walking dogs.    Current Medications:       Current Outpatient Prescriptions:     magnesium hydroxide (BEAUCHAMP MILK OF MAGNESIA) 400 mg/5 mL suspension, Take 15 mL by mouth as needed for Constipation. as needed daily, Disp: , Rfl:     temazepam (RESTORIL) 15 mg capsule, Take 15 mg by mouth nightly as needed for Sleep., Disp: , Rfl:     promethazine (PHENERGAN) 25 mg tablet, Take 25 mg by mouth every eight (8) hours as needed for Nausea., Disp: , Rfl:     HYDROmorphone (DILAUDID) 2 mg tablet, Take 2-4 mg by mouth as needed for Pain. as needed every 4-6 hours, Disp: , Rfl:     atorvastatin (LIPITOR) 20 mg tablet, Take 20 mg by mouth nightly., Disp: , Rfl:     lisinopril-hydroCHLOROthiazide (PRINZIDE, ZESTORETIC) 10-12.5 mg per tablet, Take  by mouth daily. Indications: hypertension, Disp: , Rfl:     gemfibrozil (LOPID) 600 mg tablet, Take 600 mg by mouth two (2) times a day., Disp: , Rfl:     Omega-3 Fatty Acids 60- mg cpDR, Take  by mouth daily. , Disp: , Rfl:     acetaminophen (TYLENOL) 325 mg tablet, Take 325 mg by mouth every four (4) hours as needed for Pain., Disp: , Rfl:    Date Last Reviewed:  5-14-18   Number of Personal Factors/Comorbidities that affect the Plan of Care: 1-2: MODERATE COMPLEXITY   EXAMINATION:   5/16/2018  Observation/Orthostatic Postural Assessment: Walks into clinic with functional brace on R knee, cane on R. Comes in with interpretor. Palpation: Not assessed. ROM:                Strength: Not measured. Functional Mobility: Walking on level ground: walking with functional brace R knee, straight cane on R, limp on R, slow speed. .  Balance:Not assessed. Body Structures Involved:  1. Joints  2. Muscles  3. Ligaments Body Functions Affected:  1. Neuromusculoskeletal Activities and Participation Affected:  1. Mobility  2.  Community, Social and [place]   Number of elements (examined above) that affect the Plan of Care: 4+: HIGH COMPLEXITY   CLINICAL PRESENTATION:   Presentation: Evolving clinical presentation with changing clinical characteristics: MODERATE COMPLEXITY   CLINICAL DECISION MAKING:   Outcome Measure: Tool Used: Lower Extremity Functional Scale (LEFS)  Score:  Initial: 5/80 Most Recent: 20/80 (Date: 4-16-18 )   Interpretation of Score: 20 questions each scored on a 5 point scale with 0 representing \"extreme difficulty or unable to perform\" and 4 representing \"no difficulty\". The lower the score, the greater the functional disability. 80/80 represents no disability. Minimal detectable change is 9 points. Score 80 79-63 62-48 47-32 31-16 15-1 0   Modifier CH CI CJ CK CL CM CN     Medical Necessity:   · Patient is expected to demonstrate progress in strength, range of motion and balance to improve gait. Reason for Services/Other Comments:  · Patient continues to require skilled intervention due to post-op R knee, decreased ROM, strength, gait. Use of outcome tool(s) and clinical judgement create a POC that gives a: Questionable prediction of patient's progress: MODERATE COMPLEXITY          TREATMENT:   (In addition to Assessment/Re-Assessment sessions the following treatments were rendered)  5/16/2018  Pre-treatment Symptoms/Complaints: Knee is \"ok\". Not too sore after last time. No new complaints. .   Pain: Initial:   No VAS. Post Session:        Therapeutic Exercise: (40 Minutes): Performed exercises for R knee motion with passive physiologic hold/relax 3x30\" each to extension and flexion; and whole LE motion with assisted Active Isolated Stretching to gastroc., posterior hip, lateral hip, hamstrings in 90/90 and SLR, and hip flexors and quads in side-lying and Irvin positions, 3\"x10 each.   Exercises for R knee and whole LQ strength and control with instruction and performance in hook-lying hip shift scissor slides x10, progressing to hip shift with contralat hip flex x10, progressing to bridge with hip shift and elisha-bridge 3x5 each; standing frontal plane weight shifts, hip shifts, and \"walk in place\" for R stance emphasis; dynamic control with step-to 6-in step with emphasis on R LE stance alignment 4x5 each. Verbal and visual cues for R LE muscle action and use in stance phase. HEP: No changes made to existing HEP. Treatment/Session Assessment:    · Response to Treatment:  Good effort with the exercises. Pain to the knee noted. Improving stance phase on R LE.   · Compliance with Program/Exercises:  Appears complaint   Recommendations/Intent for next treatment session: Continue with functional and specific strength, balance and control, and gait mechanics R knee.    Total Treatment Duration: 40 Minutes   PT Patient Time In/Time Out  Time In: 1040  Time Out: Conor 24, PT, MSPT, OCS

## 2018-05-18 ENCOUNTER — HOSPITAL ENCOUNTER (OUTPATIENT)
Dept: PHYSICAL THERAPY | Age: 53
Discharge: HOME | End: 2018-05-18
Payer: COMMERCIAL

## 2018-05-18 PROCEDURE — 97110 THERAPEUTIC EXERCISES: CPT

## 2018-05-18 NOTE — PROGRESS NOTES
Carisa Bolus  : 1965  Payor: [address] [place] [address] Road / Plan: Rhonda Kennethnani / Product Type: Workers Comp /  [address]  at [facility] [place] [employer]  [address] [place] [address], [address].  [phone]   [phone]       OUTPATIENT PHYSICAL [address] Note 2018      ICD-10: Treatment Diagnosis: Sprain of posterior cruciate ligament of right knee, sequela (S83.521S); Pain in right knee (M25.561); Stiffness of right knee, not elsewhere classified (M25.661); Difficulty in walking, not elsewhere classified (R26.2)  Precautions/Allergies:   Review of patient's allergies indicates no known allergies. Fall Risk Score: 1 (? 5 = High Risk)  MD Orders:Evaluate and treat, HEP, ROM, strengthening MEDICAL/REFERRING DIAGNOSIS:  S/p R knee scope, PCL reconstruction   DATE OF ONSET: Surgery 17  REFERRING PHYSICIAN: Crystal Noguera MD  RETURN PHYSICIAN APPOINTMENT: 18     ASSESSMENT:  Ms. Neetu Richey presents s/p right knee scope with PCL reconstruction. She has good R knee ROM. She is slowly progressing with LE strength. She has improved with her ambulation and improved weight shift onto R LE during gait. She does continue to report pain in R knee with increased weight on R LE with walking and exercises and continued swelling at the end of the day in knee in R knee and ankle. She will benefit from continuing skilled physical therapy to continue to progress functional mobility. PROBLEM LIST (Impacting functional limitations):  1. Post-op pain and swelling right knee  2. Decreased ROM right knee   3. Decreased functional strength right knee/LE   4. Decreased gait skills INTERVENTIONS PLANNED:  1. aquatic therapy  2. Thermal and electric modalities, manual therapies for pain. 3. Manual therapies and therapeutic exercises for ROM and strength. 4. Therapeutic exercises for gait and balance   TREATMENT PLAN:  Effective Dates: 18 to 18.   Frequency/Duration: 3 times a week for 12 more weeks  GOALS: (Goals have been discussed and agreed upon with patient.)   Short-Term Functional Goals: Time Frame: 6 weeks  1. Pt will be able to perform a good quad set and SLR with no extensor lag with for improved strength for gait. Progressing towards  2. Pt will increase R knee ROM to 0-90 for improved mobility. GOAL MET  3. Pt will improved R ankle DF AROM to 0 for improved gait. GOAL MET  4. Pt will report pain 5/10 with modified daily activities. GOAL MET  Discharge Goals: Time Frame: 12 weeks   1. Pt will increase R LE strength to 5/5 with manual muscle testing for improved strength for ADLs and work. Progressing towards  2. Pt will negotiate 1 flight of stairs with step over step with good control. Progressing towards  3. Pt will increase R knee ROM to 0-120 for mobility. GOAL MET  4. Pt will report pain 3/10 with daily activities. Progressing towards  5. Pt will score 45/80 on LEFS. Progressing towards  Rehabilitation Potential For Stated Goals: Good                HISTORY:   History of Present Injury/Illness (Reason for Referral): She works at [employer] and she was entering the cooler and there was oil on the floor. She slipped and the feet slipped out from under her. She tried to get up and slipped again. Injury was August 14-17. She did go to physical therapy but it was making her worse. They decided to have surgery. Surgery 12-21-17. Stayed 2 nights in the hospital. She did have home health physical therapy 3-4x. Pain increases with walking. Past Medical History/Comorbidities: diabetes type 2 controlled with diet, HTN, L LE varicose vein surgery  Social History/Living Environment: Lives with . 2 steps to get inside. Prior Level of Function/Work/Activity: Works at [employer]. 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 Job does require lifting. Would like to get back to walking and walking dogs.    Current Medications:       Current Outpatient Prescriptions:     magnesium hydroxide (BEAUCHAMP MILK OF MAGNESIA) 400 mg/5 mL suspension, Take 15 mL by mouth as needed for Constipation. as needed daily, Disp: , Rfl:     temazepam (RESTORIL) 15 mg capsule, Take 15 mg by mouth nightly as needed for Sleep., Disp: , Rfl:     promethazine (PHENERGAN) 25 mg tablet, Take 25 mg by mouth every eight (8) hours as needed for Nausea., Disp: , Rfl:     HYDROmorphone (DILAUDID) 2 mg tablet, Take 2-4 mg by mouth as needed for Pain. as needed every 4-6 hours, Disp: , Rfl:     atorvastatin (LIPITOR) 20 mg tablet, Take 20 mg by mouth nightly., Disp: , Rfl:     lisinopril-hydroCHLOROthiazide (PRINZIDE, ZESTORETIC) 10-12.5 mg per tablet, Take  by mouth daily. Indications: hypertension, Disp: , Rfl:     gemfibrozil (LOPID) 600 mg tablet, Take 600 mg by mouth two (2) times a day., Disp: , Rfl:     Omega-3 Fatty Acids 60- mg cpDR, Take  by mouth daily. , Disp: , Rfl:     acetaminophen (TYLENOL) 325 mg tablet, Take 325 mg by mouth every four (4) hours as needed for Pain., Disp: , Rfl:    Date Last Reviewed:  5-14-18   Number of Personal Factors/Comorbidities that affect the Plan of Care: 1-2: MODERATE COMPLEXITY   EXAMINATION:   5/18/2018  Observation/Orthostatic Postural Assessment: Walks into clinic with functional brace on R knee, cane on R. Comes in with interpretor. Palpation: Not assessed. ROM:                Strength: Not measured. Functional Mobility: Walking on level ground: walking with functional brace R knee, straight cane on R, limp on R, slow speed. .  Balance:Not assessed. Body Structures Involved:  1. Joints  2. Muscles  3. Ligaments Body Functions Affected:  1. Neuromusculoskeletal Activities and Participation Affected:  1. Mobility  2.  Community, Social and [place]   Number of elements (examined above) that affect the Plan of Care: 4+: HIGH COMPLEXITY   CLINICAL PRESENTATION:   Presentation: Evolving clinical presentation with changing clinical characteristics: MODERATE COMPLEXITY   CLINICAL DECISION MAKING:   Outcome Measure: Tool Used: Lower Extremity Functional Scale (LEFS)  Score:  Initial: 5/80 Most Recent: 20/80 (Date: 4-16-18 )   Interpretation of Score: 20 questions each scored on a 5 point scale with 0 representing \"extreme difficulty or unable to perform\" and 4 representing \"no difficulty\". The lower the score, the greater the functional disability. 80/80 represents no disability. Minimal detectable change is 9 points. Score 80 79-63 62-48 47-32 31-16 15-1 0   Modifier CH CI CJ CK CL CM CN     Medical Necessity:   · Patient is expected to demonstrate progress in strength, range of motion and balance to improve gait. Reason for Services/Other Comments:  · Patient continues to require skilled intervention due to post-op R knee, decreased ROM, strength, gait. Use of outcome tool(s) and clinical judgement create a POC that gives a: Questionable prediction of patient's progress: MODERATE COMPLEXITY          TREATMENT:   (In addition to Assessment/Re-Assessment sessions the following treatments were rendered)  5/18/2018  Pre-treatment Symptoms/Complaints: R knee is sore this morning. Went to [facility] yesterday, and thinks it is a little more sore with riding in the car. Knee continues to grid, catch with pain. It will buckle on her yvan she does not wear the brace. Pain: Initial: Pain Intensity 1: 5 (/10 R knee)  Post Session:  Mild increased pain post.      Therapeutic Exercise: (45 Minutes): Performed exercises for R knee motion with passive physiologic hold/relax 3x30\" each to extension and flexion; and whole LE motion with instruction in Active Isolated Stretching to gastroc., posterior hip, lateral hip, hamstrings in 90/90 and SLR, and hip flexors in side-lying, and assisted Irvin stretch, 3\"x10 each.   Exercises for R knee and whole LQ strength for stance phase with progression to 90/90 hip lift/hip shift scissor slide, then to hip shift with elisha-bridge. Instruction and guidance for home performance; and stand with back to wall hip shift scissor slide, L hip shift with R UE reach 1x 5 breaths, and attmpts at R hip shift, but unable. HEP: She is to work on the 90/90 hip lift with elisha-bridge, 3x10 rep each. She verbalizes understanding. Treatment/Session Assessment:    · Response to Treatment:  Good effort with the exercises. Pain to the knee noted. Pain, weakness limits weight bearing on R.   · Compliance with Program/Exercises:  Appears complaint   Recommendations/Intent for next treatment session: Continue with functional and specific strength, balance and control, and gait mechanics R knee.    Total Treatment Duration: 45 Minutes   PT Patient Time In/Time Out  Time In: 0945  Time Out: Jw De La Rosa, PT, MSPT, OCS

## 2018-05-21 ENCOUNTER — HOSPITAL ENCOUNTER (OUTPATIENT)
Dept: PHYSICAL THERAPY | Age: 53
Discharge: HOME | End: 2018-05-21
Payer: COMMERCIAL

## 2018-05-21 PROCEDURE — 97110 THERAPEUTIC EXERCISES: CPT

## 2018-05-21 NOTE — PROGRESS NOTES
Kenneth Aguillon  : 1965  Payor: [address] [place] [address] Road / Plan: Danita Rosy / Product Type: Workers Comp /  [address]  at [facility] [place] [facility]  [address] [place] [address], [address].  [phone]   [phone]       OUTPATIENT PHYSICAL THERAPY:Daily Note 2018      ICD-10: Treatment Diagnosis: Sprain of posterior cruciate ligament of right knee, sequela (S83.521S); Pain in right knee (M25.561); Stiffness of right knee, not elsewhere classified (M25.661); Difficulty in walking, not elsewhere classified (R26.2)  Precautions/Allergies:   Review of patient's allergies indicates no known allergies. Fall Risk Score: 1 (? 5 = High Risk)  MD Orders:Evaluate and treat, HEP, ROM, strengthening MEDICAL/REFERRING DIAGNOSIS:  S/p R knee scope, PCL reconstruction   DATE OF ONSET: Surgery 17  REFERRING PHYSICIAN: Kian Tracy MD  RETURN PHYSICIAN APPOINTMENT: 18     INITIAL ASSESSMENT:  Ms. Gurmeet Merino presents s/p right knee scope with PCL reconstruction. She has good R knee ROM. She is slowly progressing with LE strength. She has improved with her ambulation and improved weight shift onto R LE during gait. She does continue to report pain in R knee with increased weight on R LE with walking and exercises and continued swelling at the end of the day in knee in R knee and ankle. She will benefit from continuing skilled physical therapy to continue to progress functional mobility. PROBLEM LIST (Impacting functional limitations):  1. Post-op pain and swelling right knee  2. Decreased ROM right knee   3. Decreased functional strength right knee/LE   4. Decreased gait skills INTERVENTIONS PLANNED:  1. aquatic therapy  2. Thermal and electric modalities, manual therapies for pain. 3. Manual therapies and therapeutic exercises for ROM and strength. 4. Therapeutic exercises for gait and balance   TREATMENT PLAN:  Effective Dates: 18 to 18.   Frequency/Duration: 3 times a week for 12 more weeks  GOALS: (Goals have been discussed and agreed upon with patient.)   Short-Term Functional Goals: Time Frame: 6 weeks  1. Pt will be able to perform a good quad set and SLR with no extensor lag with for improved strength for gait. Progressing towards  2. Pt will increase R knee ROM to 0-90 for improved mobility. GOAL MET  3. Pt will improved R ankle DF AROM to 0 for improved gait. GOAL MET  4. Pt will report pain 5/10 with modified daily activities. GOAL MET  Discharge Goals: Time Frame: 12 weeks   1. Pt will increase R LE strength to 5/5 with manual muscle testing for improved strength for ADLs and work. Progressing towards  2. Pt will negotiate 1 flight of stairs with step over step with good control. Progressing towards  3. Pt will increase R knee ROM to 0-120 for mobility. GOAL MET  4. Pt will report pain 3/10 with daily activities. Progressing towards  5. Pt will score 45/80 on LEFS. Progressing towards  Rehabilitation Potential For Stated Goals: Good  Regarding Perez  therapy, I certify that the treatment plan above will be carried out by a therapist or under their direction. Thank you for this referral,    Allie Walter, PT                       HISTORY:   History of Present Injury/Illness (Reason for Referral): She works at [employer] and she was entering the cooler and there was oil on the floor. She slipped and the feet slipped out from under her. She tried to get up and slipped again. Injury was August 14-17. She did go to physical therapy but it was making her worse. They decided to have surgery. Surgery 12-21-17. Stayed 2 nights in the hospital. She did have home health physical therapy 3-4x. Pain increases with walking. Past Medical History/Comorbidities: diabetes type 2 controlled with diet, HTN, L LE varicose vein surgery  Social History/Living Environment: Lives with . 2 steps to get inside.      Prior Level of Function/Work/Activity: Works at [employer]. 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 Job does require lifting. Would like to get back to walking and walking dogs. Current Medications:       Current Outpatient Prescriptions:     magnesium hydroxide (BEAUCHAMP MILK OF MAGNESIA) 400 mg/5 mL suspension, Take 15 mL by mouth as needed for Constipation. as needed daily, Disp: , Rfl:     temazepam (RESTORIL) 15 mg capsule, Take 15 mg by mouth nightly as needed for Sleep., Disp: , Rfl:     promethazine (PHENERGAN) 25 mg tablet, Take 25 mg by mouth every eight (8) hours as needed for Nausea., Disp: , Rfl:     HYDROmorphone (DILAUDID) 2 mg tablet, Take 2-4 mg by mouth as needed for Pain. as needed every 4-6 hours, Disp: , Rfl:     atorvastatin (LIPITOR) 20 mg tablet, Take 20 mg by mouth nightly., Disp: , Rfl:     lisinopril-hydroCHLOROthiazide (PRINZIDE, ZESTORETIC) 10-12.5 mg per tablet, Take  by mouth daily. Indications: hypertension, Disp: , Rfl:     gemfibrozil (LOPID) 600 mg tablet, Take 600 mg by mouth two (2) times a day., Disp: , Rfl:     Omega-3 Fatty Acids 60- mg cpDR, Take  by mouth daily. , Disp: , Rfl:     acetaminophen (TYLENOL) 325 mg tablet, Take 325 mg by mouth every four (4) hours as needed for Pain., Disp: , Rfl:    Date Last Reviewed:  5-21-18   Number of Personal Factors/Comorbidities that affect the Plan of Care: 1-2: MODERATE COMPLEXITY   EXAMINATION:     Observation/Orthostatic Postural Assessment:  Pt arrived to therapy with no assistive device with brace on the R knee. Palpation: no tenderness around incisions     ROM:                Strength:                   Mobility: n/t  Balance:Unable to balance on R single leg       Body Structures Involved:  1. Joints  2. Muscles  3. Ligaments Body Functions Affected:  1. Neuromusculoskeletal Activities and Participation Affected:  1. Mobility  2.  Community, Social and [place]   Number of elements (examined above) that affect the Plan of Care: 4+: HIGH COMPLEXITY   CLINICAL PRESENTATION:   Presentation: Evolving clinical presentation with changing clinical characteristics: MODERATE COMPLEXITY   CLINICAL DECISION MAKING:   Outcome Measure: Tool Used: Lower Extremity Functional Scale (LEFS)  Score:  Initial: 5/80 Most Recent: 20/80 (Date: 4-16-18 )   Interpretation of Score: 20 questions each scored on a 5 point scale with 0 representing \"extreme difficulty or unable to perform\" and 4 representing \"no difficulty\". The lower the score, the greater the functional disability. 80/80 represents no disability. Minimal detectable change is 9 points. Score 80 79-63 62-48 47-32 31-16 15-1 0   Modifier CH CI CJ CK CL CM CN     Medical Necessity:   · Patient is expected to demonstrate progress in strength, range of motion and balance to improve gait. Reason for Services/Other Comments:  · Patient continues to require skilled intervention due to post-op R knee, decreased ROM, strength, gait. Use of outcome tool(s) and clinical judgement create a POC that gives a: Questionable prediction of patient's progress: MODERATE COMPLEXITY            TREATMENT:   (In addition to Assessment/Re-Assessment sessions the following treatments were rendered)  Pre-treatment Symptoms/Complaints: Pt reports she is taking the pain medication 1-2x/day. Pain: Initial:   2/10 Post Session:  5/10. Therapeutic Exercise (40 Minutes): For improved muscle activation, gait, and knee ROM. long arc quads 5# 2x10 slow, 2x10 fast.  hamstring curls on machine 10# 2x10 single leg   Shuttle press 38# 3x10 with tactile cues on R foot for neutral foot placement and verbal cueing to keep hips in alignment with pressing. Standing on R LE and L LE hip flexion, hip abduction, and hip extension x10 reps ea working on standing with full weight on R LE. gastroc stretch in standing 20\"x2. Toe taps on  on 6\" step 2x10.    Lunge slides 2x10, single leg sit to stand 2x10 with high table. HEP: No changes made to existing HEP. Treatment/Session Assessment:    · Response to Treatment: She seems to be slowly progressing with LE strength. · Compliance with Program/Exercises:  Appears complaint   · Recommendations/Intent for next treatment session: \"Next visit will focus on gait, knee ROM\".  LE strengthening as able  Total Treatment Duration: 40 Minutes   PT Patient Time In/Time Out  Time In: 1105  Time Out: [address]

## 2018-05-24 ENCOUNTER — HOSPITAL ENCOUNTER (OUTPATIENT)
Dept: PHYSICAL THERAPY | Age: 53
Discharge: HOME | End: 2018-05-24
Payer: COMMERCIAL

## 2018-05-24 PROCEDURE — 97110 THERAPEUTIC EXERCISES: CPT

## 2018-05-24 NOTE — PROGRESS NOTES
Rico Delgadillo  : 1965  Payor: [address] [place] [address] Road / Plan: Ivis Mckeon / Product Type: Workers Comp /  [address] [place]  at [facility] [place] [employer]  [address] [place] [address], [address].  [phone]   [phone]       OUTPATIENT PHYSICAL THERAPY:Daily Note 2018      ICD-10: Treatment Diagnosis: Sprain of posterior cruciate ligament of right knee, sequela (S83.521S); Pain in right knee (M25.561); Stiffness of right knee, not elsewhere classified (M25.661); Difficulty in walking, not elsewhere classified (R26.2)  Precautions/Allergies:   Review of patient's allergies indicates no known allergies. Fall Risk Score: 1 (? 5 = High Risk)  MD Orders:Evaluate and treat, HEP, ROM, strengthening MEDICAL/REFERRING DIAGNOSIS:  S/p R knee scope, PCL reconstruction   DATE OF ONSET: Surgery 17  REFERRING PHYSICIAN: Bernardo Biswas., MD  RETURN PHYSICIAN APPOINTMENT: 18     INITIAL ASSESSMENT:  Ms. Lizzy Anderson presents s/p right knee scope with PCL reconstruction. She has good R knee ROM. She is slowly progressing with LE strength. She has improved with her ambulation and improved weight shift onto R LE during gait. She does continue to report pain in R knee with increased weight on R LE with walking and exercises and continued swelling at the end of the day in knee in R knee and ankle. She will benefit from continuing skilled physical therapy to continue to progress functional mobility. PROBLEM LIST (Impacting functional limitations):  1. Post-op pain and swelling right knee  2. Decreased ROM right knee   3. Decreased functional strength right knee/LE   4. Decreased gait skills INTERVENTIONS PLANNED:  1. aquatic therapy  2. Thermal and electric modalities, manual therapies for pain. 3. Manual therapies and therapeutic exercises for ROM and strength. 4. Therapeutic exercises for gait and balance   TREATMENT PLAN:  Effective Dates: 18 to 18.   Frequency/Duration: 3 times a week for 12 more weeks  GOALS: (Goals have been discussed and agreed upon with patient.)   Short-Term Functional Goals: Time Frame: 6 weeks  1. Pt will be able to perform a good quad set and SLR with no extensor lag with for improved strength for gait. Progressing towards  2. Pt will increase R knee ROM to 0-90 for improved mobility. GOAL MET  3. Pt will improved R ankle DF AROM to 0 for improved gait. GOAL MET  4. Pt will report pain 5/10 with modified daily activities. GOAL MET  Discharge Goals: Time Frame: 12 weeks   1. Pt will increase R LE strength to 5/5 with manual muscle testing for improved strength for ADLs and work. Progressing towards  2. Pt will negotiate 1 flight of stairs with step over step with good control. Progressing towards  3. Pt will increase R knee ROM to 0-120 for mobility. GOAL MET  4. Pt will report pain 3/10 with daily activities. Progressing towards  5. Pt will score 45/80 on LEFS. Progressing towards  Rehabilitation Potential For Stated Goals: Good                HISTORY:   History of Present Injury/Illness (Reason for Referral): She works at [employer] and she was entering the cooler and there was oil on the floor. She slipped and the feet slipped out from under her. She tried to get up and slipped again. Injury was August 14-17. She did go to physical therapy but it was making her worse. They decided to have surgery. Surgery 12-21-17. Stayed 2 nights in the hospital. She did have home health physical therapy 3-4x. Pain increases with walking. Past Medical History/Comorbidities: diabetes type 2 controlled with diet, HTN, L LE varicose vein surgery  Social History/Living Environment: Lives with . 2 steps to get inside. Prior Level of Function/Work/Activity: Works at [employer]. 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 Job does require lifting. Would like to get back to walking and walking dogs.    Current Medications:       Current Outpatient Prescriptions:     magnesium hydroxide (BEAUCHAMP MILK OF MAGNESIA) 400 mg/5 mL suspension, Take 15 mL by mouth as needed for Constipation. as needed daily, Disp: , Rfl:     temazepam (RESTORIL) 15 mg capsule, Take 15 mg by mouth nightly as needed for Sleep., Disp: , Rfl:     promethazine (PHENERGAN) 25 mg tablet, Take 25 mg by mouth every eight (8) hours as needed for Nausea., Disp: , Rfl:     HYDROmorphone (DILAUDID) 2 mg tablet, Take 2-4 mg by mouth as needed for Pain. as needed every 4-6 hours, Disp: , Rfl:     atorvastatin (LIPITOR) 20 mg tablet, Take 20 mg by mouth nightly., Disp: , Rfl:     lisinopril-hydroCHLOROthiazide (PRINZIDE, ZESTORETIC) 10-12.5 mg per tablet, Take  by mouth daily. Indications: hypertension, Disp: , Rfl:     gemfibrozil (LOPID) 600 mg tablet, Take 600 mg by mouth two (2) times a day., Disp: , Rfl:     Omega-3 Fatty Acids 60- mg cpDR, Take  by mouth daily. , Disp: , Rfl:     acetaminophen (TYLENOL) 325 mg tablet, Take 325 mg by mouth every four (4) hours as needed for Pain., Disp: , Rfl:    Date Last Reviewed:  5-21-18   Number of Personal Factors/Comorbidities that affect the Plan of Care: 1-2: MODERATE COMPLEXITY   EXAMINATION:   5/24/2018   Observation/Orthostatic Postural Assessment: Comes into clinic with cane on R, knee functional brace on R knee. Palpation: Tender to R mid vastus lateralis, posterior joint, lateral joint line, gastroc. ROM:       R Knee ROM: 0-130 with pain, guardng through range. Strength: Not tested. Special Tests: Posterior Drawr: slight lag noted on R knee. Functional Mobility: Walking with cane on R, knee brace on R, limp on R with short step length L.   Balance:Unable to balance on R single leg       Body Structures Involved:  1. Joints  2. Muscles  3. Ligaments Body Functions Affected:  1. Neuromusculoskeletal Activities and Participation Affected:  1. Mobility  2.  Community, Social and [place] Number of elements (examined above) that affect the Plan of Care: 4+: HIGH COMPLEXITY   CLINICAL PRESENTATION:   Presentation: Evolving clinical presentation with changing clinical characteristics: MODERATE COMPLEXITY   CLINICAL DECISION MAKING:   Outcome Measure: Tool Used: Lower Extremity Functional Scale (LEFS)  Score:  Initial: 5/80 Most Recent: 20/80 (Date: 4-16-18 )   Interpretation of Score: 20 questions each scored on a 5 point scale with 0 representing \"extreme difficulty or unable to perform\" and 4 representing \"no difficulty\". The lower the score, the greater the functional disability. 80/80 represents no disability. Minimal detectable change is 9 points. Score 80 79-63 62-48 47-32 31-16 15-1 0   Modifier CH CI CJ CK CL CM CN     Medical Necessity:   · Patient is expected to demonstrate progress in strength, range of motion and balance to improve gait. Reason for Services/Other Comments:  · Patient continues to require skilled intervention due to post-op R knee, decreased ROM, strength, gait. Use of outcome tool(s) and clinical judgement create a POC that gives a: Questionable prediction of patient's progress: MODERATE COMPLEXITY            TREATMENT:   (In addition to Assessment/Re-Assessment sessions the following treatments were rendered)  5/24/2018  Pre-treatment Symptoms/Complaints: Says her knee has more pain today, especially to the postero-lateral joint. Says she was making the bed yesterday and knelt on the knee. It hurt the first time, but the second time, she felt a pop. Has been more pain since. Pain: Initial:   No VAS Post Session:  5/10. Therapeutic Exercise: (40 Minutes): R knee ROM with hold/relax to extension and flexion, 3x20-30\" each; assisted flexibility exercises to gastroc, posterior hip, hamstrings in 90/90 and SLR, 3\"x10. Pain and guarding through knee ROM limits her.  Active knee flexion with medial tibial glide combo through full range 3x10 reps with improved comfort through range. With Kinesio tape applied (see below), standing dynamic control with step-to 4-in step 3x5 each, 6-in step 3x5 each with one to no UE assist for balance. Functional strength with forward step up on 4-in step 3x5 each. Manual Therapies (5 Minutes):: Applied OrthoGel to to R knee, then Kinesio tape trial with mechanical correction to support proximal tibia in anterior glide and IR. Instruction in tape wear and removal if skin is irritated. She verbalizes understanding. HEP: No changes made to existing HEP. Treatment/Session Assessment:    · Response to Treatment: Pain to R knee persists, and increased pain reports today. May have strained the PCL or the mensicus with kneeling on the knee yesterday. Improved comfort with through range knee motion with the mobilization with movement, and somewhat better with the taping technique. Don't expect this to correct the alignment, but it may give support to work on movement, strength, and balance. · Compliance with Program/Exercises:  Appears complaint   · Recommendations/Intent for next treatment session: \"Next visit will focus on gait, knee ROM, strength\". Re-check the knee. May utilize the tape again as appropriate.    Total Treatment Duration: 45 Minutes   PT Patient Time In/Time Out  Time In: 1305  Time Out: [address], PT, MSPT, OCS

## 2018-05-25 ENCOUNTER — HOSPITAL ENCOUNTER (OUTPATIENT)
Dept: PHYSICAL THERAPY | Age: 53
Discharge: HOME | End: 2018-05-25
Payer: COMMERCIAL

## 2018-05-25 PROCEDURE — 97140 MANUAL THERAPY 1/> REGIONS: CPT

## 2018-05-25 PROCEDURE — 97035 APP MDLTY 1+ULTRASOUND EA 15: CPT

## 2018-05-25 NOTE — PROGRESS NOTES
Elder Moe  : 1965  Payor: [address] [place] [address] Road / Plan: Mardee Odor / Product Type: Workers Comp /  [address]  at [facility] [place] [facility]  [address] [place] [address], [address] [place].  [phone]   [phone]       OUTPATIENT PHYSICAL [address] Note 2018      ICD-10: Treatment Diagnosis: Sprain of posterior cruciate ligament of right knee, sequela (S83.521S); Pain in right knee (M25.561); Stiffness of right knee, not elsewhere classified (M25.661); Difficulty in walking, not elsewhere classified (R26.2)  Precautions/Allergies:   Review of patient's allergies indicates no known allergies. Fall Risk Score: 1 (? 5 = High Risk)  MD Orders:Evaluate and treat, HEP, ROM, strengthening MEDICAL/REFERRING DIAGNOSIS:  S/p R knee scope, PCL reconstruction   DATE OF ONSET: Surgery 17  REFERRING PHYSICIAN: Denise Arnold MD  RETURN PHYSICIAN APPOINTMENT: 18     INITIAL ASSESSMENT:  Ms. Carlin Villarreal presents s/p right knee scope with PCL reconstruction. She has good R knee ROM. She is slowly progressing with LE strength. She has improved with her ambulation and improved weight shift onto R LE during gait. She does continue to report pain in R knee with increased weight on R LE with walking and exercises and continued swelling at the end of the day in knee in R knee and ankle. She will benefit from continuing skilled physical therapy to continue to progress functional mobility. PROBLEM LIST (Impacting functional limitations):  1. Post-op pain and swelling right knee  2. Decreased ROM right knee   3. Decreased functional strength right knee/LE   4. Decreased gait skills INTERVENTIONS PLANNED:  1. aquatic therapy  2. Thermal and electric modalities, manual therapies for pain. 3. Manual therapies and therapeutic exercises for ROM and strength. 4. Therapeutic exercises for gait and balance   TREATMENT PLAN:  Effective Dates: 18 to 18.   Frequency/Duration: 3 times a week for 12 more weeks  GOALS: (Goals have been discussed and agreed upon with patient.)   Short-Term Functional Goals: Time Frame: 6 weeks  1. Pt will be able to perform a good quad set and SLR with no extensor lag with for improved strength for gait. Progressing towards  2. Pt will increase R knee ROM to 0-90 for improved mobility. GOAL MET  3. Pt will improved R ankle DF AROM to 0 for improved gait. GOAL MET  4. Pt will report pain 5/10 with modified daily activities. GOAL MET  Discharge Goals: Time Frame: 12 weeks   1. Pt will increase R LE strength to 5/5 with manual muscle testing for improved strength for ADLs and work. Progressing towards  2. Pt will negotiate 1 flight of stairs with step over step with good control. Progressing towards  3. Pt will increase R knee ROM to 0-120 for mobility. GOAL MET  4. Pt will report pain 3/10 with daily activities. Progressing towards  5. Pt will score 45/80 on LEFS. Progressing towards  Rehabilitation Potential For Stated Goals: Good                HISTORY:   History of Present Injury/Illness (Reason for Referral): She works at [employer] and she was entering the cooler and there was oil on the floor. She slipped and the feet slipped out from under her. She tried to get up and slipped again. Injury was August 14-17. She did go to physical therapy but it was making her worse. They decided to have surgery. Surgery 12-21-17. Stayed 2 nights in the hospital. She did have home health physical therapy 3-4x. Pain increases with walking. Past Medical History/Comorbidities: diabetes type 2 controlled with diet, HTN, L LE varicose vein surgery  Social History/Living Environment: Lives with . 2 steps to get inside. Prior Level of Function/Work/Activity: Works at [employer]. 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 Job does require lifting. Would like to get back to walking and walking dogs.    Current Medications:       Current Outpatient Prescriptions:     magnesium hydroxide (BEAUCHAMP MILK OF MAGNESIA) 400 mg/5 mL suspension, Take 15 mL by mouth as needed for Constipation. as needed daily, Disp: , Rfl:     temazepam (RESTORIL) 15 mg capsule, Take 15 mg by mouth nightly as needed for Sleep., Disp: , Rfl:     promethazine (PHENERGAN) 25 mg tablet, Take 25 mg by mouth every eight (8) hours as needed for Nausea., Disp: , Rfl:     HYDROmorphone (DILAUDID) 2 mg tablet, Take 2-4 mg by mouth as needed for Pain. as needed every 4-6 hours, Disp: , Rfl:     atorvastatin (LIPITOR) 20 mg tablet, Take 20 mg by mouth nightly., Disp: , Rfl:     lisinopril-hydroCHLOROthiazide (PRINZIDE, ZESTORETIC) 10-12.5 mg per tablet, Take  by mouth daily. Indications: hypertension, Disp: , Rfl:     gemfibrozil (LOPID) 600 mg tablet, Take 600 mg by mouth two (2) times a day., Disp: , Rfl:     Omega-3 Fatty Acids 60- mg cpDR, Take  by mouth daily. , Disp: , Rfl:     acetaminophen (TYLENOL) 325 mg tablet, Take 325 mg by mouth every four (4) hours as needed for Pain., Disp: , Rfl:    Date Last Reviewed:  5-21-18   Number of Personal Factors/Comorbidities that affect the Plan of Care: 1-2: MODERATE COMPLEXITY   EXAMINATION:   5/25/2018   Observation/Orthostatic Postural Assessment: Comes into clinic with cane on R, knee functional brace on R knee. Palpation: Tender to R mid vastus lateralis, posterior joint, lateral joint line. ROM:                Strength: Not tested. Special Tests: Posterior Drawr: slight lag noted on R knee. Functional Mobility: Walking with cane on R, knee brace on R, limp on R with short step length L.   Balance:Unable to balance on R single leg       Body Structures Involved:  1. Joints  2. Muscles  3. Ligaments Body Functions Affected:  1. Neuromusculoskeletal Activities and Participation Affected:  1. Mobility  2.  Community, Social and [place]   Number of elements (examined above) that affect the Plan of Care: 4+: HIGH COMPLEXITY   CLINICAL PRESENTATION:   Presentation: Evolving clinical presentation with changing clinical characteristics: MODERATE COMPLEXITY   CLINICAL DECISION MAKING:   Outcome Measure: Tool Used: Lower Extremity Functional Scale (LEFS)  Score:  Initial: 5/80 Most Recent: 20/80 (Date: 4-16-18 )   Interpretation of Score: 20 questions each scored on a 5 point scale with 0 representing \"extreme difficulty or unable to perform\" and 4 representing \"no difficulty\". The lower the score, the greater the functional disability. 80/80 represents no disability. Minimal detectable change is 9 points. Score 80 79-63 62-48 47-32 31-16 15-1 0   Modifier CH CI CJ CK CL CM CN     Medical Necessity:   · Patient is expected to demonstrate progress in strength, range of motion and balance to improve gait. Reason for Services/Other Comments:  · Patient continues to require skilled intervention due to post-op R knee, decreased ROM, strength, gait. Use of outcome tool(s) and clinical judgement create a POC that gives a: Questionable prediction of patient's progress: MODERATE COMPLEXITY            TREATMENT:   (In addition to Assessment/Re-Assessment sessions the following treatments were rendered)  5/25/2018  Pre-treatment Symptoms/Complaints: Says her knee has more pain today but slightly better than yesterday. Pain: Initial:   No VAS Post Session:  5/10. Therapeutic Exercise: ( ): held today  Manual Therapies (15 Minutes):: for decreased pain. Pt in right side lying and varus mobilization to the knee 30\"x3 grade 4. Kinesio tape trial with mechanical correction to support proximal tibia in anterior glide and IR and IR strip for PCL support. She is to remove tape if irritation.    Therapeutic Modalities: R knee ultrasound for pain                                                                 Right Knee Ultrasound  Delivery: Pulsed  Duty Cycle: 50 %  Frequency: 3 mHz  Intensity: 1.2 raman/cm sq  Duration : 10 minutes  Patient Position: Lying left side                              HEP: No changes made to existing HEP. Treatment/Session Assessment:    · Response to Treatment: She did feel better ambulating with kinesio tape on so she is to try to wear the tape this weekend to improve knee pain. May add back in strengthening exercises next week if pain has improved. · Compliance with Program/Exercises:  Appears complaint   · Recommendations/Intent for next treatment session: \"Next visit will focus on gait, knee ROM, strength\". Re-check the knee. May utilize the tape again as appropriate.    Total Treatment Duration: 25 Minutes   PT Patient Time In/Time Out  Time In: 0930  Time Out: [address]le, PT

## 2018-05-29 ENCOUNTER — HOSPITAL ENCOUNTER (OUTPATIENT)
Dept: PHYSICAL THERAPY | Age: 53
Discharge: HOME | End: 2018-05-29
Payer: COMMERCIAL

## 2018-05-29 PROCEDURE — 97035 APP MDLTY 1+ULTRASOUND EA 15: CPT

## 2018-05-29 PROCEDURE — 97110 THERAPEUTIC EXERCISES: CPT

## 2018-05-29 NOTE — PROGRESS NOTES
Marlin Gonzales  : 1965  Payor: [address] [place] [address] Road / Plan: Becky Ortega / Product Type: [employer] /  [address]  at [address] [place] [address] Rd  [facility], [address] [place] [address]  [phone]   [phone]       OUTPATIENT PHYSICAL THERAPY:Daily Note 2018      ICD-10: Treatment Diagnosis: Sprain of posterior cruciate ligament of right knee, sequela (S83.521S); Pain in right knee (M25.561); Stiffness of right knee, not elsewhere classified (M25.661); Difficulty in walking, not elsewhere classified (R26.2)  Precautions/Allergies:   Review of patient's allergies indicates no known allergies. Fall Risk Score: 1 (? 5 = High Risk)  MD Orders:Evaluate and treat, HEP, ROM, strengthening MEDICAL/REFERRING DIAGNOSIS:  S/p R knee scope, PCL reconstruction   DATE OF ONSET: Surgery 17  REFERRING PHYSICIAN: Nanette Wray MD  RETURN PHYSICIAN APPOINTMENT: 18     INITIAL ASSESSMENT:  Ms. Laisha Hale presents s/p right knee scope with PCL reconstruction. She has good R knee ROM. She is slowly progressing with LE strength. She has improved with her ambulation and improved weight shift onto R LE during gait. She does continue to report pain in R knee with increased weight on R LE with walking and exercises and continued swelling at the end of the day in knee in R knee and ankle. She will benefit from continuing skilled physical therapy to continue to progress functional mobility. PROBLEM LIST (Impacting functional limitations):  1. Post-op pain and swelling right knee  2. Decreased ROM right knee   3. Decreased functional strength right knee/LE   4. Decreased gait skills INTERVENTIONS PLANNED:  1. aquatic therapy  2. Thermal and electric modalities, manual therapies for pain. 3. Manual therapies and therapeutic exercises for ROM and strength. 4. Therapeutic exercises for gait and balance   TREATMENT PLAN:  Effective Dates: 18 to 18.   Frequency/Duration: 3 times a week for 12 more weeks  GOALS: (Goals have been discussed and agreed upon with patient.)   Short-Term Functional Goals: Time Frame: 6 weeks  1. Pt will be able to perform a good quad set and SLR with no extensor lag with for improved strength for gait. Progressing towards  2. Pt will increase R knee ROM to 0-90 for improved mobility. GOAL MET  3. Pt will improved R ankle DF AROM to 0 for improved gait. GOAL MET  4. Pt will report pain 5/10 with modified daily activities. GOAL MET  Discharge Goals: Time Frame: 12 weeks   1. Pt will increase R LE strength to 5/5 with manual muscle testing for improved strength for ADLs and work. Progressing towards  2. Pt will negotiate 1 flight of stairs with step over step with good control. Progressing towards  3. Pt will increase R knee ROM to 0-120 for mobility. GOAL MET  4. Pt will report pain 3/10 with daily activities. Progressing towards  5. Pt will score 45/80 on LEFS. Progressing towards  Rehabilitation Potential For Stated Goals: Good                HISTORY:   History of Present Injury/Illness (Reason for Referral): She works at [email or web address] and she was entering the cooler and there was oil on the floor. She slipped and the feet slipped out from under her. She tried to get up and slipped again. Injury was August 14-17. She did go to physical therapy but it was making her worse. They decided to have surgery. Surgery 12-21-17. Stayed 2 nights in the hospital. She did have home health physical therapy 3-4x. Pain increases with walking. Past Medical History/Comorbidities: diabetes type 2 controlled with diet, HTN, L LE varicose vein surgery  Social History/Living Environment: Lives with . 2 steps to get inside. Prior Level of Function/Work/Activity: Works at [email or web address]. 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 Job does require lifting. Would like to get back to walking and walking dogs.    Current Medications:       Current Outpatient Prescriptions:     magnesium hydroxide (BEAUCHAMP MILK OF MAGNESIA) 400 mg/5 mL suspension, Take 15 mL by mouth as needed for Constipation. as needed daily, Disp: , Rfl:     temazepam (RESTORIL) 15 mg capsule, Take 15 mg by mouth nightly as needed for Sleep., Disp: , Rfl:     promethazine (PHENERGAN) 25 mg tablet, Take 25 mg by mouth every eight (8) hours as needed for Nausea., Disp: , Rfl:     HYDROmorphone (DILAUDID) 2 mg tablet, Take 2-4 mg by mouth as needed for Pain. as needed every 4-6 hours, Disp: , Rfl:     atorvastatin (LIPITOR) 20 mg tablet, Take 20 mg by mouth nightly., Disp: , Rfl:     lisinopril-hydroCHLOROthiazide (PRINZIDE, ZESTORETIC) 10-12.5 mg per tablet, Take  by mouth daily. Indications: hypertension, Disp: , Rfl:     gemfibrozil (LOPID) 600 mg tablet, Take 600 mg by mouth two (2) times a day., Disp: , Rfl:     Omega-3 Fatty Acids 60- mg cpDR, Take  by mouth daily. , Disp: , Rfl:     acetaminophen (TYLENOL) 325 mg tablet, Take 325 mg by mouth every four (4) hours as needed for Pain., Disp: , Rfl:    Date Last Reviewed:  5-21-18   Number of Personal Factors/Comorbidities that affect the Plan of Care: 1-2: MODERATE COMPLEXITY   EXAMINATION:   5/29/2018   Observation/Orthostatic Postural Assessment: Comes into clinic with cane on R, knee functional brace on R knee. Palpation: Tender to R mid vastus lateralis, posterior joint, lateral joint line. ROM:                Strength: Not tested. Special Tests: Posterior Drawr: slight lag noted on R knee. Functional Mobility: Walking with cane on R, knee brace on R, limp on R with short step length L.   Balance:Unable to balance on R single leg       Body Structures Involved:  1. Joints  2. Muscles  3. Ligaments Body Functions Affected:  1. Neuromusculoskeletal Activities and Participation Affected:  1. Mobility  2.  Community, Social and [place]   Number of elements (examined above) that affect the Plan of Care: 4+: HIGH COMPLEXITY   CLINICAL PRESENTATION:   Presentation: Evolving clinical presentation with changing clinical characteristics: MODERATE COMPLEXITY   CLINICAL DECISION MAKING:   Outcome Measure: Tool Used: Lower Extremity Functional Scale (LEFS)  Score:  Initial: 5/80 Most Recent: 20/80 (Date: 4-16-18 )   Interpretation of Score: 20 questions each scored on a 5 point scale with 0 representing \"extreme difficulty or unable to perform\" and 4 representing \"no difficulty\". The lower the score, the greater the functional disability. 80/80 represents no disability. Minimal detectable change is 9 points. Score 80 79-63 62-48 47-32 31-16 15-1 0   Modifier CH CI CJ CK CL CM CN     Medical Necessity:   · Patient is expected to demonstrate progress in strength, range of motion and balance to improve gait. Reason for Services/Other Comments:  · Patient continues to require skilled intervention due to post-op R knee, decreased ROM, strength, gait. Use of outcome tool(s) and clinical judgement create a POC that gives a: Questionable prediction of patient's progress: MODERATE COMPLEXITY            TREATMENT:   (In addition to Assessment/Re-Assessment sessions the following treatments were rendered)  5/29/2018  Pre-treatment Symptoms/Complaints: Pt reports pain continues in her posterolateral knee but it is some improved from Friday. Pt states the tape was helpful. Pain: Initial:   6/10 Post Session:  6/10. Therapeutic Exercise: ( ): held today  Manual Therapies (15 Minutes):: for decreased pain. Pt in right side lying and varus mobilization to the knee 30\"x3 grade 4. Kinesio tape replaced with mechanical correction to support proximal tibia in anterior glide and IR and IR strip for PCL support. She is to remove tape if irritation.    Therapeutic Modalities: R knee ultrasound for pain  8 min  Pulsed 1.4 w/cm                                                                                                HEP: No changes made to existing HEP. Treatment/Session Assessment:    · Response to Treatment: Continues with taping and some rest for knee secondary to increased pain last week with kneeling at home. · Compliance with Program/Exercises:  Appears compliant   · Recommendations/Intent for next treatment session: \"Next visit will focus on gait, knee ROM, strength\". Re-check the knee.     Total Treatment Duration: 30 Minutes   PT Patient Time In/Time Out  Time In: 1030  Time Out: Jw Chavez 18, PT

## 2018-05-31 ENCOUNTER — HOSPITAL ENCOUNTER (OUTPATIENT)
Dept: PHYSICAL THERAPY | Age: 53
Discharge: HOME | End: 2018-05-31
Payer: COMMERCIAL

## 2018-05-31 PROCEDURE — 97140 MANUAL THERAPY 1/> REGIONS: CPT

## 2018-05-31 PROCEDURE — 97110 THERAPEUTIC EXERCISES: CPT

## 2018-05-31 PROCEDURE — 97035 APP MDLTY 1+ULTRASOUND EA 15: CPT

## 2018-05-31 NOTE — PROGRESS NOTES
Pinky Vicente  : 1965  Payor: [address] [place] [address] Road / Plan: Skip Ivandeena / Product Type: Workers Comp /  [address]  at [facility] [place] [employer]  [address] [place] [address], [facility], [address] [place] [address]  [phone]   [phone]       OUTPATIENT PHYSICAL THERAPY:Daily Note 2018      ICD-10: Treatment Diagnosis: Sprain of posterior cruciate ligament of right knee, sequela (S83.521S); Pain in right knee (M25.561); Stiffness of right knee, not elsewhere classified (M25.661); Difficulty in walking, not elsewhere classified (R26.2)  Precautions/Allergies:   Review of patient's allergies indicates no known allergies. Fall Risk Score: 1 (? 5 = High Risk)  MD Orders:Evaluate and treat, HEP, ROM, strengthening MEDICAL/REFERRING DIAGNOSIS:  S/p R knee scope, PCL reconstruction   DATE OF ONSET: Surgery 17  REFERRING PHYSICIAN: Jessica Lambert MD  RETURN PHYSICIAN APPOINTMENT: 18     INITIAL ASSESSMENT:  Ms. Farhana Conn presents s/p right knee scope with PCL reconstruction. She has good R knee ROM. She is slowly progressing with LE strength. She has improved with her ambulation and improved weight shift onto R LE during gait. She does continue to report pain in R knee with increased weight on R LE with walking and exercises and continued swelling at the end of the day in knee in R knee and ankle. She will benefit from continuing skilled physical therapy to continue to progress functional mobility. PROBLEM LIST (Impacting functional limitations):  1. Post-op pain and swelling right knee  2. Decreased ROM right knee   3. Decreased functional strength right knee/LE   4. Decreased gait skills INTERVENTIONS PLANNED:  1. aquatic therapy  2. Thermal and electric modalities, manual therapies for pain. 3. Manual therapies and therapeutic exercises for ROM and strength. 4. Therapeutic exercises for gait and balance   TREATMENT PLAN:  Effective Dates: 18 to 18.   Frequency/Duration: 3 times a week for 12 more weeks  GOALS: (Goals have been discussed and agreed upon with patient.)   Short-Term Functional Goals: Time Frame: 6 weeks  1. Pt will be able to perform a good quad set and SLR with no extensor lag with for improved strength for gait. Progressing towards  2. Pt will increase R knee ROM to 0-90 for improved mobility. GOAL MET  3. Pt will improved R ankle DF AROM to 0 for improved gait. GOAL MET  4. Pt will report pain 5/10 with modified daily activities. GOAL MET  Discharge Goals: Time Frame: 12 weeks   1. Pt will increase R LE strength to 5/5 with manual muscle testing for improved strength for ADLs and work. Progressing towards  2. Pt will negotiate 1 flight of stairs with step over step with good control. Progressing towards  3. Pt will increase R knee ROM to 0-120 for mobility. GOAL MET  4. Pt will report pain 3/10 with daily activities. Progressing towards  5. Pt will score 45/80 on LEFS. Progressing towards  Rehabilitation Potential For Stated Goals: Good                HISTORY:   History of Present Injury/Illness (Reason for Referral): She works at [employer] and she was entering the cooler and there was oil on the floor. She slipped and the feet slipped out from under her. She tried to get up and slipped again. Injury was August 14-17. She did go to physical therapy but it was making her worse. They decided to have surgery. Surgery 12-21-17. Stayed 2 nights in the hospital. She did have home health physical therapy 3-4x. Pain increases with walking. Past Medical History/Comorbidities: diabetes type 2 controlled with diet, HTN, L LE varicose vein surgery  Social History/Living Environment: Lives with . 2 steps to get inside. Prior Level of Function/Work/Activity: Works at [employer]. 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 Job does require lifting. Would like to get back to walking and walking dogs.    Current Medications:       Current Outpatient Prescriptions:     magnesium hydroxide (BEAUCHAMP MILK OF MAGNESIA) 400 mg/5 mL suspension, Take 15 mL by mouth as needed for Constipation. as needed daily, Disp: , Rfl:     temazepam (RESTORIL) 15 mg capsule, Take 15 mg by mouth nightly as needed for Sleep., Disp: , Rfl:     promethazine (PHENERGAN) 25 mg tablet, Take 25 mg by mouth every eight (8) hours as needed for Nausea., Disp: , Rfl:     HYDROmorphone (DILAUDID) 2 mg tablet, Take 2-4 mg by mouth as needed for Pain. as needed every 4-6 hours, Disp: , Rfl:     atorvastatin (LIPITOR) 20 mg tablet, Take 20 mg by mouth nightly., Disp: , Rfl:     lisinopril-hydroCHLOROthiazide (PRINZIDE, ZESTORETIC) 10-12.5 mg per tablet, Take  by mouth daily. Indications: hypertension, Disp: , Rfl:     gemfibrozil (LOPID) 600 mg tablet, Take 600 mg by mouth two (2) times a day., Disp: , Rfl:     Omega-3 Fatty Acids 60- mg cpDR, Take  by mouth daily. , Disp: , Rfl:     acetaminophen (TYLENOL) 325 mg tablet, Take 325 mg by mouth every four (4) hours as needed for Pain., Disp: , Rfl:    Date Last Reviewed:  5-31-18   Number of Personal Factors/Comorbidities that affect the Plan of Care: 1-2: MODERATE COMPLEXITY   EXAMINATION:   5/31/2018   Observation/Orthostatic Postural Assessment: Comes into clinic with cane on R, knee functional brace on R knee. Palpation: Tender to posterior joint, lateral joint line. ROM:                Strength: Not tested. Special Tests: Posterior Drawr: slight lag noted on R knee. Functional Mobility: Walking with cane on R, knee brace on R, limp on R with short step length L.   Balance:Unable to balance on R single leg       Body Structures Involved:  1. Joints  2. Muscles  3. Ligaments Body Functions Affected:  1. Neuromusculoskeletal Activities and Participation Affected:  1. Mobility  2.  Community, Social and [place]   Number of elements (examined above) that affect the Plan of Care: 4+: HIGH COMPLEXITY CLINICAL PRESENTATION:   Presentation: Evolving clinical presentation with changing clinical characteristics: MODERATE COMPLEXITY   CLINICAL DECISION MAKING:   Outcome Measure: Tool Used: Lower Extremity Functional Scale (LEFS)  Score:  Initial: 5/80 Most Recent: 20/80 (Date: 4-16-18 )   Interpretation of Score: 20 questions each scored on a 5 point scale with 0 representing \"extreme difficulty or unable to perform\" and 4 representing \"no difficulty\". The lower the score, the greater the functional disability. 80/80 represents no disability. Minimal detectable change is 9 points. Score 80 79-63 62-48 47-32 31-16 15-1 0   Modifier CH CI CJ CK CL CM CN     Medical Necessity:   · Patient is expected to demonstrate progress in strength, range of motion and balance to improve gait. Reason for Services/Other Comments:  · Patient continues to require skilled intervention due to post-op R knee, decreased ROM, strength, gait. Use of outcome tool(s) and clinical judgement create a POC that gives a: Questionable prediction of patient's progress: MODERATE COMPLEXITY            TREATMENT:   (In addition to Assessment/Re-Assessment sessions the following treatments were rendered)  5/31/2018  Pre-treatment Symptoms/Complaints: Pt reports pain continues in her posterolateral knee but is better. Pain: Initial:   4/10 Post Session:  4/10. Therapeutic Exercise: (15 Minutes): for LE strengthening. P supine and SLR 2x10, side lying hip abduction 2x10, prone hip extension 2x10. Long arc quad 3# 2x10. Kinesio tape applied for PCL support and I strip for patella medial glide. Manual Therapies (15 Minutes):: for decreased pain. Pt prone and soft tissue mobilization to gastroc and peroneals. Knee flexion and knee extension physiological mobilizations with pt supine.    Therapeutic Modalities: R knee ultrasound for pain                                                                   Right Knee Ultrasound  Delivery: Pulsed  Duty Cycle: 50 %  Frequency: 3 mHz  Intensity: 1.2 raman/cm sq  Duration : 10 minutes  Patient Position: Lying left side                              HEP: No changes made to existing HEP. Treatment/Session Assessment:    · Response to Treatment: Added back in open chain strengthening exercises today and she did well with minimal complaints of pain. She continues with pain to the posterior lateral knee and pain with active knee flexion. · Compliance with Program/Exercises:  Appears compliant   · Recommendations/Intent for next treatment session: \"Next visit will focus on gait, knee ROM, strength\". Re-check the knee.     Total Treatment Duration: 40 Minutes   PT Patient Time In/Time Out  Time In: 0808  Time Out: JORGE Viveros

## 2018-06-01 ENCOUNTER — HOSPITAL ENCOUNTER (OUTPATIENT)
Dept: PHYSICAL THERAPY | Age: 53
Discharge: HOME | End: 2018-06-01
Payer: COMMERCIAL

## 2018-06-01 PROCEDURE — 97140 MANUAL THERAPY 1/> REGIONS: CPT

## 2018-06-01 PROCEDURE — 97110 THERAPEUTIC EXERCISES: CPT

## 2018-06-01 NOTE — PROGRESS NOTES
Katy Neither  : 1965  Payor: [address] [place] [address] Road / Plan: Flor Randall / Product Type: Workers Comp /  [address]  at [facility] [place] [employer]  [address] [place] [address], [address] [place].  [phone]   [phone]       OUTPATIENT PHYSICAL THERAPY:Daily Note 2018      ICD-10: Treatment Diagnosis: Sprain of posterior cruciate ligament of right knee, sequela (S83.521S); Pain in right knee (M25.561); Stiffness of right knee, not elsewhere classified (M25.661); Difficulty in walking, not elsewhere classified (R26.2)  Precautions/Allergies:   Review of patient's allergies indicates no known allergies. Fall Risk Score: 1 (? 5 = High Risk)  MD Orders:Evaluate and treat, HEP, ROM, strengthening MEDICAL/REFERRING DIAGNOSIS:  S/p R knee scope, PCL reconstruction   DATE OF ONSET: Surgery 17  REFERRING PHYSICIAN: Caitlin Kaba MD  RETURN PHYSICIAN APPOINTMENT: 18     INITIAL ASSESSMENT:  Ms. Bro Miranda presents s/p right knee scope with PCL reconstruction. She has good R knee ROM. She is slowly progressing with LE strength. She has improved with her ambulation and improved weight shift onto R LE during gait. She does continue to report pain in R knee with increased weight on R LE with walking and exercises and continued swelling at the end of the day in knee in R knee and ankle. She will benefit from continuing skilled physical therapy to continue to progress functional mobility. PROBLEM LIST (Impacting functional limitations):  1. Post-op pain and swelling right knee  2. Decreased ROM right knee   3. Decreased functional strength right knee/LE   4. Decreased gait skills INTERVENTIONS PLANNED:  1. aquatic therapy  2. Thermal and electric modalities, manual therapies for pain. 3. Manual therapies and therapeutic exercises for ROM and strength. 4. Therapeutic exercises for gait and balance   TREATMENT PLAN:  Effective Dates: 18 to 18.   Frequency/Duration: 3 times a week for 12 more weeks  GOALS: (Goals have been discussed and agreed upon with patient.)   Short-Term Functional Goals: Time Frame: 6 weeks  1. Pt will be able to perform a good quad set and SLR with no extensor lag with for improved strength for gait. Progressing towards  2. Pt will increase R knee ROM to 0-90 for improved mobility. GOAL MET  3. Pt will improved R ankle DF AROM to 0 for improved gait. GOAL MET  4. Pt will report pain 5/10 with modified daily activities. GOAL MET  Discharge Goals: Time Frame: 12 weeks   1. Pt will increase R LE strength to 5/5 with manual muscle testing for improved strength for ADLs and work. Progressing towards  2. Pt will negotiate 1 flight of stairs with step over step with good control. Progressing towards  3. Pt will increase R knee ROM to 0-120 for mobility. GOAL MET  4. Pt will report pain 3/10 with daily activities. Progressing towards  5. Pt will score 45/80 on LEFS. Progressing towards  Rehabilitation Potential For Stated Goals: Good                HISTORY:   History of Present Injury/Illness (Reason for Referral): She works at [employer] and she was entering the cooler and there was oil on the floor. She slipped and the feet slipped out from under her. She tried to get up and slipped again. Injury was August 14-17. She did go to physical therapy but it was making her worse. They decided to have surgery. Surgery 12-21-17. Stayed 2 nights in the hospital. She did have home health physical therapy 3-4x. Pain increases with walking. Past Medical History/Comorbidities: diabetes type 2 controlled with diet, HTN, L LE varicose vein surgery  Social History/Living Environment: Lives with . 2 steps to get inside. Prior Level of Function/Work/Activity: Works at [employer]. 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 Job does require lifting. Would like to get back to walking and walking dogs.    Current Medications:       Current Outpatient Prescriptions:     magnesium hydroxide (BEAUCHAMP MILK OF MAGNESIA) 400 mg/5 mL suspension, Take 15 mL by mouth as needed for Constipation. as needed daily, Disp: , Rfl:     temazepam (RESTORIL) 15 mg capsule, Take 15 mg by mouth nightly as needed for Sleep., Disp: , Rfl:     promethazine (PHENERGAN) 25 mg tablet, Take 25 mg by mouth every eight (8) hours as needed for Nausea., Disp: , Rfl:     HYDROmorphone (DILAUDID) 2 mg tablet, Take 2-4 mg by mouth as needed for Pain. as needed every 4-6 hours, Disp: , Rfl:     atorvastatin (LIPITOR) 20 mg tablet, Take 20 mg by mouth nightly., Disp: , Rfl:     lisinopril-hydroCHLOROthiazide (PRINZIDE, ZESTORETIC) 10-12.5 mg per tablet, Take  by mouth daily. Indications: hypertension, Disp: , Rfl:     gemfibrozil (LOPID) 600 mg tablet, Take 600 mg by mouth two (2) times a day., Disp: , Rfl:     Omega-3 Fatty Acids 60- mg cpDR, Take  by mouth daily. , Disp: , Rfl:     acetaminophen (TYLENOL) 325 mg tablet, Take 325 mg by mouth every four (4) hours as needed for Pain., Disp: , Rfl:    Date Last Reviewed:  5-31-18   Number of Personal Factors/Comorbidities that affect the Plan of Care: 1-2: MODERATE COMPLEXITY   EXAMINATION:   6/1/2018   Observation/Orthostatic Postural Assessment: Comes into clinic with cane on R, knee functional brace on R knee. Palpation: Tender to posterior joint, lateral joint line. ROM:                Strength: Not tested. Functional Mobility: Walking with cane on R, knee brace on R, limp on R with short step length L.   Balance:Unable to balance on R single leg       Body Structures Involved:  1. Joints  2. Muscles  3. Ligaments Body Functions Affected:  1. Neuromusculoskeletal Activities and Participation Affected:  1. Mobility  2.  Community, Social and [place]   Number of elements (examined above) that affect the Plan of Care: 4+: HIGH COMPLEXITY   CLINICAL PRESENTATION:   Presentation: Evolving clinical presentation with changing clinical characteristics: MODERATE COMPLEXITY   CLINICAL DECISION MAKING:   Outcome Measure: Tool Used: Lower Extremity Functional Scale (LEFS)  Score:  Initial: 5/80 Most Recent: 20/80 (Date: 4-16-18 )   Interpretation of Score: 20 questions each scored on a 5 point scale with 0 representing \"extreme difficulty or unable to perform\" and 4 representing \"no difficulty\". The lower the score, the greater the functional disability. 80/80 represents no disability. Minimal detectable change is 9 points. Score 80 79-63 62-48 47-32 31-16 15-1 0   Modifier CH CI CJ CK CL CM CN     Medical Necessity:   · Patient is expected to demonstrate progress in strength, range of motion and balance to improve gait. Reason for Services/Other Comments:  · Patient continues to require skilled intervention due to post-op R knee, decreased ROM, strength, gait. Use of outcome tool(s) and clinical judgement create a POC that gives a: Questionable prediction of patient's progress: MODERATE COMPLEXITY            TREATMENT:   (In addition to Assessment/Re-Assessment sessions the following treatments were rendered)  6/1/2018  Pre-treatment Symptoms/Complaints: Pt reports the knee is starting to feel better. Pain: Initial:   3-4/10 Post Session:  4/10. Therapeutic Exercise: (23 Minutes): for LE strengthening. P supine and SLR 2x10, side lying hip abduction 2x10, prone hip extension 2x10. Long arc quad 3# 2x10. Kinesio tape applied for PCL support and I strip for patella medial glide. Standing and walking with verbal cues for heel strike and weight shift onto the R LE. Reviewed HEP for the weekend with standing gastroc stretch with toe pointing forward and then with toe pointing inward. She demonstrated each exercise. Manual Therapies (15 Minutes): for decreased pain. Pt prone and soft tissue mobilization to gastroc and peroneals.  Knee flexion and knee extension physiological mobilizations with pt supine. Therapeutic Modalities: not today                                                                                                HEP: No changes made to existing HEP. Treatment/Session Assessment:    · Response to Treatment: She continues with weakness around the R knee. Tightness in gastroc that could be contributing to posterior lateral knee pain. · Compliance with Program/Exercises:  Appears compliant   · Recommendations/Intent for next treatment session: \"Next visit will focus on gait, knee ROM, strength\". Re-check the knee.     Total Treatment Duration: 38 Minutes   PT Patient Time In/Time Out  Time In: 0807  Time Out: JORGE Viveros

## 2018-06-04 ENCOUNTER — HOSPITAL ENCOUNTER (OUTPATIENT)
Dept: PHYSICAL THERAPY | Age: 53
Discharge: HOME | End: 2018-06-04
Payer: COMMERCIAL

## 2018-06-04 PROCEDURE — 97110 THERAPEUTIC EXERCISES: CPT

## 2018-06-04 PROCEDURE — 97140 MANUAL THERAPY 1/> REGIONS: CPT

## 2018-06-04 NOTE — PROGRESS NOTES
Georgette Medina  : 1965  Payor: [address] [place] [address] Road / Plan: Nida Mcleod / Product Type: Workers Comp /  [address]  at [facility] [place] [employer]  1101 [facility], [address] [place] [address],[address], [facility] [address].  [phone]   [phone]       OUTPATIENT PHYSICAL THERAPY:Daily Note and Progress Report 2018      ICD-10: Treatment Diagnosis: Sprain of posterior cruciate ligament of right knee, sequela (S83.521S); Pain in right knee (M25.561); Stiffness of right knee, not elsewhere classified (M25.661); Difficulty in walking, not elsewhere classified (R26.2)  Precautions/Allergies:   Review of patient's allergies indicates no known allergies. Fall Risk Score: 1 (? 5 = High Risk)  MD Orders:Evaluate and treat, HEP, ROM, strengthening MEDICAL/REFERRING DIAGNOSIS:  S/p R knee scope, PCL reconstruction   DATE OF ONSET: Surgery 17  REFERRING PHYSICIAN: Molina Villalobos MD  RETURN PHYSICIAN APPOINTMENT: 18     INITIAL ASSESSMENT:  Ms. Odalis Alvarenga presents s/p right knee scope with PCL reconstruction. She has good R knee ROM. She is slowly progressing with LE strength. She did have increased knee pain after kneeling on her bed to put sheets on. She has improved with her ambulation and improved weight shift onto R LE during gait. She does continue to report pain in R knee with increased weight on R LE with walking and exercises. She will benefit from continuing skilled physical therapy to continue to progress functional mobility. PROBLEM LIST (Impacting functional limitations):  1. Post-op pain and swelling right knee  2. Decreased ROM right knee   3. Decreased functional strength right knee/LE   4. Decreased gait skills INTERVENTIONS PLANNED:  1. aquatic therapy  2. Thermal and electric modalities, manual therapies for pain. 3. Manual therapies and therapeutic exercises for ROM and strength.    4. Therapeutic exercises for gait and balance   TREATMENT PLAN:  Effective Dates: 18 to 6-29-18. Frequency/Duration: 3 times a week for 12 more weeks  GOALS: (Goals have been discussed and agreed upon with patient.)   Short-Term Functional Goals: Time Frame: 6 weeks  1. Pt will be able to perform a good quad set and SLR with no extensor lag with for improved strength for gait. Progressing towards  2. Pt will increase R knee ROM to 0-90 for improved mobility. GOAL MET  3. Pt will improved R ankle DF AROM to 0 for improved gait. GOAL MET  4. Pt will report pain 5/10 with modified daily activities. GOAL MET  Discharge Goals: Time Frame: 12 weeks   1. Pt will increase R LE strength to 5/5 with manual muscle testing for improved strength for ADLs and work. Progressing towards  2. Pt will negotiate 1 flight of stairs with step over step with good control. Progressing towards  3. Pt will increase R knee ROM to 0-120 for mobility. GOAL MET  4. Pt will report pain 3/10 with daily activities. Progressing towards  5. Pt will score 45/80 on LEFS. Progressing towards  Rehabilitation Potential For Stated Goals: Good                HISTORY:   History of Present Injury/Illness (Reason for Referral): She works at [employer] and she was entering the cooler and there was oil on the floor. She slipped and the feet slipped out from under her. She tried to get up and slipped again. Injury was August 14-17. She did go to physical therapy but it was making her worse. They decided to have surgery. Surgery 12-21-17. Stayed 2 nights in the hospital. She did have home health physical therapy 3-4x. Pain increases with walking. Past Medical History/Comorbidities: diabetes type 2 controlled with diet, HTN, L LE varicose vein surgery  Social History/Living Environment: Lives with . 2 steps to get inside. Prior Level of Function/Work/Activity: Works at [employer]. 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 Job does require lifting. Would like to get back to walking and walking dogs.    Current Medications:       Current Outpatient Prescriptions:     magnesium hydroxide (BEAUCHAMP MILK OF MAGNESIA) 400 mg/5 mL suspension, Take 15 mL by mouth as needed for Constipation. as needed daily, Disp: , Rfl:     temazepam (RESTORIL) 15 mg capsule, Take 15 mg by mouth nightly as needed for Sleep., Disp: , Rfl:     promethazine (PHENERGAN) 25 mg tablet, Take 25 mg by mouth every eight (8) hours as needed for Nausea., Disp: , Rfl:     HYDROmorphone (DILAUDID) 2 mg tablet, Take 2-4 mg by mouth as needed for Pain. as needed every 4-6 hours, Disp: , Rfl:     atorvastatin (LIPITOR) 20 mg tablet, Take 20 mg by mouth nightly., Disp: , Rfl:     lisinopril-hydroCHLOROthiazide (PRINZIDE, ZESTORETIC) 10-12.5 mg per tablet, Take  by mouth daily. Indications: hypertension, Disp: , Rfl:     gemfibrozil (LOPID) 600 mg tablet, Take 600 mg by mouth two (2) times a day., Disp: , Rfl:     Omega-3 Fatty Acids 60- mg cpDR, Take  by mouth daily. , Disp: , Rfl:     acetaminophen (TYLENOL) 325 mg tablet, Take 325 mg by mouth every four (4) hours as needed for Pain., Disp: , Rfl:    Date Last Reviewed:  5-31-18   Number of Personal Factors/Comorbidities that affect the Plan of Care: 1-2: MODERATE COMPLEXITY   EXAMINATION:   6/4/2018   Observation/Orthostatic Postural Assessment: Comes into clinic with no assistive device, knee functional brace on R knee. Palpation: Tender to posterior joint, lateral joint line. ROM:         RLE PROM  R Knee Flexion: 130  R Knee Extension: 2 (hyperextension)      Strength:           RLE Strength  R Hip Flexion: 4-  R Hip Extension: 4+  R Hip ABduction: 4+  R Knee Flexion: 4-  R Knee Extension: 4-        Functional Mobility: Walking with cane on R, knee brace on R, limp on R with short step length L.   Balance:Unable to balance on R single leg       Body Structures Involved:  1. Joints  2. Muscles  3. Ligaments Body Functions Affected:  1.  Neuromusculoskeletal Activities and Participation Affected:  1. Mobility  2. Community, Social and [place]   Number of elements (examined above) that affect the Plan of Care: 4+: HIGH COMPLEXITY   CLINICAL PRESENTATION:   Presentation: Evolving clinical presentation with changing clinical characteristics: MODERATE COMPLEXITY   CLINICAL DECISION MAKING:   Outcome Measure: Tool Used: Lower Extremity Functional Scale (LEFS)  Score:  Initial: 5/80 Most Recent: 20/80 (Date: 4-16-18 )   Interpretation of Score: 20 questions each scored on a 5 point scale with 0 representing \"extreme difficulty or unable to perform\" and 4 representing \"no difficulty\". The lower the score, the greater the functional disability. 80/80 represents no disability. Minimal detectable change is 9 points. Score 80 79-63 62-48 47-32 31-16 15-1 0   Modifier CH CI CJ CK CL CM CN     Medical Necessity:   · Patient is expected to demonstrate progress in strength, range of motion and balance to improve gait. Reason for Services/Other Comments:  · Patient continues to require skilled intervention due to post-op R knee, decreased ROM, strength, gait. Use of outcome tool(s) and clinical judgement create a POC that gives a: Questionable prediction of patient's progress: MODERATE COMPLEXITY            TREATMENT:   (In addition to Assessment/Re-Assessment sessions the following treatments were rendered)  6/4/2018  Pre-treatment Symptoms/Complaints: Pt reports the knee feels better. Only pain if she moves it a certain way. Pain: Initial:   2/10 Post Session:  4/10. Therapeutic Exercise: (25 Minutes): for LE strengthening. P supine and SLR 1#2x10, side lying hip abduction 1#2x10, prone hip extension 1# 2x10. (verbal cues to control descending movement)  Long arc quad 3# 2x10. Standing and walking with verbal cues for heel strike and weight shift onto the R LE. Toe taps with standing on R LE on step stool x20 reps with verbal cues for foot position and body alignment.   Shuttle press single leg 38# 2x10. Manual Therapies (15 Minutes): for decreased pain. Pt prone and soft tissue mobilization to gastroc and peroneals. Knee flexion and knee extension physiological mobilizations with pt supine. Therapeutic Modalities: not today                                                                                                HEP: No changes made to existing HEP. Treatment/Session Assessment:    · Response to Treatment: She continues to requires moderate verbal cueing for gait for foot/ankle alignment, heel strike, and weight shift onto R LE.   · Compliance with Program/Exercises:  Appears compliant   · Recommendations/Intent for next treatment session: \"Next visit will focus on gait, knee ROM, strength\". Re-check the knee.     Total Treatment Duration: 40 Minutes   PT Patient Time In/Time Out  Time In: 1118  Time Out: 11 [facility] [address]

## 2018-06-06 ENCOUNTER — HOSPITAL ENCOUNTER (OUTPATIENT)
Dept: PHYSICAL THERAPY | Age: 53
Discharge: HOME | End: 2018-06-06
Payer: COMMERCIAL

## 2018-06-06 PROCEDURE — 97140 MANUAL THERAPY 1/> REGIONS: CPT

## 2018-06-06 PROCEDURE — 97110 THERAPEUTIC EXERCISES: CPT

## 2018-06-06 NOTE — PROGRESS NOTES
Marianna Gross  : 1965  Payor: [address] [place] [address] Road / Plan: [place] / Product Type: Workers Comp /  [address]  at [address] [place] [address] Rd  1101 [facility], [address] [place], [place] [address], [address] [place] [address]  [phone]   [phone]       OUTPATIENT PHYSICAL THERAPY:Daily Note 2018      ICD-10: Treatment Diagnosis: Sprain of posterior cruciate ligament of right knee, sequela (S83.521S); Pain in right knee (M25.561); Stiffness of right knee, not elsewhere classified (M25.661); Difficulty in walking, not elsewhere classified (R26.2)  Precautions/Allergies:   Review of patient's allergies indicates no known allergies. Fall Risk Score: 1 (? 5 = High Risk)  MD Orders:Evaluate and treat, HEP, ROM, strengthening MEDICAL/REFERRING DIAGNOSIS:  S/p R knee scope, PCL reconstruction   DATE OF ONSET: Surgery 17  REFERRING PHYSICIAN: Destinee No MD  RETURN PHYSICIAN APPOINTMENT: 18     INITIAL ASSESSMENT:  Ms. Ban Heaton presents s/p right knee scope with PCL reconstruction. She has good R knee ROM. She is slowly progressing with LE strength. She did have increased knee pain after kneeling on her bed to put sheets on. She has improved with her ambulation and improved weight shift onto R LE during gait. She does continue to report pain in R knee with increased weight on R LE with walking and exercises. She will benefit from continuing skilled physical therapy to continue to progress functional mobility. PROBLEM LIST (Impacting functional limitations):  1. Post-op pain and swelling right knee  2. Decreased ROM right knee   3. Decreased functional strength right knee/LE   4. Decreased gait skills INTERVENTIONS PLANNED:  1. aquatic therapy  2. Thermal and electric modalities, manual therapies for pain. 3. Manual therapies and therapeutic exercises for ROM and strength. 4. Therapeutic exercises for gait and balance   TREATMENT PLAN:  Effective Dates: 18 to 18. Frequency/Duration: 3 times a week for 12 more weeks  GOALS: (Goals have been discussed and agreed upon with patient.)   Short-Term Functional Goals: Time Frame: 6 weeks  1. Pt will be able to perform a good quad set and SLR with no extensor lag with for improved strength for gait. Progressing towards  2. Pt will increase R knee ROM to 0-90 for improved mobility. GOAL MET  3. Pt will improved R ankle DF AROM to 0 for improved gait. GOAL MET  4. Pt will report pain 5/10 with modified daily activities. GOAL MET  Discharge Goals: Time Frame: 12 weeks   1. Pt will increase R LE strength to 5/5 with manual muscle testing for improved strength for ADLs and work. Progressing towards  2. Pt will negotiate 1 flight of stairs with step over step with good control. Progressing towards  3. Pt will increase R knee ROM to 0-120 for mobility. GOAL MET  4. Pt will report pain 3/10 with daily activities. Progressing towards  5. Pt will score 45/80 on LEFS. Progressing towards  Rehabilitation Potential For Stated Goals: Good                HISTORY:   History of Present Injury/Illness (Reason for Referral): She works at [employer] and she was entering the cooler and there was oil on the floor. She slipped and the feet slipped out from under her. She tried to get up and slipped again. Injury was August 14-17. She did go to physical therapy but it was making her worse. They decided to have surgery. Surgery 12-21-17. Stayed 2 nights in the hospital. She did have home health physical therapy 3-4x. Pain increases with walking. Past Medical History/Comorbidities: diabetes type 2 controlled with diet, HTN, L LE varicose vein surgery  Social History/Living Environment: Lives with . 2 steps to get inside. Prior Level of Function/Work/Activity: Works at [employer]. 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 Job does require lifting. Would like to get back to walking and walking dogs.    Current Medications:       Current Outpatient Prescriptions:     magnesium hydroxide (BEAUCHAMP MILK OF MAGNESIA) 400 mg/5 mL suspension, Take 15 mL by mouth as needed for Constipation. as needed daily, Disp: , Rfl:     temazepam (RESTORIL) 15 mg capsule, Take 15 mg by mouth nightly as needed for Sleep., Disp: , Rfl:     promethazine (PHENERGAN) 25 mg tablet, Take 25 mg by mouth every eight (8) hours as needed for Nausea., Disp: , Rfl:     HYDROmorphone (DILAUDID) 2 mg tablet, Take 2-4 mg by mouth as needed for Pain. as needed every 4-6 hours, Disp: , Rfl:     atorvastatin (LIPITOR) 20 mg tablet, Take 20 mg by mouth nightly., Disp: , Rfl:     lisinopril-hydroCHLOROthiazide (PRINZIDE, ZESTORETIC) 10-12.5 mg per tablet, Take  by mouth daily. Indications: hypertension, Disp: , Rfl:     gemfibrozil (LOPID) 600 mg tablet, Take 600 mg by mouth two (2) times a day., Disp: , Rfl:     Omega-3 Fatty Acids 60- mg cpDR, Take  by mouth daily. , Disp: , Rfl:     acetaminophen (TYLENOL) 325 mg tablet, Take 325 mg by mouth every four (4) hours as needed for Pain., Disp: , Rfl:    Date Last Reviewed:  5-31-18   Number of Personal Factors/Comorbidities that affect the Plan of Care: 1-2: MODERATE COMPLEXITY   EXAMINATION:   6/6/2018   Observation/Orthostatic Postural Assessment: Comes into clinic with no assistive device, knee functional brace on R knee. Palpation:tender to lateral joint line. ROM:                Strength:                    Functional Mobility: Walking with cane on R, knee brace on R, limp on R with short step length L.   Balance: n/t       Body Structures Involved:  1. Joints  2. Muscles  3. Ligaments Body Functions Affected:  1. Neuromusculoskeletal Activities and Participation Affected:  1. Mobility  2.  Community, Social and [place]   Number of elements (examined above) that affect the Plan of Care: 4+: HIGH COMPLEXITY   CLINICAL PRESENTATION:   Presentation: Evolving clinical presentation with changing clinical characteristics: MODERATE COMPLEXITY   CLINICAL DECISION MAKING:   Outcome Measure: Tool Used: Lower Extremity Functional Scale (LEFS)  Score:  Initial: 5/80 Most Recent: 20/80 (Date: 4-16-18 )   Interpretation of Score: 20 questions each scored on a 5 point scale with 0 representing \"extreme difficulty or unable to perform\" and 4 representing \"no difficulty\". The lower the score, the greater the functional disability. 80/80 represents no disability. Minimal detectable change is 9 points. Score 80 79-63 62-48 47-32 31-16 15-1 0   Modifier CH CI CJ CK CL CM CN     Medical Necessity:   · Patient is expected to demonstrate progress in strength, range of motion and balance to improve gait. Reason for Services/Other Comments:  · Patient continues to require skilled intervention due to post-op R knee, decreased ROM, strength, gait. Use of outcome tool(s) and clinical judgement create a POC that gives a: Questionable prediction of patient's progress: MODERATE COMPLEXITY            TREATMENT:   (In addition to Assessment/Re-Assessment sessions the following treatments were rendered)  6/6/2018  Pre-treatment Symptoms/Complaints: Pt reports the knee is not hurting but she is taking her pain medication. Pain: Initial:   2/10 Post Session:  4/10. Therapeutic Exercise: (25 Minutes): for LE strengthening. Long arc quad 4# 3x10. Standing and walking with verbal cues for heel strike and weight shift onto the R LE. Toe taps with standing on R LE on step stool x20 reps with verbal cues for foot position and body alignment. Shuttle press single leg 38# 2x10. Hamstring curls 10# 2x10, long arc quad on machine 10# 10x. Lunge slides  Mini R LE 2x10    Manual Therapies (15 Minutes): for decreased pain. Pt prone and tool assisted soft tissue mobilization to gastroc and peroneals. Knee flexion and knee extension physiological mobilizations with pt supine.    Therapeutic Modalities: not today HEP: No changes made to existing HEP. Treatment/Session Assessment:    · Response to Treatment: difficulty with knee extension on machine. Pain in the knee seems to have improved. · Compliance with Program/Exercises:  Appears compliant   · Recommendations/Intent for next treatment session: \"Next visit will focus on gait, knee ROM, strength\". Re-check the knee.     Total Treatment Duration: 40 Minutes   PT Patient Time In/Time Out  Time In: 1115  Time Out: 11 [facility]

## 2018-06-08 ENCOUNTER — HOSPITAL ENCOUNTER (OUTPATIENT)
Dept: PHYSICAL THERAPY | Age: 53
Discharge: HOME | End: 2018-06-08
Payer: COMMERCIAL

## 2018-06-08 PROCEDURE — 97116 GAIT TRAINING THERAPY: CPT

## 2018-06-08 PROCEDURE — 97110 THERAPEUTIC EXERCISES: CPT

## 2018-06-08 NOTE — PROGRESS NOTES
Ludmila Escobedo  : 1965  Payor: Segundo Garcia / Plan: Ebony Stevensonh / Product Type: Workers Comp /  [address]  at [address] [place] [address] Rd  [address] [facility],  [place].  [phone]   [phone]       OUTPATIENT PHYSICAL THERAPY:Daily Note 2018      ICD-10: Treatment Diagnosis: Sprain of posterior cruciate ligament of right knee, sequela (S83.521S); Pain in right knee (M25.561); Stiffness of right knee, not elsewhere classified (M25.661); Difficulty in walking, not elsewhere classified (R26.2)  Precautions/Allergies:   Review of patient's allergies indicates no known allergies. Fall Risk Score: 1 (? 5 = High Risk)  MD Orders:Evaluate and treat, HEP, ROM, strengthening MEDICAL/REFERRING DIAGNOSIS:  S/p R knee scope, PCL reconstruction   DATE OF ONSET: Surgery 17  REFERRING PHYSICIAN: Santino Cifuentes MD  RETURN PHYSICIAN APPOINTMENT: 18     INITIAL ASSESSMENT:  Ms. Joselyn Quezada presents s/p right knee scope with PCL reconstruction. She has good R knee ROM. She is slowly progressing with LE strength. She did have increased knee pain after kneeling on her bed to put sheets on. She has improved with her ambulation and improved weight shift onto R LE during gait. She does continue to report pain in R knee with increased weight on R LE with walking and exercises. She will benefit from continuing skilled physical therapy to continue to progress functional mobility. PROBLEM LIST (Impacting functional limitations):  1. Post-op pain and swelling right knee  2. Decreased ROM right knee   3. Decreased functional strength right knee/LE   4. Decreased gait skills INTERVENTIONS PLANNED:  1. aquatic therapy  2. Thermal and electric modalities, manual therapies for pain. 3. Manual therapies and therapeutic exercises for ROM and strength. 4. Therapeutic exercises for gait and balance   TREATMENT PLAN:  Effective Dates: 18 to 18. Frequency/Duration: 3 times a week for 12 more weeks  GOALS: (Goals have been discussed and agreed upon with patient.)   Short-Term Functional Goals: Time Frame: 6 weeks  1. Pt will be able to perform a good quad set and SLR with no extensor lag with for improved strength for gait. Progressing towards  2. Pt will increase R knee ROM to 0-90 for improved mobility. GOAL MET  3. Pt will improved R ankle DF AROM to 0 for improved gait. GOAL MET  4. Pt will report pain 5/10 with modified daily activities. GOAL MET  Discharge Goals: Time Frame: 12 weeks   1. Pt will increase R LE strength to 5/5 with manual muscle testing for improved strength for ADLs and work. Progressing towards  2. Pt will negotiate 1 flight of stairs with step over step with good control. Progressing towards  3. Pt will increase R knee ROM to 0-120 for mobility. GOAL MET  4. Pt will report pain 3/10 with daily activities. Progressing towards  5. Pt will score 45/80 on LEFS. Progressing towards  Rehabilitation Potential For Stated Goals: Good                HISTORY:   History of Present Injury/Illness (Reason for Referral): She works at [employer] and she was entering the cooler and there was oil on the floor. She slipped and the feet slipped out from under her. She tried to get up and slipped again. Injury was August 14-17. She did go to physical therapy but it was making her worse. They decided to have surgery. Surgery 12-21-17. Stayed 2 nights in the hospital. She did have home health physical therapy 3-4x. Pain increases with walking. Past Medical History/Comorbidities: diabetes type 2 controlled with diet, HTN, L LE varicose vein surgery  Social History/Living Environment: Lives with . 2 steps to get inside. Prior Level of Function/Work/Activity: Works at [employer]. 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 Job does require lifting. Would like to get back to walking and walking dogs.    Current Medications:       Current Outpatient Prescriptions:     magnesium hydroxide (BEAUCHAMP MILK OF MAGNESIA) 400 mg/5 mL suspension, Take 15 mL by mouth as needed for Constipation. as needed daily, Disp: , Rfl:     temazepam (RESTORIL) 15 mg capsule, Take 15 mg by mouth nightly as needed for Sleep., Disp: , Rfl:     promethazine (PHENERGAN) 25 mg tablet, Take 25 mg by mouth every eight (8) hours as needed for Nausea., Disp: , Rfl:     HYDROmorphone (DILAUDID) 2 mg tablet, Take 2-4 mg by mouth as needed for Pain. as needed every 4-6 hours, Disp: , Rfl:     atorvastatin (LIPITOR) 20 mg tablet, Take 20 mg by mouth nightly., Disp: , Rfl:     lisinopril-hydroCHLOROthiazide (PRINZIDE, ZESTORETIC) 10-12.5 mg per tablet, Take  by mouth daily. Indications: hypertension, Disp: , Rfl:     gemfibrozil (LOPID) 600 mg tablet, Take 600 mg by mouth two (2) times a day., Disp: , Rfl:     Omega-3 Fatty Acids 60- mg cpDR, Take  by mouth daily. , Disp: , Rfl:     acetaminophen (TYLENOL) 325 mg tablet, Take 325 mg by mouth every four (4) hours as needed for Pain., Disp: , Rfl:    Date Last Reviewed:  6-11-18   Number of Personal Factors/Comorbidities that affect the Plan of Care: 1-2: MODERATE COMPLEXITY   EXAMINATION:   6/11/2018   Observation/Orthostatic Postural Assessment: Comes into clinic with no assistive device, knee functional brace on R knee. Palpation:tender to lateral joint line. ROM:                Strength:                    Functional Mobility: Walking with cane on R, knee brace on R, limp on R with short step length L.   Balance: n/t       Body Structures Involved:  1. Joints  2. Muscles  3. Ligaments Body Functions Affected:  1. Neuromusculoskeletal Activities and Participation Affected:  1. Mobility  2.  Community, Social and [place]   Number of elements (examined above) that affect the Plan of Care: 4+: HIGH COMPLEXITY   CLINICAL PRESENTATION:   Presentation: Evolving clinical presentation with changing clinical characteristics: MODERATE COMPLEXITY   CLINICAL DECISION MAKING:   Outcome Measure: Tool Used: Lower Extremity Functional Scale (LEFS)  Score:  Initial: 5/80 Most Recent: 20/80 (Date: 4-16-18 )   Interpretation of Score: 20 questions each scored on a 5 point scale with 0 representing \"extreme difficulty or unable to perform\" and 4 representing \"no difficulty\". The lower the score, the greater the functional disability. 80/80 represents no disability. Minimal detectable change is 9 points. Score 80 79-63 62-48 47-32 31-16 15-1 0   Modifier CH CI CJ CK CL CM CN     Medical Necessity:   · Patient is expected to demonstrate progress in strength, range of motion and balance to improve gait. Reason for Services/Other Comments:  · Patient continues to require skilled intervention due to post-op R knee, decreased ROM, strength, gait. Use of outcome tool(s) and clinical judgement create a POC that gives a: Questionable prediction of patient's progress: MODERATE COMPLEXITY            TREATMENT:   (In addition to Assessment/Re-Assessment sessions the following treatments were rendered)  6/11/2018  Pre-treatment Symptoms/Complaints: Pt reports she had her isokinetic test yesterday and it did not go well. Pain: Initial:   2/10 Post Session:  4/10. Therapeutic Exercise: (25 Minutes): for LE strengthening. Toe taps with standing on R LE on step stool x20 reps with verbal cues for foot position and body alignment. Lunge slides  Mini R LE 2x10. Sit to stand 2x10 from high low table. Hip 4 directions on cable column 3# 10 ea way B LE. Gait training: (15 minutes):  Weight shifts forward and backward then with stepping forward and backward. Walking with maximal verbal and visual cues for foot, hip, knee alignment.       Therapeutic Modalities: not today                                                                                                HEP: No changes made to existing HEP.    Treatment/Session Assessment:    · Response to Treatment: Improved gait by end of session but will need continued practice. · Compliance with Program/Exercises:  Appears compliant   · Recommendations/Intent for next treatment session: \"Next visit will focus on gait, knee ROM, strength\". Re-check the knee.     Total Treatment Duration: 40 Minutes   PT Patient Time In/Time Out  Time In: 1115  Time Out: Jw [place]

## 2018-06-11 ENCOUNTER — HOSPITAL ENCOUNTER (OUTPATIENT)
Dept: PHYSICAL THERAPY | Age: 53
Discharge: HOME | End: 2018-06-11
Payer: COMMERCIAL

## 2018-06-11 PROCEDURE — 97110 THERAPEUTIC EXERCISES: CPT

## 2018-06-11 NOTE — PROGRESS NOTES
Pinky Vicente  : 1965  Payor: [address] [place] [address] Road / Plan: Skip Byrd / Product Type: Workers Comp /  [address]  at [facility]  [address] [place] [address], [address] [place].  [phone]   [phone]       OUTPATIENT PHYSICAL THERAPY:Daily Note 2018      ICD-10: Treatment Diagnosis: Sprain of posterior cruciate ligament of right knee, sequela (S83.521S); Pain in right knee (M25.561); Stiffness of right knee, not elsewhere classified (M25.661); Difficulty in walking, not elsewhere classified (R26.2)  Precautions/Allergies:   Review of patient's allergies indicates no known allergies. Fall Risk Score: 1 (? 5 = High Risk)  MD Orders:Evaluate and treat, HEP, ROM, strengthening MEDICAL/REFERRING DIAGNOSIS:  S/p R knee scope, PCL reconstruction   DATE OF ONSET: Surgery 17  REFERRING PHYSICIAN: Jessica Lambert MD  RETURN PHYSICIAN APPOINTMENT: 18     INITIAL ASSESSMENT:  Ms. Farhana Conn presents s/p right knee scope with PCL reconstruction. She has good R knee ROM. She is slowly progressing with LE strength. She did have increased knee pain after kneeling on her bed to put sheets on. She has improved with her ambulation and improved weight shift onto R LE during gait. She does continue to report pain in R knee with increased weight on R LE with walking and exercises. She will benefit from continuing skilled physical therapy to continue to progress functional mobility. PROBLEM LIST (Impacting functional limitations):  1. Post-op pain and swelling right knee  2. Decreased ROM right knee   3. Decreased functional strength right knee/LE   4. Decreased gait skills INTERVENTIONS PLANNED:  1. aquatic therapy  2. Thermal and electric modalities, manual therapies for pain. 3. Manual therapies and therapeutic exercises for ROM and strength. 4. Therapeutic exercises for gait and balance   TREATMENT PLAN:  Effective Dates: 18 to 18. Frequency/Duration: 3 times a week for 12 more weeks  GOALS: (Goals have been discussed and agreed upon with patient.)   Short-Term Functional Goals: Time Frame: 6 weeks  1. Pt will be able to perform a good quad set and SLR with no extensor lag with for improved strength for gait. Progressing towards  2. Pt will increase R knee ROM to 0-90 for improved mobility. GOAL MET  3. Pt will improved R ankle DF AROM to 0 for improved gait. GOAL MET  4. Pt will report pain 5/10 with modified daily activities. GOAL MET  Discharge Goals: Time Frame: 12 weeks   1. Pt will increase R LE strength to 5/5 with manual muscle testing for improved strength for ADLs and work. Progressing towards  2. Pt will negotiate 1 flight of stairs with step over step with good control. Progressing towards  3. Pt will increase R knee ROM to 0-120 for mobility. GOAL MET  4. Pt will report pain 3/10 with daily activities. Progressing towards  5. Pt will score 45/80 on LEFS. Progressing towards  Rehabilitation Potential For Stated Goals: Good                HISTORY:   History of Present Injury/Illness (Reason for Referral): She works at [employer] and she was entering the cooler and there was oil on the floor. She slipped and the feet slipped out from under her. She tried to get up and slipped again. Injury was August 14-17. She did go to physical therapy but it was making her worse. They decided to have surgery. Surgery 12-21-17. Stayed 2 nights in the hospital. She did have home health physical therapy 3-4x. Pain increases with walking. Past Medical History/Comorbidities: diabetes type 2 controlled with diet, HTN, L LE varicose vein surgery  Social History/Living Environment: Lives with . 2 steps to get inside. Prior Level of Function/Work/Activity: Works at [employer]. 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 Job does require lifting. Would like to get back to walking and walking dogs.    Current Medications:       Current Outpatient Prescriptions:     magnesium hydroxide (BEAUCHAMP MILK OF MAGNESIA) 400 mg/5 mL suspension, Take 15 mL by mouth as needed for Constipation. as needed daily, Disp: , Rfl:     temazepam (RESTORIL) 15 mg capsule, Take 15 mg by mouth nightly as needed for Sleep., Disp: , Rfl:     promethazine (PHENERGAN) 25 mg tablet, Take 25 mg by mouth every eight (8) hours as needed for Nausea., Disp: , Rfl:     HYDROmorphone (DILAUDID) 2 mg tablet, Take 2-4 mg by mouth as needed for Pain. as needed every 4-6 hours, Disp: , Rfl:     atorvastatin (LIPITOR) 20 mg tablet, Take 20 mg by mouth nightly., Disp: , Rfl:     lisinopril-hydroCHLOROthiazide (PRINZIDE, ZESTORETIC) 10-12.5 mg per tablet, Take  by mouth daily. Indications: hypertension, Disp: , Rfl:     gemfibrozil (LOPID) 600 mg tablet, Take 600 mg by mouth two (2) times a day., Disp: , Rfl:     Omega-3 Fatty Acids 60- mg cpDR, Take  by mouth daily. , Disp: , Rfl:     acetaminophen (TYLENOL) 325 mg tablet, Take 325 mg by mouth every four (4) hours as needed for Pain., Disp: , Rfl:    Date Last Reviewed:  6-11-18   Number of Personal Factors/Comorbidities that affect the Plan of Care: 1-2: MODERATE COMPLEXITY   EXAMINATION:   6/11/2018   Observation/Orthostatic Postural Assessment: Comes into clinic with no assistive device, knee functional brace on R knee. Palpation:tender to lateral joint line. ROM:                Strength:                    Functional Mobility: Walking with cane on R, knee brace on R, limp on R with short step length L.   Balance: n/t       Body Structures Involved:  1. Joints  2. Muscles  3. Ligaments Body Functions Affected:  1. Neuromusculoskeletal Activities and Participation Affected:  1. Mobility  2.  Community, Social and [place]   Number of elements (examined above) that affect the Plan of Care: 4+: HIGH COMPLEXITY   CLINICAL PRESENTATION:   Presentation: Evolving clinical presentation with changing clinical characteristics: MODERATE COMPLEXITY   CLINICAL DECISION MAKING:   Outcome Measure: Tool Used: Lower Extremity Functional Scale (LEFS)  Score:  Initial: 5/80 Most Recent: 20/80 (Date: 4-16-18 )   Interpretation of Score: 20 questions each scored on a 5 point scale with 0 representing \"extreme difficulty or unable to perform\" and 4 representing \"no difficulty\". The lower the score, the greater the functional disability. 80/80 represents no disability. Minimal detectable change is 9 points. Score 80 79-63 62-48 47-32 31-16 15-1 0   Modifier CH CI CJ CK CL CM CN     Medical Necessity:   · Patient is expected to demonstrate progress in strength, range of motion and balance to improve gait. Reason for Services/Other Comments:  · Patient continues to require skilled intervention due to post-op R knee, decreased ROM, strength, gait. Use of outcome tool(s) and clinical judgement create a POC that gives a: Questionable prediction of patient's progress: MODERATE COMPLEXITY            TREATMENT:   (In addition to Assessment/Re-Assessment sessions the following treatments were rendered)  6/11/2018    Pre-treatment Symptoms/Complaints: Pt reports she practiced her walking all weekend. Pain: Initial:   2-3/10 Post Session:  4/10. Therapeutic Exercise: (40 Minutes): for LE strengthening. Moderate visual and verbal cues for body alignment with each exercise.     Date:  6-11-18 Date:   Date:     Activity/Exercise Parameters Parameters Parameters   Sit to stand 2x10      Lunge slides 2x10     Lunge to floor With UE assist 2x5     Long arc quad 4# 3x10     4 -way hip on cables 3# 10 ea way B     Shuttle press single leg 38# 3x10     Hamstring curls on machine 10# 3x10     Slant board gastroc stretch 20\"x3     Standing weight shifts with overhead press with ball 10xB red ball     Squats with ball 10x red ball               Therapeutic Modalities: not today HEP: No changes made to existing HEP. Treatment/Session Assessment:    · Response to Treatment: She worked hard with exercises and seemed to push through the knee pain better with exercises today. · Compliance with Program/Exercises:  Appears compliant   · Recommendations/Intent for next treatment session: \"Next visit will focus on gait, knee ROM, strength\".    Total Treatment Duration: 40 Minutes   PT Patient Time In/Time Out  Time In: 1105  Time Out: [address]

## 2018-06-13 ENCOUNTER — HOSPITAL ENCOUNTER (OUTPATIENT)
Dept: PHYSICAL THERAPY | Age: 53
Discharge: HOME | End: 2018-06-13
Payer: COMMERCIAL

## 2018-06-13 PROCEDURE — 97110 THERAPEUTIC EXERCISES: CPT

## 2018-06-13 NOTE — PROGRESS NOTES
Keena Chavira  : 1965  Payor: [address] [place] [address] Road / Plan: Yadiel Mcardle / Product Type: Workers Comp /  [address]  at [facility] [place] [employer]  [address] [place] [address], [facility], [address] [place] [address]  [phone]   [phone]       OUTPATIENT PHYSICAL THERAPY:Daily Note 2018      ICD-10: Treatment Diagnosis: Sprain of posterior cruciate ligament of right knee, sequela (S83.521S); Pain in right knee (M25.561); Stiffness of right knee, not elsewhere classified (M25.661); Difficulty in walking, not elsewhere classified (R26.2)  Precautions/Allergies:   Review of patient's allergies indicates no known allergies. Fall Risk Score: 1 (? 5 = High Risk)  MD Orders:Evaluate and treat, HEP, ROM, strengthening MEDICAL/REFERRING DIAGNOSIS:  S/p R knee scope, PCL reconstruction   DATE OF ONSET: Surgery 17  REFERRING PHYSICIAN: Davey Ga MD  RETURN PHYSICIAN APPOINTMENT: 18     INITIAL ASSESSMENT:  Ms. Mary Preez presents s/p right knee scope with PCL reconstruction. She has good R knee ROM. She is slowly progressing with LE strength. She did have increased knee pain after kneeling on her bed to put sheets on. She has improved with her ambulation and improved weight shift onto R LE during gait. She does continue to report pain in R knee with increased weight on R LE with walking and exercises. She will benefit from continuing skilled physical therapy to continue to progress functional mobility. PROBLEM LIST (Impacting functional limitations):  1. Post-op pain and swelling right knee  2. Decreased ROM right knee   3. Decreased functional strength right knee/LE   4. Decreased gait skills INTERVENTIONS PLANNED:  1. aquatic therapy  2. Thermal and electric modalities, manual therapies for pain. 3. Manual therapies and therapeutic exercises for ROM and strength. 4. Therapeutic exercises for gait and balance   TREATMENT PLAN:  Effective Dates: 18 to 18. Frequency/Duration: 3 times a week for 12 more weeks  GOALS: (Goals have been discussed and agreed upon with patient.)   Short-Term Functional Goals: Time Frame: 6 weeks  1. Pt will be able to perform a good quad set and SLR with no extensor lag with for improved strength for gait. Progressing towards  2. Pt will increase R knee ROM to 0-90 for improved mobility. GOAL MET  3. Pt will improved R ankle DF AROM to 0 for improved gait. GOAL MET  4. Pt will report pain 5/10 with modified daily activities. GOAL MET  Discharge Goals: Time Frame: 12 weeks   1. Pt will increase R LE strength to 5/5 with manual muscle testing for improved strength for ADLs and work. Progressing towards  2. Pt will negotiate 1 flight of stairs with step over step with good control. Progressing towards  3. Pt will increase R knee ROM to 0-120 for mobility. GOAL MET  4. Pt will report pain 3/10 with daily activities. Progressing towards  5. Pt will score 45/80 on LEFS. Progressing towards  Rehabilitation Potential For Stated Goals: Good                HISTORY:   History of Present Injury/Illness (Reason for Referral): She works at [employer] and she was entering the cooler and there was oil on the floor. She slipped and the feet slipped out from under her. She tried to get up and slipped again. Injury was August 14-17. She did go to physical therapy but it was making her worse. They decided to have surgery. Surgery 12-21-17. Stayed 2 nights in the hospital. She did have home health physical therapy 3-4x. Pain increases with walking. Past Medical History/Comorbidities: diabetes type 2 controlled with diet, HTN, L LE varicose vein surgery  Social History/Living Environment: Lives with . 2 steps to get inside. Prior Level of Function/Work/Activity: Works at [employer]. 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 Job does require lifting. Would like to get back to walking and walking dogs.    Current Medications:       Current Outpatient Prescriptions:     magnesium hydroxide (BEAUCHAMP MILK OF MAGNESIA) 400 mg/5 mL suspension, Take 15 mL by mouth as needed for Constipation. as needed daily, Disp: , Rfl:     temazepam (RESTORIL) 15 mg capsule, Take 15 mg by mouth nightly as needed for Sleep., Disp: , Rfl:     promethazine (PHENERGAN) 25 mg tablet, Take 25 mg by mouth every eight (8) hours as needed for Nausea., Disp: , Rfl:     HYDROmorphone (DILAUDID) 2 mg tablet, Take 2-4 mg by mouth as needed for Pain. as needed every 4-6 hours, Disp: , Rfl:     atorvastatin (LIPITOR) 20 mg tablet, Take 20 mg by mouth nightly., Disp: , Rfl:     lisinopril-hydroCHLOROthiazide (PRINZIDE, ZESTORETIC) 10-12.5 mg per tablet, Take  by mouth daily. Indications: hypertension, Disp: , Rfl:     gemfibrozil (LOPID) 600 mg tablet, Take 600 mg by mouth two (2) times a day., Disp: , Rfl:     Omega-3 Fatty Acids 60- mg cpDR, Take  by mouth daily. , Disp: , Rfl:     acetaminophen (TYLENOL) 325 mg tablet, Take 325 mg by mouth every four (4) hours as needed for Pain., Disp: , Rfl:    Date Last Reviewed:  6-13-18   Number of Personal Factors/Comorbidities that affect the Plan of Care: 1-2: MODERATE COMPLEXITY   EXAMINATION:   6/13/2018   Observation/Orthostatic Postural Assessment: Comes into clinic with no assistive device, knee functional brace on R knee. Palpation:tender to lateral joint line. ROM:                Strength:                    Functional Mobility: Walking with cane on R, knee brace on R, limp on R with short step length L.   Balance: n/t       Body Structures Involved:  1. Joints  2. Muscles  3. Ligaments Body Functions Affected:  1. Neuromusculoskeletal Activities and Participation Affected:  1. Mobility  2.  Community, Social and [place]   Number of elements (examined above) that affect the Plan of Care: 4+: HIGH COMPLEXITY   CLINICAL PRESENTATION:   Presentation: Evolving clinical presentation with changing clinical characteristics: MODERATE COMPLEXITY   CLINICAL DECISION MAKING:   Outcome Measure: Tool Used: Lower Extremity Functional Scale (LEFS)  Score:  Initial: 5/80 Most Recent: 20/80 (Date: 4-16-18 )   Interpretation of Score: 20 questions each scored on a 5 point scale with 0 representing \"extreme difficulty or unable to perform\" and 4 representing \"no difficulty\". The lower the score, the greater the functional disability. 80/80 represents no disability. Minimal detectable change is 9 points. Score 80 79-63 62-48 47-32 31-16 15-1 0   Modifier CH CI CJ CK CL CM CN     Medical Necessity:   · Patient is expected to demonstrate progress in strength, range of motion and balance to improve gait. Reason for Services/Other Comments:  · Patient continues to require skilled intervention due to post-op R knee, decreased ROM, strength, gait. Use of outcome tool(s) and clinical judgement create a POC that gives a: Questionable prediction of patient's progress: MODERATE COMPLEXITY            TREATMENT:   (In addition to Assessment/Re-Assessment sessions the following treatments were rendered)  6/13/2018    Pre-treatment Symptoms/Complaints: Pt reports she returned to MD yesterday and he ordered 6 more weeks of therapy. Pain: Initial:   2-3/10 Post Session:  4/10. Therapeutic Exercise: (40 Minutes): for LE strengthening. Moderate visual and verbal cues for body alignment with each exercise.     Date:  6-11-18 Date:  6-13-18 Date:     Activity/Exercise Parameters Parameters Parameters   Sit to stand 2x10  2x10    Lunge slides 2x10 2x10 B    Lunge to floor With UE assist 2x5 With UE assist 2x5    Long arc quad 4# 3x10 5# 3x10    4 -way hip on cables 3# 10 ea way B -    Shuttle press single leg 38# 3x10 38# 10x  50# 2x8    Hamstring curls on machine 10# 3x10 15# 6x, 10# 2x10    Slant board gastroc stretch 20\"x3     Standing weight shifts with overhead press with ball 10xB red ball 2x10 red ball    Squats with ball 10x red ball 2x10  Red ball     Lunges to the side and forward mini  10 ea way    Side stepping with t-band - Red 15 ftB        Therapeutic Modalities: not today                                                                                                HEP: No changes made to existing HEP. Treatment/Session Assessment:    · Response to Treatment: Progressed closed chain exercises today and she did well. She did complain of increased knee pain with new exercises. · Compliance with Program/Exercises:  Appears compliant   · Recommendations/Intent for next treatment session: \"Next visit will focus on gait, knee ROM, strength\".    Total Treatment Duration: 40 Minutes   PT Patient Time In/Time Out  Time In: 0803  Time Out: [address]

## 2018-06-14 ENCOUNTER — HOSPITAL ENCOUNTER (OUTPATIENT)
Dept: PHYSICAL THERAPY | Age: 53
Discharge: HOME | End: 2018-06-14
Payer: COMMERCIAL

## 2018-06-14 PROCEDURE — 97110 THERAPEUTIC EXERCISES: CPT

## 2018-06-14 NOTE — PROGRESS NOTES
Marlin Gonzales  : 1965  Payor: [address] [place] [address] Road / Plan: Becky Ortega / Product Type: Workers Comp /  [employer] [employer]  at [facility] [place] [employer]  [address] [place] [address], [address].  [phone]   [phone]       OUTPATIENT PHYSICAL THERAPY:Daily Note 2018      ICD-10: Treatment Diagnosis: Sprain of posterior cruciate ligament of right knee, sequela (S83.521S); Pain in right knee (M25.561); Stiffness of right knee, not elsewhere classified (M25.661); Difficulty in walking, not elsewhere classified (R26.2)  Precautions/Allergies:   Review of patient's allergies indicates no known allergies. Fall Risk Score: 1 (? 5 = High Risk)  MD Orders:Evaluate and treat, HEP, ROM, strengthening MEDICAL/REFERRING DIAGNOSIS:  S/p R knee scope, PCL reconstruction   DATE OF ONSET: Surgery 17  REFERRING PHYSICIAN: Nanette Wray MD  RETURN PHYSICIAN APPOINTMENT: 18     INITIAL ASSESSMENT:  Ms. Laisha Hale presents s/p right knee scope with PCL reconstruction. She has good R knee ROM. She is slowly progressing with LE strength. She did have increased knee pain after kneeling on her bed to put sheets on. She has improved with her ambulation and improved weight shift onto R LE during gait. She does continue to report pain in R knee with increased weight on R LE with walking and exercises. She will benefit from continuing skilled physical therapy to continue to progress functional mobility. PROBLEM LIST (Impacting functional limitations):  1. Post-op pain and swelling right knee  2. Decreased ROM right knee   3. Decreased functional strength right knee/LE   4. Decreased gait skills INTERVENTIONS PLANNED:  1. aquatic therapy  2. Thermal and electric modalities, manual therapies for pain. 3. Manual therapies and therapeutic exercises for ROM and strength. 4. Therapeutic exercises for gait and balance   TREATMENT PLAN:  Effective Dates: 18 to 18. Frequency/Duration: 3 times a week for 12 more weeks  GOALS: (Goals have been discussed and agreed upon with patient.)   Short-Term Functional Goals: Time Frame: 6 weeks  1. Pt will be able to perform a good quad set and SLR with no extensor lag with for improved strength for gait. Progressing towards  2. Pt will increase R knee ROM to 0-90 for improved mobility. GOAL MET  3. Pt will improved R ankle DF AROM to 0 for improved gait. GOAL MET  4. Pt will report pain 5/10 with modified daily activities. GOAL MET  Discharge Goals: Time Frame: 12 weeks   1. Pt will increase R LE strength to 5/5 with manual muscle testing for improved strength for ADLs and work. Progressing towards  2. Pt will negotiate 1 flight of stairs with step over step with good control. Progressing towards  3. Pt will increase R knee ROM to 0-120 for mobility. GOAL MET  4. Pt will report pain 3/10 with daily activities. Progressing towards  5. Pt will score 45/80 on LEFS. Progressing towards  Rehabilitation Potential For Stated Goals: Good                HISTORY:   History of Present Injury/Illness (Reason for Referral): She works at [employer] and she was entering the cooler and there was oil on the floor. She slipped and the feet slipped out from under her. She tried to get up and slipped again. Injury was August 14-17. She did go to physical therapy but it was making her worse. They decided to have surgery. Surgery 12-21-17. Stayed 2 nights in the hospital. She did have home health physical therapy 3-4x. Pain increases with walking. Past Medical History/Comorbidities: diabetes type 2 controlled with diet, HTN, L LE varicose vein surgery  Social History/Living Environment: Lives with . 2 steps to get inside. Prior Level of Function/Work/Activity: Works at [employer]. 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 Job does require lifting. Would like to get back to walking and walking dogs.    Current Medications:       Current Outpatient Prescriptions:     magnesium hydroxide (BEAUCHAMP MILK OF MAGNESIA) 400 mg/5 mL suspension, Take 15 mL by mouth as needed for Constipation. as needed daily, Disp: , Rfl:     temazepam (RESTORIL) 15 mg capsule, Take 15 mg by mouth nightly as needed for Sleep., Disp: , Rfl:     promethazine (PHENERGAN) 25 mg tablet, Take 25 mg by mouth every eight (8) hours as needed for Nausea., Disp: , Rfl:     HYDROmorphone (DILAUDID) 2 mg tablet, Take 2-4 mg by mouth as needed for Pain. as needed every 4-6 hours, Disp: , Rfl:     atorvastatin (LIPITOR) 20 mg tablet, Take 20 mg by mouth nightly., Disp: , Rfl:     lisinopril-hydroCHLOROthiazide (PRINZIDE, ZESTORETIC) 10-12.5 mg per tablet, Take  by mouth daily. Indications: hypertension, Disp: , Rfl:     gemfibrozil (LOPID) 600 mg tablet, Take 600 mg by mouth two (2) times a day., Disp: , Rfl:     Omega-3 Fatty Acids 60- mg cpDR, Take  by mouth daily. , Disp: , Rfl:     acetaminophen (TYLENOL) 325 mg tablet, Take 325 mg by mouth every four (4) hours as needed for Pain., Disp: , Rfl:    Date Last Reviewed:  6-13-18   Number of Personal Factors/Comorbidities that affect the Plan of Care: 1-2: MODERATE COMPLEXITY   EXAMINATION:   6/14/2018   Observation/Orthostatic Postural Assessment: Comes into clinic with no assistive device, knee functional brace on R knee. Palpation:tender to lateral joint line. ROM:                Strength:                    Functional Mobility: Walking with cane on R, knee brace on R, limp on R with short step length L.   Balance: n/t       Body Structures Involved:  1. Joints  2. Muscles  3. Ligaments Body Functions Affected:  1. Neuromusculoskeletal Activities and Participation Affected:  1. Mobility  2.  Community, Social and [place]   Number of elements (examined above) that affect the Plan of Care: 4+: HIGH COMPLEXITY   CLINICAL PRESENTATION:   Presentation: Evolving clinical presentation with changing clinical characteristics: MODERATE COMPLEXITY   CLINICAL DECISION MAKING:   Outcome Measure: Tool Used: Lower Extremity Functional Scale (LEFS)  Score:  Initial: 5/80 Most Recent: 20/80 (Date: 4-16-18 )   Interpretation of Score: 20 questions each scored on a 5 point scale with 0 representing \"extreme difficulty or unable to perform\" and 4 representing \"no difficulty\". The lower the score, the greater the functional disability. 80/80 represents no disability. Minimal detectable change is 9 points. Score 80 79-63 62-48 47-32 31-16 15-1 0   Modifier CH CI CJ CK CL CM CN     Medical Necessity:   · Patient is expected to demonstrate progress in strength, range of motion and balance to improve gait. Reason for Services/Other Comments:  · Patient continues to require skilled intervention due to post-op R knee, decreased ROM, strength, gait. Use of outcome tool(s) and clinical judgement create a POC that gives a: Questionable prediction of patient's progress: MODERATE COMPLEXITY            TREATMENT:   (In addition to Assessment/Re-Assessment sessions the following treatments were rendered)  6/14/2018    Pre-treatment Symptoms/Complaints: Pt reports she was sore in the knee last night and this morning. She did go home and ice the knee yesterday. Pain: Initial:   3/10 Post Session:  3/10. Therapeutic Exercise: (40 Minutes): for LE strengthening. Moderate visual and verbal cues for body alignment with each exercise.     Date:  6-11-18 Date:  6-13-18 Date:  6-14-18   Activity/Exercise Parameters Parameters Parameters   Sit to stand 2x10  2x10    Lunge slides 2x10 2x10 B    Lunge to floor With UE assist 2x5 With UE assist 2x5    Long arc quad 4# 3x10 5# 3x10    4 -way hip on cables 3# 10 ea way B -    Shuttle press single leg 38# 3x10 38# 10x  50# 2x8    Hamstring curls on machine 10# 3x10 15# 6x, 10# 2x10    Slant board gastroc stretch 20\"x3     Standing weight shifts with overhead press with ball 10xB red ball 2x10 red ball 2x10 red ball   Squats with ball 10x red ball 2x10  Red ball  2x10 red ball    Lunges to the side and forward mini  10 ea way    Side stepping with t-band - Red 15 ftB With cables 10# 5 ea way   SLR - - 1# 2x10   Side lying hip abduction - - 1# 2x10   Prone hip extension  - 1# 2x10   Single leg bridge - - 2x10   Walking forward and backward cables around waist - - 10# 5 ea way             Therapeutic Modalities: not today                                                                                                HEP: No changes made to existing HEP. Treatment/Session Assessment:    · Response to Treatment: She did well with walking against resistance of 10lb. She did report pain with squatting in the R knee. · Compliance with Program/Exercises:  Appears compliant   · Recommendations/Intent for next treatment session: \"Next visit will focus on gait, knee ROM, strength\".    Total Treatment Duration: 40 Minutes   PT Patient Time In/Time Out  Time In: 0805  Time Out: [address] PT

## 2018-06-18 ENCOUNTER — HOSPITAL ENCOUNTER (OUTPATIENT)
Dept: PHYSICAL THERAPY | Age: 53
Discharge: HOME | End: 2018-06-18
Payer: COMMERCIAL

## 2018-06-18 PROCEDURE — 97110 THERAPEUTIC EXERCISES: CPT

## 2018-06-18 NOTE — PROGRESS NOTES
Pooja Barney  : 1965  Payor: Elton Amezquita / Plan: John Divers / Product Type: Workers Comp /  [address]  [facility]  [address] [place] [address], [address].  [phone]   [phone]       OUTPATIENT PHYSICAL THERAPY:Daily Note 2018      ICD-10: Treatment Diagnosis: Sprain of posterior cruciate ligament of right knee, sequela (S83.521S); Pain in right knee (M25.561); Stiffness of right knee, not elsewhere classified (M25.661); Difficulty in walking, not elsewhere classified (R26.2)  Precautions/Allergies:   Review of patient's allergies indicates no known allergies. Fall Risk Score: 1 (? 5 = High Risk)  MD Orders:Evaluate and treat, HEP, ROM, strengthening MEDICAL/REFERRING DIAGNOSIS:  S/p R knee scope, PCL reconstruction   DATE OF ONSET: Surgery 17  REFERRING PHYSICIAN: Suni Woodruff MD  RETURN PHYSICIAN APPOINTMENT: 18     INITIAL ASSESSMENT:  Ms. Yamil Meyer presents s/p right knee scope with PCL reconstruction. She has good R knee ROM. She is slowly progressing with LE strength. She did have increased knee pain after kneeling on her bed to put sheets on. She has improved with her ambulation and improved weight shift onto R LE during gait. She does continue to report pain in R knee with increased weight on R LE with walking and exercises. She will benefit from continuing skilled physical therapy to continue to progress functional mobility. PROBLEM LIST (Impacting functional limitations):  1. Post-op pain and swelling right knee  2. Decreased ROM right knee   3. Decreased functional strength right knee/LE   4. Decreased gait skills INTERVENTIONS PLANNED:  1. aquatic therapy  2. Thermal and electric modalities, manual therapies for pain. 3. Manual therapies and therapeutic exercises for ROM and strength. 4. Therapeutic exercises for gait and balance   TREATMENT PLAN:  Effective Dates: 18 to 18. Frequency/Duration: 3 times a week for 12 more weeks  GOALS: (Goals have been discussed and agreed upon with patient.)   Short-Term Functional Goals: Time Frame: 6 weeks  1. Pt will be able to perform a good quad set and SLR with no extensor lag with for improved strength for gait. Progressing towards  2. Pt will increase R knee ROM to 0-90 for improved mobility. GOAL MET  3. Pt will improved R ankle DF AROM to 0 for improved gait. GOAL MET  4. Pt will report pain 5/10 with modified daily activities. GOAL MET  Discharge Goals: Time Frame: 12 weeks   1. Pt will increase R LE strength to 5/5 with manual muscle testing for improved strength for ADLs and work. Progressing towards  2. Pt will negotiate 1 flight of stairs with step over step with good control. Progressing towards  3. Pt will increase R knee ROM to 0-120 for mobility. GOAL MET  4. Pt will report pain 3/10 with daily activities. Progressing towards  5. Pt will score 45/80 on LEFS. Progressing towards  Rehabilitation Potential For Stated Goals: Good                HISTORY:   History of Present Injury/Illness (Reason for Referral): She works at [employer] and she was entering the cooler and there was oil on the floor. She slipped and the feet slipped out from under her. She tried to get up and slipped again. Injury was August 14-17. She did go to physical therapy but it was making her worse. They decided to have surgery. Surgery 12-21-17. Stayed 2 nights in the hospital. She did have home health physical therapy 3-4x. Pain increases with walking. Past Medical History/Comorbidities: diabetes type 2 controlled with diet, HTN, L LE varicose vein surgery  Social History/Living Environment: Lives with . 2 steps to get inside. Prior Level of Function/Work/Activity: Works at [employer]. 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 Job does require lifting. Would like to get back to walking and walking dogs.    Current Medications:       Current Outpatient Prescriptions:     magnesium hydroxide (BEAUCHAMP MILK OF MAGNESIA) 400 mg/5 mL suspension, Take 15 mL by mouth as needed for Constipation. as needed daily, Disp: , Rfl:    tramodol   Steroid *    atorvastatin (LIPITOR) 20 mg tablet, Take 20 mg by mouth nightly., Disp: , Rfl:     lisinopril-hydroCHLOROthiazide (PRINZIDE, ZESTORETIC) 10-12.5 mg per tablet, Take  by mouth daily. Indications: hypertension, Disp: , Rfl:     gemfibrozil (LOPID) 600 mg tablet, Take 600 mg by mouth two (2) times a day., Disp: , Rfl:     Omega-3 Fatty Acids 60- mg cpDR, Take  by mouth daily. , Disp: , Rfl:     acetaminophen (TYLENOL) 325 mg tablet, Take 325 mg by mouth every four (4) hours as needed for Pain., Disp: , Rfl:    Date Last Reviewed:  6-18-18   Number of Personal Factors/Comorbidities that affect the Plan of Care: 1-2: MODERATE COMPLEXITY   EXAMINATION:   6/18/2018   Observation/Orthostatic Postural Assessment: Comes into clinic with no assistive device, knee functional brace on R knee. Palpation:n/t.    ROM:                Strength:                    Functional Mobility: Walking with cane on R, knee brace on R, limp on R with short step length L.   Balance: n/t       Body Structures Involved:  1. Joints  2. Muscles  3. Ligaments Body Functions Affected:  1. Neuromusculoskeletal Activities and Participation Affected:  1. Mobility  2. Community, Social and [place]   Number of elements (examined above) that affect the Plan of Care: 4+: HIGH COMPLEXITY   CLINICAL PRESENTATION:   Presentation: Evolving clinical presentation with changing clinical characteristics: MODERATE COMPLEXITY   CLINICAL DECISION MAKING:   Outcome Measure:    Tool Used: Lower Extremity Functional Scale (LEFS)  Score:  Initial: 5/80 Most Recent: 20/80 (Date: 4-16-18 )   Interpretation of Score: 20 questions each scored on a 5 point scale with 0 representing \"extreme difficulty or unable to perform\" and 4 representing \"no difficulty\". The lower the score, the greater the functional disability. 80/80 represents no disability. Minimal detectable change is 9 points. Score 80 79-63 62-48 47-32 31-16 15-1 0   Modifier CH CI CJ CK CL CM CN     Medical Necessity:   · Patient is expected to demonstrate progress in strength, range of motion and balance to improve gait. Reason for Services/Other Comments:  · Patient continues to require skilled intervention due to post-op R knee, decreased ROM, strength, gait. Use of outcome tool(s) and clinical judgement create a POC that gives a: Questionable prediction of patient's progress: MODERATE COMPLEXITY            TREATMENT:   (In addition to Assessment/Re-Assessment sessions the following treatments were rendered)  6/18/2018    Pre-treatment Symptoms/Complaints: Pt reports she is not hurting this morning. Pain: Initial:   1/10 Post Session:  3/10. Therapeutic Exercise: (40 Minutes): for LE strengthening. Moderate visual and verbal cues for body alignment with each exercise.     Date:  6-11-18 Date:  6-13-18 Date:  6-14-18 Date  6-18-18   Activity/Exercise Parameters Parameters Parameters parameters   Sit to stand 2x10  2x10  2x15   Lunge slides 2x10 2x10 B  2x10 B   Lunge to floor With UE assist 2x5 With UE assist 2x5     Long arc quad 4# 3x10 5# 3x10  5# 3x10 B   4 -way hip on cables 3# 10 ea way B -     Shuttle press single leg 38# 3x10 38# 10x  50# 2x8  38# 10x  50# 2x8   Hamstring curls on machine 10# 3x10 15# 6x, 10# 2x10  10# 2x15   Slant board gastroc stretch 20\"x3   20\"x3   Standing weight shifts with overhead press with ball 10xB red ball 2x10 red ball 2x10 red ball 2x10 red ball   Squats with ball 10x red ball 2x10  Red ball  2x10 red ball  2x10 red ball   Lunges to the side and forward mini  10 ea way  10 ea way   Side stepping with t-band - Red 15 ftB With cables 10# 5 ea way    SLR - - 1# 2x10    Side lying hip abduction - - 1# 2x10    Prone hip extension - 1# 2x10    Single leg bridge - - 2x10    Walking forward and backward cables around waist - - 10# 5 ea way    Standing hip flexion at doorway  - - 2x10   balance - - - Tandem 30\"x3; SLB 15\"x2       Therapeutic Modalities: not today                                                                                                HEP: No changes made to existing HEP. Treatment/Session Assessment:    · Response to Treatment: Improved single leg balance today on R LE.   · Compliance with Program/Exercises:  Appears compliant   · Recommendations/Intent for next treatment session: \"Next visit will focus on gait, knee ROM, strength\".    Total Treatment Duration: 40 Minutes   PT Patient Time In/Time Out  Time In: 0810  Time Out: JORGE Viveros

## 2018-06-20 ENCOUNTER — HOSPITAL ENCOUNTER (OUTPATIENT)
Dept: PHYSICAL THERAPY | Age: 53
Discharge: HOME | End: 2018-06-20
Payer: COMMERCIAL

## 2018-06-20 PROCEDURE — 97110 THERAPEUTIC EXERCISES: CPT

## 2018-06-20 NOTE — PROGRESS NOTES
Katelin Wiseman  : 1965  Payor: [address] [place] [address] Road / Plan: Manan Garcia / Product Type: Workers Comp /  [address]  at [address] [place] [address] Rd  [address] [facility],  [address] [place].  [phone]   [phone]       OUTPATIENT PHYSICAL THERAPY:Daily Note 2018      ICD-10: Treatment Diagnosis: Sprain of posterior cruciate ligament of right knee, sequela (S83.521S); Pain in right knee (M25.561); Stiffness of right knee, not elsewhere classified (M25.661); Difficulty in walking, not elsewhere classified (R26.2)  Precautions/Allergies:   Review of patient's allergies indicates no known allergies. Fall Risk Score: 1 (? 5 = High Risk)  MD Orders:Evaluate and treat, HEP, ROM, strengthening MEDICAL/REFERRING DIAGNOSIS:  S/p R knee scope, PCL reconstruction   DATE OF ONSET: Surgery 17  REFERRING PHYSICIAN: Olga Akhtar MD  RETURN PHYSICIAN APPOINTMENT: 18     INITIAL ASSESSMENT:  Ms. Rom Walter presents s/p right knee scope with PCL reconstruction. She has good R knee ROM. She is slowly progressing with LE strength. She did have increased knee pain after kneeling on her bed to put sheets on. She has improved with her ambulation and improved weight shift onto R LE during gait. She does continue to report pain in R knee with increased weight on R LE with walking and exercises. She will benefit from continuing skilled physical therapy to continue to progress functional mobility. PROBLEM LIST (Impacting functional limitations):  1. Post-op pain and swelling right knee  2. Decreased ROM right knee   3. Decreased functional strength right knee/LE   4. Decreased gait skills INTERVENTIONS PLANNED:  1. aquatic therapy  2. Thermal and electric modalities, manual therapies for pain. 3. Manual therapies and therapeutic exercises for ROM and strength. 4. Therapeutic exercises for gait and balance   TREATMENT PLAN:  Effective Dates: 18 to 18. Frequency/Duration: 3 times a week for 12 more weeks  GOALS: (Goals have been discussed and agreed upon with patient.)   Short-Term Functional Goals: Time Frame: 6 weeks  1. Pt will be able to perform a good quad set and SLR with no extensor lag with for improved strength for gait. Progressing towards  2. Pt will increase R knee ROM to 0-90 for improved mobility. GOAL MET  3. Pt will improved R ankle DF AROM to 0 for improved gait. GOAL MET  4. Pt will report pain 5/10 with modified daily activities. GOAL MET  Discharge Goals: Time Frame: 12 weeks   1. Pt will increase R LE strength to 5/5 with manual muscle testing for improved strength for ADLs and work. Progressing towards  2. Pt will negotiate 1 flight of stairs with step over step with good control. Progressing towards  3. Pt will increase R knee ROM to 0-120 for mobility. GOAL MET  4. Pt will report pain 3/10 with daily activities. Progressing towards  5. Pt will score 45/80 on LEFS. Progressing towards  Rehabilitation Potential For Stated Goals: Good                HISTORY:   History of Present Injury/Illness (Reason for Referral): She works at [employer] and she was entering the cooler and there was oil on the floor. She slipped and the feet slipped out from under her. She tried to get up and slipped again. Injury was August 14-17. She did go to physical therapy but it was making her worse. They decided to have surgery. Surgery 12-21-17. Stayed 2 nights in the hospital. She did have home health physical therapy 3-4x. Pain increases with walking. Past Medical History/Comorbidities: diabetes type 2 controlled with diet, HTN, L LE varicose vein surgery  Social History/Living Environment: Lives with . 2 steps to get inside. Prior Level of Function/Work/Activity: Works at [employer]. 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 Job does require lifting. Would like to get back to walking and walking dogs.    Current Medications:       Current Outpatient Prescriptions:     magnesium hydroxide (BEAUCHAMP MILK OF MAGNESIA) 400 mg/5 mL suspension, Take 15 mL by mouth as needed for Constipation. as needed daily, Disp: , Rfl:    tramodol   Steroid *    atorvastatin (LIPITOR) 20 mg tablet, Take 20 mg by mouth nightly., Disp: , Rfl:     lisinopril-hydroCHLOROthiazide (PRINZIDE, ZESTORETIC) 10-12.5 mg per tablet, Take  by mouth daily. Indications: hypertension, Disp: , Rfl:     gemfibrozil (LOPID) 600 mg tablet, Take 600 mg by mouth two (2) times a day., Disp: , Rfl:     Omega-3 Fatty Acids 60- mg cpDR, Take  by mouth daily. , Disp: , Rfl:     acetaminophen (TYLENOL) 325 mg tablet, Take 325 mg by mouth every four (4) hours as needed for Pain., Disp: , Rfl:    Date Last Reviewed:  6-18-18   Number of Personal Factors/Comorbidities that affect the Plan of Care: 1-2: MODERATE COMPLEXITY   EXAMINATION:   6/20/2018   Observation/Orthostatic Postural Assessment: Comes into clinic with no assistive device, knee functional brace on R knee. Palpation:n/t.    ROM:                Strength:                    Functional Mobility: Walking with cane on R, knee brace on R, limp on R with short step length L.   Balance: n/t       Body Structures Involved:  1. Joints  2. Muscles  3. Ligaments Body Functions Affected:  1. Neuromusculoskeletal Activities and Participation Affected:  1. Mobility  2. Community, Social and [place]   Number of elements (examined above) that affect the Plan of Care: 4+: HIGH COMPLEXITY   CLINICAL PRESENTATION:   Presentation: Evolving clinical presentation with changing clinical characteristics: MODERATE COMPLEXITY   CLINICAL DECISION MAKING:   Outcome Measure:    Tool Used: Lower Extremity Functional Scale (LEFS)  Score:  Initial: 5/80 Most Recent: 20/80 (Date: 4-16-18 )   Interpretation of Score: 20 questions each scored on a 5 point scale with 0 representing \"extreme difficulty or unable to perform\" and 4 representing \"no difficulty\". The lower the score, the greater the functional disability. 80/80 represents no disability. Minimal detectable change is 9 points. Score 80 79-63 62-48 47-32 31-16 15-1 0   Modifier CH CI CJ CK CL CM CN     Medical Necessity:   · Patient is expected to demonstrate progress in strength, range of motion and balance to improve gait. Reason for Services/Other Comments:  · Patient continues to require skilled intervention due to post-op R knee, decreased ROM, strength, gait. Use of outcome tool(s) and clinical judgement create a POC that gives a: Questionable prediction of patient's progress: MODERATE COMPLEXITY            TREATMENT:   (In addition to Assessment/Re-Assessment sessions the following treatments were rendered)  6/20/2018    Pre-treatment Symptoms/Complaints: Pt reports she got sweating and felt like her heart was racing yesterday. She did lay down and rest and felt better afterwards. She is going to call her primary doctor today after symptoms. Pain: Initial:   1/10 Post Session:  3/10. Therapeutic Exercise: (40 Minutes): for LE strengthening. Moderate visual and verbal cues for body alignment with each exercise.    Gait drills for warm up: with walking forward, walking backward, marching, kick backs, side stepping 30ft x2 ea   Date:  6-11-18 Date:  6-13-18 Date:  6-14-18 Date  6-18-18 Date  6-20-18   Activity/Exercise Parameters Parameters Parameters parameters parameters   Sit to stand 2x10  2x10  2x15 With heel raise 2x10   Lunge slides 2x10 2x10 B  2x10 B    Lunge to floor With UE assist 2x5 With UE assist 2x5   With UE assist 2x5   Long arc quad 4# 3x10 5# 3x10  5# 3x10 B 6# 2x10   4 -way hip on cables 3# 10 ea way B -      Shuttle press single leg 38# 3x10 38# 10x  50# 2x8  38# 10x  50# 2x8 50# 3x8   Hamstring curls on machine 10# 3x10 15# 6x, 10# 2x10  10# 2x15 12# 2x10   Slant board gastroc stretch 20\"x3   20\"x3    Standing weight shifts with overhead press with ball 10xB red ball 2x10 red ball 2x10 red ball 2x10 red ball 2x10 red bal   Squats with ball 10x red ball 2x10  Red ball  2x10 red ball  2x10 red ball 2x10 red ball   Lunges to the side and forward mini  10 ea way  10 ea way Forward 10x,5x  Side 2x5   Side stepping with t-band - Red 15 ftB With cables 10# 5 ea way     SLR - - 1# 2x10     Side lying hip abduction - - 1# 2x10     Prone hip extension  - 1# 2x10     Single leg bridge - - 2x10     Walking forward and backward cables around waist - - 10# 5 ea way     Standing hip flexion at doorway  - - 2x10    balance - - - Tandem 30\"x3; SLB 15\"x2        Therapeutic Modalities: not today                                                                                                HEP: No changes made to existing HEP. Treatment/Session Assessment:    · Response to Treatment: She works hard with exercises and seems more confident on R LE.   · Compliance with Program/Exercises:  Appears compliant   · Recommendations/Intent for next treatment session: \"Next visit will focus on gait, knee ROM, strength\".    Total Treatment Duration: 40 Minutes   PT Patient Time In/Time Out  Time In: 0805  Time Out: JORGE Viveros

## 2018-06-21 ENCOUNTER — HOSPITAL ENCOUNTER (OUTPATIENT)
Dept: PHYSICAL THERAPY | Age: 53
Discharge: HOME | End: 2018-06-21
Payer: COMMERCIAL

## 2018-06-21 PROCEDURE — 97110 THERAPEUTIC EXERCISES: CPT

## 2018-06-21 NOTE — PROGRESS NOTES
Katelin Wiseman  : 1965  Payor: [address] [place] [address] Road / Plan: Manan Jose / Product Type: Workers Comp /  [address]  at [facility] [place] [facility]  [address] [place] [address], [address].  [phone]   [phone]       OUTPATIENT PHYSICAL THERAPY:Daily Note 2018      ICD-10: Treatment Diagnosis: Sprain of posterior cruciate ligament of right knee, sequela (S83.521S); Pain in right knee (M25.561); Stiffness of right knee, not elsewhere classified (M25.661); Difficulty in walking, not elsewhere classified (R26.2)  Precautions/Allergies:   Review of patient's allergies indicates no known allergies. Fall Risk Score: 1 (? 5 = High Risk)  MD Orders:Evaluate and treat, HEP, ROM, strengthening MEDICAL/REFERRING DIAGNOSIS:  S/p R knee scope, PCL reconstruction   DATE OF ONSET: Surgery 17  REFERRING PHYSICIAN: Olga Akhtar MD  RETURN PHYSICIAN APPOINTMENT: 18     INITIAL ASSESSMENT:  Ms. Rom Walter presents s/p right knee scope with PCL reconstruction. She has good R knee ROM. She is slowly progressing with LE strength. She did have increased knee pain after kneeling on her bed to put sheets on. She has improved with her ambulation and improved weight shift onto R LE during gait. She does continue to report pain in R knee with increased weight on R LE with walking and exercises. She will benefit from continuing skilled physical therapy to continue to progress functional mobility. PROBLEM LIST (Impacting functional limitations):  1. Post-op pain and swelling right knee  2. Decreased ROM right knee   3. Decreased functional strength right knee/LE   4. Decreased gait skills INTERVENTIONS PLANNED:  1. aquatic therapy  2. Thermal and electric modalities, manual therapies for pain. 3. Manual therapies and therapeutic exercises for ROM and strength. 4. Therapeutic exercises for gait and balance   TREATMENT PLAN:  Effective Dates: 18 to 18. Frequency/Duration: 3 times a week for 12 more weeks  GOALS: (Goals have been discussed and agreed upon with patient.)   Short-Term Functional Goals: Time Frame: 6 weeks  1. Pt will be able to perform a good quad set and SLR with no extensor lag with for improved strength for gait. Progressing towards  2. Pt will increase R knee ROM to 0-90 for improved mobility. GOAL MET  3. Pt will improved R ankle DF AROM to 0 for improved gait. GOAL MET  4. Pt will report pain 5/10 with modified daily activities. GOAL MET  Discharge Goals: Time Frame: 12 weeks   1. Pt will increase R LE strength to 5/5 with manual muscle testing for improved strength for ADLs and work. Progressing towards  2. Pt will negotiate 1 flight of stairs with step over step with good control. Progressing towards  3. Pt will increase R knee ROM to 0-120 for mobility. GOAL MET  4. Pt will report pain 3/10 with daily activities. Progressing towards  5. Pt will score 45/80 on LEFS. Progressing towards  Rehabilitation Potential For Stated Goals: Good                HISTORY:   History of Present Injury/Illness (Reason for Referral): She works at [employer] and she was entering the cooler and there was oil on the floor. She slipped and the feet slipped out from under her. She tried to get up and slipped again. Injury was August 14-17. She did go to physical therapy but it was making her worse. They decided to have surgery. Surgery 12-21-17. Stayed 2 nights in the hospital. She did have home health physical therapy 3-4x. Pain increases with walking. Past Medical History/Comorbidities: diabetes type 2 controlled with diet, HTN, L LE varicose vein surgery  Social History/Living Environment: Lives with . 2 steps to get inside. Prior Level of Function/Work/Activity: Works at [employer] 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 Job does require lifting. Would like to get back to walking and walking dogs.    Current Medications:       Current Outpatient Prescriptions:     magnesium hydroxide (BEAUCHAMP MILK OF MAGNESIA) 400 mg/5 mL suspension, Take 15 mL by mouth as needed for Constipation. as needed daily, Disp: , Rfl:    tramodol   Steroid *    atorvastatin (LIPITOR) 20 mg tablet, Take 20 mg by mouth nightly., Disp: , Rfl:     lisinopril-hydroCHLOROthiazide (PRINZIDE, ZESTORETIC) 10-12.5 mg per tablet, Take  by mouth daily. Indications: hypertension, Disp: , Rfl:     gemfibrozil (LOPID) 600 mg tablet, Take 600 mg by mouth two (2) times a day., Disp: , Rfl:     Omega-3 Fatty Acids 60- mg cpDR, Take  by mouth daily. , Disp: , Rfl:     acetaminophen (TYLENOL) 325 mg tablet, Take 325 mg by mouth every four (4) hours as needed for Pain., Disp: , Rfl:    Date Last Reviewed:  6-18-18   Number of Personal Factors/Comorbidities that affect the Plan of Care: 1-2: MODERATE COMPLEXITY   EXAMINATION:   6/21/2018   Observation/Orthostatic Postural Assessment: Comes into clinic with no assistive device, knee functional brace on R knee. Palpation:n/t.    ROM:                Strength:                    Functional Mobility: n/t  Balance: n/t       Body Structures Involved:  1. Joints  2. Muscles  3. Ligaments Body Functions Affected:  1. Neuromusculoskeletal Activities and Participation Affected:  1. Mobility  2. Community, Social and [place]   Number of elements (examined above) that affect the Plan of Care: 4+: HIGH COMPLEXITY   CLINICAL PRESENTATION:   Presentation: Evolving clinical presentation with changing clinical characteristics: MODERATE COMPLEXITY   CLINICAL DECISION MAKING:   Outcome Measure: Tool Used: Lower Extremity Functional Scale (LEFS)  Score:  Initial: 5/80 Most Recent: 20/80 (Date: 4-16-18 )   Interpretation of Score: 20 questions each scored on a 5 point scale with 0 representing \"extreme difficulty or unable to perform\" and 4 representing \"no difficulty\".   The lower the score, the greater the functional disability. 80/80 represents no disability. Minimal detectable change is 9 points. Score 80 79-63 62-48 47-32 31-16 15-1 0   Modifier CH CI CJ CK CL CM CN     Medical Necessity:   · Patient is expected to demonstrate progress in strength, range of motion and balance to improve gait. Reason for Services/Other Comments:  · Patient continues to require skilled intervention due to post-op R knee, decreased ROM, strength, gait. Use of outcome tool(s) and clinical judgement create a POC that gives a: Questionable prediction of patient's progress: MODERATE COMPLEXITY            TREATMENT:   (In addition to Assessment/Re-Assessment sessions the following treatments were rendered)  6/21/2018    Pre-treatment Symptoms/Complaints: Pt reports she feels \"so-so\" today. She is going to see primary care doctor tomorrow. Pain: Initial:   1/10 Post Session:  3/10. Therapeutic Exercise: (40 Minutes): for LE strengthening. Moderate visual and verbal cues for body alignment with each exercise.       Date:  6-11-18 Date:  6-13-18 Date:  6-14-18 Date  6-18-18 Date  6-20-18 Date  6-21-18   Activity/Exercise Parameters Parameters Parameters parameters parameters    Sit to stand 2x10  2x10  2x15 With heel raise 2x10    Lunge slides 2x10 2x10 B  2x10 B  2x10 B   Lunge to floor With UE assist 2x5 With UE assist 2x5   With UE assist 2x5    Long arc quad 4# 3x10 5# 3x10  5# 3x10 B 6# 2x10    4 -way hip on cables 3# 10 ea way B -       Shuttle press single leg 38# 3x10 38# 10x  50# 2x8  38# 10x  50# 2x8 50# 3x8    Hamstring curls on machine 10# 3x10 15# 6x, 10# 2x10  10# 2x15 12# 2x10    Slant board gastroc stretch 20\"x3   20\"x3  20\"x3   Standing weight shifts with overhead press with ball 10xB red ball 2x10 red ball 2x10 red ball 2x10 red ball 2x10 red bal    Squats with ball 10x red ball 2x10  Red ball  2x10 red ball  2x10 red ball 2x10 red ball    Lunges to the side and forward mini  10 ea way  10 ea way Forward 10x,5x  Side 2x5    Side stepping with t-band - Red 15 ftB With cables 10# 5 ea way   With cables 10# 5 ea way   SLR - - 1# 2x10   1#2x10   Side lying hip abduction - - 1# 2x10   1#2x10   Prone hip extension  - 1# 2x10   1#2x10   Single leg bridge - - 2x10   2x10   Walking forward and backward cables around waist - - 10# 5 ea way   10# 5x ea way   Standing hip flexion at doorway  - - 2x10     balance - - - Tandem 30\"x3; SLB 15\"x2  Tandem 30\"x3; SLB 10\"x2   Step ups - - - - - 4\" 2x10 B       Therapeutic Modalities: not today                                                                                                HEP: No changes made to existing HEP. Treatment/Session Assessment:    · Response to Treatment: Less rest breaks between exercises today. · Compliance with Program/Exercises:  Appears compliant   · Recommendations/Intent for next treatment session: \"Next visit will focus on gait, knee ROM, strength\".    Total Treatment Duration: 40 Minutes   PT Patient Time In/Time Out  Time In: 0805  Time Out: [address] Siddhartha, PT

## 2018-06-26 ENCOUNTER — HOSPITAL ENCOUNTER (OUTPATIENT)
Dept: PHYSICAL THERAPY | Age: 53
Discharge: HOME | End: 2018-06-26
Payer: COMMERCIAL

## 2018-06-26 PROCEDURE — 97110 THERAPEUTIC EXERCISES: CPT

## 2018-06-26 NOTE — PROGRESS NOTES
[address]  : 1965  Payor: [address] [place] [address] Road / Plan: Luisa Elizabeth / Product Type: Workers Comp /  [address]  at [facility] [place] [facility]  [address] [place] [address], [address].  [phone]   [phone]       OUTPATIENT PHYSICAL THERAPY:Daily Note 2018      ICD-10: Treatment Diagnosis: Sprain of posterior cruciate ligament of right knee, sequela (S83.521S); Pain in right knee (M25.561); Stiffness of right knee, not elsewhere classified (M25.661); Difficulty in walking, not elsewhere classified (R26.2)  Precautions/Allergies:   Review of patient's allergies indicates no known allergies. Fall Risk Score: 1 (? 5 = High Risk)  MD Orders:Evaluate and treat, HEP, ROM, strengthening MEDICAL/REFERRING DIAGNOSIS:  S/p R knee scope, PCL reconstruction   DATE OF ONSET: Surgery 17  REFERRING PHYSICIAN: Omer Costa MD  RETURN PHYSICIAN APPOINTMENT: 18     INITIAL ASSESSMENT:  Ms. Maria M Don presents s/p right knee scope with PCL reconstruction. She has good R knee ROM. She is slowly progressing with LE strength. She did have increased knee pain after kneeling on her bed to put sheets on. She has improved with her ambulation and improved weight shift onto R LE during gait. She does continue to report pain in R knee with increased weight on R LE with walking and exercises. She will benefit from continuing skilled physical therapy to continue to progress functional mobility. PROBLEM LIST (Impacting functional limitations):  1. Post-op pain and swelling right knee  2. Decreased ROM right knee   3. Decreased functional strength right knee/LE   4. Decreased gait skills INTERVENTIONS PLANNED:  1. aquatic therapy  2. Thermal and electric modalities, manual therapies for pain. 3. Manual therapies and therapeutic exercises for ROM and strength. 4. Therapeutic exercises for gait and balance   TREATMENT PLAN:  Effective Dates: 18 to 18. Frequency/Duration: 3 times a week for 12 more weeks  GOALS: (Goals have been discussed and agreed upon with patient.)   Short-Term Functional Goals: Time Frame: 6 weeks  1. Pt will be able to perform a good quad set and SLR with no extensor lag with for improved strength for gait. Progressing towards  2. Pt will increase R knee ROM to 0-90 for improved mobility. GOAL MET  3. Pt will improved R ankle DF AROM to 0 for improved gait. GOAL MET  4. Pt will report pain 5/10 with modified daily activities. GOAL MET  Discharge Goals: Time Frame: 12 weeks   1. Pt will increase R LE strength to 5/5 with manual muscle testing for improved strength for ADLs and work. Progressing towards  2. Pt will negotiate 1 flight of stairs with step over step with good control. Progressing towards  3. Pt will increase R knee ROM to 0-120 for mobility. GOAL MET  4. Pt will report pain 3/10 with daily activities. Progressing towards  5. Pt will score 45/80 on LEFS. Progressing towards  Rehabilitation Potential For Stated Goals: Good                HISTORY:   History of Present Injury/Illness (Reason for Referral): She works at [employer] and she was entering the cooler and there was oil on the floor. She slipped and the feet slipped out from under her. She tried to get up and slipped again. Injury was August 14-17. She did go to physical therapy but it was making her worse. They decided to have surgery. Surgery 12-21-17. Stayed 2 nights in the hospital. She did have home health physical therapy 3-4x. Pain increases with walking. Past Medical History/Comorbidities: diabetes type 2 controlled with diet, HTN, L LE varicose vein surgery  Social History/Living Environment: Lives with . 2 steps to get inside. Prior Level of Function/Work/Activity: Works at [employer]. 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 Job does require lifting. Would like to get back to walking and walking dogs.    Current Medications:       Current Outpatient Prescriptions:     magnesium hydroxide (BEAUCHAMP MILK OF MAGNESIA) 400 mg/5 mL suspension, Take 15 mL by mouth as needed for Constipation. as needed daily, Disp: , Rfl:    tramodol   Steroid *    atorvastatin (LIPITOR) 20 mg tablet, Take 20 mg by mouth nightly., Disp: , Rfl:     lisinopril-hydroCHLOROthiazide (PRINZIDE, ZESTORETIC) 10-12.5 mg per tablet, Take  by mouth daily. Indications: hypertension, Disp: , Rfl:     gemfibrozil (LOPID) 600 mg tablet, Take 600 mg by mouth two (2) times a day., Disp: , Rfl:     Omega-3 Fatty Acids 60- mg cpDR, Take  by mouth daily. , Disp: , Rfl:     acetaminophen (TYLENOL) 325 mg tablet, Take 325 mg by mouth every four (4) hours as needed for Pain., Disp: , Rfl:    Date Last Reviewed:  6-26-18   Number of Personal Factors/Comorbidities that affect the Plan of Care: 1-2: MODERATE COMPLEXITY   EXAMINATION:   6/26/2018   Observation/Orthostatic Postural Assessment: Comes into clinic with no assistive device, knee functional brace on R knee. Palpation:n/t.    ROM:                Strength:                    Functional Mobility: n/t  Balance: n/t       Body Structures Involved:  1. Joints  2. Muscles  3. Ligaments Body Functions Affected:  1. Neuromusculoskeletal Activities and Participation Affected:  1. Mobility  2. Community, Social and [place]   Number of elements (examined above) that affect the Plan of Care: 4+: HIGH COMPLEXITY   CLINICAL PRESENTATION:   Presentation: Evolving clinical presentation with changing clinical characteristics: MODERATE COMPLEXITY   CLINICAL DECISION MAKING:   Outcome Measure: Tool Used: Lower Extremity Functional Scale (LEFS)  Score:  Initial: 5/80 Most Recent: 20/80 (Date: 4-16-18 )   Interpretation of Score: 20 questions each scored on a 5 point scale with 0 representing \"extreme difficulty or unable to perform\" and 4 representing \"no difficulty\".   The lower the score, the greater the functional disability. 80/80 represents no disability. Minimal detectable change is 9 points. Score 80 79-63 62-48 47-32 31-16 15-1 0   Modifier CH CI CJ CK CL CM CN     Medical Necessity:   · Patient is expected to demonstrate progress in strength, range of motion and balance to improve gait. Reason for Services/Other Comments:  · Patient continues to require skilled intervention due to post-op R knee, decreased ROM, strength, gait. Use of outcome tool(s) and clinical judgement create a POC that gives a: Questionable prediction of patient's progress: MODERATE COMPLEXITY            TREATMENT:   (In addition to Assessment/Re-Assessment sessions the following treatments were rendered)  6/26/2018    Pre-treatment Symptoms/Complaints: Pt reports she had a little pain in the knee today and yesterday. Pain: Initial:   4/10 Post Session:  4-5/10. Therapeutic Exercise: (42 Minutes): for LE strengthening. Moderate visual and verbal cues for body alignment with each exercise.       Date:  6-11-18 Date:  6-13-18 Date:  6-14-18 Date  6-18-18 Date  6-20-18 Date  6-21-18 Date  6-26-18   Activity/Exercise Parameters Parameters Parameters parameters parameters  parameters   Sit to stand 2x10  2x10  2x15 With heel raise 2x10  2x10   Lunge slides 2x10 2x10 B  2x10 B  2x10 B    Lunge to floor With UE assist 2x5 With UE assist 2x5   With UE assist 2x5     Long arc quad 4# 3x10 5# 3x10  5# 3x10 B 6# 2x10  6# 2x10   4 -way hip on cables 3# 10 ea way B -        Shuttle press single leg 38# 3x10 38# 10x  50# 2x8  38# 10x  50# 2x8 50# 3x8  50# 3x8   Hamstring curls on machine 10# 3x10 15# 6x, 10# 2x10  10# 2x15 12# 2x10  12# 2x10   Slant board gastroc stretch 20\"x3   20\"x3  20\"x3 20\"x3   Standing weight shifts with overhead press with ball 10xB red ball 2x10 red ball 2x10 red ball 2x10 red ball 2x10 red bal  2x10 yellow ball   Squats with ball 10x red ball 2x10  Red ball  2x10 red ball  2x10 red ball 2x10 red ball  2x10 yellow ball   Lunges to the side and forward mini  10 ea way  10 ea way Forward 10x,5x  Side 2x5  Forward 10x, side 10x   Side stepping with t-band - Red 15 ftB With cables 10# 5 ea way   With cables 10# 5 ea way    SLR - - 1# 2x10   1#2x10    Side lying hip abduction - - 1# 2x10   1#2x10    Prone hip extension  - 1# 2x10   1#2x10    Single leg bridge - - 2x10   2x10    Walking forward and backward cables around waist - - 10# 5 ea way   10# 5x ea way    Standing hip flexion at doorway  - - 2x10      balance - - - Tandem 30\"x3; SLB 15\"x2  Tandem 30\"x3; SLB 10\"x2 Tandem 30\"x3; SLB 10\"x2   Step ups - - - - - 4\" 2x10 B 6\" 2x10B       Therapeutic Modalities: not today                                                                                                HEP: No changes made to existing HEP. Treatment/Session Assessment:    · Response to Treatment: She reported posterior right knee pain with stepping lunges today. Improved step ups on 6\" step. · Compliance with Program/Exercises:  Appears compliant   · Recommendations/Intent for next treatment session: \"Next visit will focus on gait, knee ROM, strength\".    Total Treatment Duration: 42 Minutes   PT Patient Time In/Time Out  Time In: 1015  Time Out: [address], PT

## 2018-06-28 ENCOUNTER — APPOINTMENT (OUTPATIENT)
Dept: PHYSICAL THERAPY | Age: 53
End: 2018-06-28
Payer: COMMERCIAL

## 2018-06-29 ENCOUNTER — APPOINTMENT (OUTPATIENT)
Dept: PHYSICAL THERAPY | Age: 53
End: 2018-06-29
Payer: COMMERCIAL

## 2018-07-03 ENCOUNTER — HOSPITAL ENCOUNTER (OUTPATIENT)
Dept: PHYSICAL THERAPY | Age: 53
Discharge: HOME | End: 2018-07-03
Payer: COMMERCIAL

## 2018-07-03 PROCEDURE — 97110 THERAPEUTIC EXERCISES: CPT

## 2018-07-03 NOTE — PROGRESS NOTES
Boom Purvisodore  : 1965  Payor: [address] [place] [address] Road / Plan: Yi Sacks / Product Type: Workers Comp /  [address]  at [facility] [place] [employer]  [address] [place] [address], [address], [address] [place] [address]  [phone]   [phone]       OUTPATIENT PHYSICAL THERAPY:Daily Note 7/3/2018      ICD-10: Treatment Diagnosis: Sprain of posterior cruciate ligament of right knee, sequela (S83.521S); Pain in right knee (M25.561); Stiffness of right knee, not elsewhere classified (M25.661); Difficulty in walking, not elsewhere classified (R26.2)  Precautions/Allergies:   Review of patient's allergies indicates no known allergies. Fall Risk Score: 1 (? 5 = High Risk)  MD Orders:Evaluate and treat, HEP, ROM, strengthening MEDICAL/REFERRING DIAGNOSIS:  S/p R knee scope, PCL reconstruction   DATE OF ONSET: Surgery 17  REFERRING PHYSICIAN: Brett Lynn MD  RETURN PHYSICIAN APPOINTMENT: 18     INITIAL ASSESSMENT:  Ms. Jennifer Durand presents s/p right knee scope with PCL reconstruction. She has good R knee ROM. She is slowly progressing with LE strength. She did have increased knee pain after kneeling on her bed to put sheets on. She has improved with her ambulation and improved weight shift onto R LE during gait. She does continue to report pain in R knee with increased weight on R LE with walking and exercises. She will benefit from continuing skilled physical therapy to continue to progress functional mobility. PROBLEM LIST (Impacting functional limitations):  1. Post-op pain and swelling right knee  2. Decreased ROM right knee   3. Decreased functional strength right knee/LE   4. Decreased gait skills INTERVENTIONS PLANNED:  1. aquatic therapy  2. Thermal and electric modalities, manual therapies for pain. 3. Manual therapies and therapeutic exercises for ROM and strength. 4. Therapeutic exercises for gait and balance   TREATMENT PLAN:  Effective Dates: 18 to 18. Frequency/Duration: 3 times a week for 12 more weeks  GOALS: (Goals have been discussed and agreed upon with patient.)   Short-Term Functional Goals: Time Frame: 6 weeks  1. Pt will be able to perform a good quad set and SLR with no extensor lag with for improved strength for gait. Progressing towards  2. Pt will increase R knee ROM to 0-90 for improved mobility. GOAL MET  3. Pt will improved R ankle DF AROM to 0 for improved gait. GOAL MET  4. Pt will report pain 5/10 with modified daily activities. GOAL MET  Discharge Goals: Time Frame: 12 weeks   1. Pt will increase R LE strength to 5/5 with manual muscle testing for improved strength for ADLs and work. Progressing towards  2. Pt will negotiate 1 flight of stairs with step over step with good control. Progressing towards  3. Pt will increase R knee ROM to 0-120 for mobility. GOAL MET  4. Pt will report pain 3/10 with daily activities. Progressing towards  5. Pt will score 45/80 on LEFS. Progressing towards  Rehabilitation Potential For Stated Goals: Good                HISTORY:   History of Present Injury/Illness (Reason for Referral): She works at [employer] and she was entering the cooler and there was oil on the floor. She slipped and the feet slipped out from under her. She tried to get up and slipped again. Injury was August 14-17. She did go to physical therapy but it was making her worse. They decided to have surgery. Surgery 12-21-17. Stayed 2 nights in the hospital. She did have home health physical therapy 3-4x. Pain increases with walking. Past Medical History/Comorbidities: diabetes type 2 controlled with diet, HTN, L LE varicose vein surgery  Social History/Living Environment: Lives with . 2 steps to get inside. Prior Level of Function/Work/Activity: Works at [employer]. 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 Job does require lifting. Would like to get back to walking and walking dogs.    Current Medications:       Current Outpatient Prescriptions:     magnesium hydroxide (BEAUCHAMP MILK OF MAGNESIA) 400 mg/5 mL suspension, Take 15 mL by mouth as needed for Constipation. as needed daily, Disp: , Rfl:    tramodol   Steroid *    atorvastatin (LIPITOR) 20 mg tablet, Take 20 mg by mouth nightly., Disp: , Rfl:     lisinopril-hydroCHLOROthiazide (PRINZIDE, ZESTORETIC) 10-12.5 mg per tablet, Take  by mouth daily. Indications: hypertension, Disp: , Rfl:     gemfibrozil (LOPID) 600 mg tablet, Take 600 mg by mouth two (2) times a day., Disp: , Rfl:     Omega-3 Fatty Acids 60- mg cpDR, Take  by mouth daily. , Disp: , Rfl:     acetaminophen (TYLENOL) 325 mg tablet, Take 325 mg by mouth every four (4) hours as needed for Pain., Disp: , Rfl:    Date Last Reviewed:  6-26-18   Number of Personal Factors/Comorbidities that affect the Plan of Care: 1-2: MODERATE COMPLEXITY   EXAMINATION:   7/3/2018   Observation/Orthostatic Postural Assessment: Comes into clinic with no assistive device, knee functional brace on R knee. Palpation:n/t.    ROM:                Strength:                    Functional Mobility: n/t  Balance: n/t       Body Structures Involved:  1. Joints  2. Muscles  3. Ligaments Body Functions Affected:  1. Neuromusculoskeletal Activities and Participation Affected:  1. Mobility  2. Community, Social and [place]   Number of elements (examined above) that affect the Plan of Care: 4+: HIGH COMPLEXITY   CLINICAL PRESENTATION:   Presentation: Evolving clinical presentation with changing clinical characteristics: MODERATE COMPLEXITY   CLINICAL DECISION MAKING:   Outcome Measure: Tool Used: Lower Extremity Functional Scale (LEFS)  Score:  Initial: 5/80 Most Recent: 20/80 (Date: 4-16-18 )   Interpretation of Score: 20 questions each scored on a 5 point scale with 0 representing \"extreme difficulty or unable to perform\" and 4 representing \"no difficulty\".   The lower the score, the greater the functional disability. 80/80 represents no disability. Minimal detectable change is 9 points. Score 80 79-63 62-48 47-32 31-16 15-1 0   Modifier CH CI CJ CK CL CM CN     Medical Necessity:   · Patient is expected to demonstrate progress in strength, range of motion and balance to improve gait. Reason for Services/Other Comments:  · Patient continues to require skilled intervention due to post-op R knee, decreased ROM, strength, gait. Use of outcome tool(s) and clinical judgement create a POC that gives a: Questionable prediction of patient's progress: MODERATE COMPLEXITY            TREATMENT:   (In addition to Assessment/Re-Assessment sessions the following treatments were rendered)  7/3/2018    Pre-treatment Symptoms/Complaints: Pt reports no pain upon entering clinic today. She says she wears the brace as she gets tired because her knee begins to hurt. Pain: Initial:   0/10 Post Session:  2/10. Therapeutic Exercise: ( ): for LE strengthening. Moderate visual and verbal cues for body alignment with each exercise.       Date:  6-11-18 Date:  6-13-18 Date:  6-14-18 Date  6-18-18 Date  6-20-18 Date  6-21-18 Date  6-26-18 7/3/18   Activity/Exercise Parameters Parameters Parameters parameters parameters  parameters    Sit to stand 2x10  2x10  2x15 With heel raise 2x10  2x10 2 x 10   Lunge slides 2x10 2x10 B  2x10 B  2x10 B     Lunge to floor With UE assist 2x5 With UE assist 2x5   With UE assist 2x5      Long arc quad 4# 3x10 5# 3x10  5# 3x10 B 6# 2x10  6# 2x10 5# 2 x 10   4 -way hip on cables 3# 10 ea way B -         Shuttle press single leg 38# 3x10 38# 10x  50# 2x8  38# 10x  50# 2x8 50# 3x8  50# 3x8 37# 2 x 10   Hamstring curls on machine 10# 3x10 15# 6x, 10# 2x10  10# 2x15 12# 2x10  12# 2x10 12# 2 x 10   Slant board gastroc stretch 20\"x3   20\"x3  20\"x3 20\"x3 20' x 3   Standing weight shifts with overhead press with ball 10xB red ball 2x10 red ball 2x10 red ball 2x10 red ball 2x10 red bal  2x10 yellow ball Squats with ball 10x red ball 2x10  Red ball  2x10 red ball  2x10 red ball 2x10 red ball  2x10 yellow ball 2 x 10    Lunges to the side and forward mini  10 ea way  10 ea way Forward 10x,5x  Side 2x5  Forward 10x, side 10x    Side stepping with t-band - Red 15 ftB With cables 10# 5 ea way   With cables 10# 5 ea way     SLR - - 1# 2x10   1#2x10     Side lying hip abduction - - 1# 2x10   1#2x10     Prone hip extension  - 1# 2x10   1#2x10     Single leg bridge - - 2x10   2x10     Walking forward and backward cables around waist - - 10# 5 ea way   10# 5x ea way     Standing hip flexion at doorway  - - 2x10       balance - - - Tandem 30\"x3; SLB 15\"x2  Tandem 30\"x3; SLB 10\"x2 Tandem 30\"x3; SLB 10\"x2    Step ups - - - - - 4\" 2x10 B 6\" 2x10B 6' 2 x 10 B       Therapeutic Modalities: not today                                                                                                HEP: No changes made to existing HEP. Treatment/Session Assessment:    · Response to Treatment: Pain with squats - worked on technique and was able to decrease pain. Audible crepitus with knee ext. · Compliance with Program/Exercises:  Appears compliant   · Recommendations/Intent for next treatment session: \"Next visit will focus on gait, knee ROM, strength\".    Total Treatment Duration: 40 Minutes   PT Patient Time In/Time Out  Time In: 0800  Time Out: 0840    Ignacio Aguila, PT

## 2018-07-05 ENCOUNTER — HOSPITAL ENCOUNTER (OUTPATIENT)
Dept: PHYSICAL THERAPY | Age: 53
Discharge: HOME | End: 2018-07-05
Payer: COMMERCIAL

## 2018-07-05 PROCEDURE — 97110 THERAPEUTIC EXERCISES: CPT

## 2018-07-05 NOTE — PROGRESS NOTES
Pancho Nelson  : 1965  Payor: [address] [place] [address] Road / Plan:  [place]inder / Product Type: Workers Comp /  [address]  at [facility] [place] [employer]  [address] [place] [address], [address].  [phone]   [phone]       OUTPATIENT PHYSICAL THERAPY:Daily Note 2018      ICD-10: Treatment Diagnosis: Sprain of posterior cruciate ligament of right knee, sequela (S83.521S); Pain in right knee (M25.561); Stiffness of right knee, not elsewhere classified (M25.661); Difficulty in walking, not elsewhere classified (R26.2)  Precautions/Allergies:   Review of patient's allergies indicates no known allergies. Fall Risk Score: 1 (? 5 = High Risk)  MD Orders:Evaluate and treat, HEP, ROM, strengthening MEDICAL/REFERRING DIAGNOSIS:  S/p R knee scope, PCL reconstruction   DATE OF ONSET: Surgery 17  REFERRING PHYSICIAN: Wagner Hines MD  RETURN PHYSICIAN APPOINTMENT: 18     INITIAL ASSESSMENT:  Ms. Richie Silvestre presents s/p right knee scope with PCL reconstruction. She has good R knee ROM. She is slowly progressing with LE strength. She did have increased knee pain after kneeling on her bed to put sheets on. She has improved with her ambulation and improved weight shift onto R LE during gait. She does continue to report pain in R knee with increased weight on R LE with walking and exercises. She will benefit from continuing skilled physical therapy to continue to progress functional mobility. PROBLEM LIST (Impacting functional limitations):  1. Post-op pain and swelling right knee  2. Decreased ROM right knee   3. Decreased functional strength right knee/LE   4. Decreased gait skills INTERVENTIONS PLANNED:  1. aquatic therapy  2. Thermal and electric modalities, manual therapies for pain. 3. Manual therapies and therapeutic exercises for ROM and strength. 4. Therapeutic exercises for gait and balance   TREATMENT PLAN:  Effective Dates: 18 to 18. Frequency/Duration: 3 times a week for 12 more weeks  GOALS: (Goals have been discussed and agreed upon with patient.)   Short-Term Functional Goals: Time Frame: 6 weeks  1. Pt will be able to perform a good quad set and SLR with no extensor lag with for improved strength for gait. Progressing towards  2. Pt will increase R knee ROM to 0-90 for improved mobility. GOAL MET  3. Pt will improved R ankle DF AROM to 0 for improved gait. GOAL MET  4. Pt will report pain 5/10 with modified daily activities. GOAL MET  Discharge Goals: Time Frame: 12 weeks   1. Pt will increase R LE strength to 5/5 with manual muscle testing for improved strength for ADLs and work. Progressing towards  2. Pt will negotiate 1 flight of stairs with step over step with good control. Progressing towards  3. Pt will increase R knee ROM to 0-120 for mobility. GOAL MET  4. Pt will report pain 3/10 with daily activities. Progressing towards  5. Pt will score 45/80 on LEFS. Progressing towards  Rehabilitation Potential For Stated Goals: Good                HISTORY:   History of Present Injury/Illness (Reason for Referral): She works at [employer] and she was entering the cooler and there was oil on the floor. She slipped and the feet slipped out from under her. She tried to get up and slipped again. Injury was August 14-17. She did go to physical therapy but it was making her worse. They decided to have surgery. Surgery 12-21-17. Stayed 2 nights in the hospital. She did have home health physical therapy 3-4x. Pain increases with walking. Past Medical History/Comorbidities: diabetes type 2 controlled with diet, HTN, L LE varicose vein surgery  Social History/Living Environment: Lives with . 2 steps to get inside. Prior Level of Function/Work/Activity: Works at [employer]. 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 Job does require lifting. Would like to get back to walking and walking dogs.    Current Medications:       Current Outpatient Prescriptions:     magnesium hydroxide (BEAUCHAMP MILK OF MAGNESIA) 400 mg/5 mL suspension, Take 15 mL by mouth as needed for Constipation. as needed daily, Disp: , Rfl:    tramodol   Steroid *    atorvastatin (LIPITOR) 20 mg tablet, Take 20 mg by mouth nightly., Disp: , Rfl:     lisinopril-hydroCHLOROthiazide (PRINZIDE, ZESTORETIC) 10-12.5 mg per tablet, Take  by mouth daily. Indications: hypertension, Disp: , Rfl:     gemfibrozil (LOPID) 600 mg tablet, Take 600 mg by mouth two (2) times a day., Disp: , Rfl:     Omega-3 Fatty Acids 60- mg cpDR, Take  by mouth daily. , Disp: , Rfl:     acetaminophen (TYLENOL) 325 mg tablet, Take 325 mg by mouth every four (4) hours as needed for Pain., Disp: , Rfl:    Date Last Reviewed:  6-26-18   Number of Personal Factors/Comorbidities that affect the Plan of Care: 1-2: MODERATE COMPLEXITY   EXAMINATION:   7/5/2018   Observation/Orthostatic Postural Assessment: Comes into clinic with no assistive device, knee functional brace on R knee. Palpation:n/t.    ROM:                Strength:                    Functional Mobility: n/t  Balance: n/t       Body Structures Involved:  1. Joints  2. Muscles  3. Ligaments Body Functions Affected:  1. Neuromusculoskeletal Activities and Participation Affected:  1. Mobility  2. Community, Social and [place]   Number of elements (examined above) that affect the Plan of Care: 4+: HIGH COMPLEXITY   CLINICAL PRESENTATION:   Presentation: Evolving clinical presentation with changing clinical characteristics: MODERATE COMPLEXITY   CLINICAL DECISION MAKING:   Outcome Measure: Tool Used: Lower Extremity Functional Scale (LEFS)  Score:  Initial: 5/80 Most Recent: 20/80 (Date: 4-16-18 )   Interpretation of Score: 20 questions each scored on a 5 point scale with 0 representing \"extreme difficulty or unable to perform\" and 4 representing \"no difficulty\".   The lower the score, the greater the functional disability. 80/80 represents no disability. Minimal detectable change is 9 points. Score 80 79-63 62-48 47-32 31-16 15-1 0   Modifier CH CI CJ CK CL CM CN     Medical Necessity:   · Patient is expected to demonstrate progress in strength, range of motion and balance to improve gait. Reason for Services/Other Comments:  · Patient continues to require skilled intervention due to post-op R knee, decreased ROM, strength, gait. Use of outcome tool(s) and clinical judgement create a POC that gives a: Questionable prediction of patient's progress: MODERATE COMPLEXITY            TREATMENT:   (In addition to Assessment/Re-Assessment sessions the following treatments were rendered)  7/5/2018    Pre-treatment Symptoms/Complaints: Pt reports her knee has been more painful. Pain: Initial:   4/10 Post Session:  4/10. Therapeutic Exercise: ( ): for LE strengthening. Moderate visual and verbal cues for body alignment with each exercise.       Date  6-21-18 Date  6-26-18 7/3/18 Date  7/5/18     Activity/Exercise  parameters       Sit to stand  2x10 2 x 10      Lunge slides 2x10 B        Lunge to floor         Long arc quad  6# 2x10 5# 2 x 10      4 -way hip on cables         Shuttle press single leg  50# 3x8 37# 2 x 10 B - 75# 2 x 15  R 37# 2 x 15     Hamstring curls on machine  12# 2x10 12# 2 x 10 10# 3 x 10     Slant board gastroc stretch 20\"x3 20\"x3 20' x 3 20 sec x 3     Standing weight shifts with overhead press with ball  2x10 yellow ball       Squats with ball  2x10 yellow ball 2 x 10  Wall squats with yellow ball  2 x 5     Lunges to the side and forward mini  Forward 10x, side 10x       Side stepping with t-band With cables 10# 5 ea way        SLR 1#2x10        Side lying hip abduction 1#2x10        Prone hip extension 1#2x10        Single leg bridge 2x10        Walking forward and backward cables around waist 10# 5x ea way        Standing hip flexion at doorway         balance Tandem 30\"x3; SLB 10\"x2 Tandem 30\"x3; SLB 10\"x2  Single leg stance throwing ball into tramp  2 bouts each leg     Step ups 4\" 2x10 B 6\" 2x10B 6' 2 x 10 B 4' x 10  6' x 10  Tried stairs with 1 railing. Therapeutic Modalities: not today                                                                                                HEP: No changes made to existing HEP. Treatment/Session Assessment:    · Response to Treatment: Anterior knee pain. Requires more eccentric control for good control descending stairs. · Compliance with Program/Exercises:  Appears compliant   · Recommendations/Intent for next treatment session: \"Next visit will focus on gait, knee ROM, strength\".    Total Treatment Duration: 40 Minutes   PT Patient Time In/Time Out  Time In: 0230  Time Out: 3545    Maryam Johnston, PT

## 2018-07-06 ENCOUNTER — HOSPITAL ENCOUNTER (OUTPATIENT)
Dept: PHYSICAL THERAPY | Age: 53
Discharge: HOME | End: 2018-07-06
Payer: COMMERCIAL

## 2018-07-06 PROCEDURE — 97110 THERAPEUTIC EXERCISES: CPT

## 2018-07-06 NOTE — PROGRESS NOTES
Jeremiah Lane  : 1965  Payor: [address] [place] [address] Road / Plan: Leigh Beat / Product Type: Workers Comp /  [address]  at [facility] [place] [employer]  1101 [facility], [address] [place] [address],[address], [facility] [address].  [phone]   [phone]       OUTPATIENT PHYSICAL THERAPY:Daily Note and Progress Report 2018      ICD-10: Treatment Diagnosis: Sprain of posterior cruciate ligament of right knee, sequela (S83.521S); Pain in right knee (M25.561); Stiffness of right knee, not elsewhere classified (M25.661); Difficulty in walking, not elsewhere classified (R26.2)  Precautions/Allergies:   Review of patient's allergies indicates no known allergies. Fall Risk Score: 1 (? 5 = High Risk)  MD Orders:Evaluate and treat, HEP, ROM, strengthening MEDICAL/REFERRING DIAGNOSIS:  S/p R knee scope, PCL reconstruction   DATE OF ONSET: Surgery 17  REFERRING PHYSICIAN: Jose Manuel Sotelo MD  RETURN PHYSICIAN APPOINTMENT: [facility]     18 ASSESSMENT:  Ms. Kailey Smiley presents s/p right knee scope with PCL reconstruction. She has made excellent progress toward the goals established at her initial evaluation, and demonstrates good R knee ROM. She does demonstrate R quad weakness, ongoing pain in R knee, and difficulty descending stairs. R quadriceps eccentric weakness is evident with stair descent. Patient needs continued PT to optimize R LE function to facilitate return to work. PROBLEM LIST (Impacting functional limitations):  1. Post-op pain and swelling right knee  2. Decreased ROM right knee   3. Decreased functional strength right knee/LE   4. Decreased gait skills INTERVENTIONS PLANNED:  1. aquatic therapy  2. Thermal and electric modalities, manual therapies for pain. 3. Manual therapies and therapeutic exercises for ROM and strength. 4. Therapeutic exercises for gait and balance   TREATMENT PLAN:  Effective Dates: 18 to 18.   Frequency/Duration: 2-3 times a week for 8 more weeks  GOALS: (Goals have been discussed and agreed upon with patient.)   Short-Term Functional Goals: Time Frame: 6 weeks  1. Pt will be able to perform a good quad set and SLR with no extensor lag with for improved strength for gait. MET 7-6-18  2. Pt will increase R knee ROM to 0-90 for improved mobility. GOAL MET  3. Pt will improved R ankle DF AROM to 0 for improved gait. GOAL MET  4. Pt will report pain 5/10 with modified daily activities. GOAL MET  Discharge Goals: Time Frame: 12 weeks   1. Pt will increase R LE strength to 5/5 with manual muscle testing for improved strength for ADLs and work. Progressing towards, 7-6-18  2. Pt will negotiate 1 flight of stairs with step over step with good control. Progressing towards 7-6-18  3. Pt will increase R knee ROM to 0-120 for mobility. GOAL MET  4. Pt will report pain 3/10 with daily activities. Progressing towards  5. Pt will score 45/80 on LEFS. Progressing towards  Rehabilitation Potential For Stated Goals: Good                HISTORY:   History of Present Injury/Illness (Reason for Referral): She works at [employer] and she was entering the cooler and there was oil on the floor. She slipped and the feet slipped out from under her. She tried to get up and slipped again. Injury was August 14-17. She did go to physical therapy but it was making her worse. They decided to have surgery. Surgery 12-21-17. Stayed 2 nights in the hospital. She did have home health physical therapy 3-4x. Pain increases with walking. Past Medical History/Comorbidities: diabetes type 2 controlled with diet, HTN, L LE varicose vein surgery  Social History/Living Environment: Lives with . 2 steps to get inside. Prior Level of Function/Work/Activity: Works at [employer]. 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 Job does require lifting. Would like to get back to walking and walking dogs.    Current Medications:       Current Outpatient Prescriptions:     magnesium hydroxide (BEAUCHAMP MILK OF MAGNESIA) 400 mg/5 mL suspension, Take 15 mL by mouth as needed for Constipation. as needed daily, Disp: , Rfl:    tramodol   Steroid *    atorvastatin (LIPITOR) 20 mg tablet, Take 20 mg by mouth nightly., Disp: , Rfl:     lisinopril-hydroCHLOROthiazide (PRINZIDE, ZESTORETIC) 10-12.5 mg per tablet, Take  by mouth daily. Indications: hypertension, Disp: , Rfl:     gemfibrozil (LOPID) 600 mg tablet, Take 600 mg by mouth two (2) times a day., Disp: , Rfl:     Omega-3 Fatty Acids 60- mg cpDR, Take  by mouth daily. , Disp: , Rfl:     acetaminophen (TYLENOL) 325 mg tablet, Take 325 mg by mouth every four (4) hours as needed for Pain., Disp: , Rfl:    Date Last Reviewed:  6-26-18   Number of Personal Factors/Comorbidities that affect the Plan of Care: 1-2: MODERATE COMPLEXITY   EXAMINATION:   7/6/2018   Observation/Orthostatic Postural Assessment: Comes into clinic with no assistive device, knee functional brace on R knee. Palpation:n/t.    ROM:     L Knee: 2 degrees of hyperextension to 124 degrees            Strength:   L Knee extension: 4+/5    L knee flexion: 4+/5   5-rep leg press max: L 75#, R 25#                    Functional Mobility: Patient ambulates on level ground with mild antalgic gait pattern when in stance phase on R LE. Patient ascends stairs with reciprocal gait pattern with use of handrail. Patient descends stairs with step-to gait pattern with L LE descending stair before R wit use of single handrail. Patient able to stand from standard chair without use of hands, but performs significant forward trunk lean to complete the movement. Balance: not tested. Body Structures Involved:  1. Joints  2. Muscles  3. Ligaments Body Functions Affected:  1. Neuromusculoskeletal Activities and Participation Affected:  1. Mobility  2.  Community, Social and [place]   Number of elements (examined above) that affect the Plan of Care: 4+: HIGH COMPLEXITY   CLINICAL PRESENTATION:   Presentation: Evolving clinical presentation with changing clinical characteristics: MODERATE COMPLEXITY   CLINICAL DECISION MAKING:   Outcome Measure: Tool Used: Lower Extremity Functional Scale (LEFS)  Score:  Initial: 5/80 20/80 (Date: 4-16-18 ) Most recent: 44/80 (7-6-18)   Interpretation of Score: 20 questions each scored on a 5 point scale with 0 representing \"extreme difficulty or unable to perform\" and 4 representing \"no difficulty\". The lower the score, the greater the functional disability. 80/80 represents no disability. Minimal detectable change is 9 points. Score 80 79-63 62-48 47-32 31-16 15-1 0   Modifier CH CI CJ CK CL CM CN     Medical Necessity:   · Patient is expected to demonstrate progress in strength, range of motion and balance to improve gait. Reason for Services/Other Comments:  · Patient continues to require skilled intervention due to post-op R knee, decreased ROM, strength, gait. Use of outcome tool(s) and clinical judgement create a POC that gives a: Questionable prediction of patient's progress: MODERATE COMPLEXITY            TREATMENT:   (In addition to Assessment/Re-Assessment sessions the following treatments were rendered)  7/6/2018    Pre-treatment Symptoms/Complaints: Pt reports her knee is sore today. Pain: Initial:   4/10 Post Session:  4/10. Therapeutic Exercise: ( 40 minutes) for LE strengthening. Moderate visual and verbal cues for body alignment with each exercise. Passive range of motion to R knee performed for improved tissue warmth prior to loading.       Date  6-21-18 Date  6-26-18 7/3/18 Date  7/5/18 7-6-18    Activity/Exercise  parameters       Sit to stand  2x10 2 x 10  2 x 10 from standard chair    Lunge slides 2x10 B        Lunge to floor         Long arc quad  6# 2x10 5# 2 x 10      4 -way hip on cables         Shuttle press single leg  50# 3x8 37# 2 x 10 B - 75# 2 x 15  R 37# 2 x 15 B 75# x 10  5-rep 25# x 5 on R  75# x 5 on L Hamstring curls on machine  12# 2x10 12# 2 x 10 10# 3 x 10     Slant board gastroc stretch 20\"x3 20\"x3 20' x 3 20 sec x 3 2 x 30\" bilateral    Standing weight shifts with overhead press with ball  2x10 yellow ball       Squats with ball  2x10 yellow ball 2 x 10  Wall squats with yellow ball  2 x 5     Lunges to the side and forward mini  Forward 10x, side 10x       Side stepping with t-band With cables 10# 5 ea way        SLR 1#2x10    1# 3 x 10    Side lying hip abduction 1#2x10        Prone hip extension 1#2x10        Single leg bridge 2x10        Walking forward and backward cables around waist 10# 5x ea way        Standing hip flexion at doorway         balance Tandem 30\"x3; SLB 10\"x2 Tandem 30\"x3; SLB 10\"x2  Single leg stance throwing ball into tramp  2 bouts each leg     Step ups 4\" 2x10 B 6\" 2x10B 6' 2 x 10 B 4' x 10  6' x 10  Tried stairs with 1 railing. 4\" x 10, regressed to 2\" to reduce compensatory movements, 2 x 10.    2\" eccentric stepdown, 2 x 10        Therapeutic Modalities: not today                                                                                                HEP: No changes made to existing HEP. Treatment/Session Assessment:    · Response to Treatment: Experienced anterior R knee pain with controlled eccentrics stepdown. Needs increased quad strength on R LE but has made great progress with gait on level ground. Stairs remain difficult. · Compliance with Program/Exercises:  Appears compliant   · Recommendations/Intent for next treatment session: \"Next visit will focus on gait, knee ROM, strength\".    Total Treatment Duration: 40 Minutes   PT Patient Time In/Time Out  Time In: 0800  Time Out: 4300 [facility],

## 2018-07-09 ENCOUNTER — HOSPITAL ENCOUNTER (OUTPATIENT)
Dept: PHYSICAL THERAPY | Age: 53
Discharge: HOME | End: 2018-07-09
Payer: COMMERCIAL

## 2018-07-09 PROCEDURE — 97110 THERAPEUTIC EXERCISES: CPT

## 2018-07-09 NOTE — PROGRESS NOTES
Jono Kimi  : 1965  Payor: [address] [place] [address] Road / Plan: Paddy Mcdonald / Product Type: Workers Comp /  [address]  at [address] [place] [address] Rd  1101 [facility],  [place].  [phone]   [phone]       OUTPATIENT PHYSICAL THERAPY:Daily Note 2018      ICD-10: Treatment Diagnosis: Sprain of posterior cruciate ligament of right knee, sequela (S83.521S); Pain in right knee (M25.561); Stiffness of right knee, not elsewhere classified (M25.661); Difficulty in walking, not elsewhere classified (R26.2)  Precautions/Allergies:   Review of patient's allergies indicates no known allergies. Fall Risk Score: 1 (? 5 = High Risk)  MD Orders:Evaluate and treat, HEP, ROM, strengthening MEDICAL/REFERRING DIAGNOSIS:  S/p R knee scope, PCL reconstruction   DATE OF ONSET: Surgery 17  REFERRING PHYSICIAN: Niecy Epps MD  RETURN PHYSICIAN APPOINTMENT: [facility]     18 ASSESSMENT:  Ms. Gael Fabry presents s/p right knee scope with PCL reconstruction. She has made excellent progress toward the goals established at her initial evaluation, and demonstrates good R knee ROM. She does demonstrate R quad weakness, ongoing pain in R knee, and difficulty descending stairs. R quadriceps eccentric weakness is evident with stair descent. Patient needs continued PT to optimize R LE function to facilitate return to work. PROBLEM LIST (Impacting functional limitations):  1. Post-op pain and swelling right knee  2. Decreased ROM right knee   3. Decreased functional strength right knee/LE   4. Decreased gait skills INTERVENTIONS PLANNED:  1. aquatic therapy  2. Thermal and electric modalities, manual therapies for pain. 3. Manual therapies and therapeutic exercises for ROM and strength. 4. Therapeutic exercises for gait and balance   TREATMENT PLAN:  Effective Dates: 18 to 18.   Frequency/Duration: 2-3 times a week for 8 more weeks  GOALS: (Goals have been discussed and agreed upon with patient.)   Short-Term Functional Goals: Time Frame: 6 weeks  1. Pt will be able to perform a good quad set and SLR with no extensor lag with for improved strength for gait. MET 7-6-18  2. Pt will increase R knee ROM to 0-90 for improved mobility. GOAL MET  3. Pt will improved R ankle DF AROM to 0 for improved gait. GOAL MET  4. Pt will report pain 5/10 with modified daily activities. GOAL MET  Discharge Goals: Time Frame: 12 weeks   1. Pt will increase R LE strength to 5/5 with manual muscle testing for improved strength for ADLs and work. Progressing towards, 7-6-18  2. Pt will negotiate 1 flight of stairs with step over step with good control. Progressing towards 7-6-18  3. Pt will increase R knee ROM to 0-120 for mobility. GOAL MET  4. Pt will report pain 3/10 with daily activities. Progressing towards  5. Pt will score 45/80 on LEFS. Progressing towards  Rehabilitation Potential For Stated Goals: Good                HISTORY:   History of Present Injury/Illness (Reason for Referral): She works at [employer] and she was entering the cooler and there was oil on the floor. She slipped and the feet slipped out from under her. She tried to get up and slipped again. Injury was August 14-17. She did go to physical therapy but it was making her worse. They decided to have surgery. Surgery 12-21-17. Stayed 2 nights in the hospital. She did have home health physical therapy 3-4x. Pain increases with walking. Past Medical History/Comorbidities: diabetes type 2 controlled with diet, HTN, L LE varicose vein surgery  Social History/Living Environment: Lives with . 2 steps to get inside. Prior Level of Function/Work/Activity: Works at [employer]. 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 Job does require lifting. Would like to get back to walking and walking dogs.    Current Medications:       Current Outpatient Prescriptions:     magnesium hydroxide (BEAUCHAMP MILK OF MAGNESIA) 400 mg/5 mL suspension, Take 15 mL by mouth as needed for Constipation. as needed daily, Disp: , Rfl:    tramodol   Steroid *    atorvastatin (LIPITOR) 20 mg tablet, Take 20 mg by mouth nightly., Disp: , Rfl:     lisinopril-hydroCHLOROthiazide (PRINZIDE, ZESTORETIC) 10-12.5 mg per tablet, Take  by mouth daily. Indications: hypertension, Disp: , Rfl:     gemfibrozil (LOPID) 600 mg tablet, Take 600 mg by mouth two (2) times a day., Disp: , Rfl:     Omega-3 Fatty Acids 60- mg cpDR, Take  by mouth daily. , Disp: , Rfl:     acetaminophen (TYLENOL) 325 mg tablet, Take 325 mg by mouth every four (4) hours as needed for Pain., Disp: , Rfl:    Date Last Reviewed:  6-26-18   Number of Personal Factors/Comorbidities that affect the Plan of Care: 1-2: MODERATE COMPLEXITY   EXAMINATION:   7/9/2018   Observation/Orthostatic Postural Assessment: Comes into clinic with no assistive device, knee functional brace on R knee. Palpation:n/t.    ROM:     L Knee: 2 degrees of hyperextension to 124 degrees            Strength:   L Knee extension: 4+/5    L knee flexion: 4+/5   5-rep leg press max: L 75#, R 25#                    Functional Mobility: Patient ambulates on level ground with mild antalgic gait pattern when in stance phase on R LE. Patient ascends stairs with reciprocal gait pattern with use of handrail. Patient descends stairs with step-to gait pattern with L LE descending stair before R wit use of single handrail. Patient able to stand from standard chair without use of hands, but performs significant forward trunk lean to complete the movement. Balance: not tested. Body Structures Involved:  1. Joints  2. Muscles  3. Ligaments Body Functions Affected:  1. Neuromusculoskeletal Activities and Participation Affected:  1. Mobility  2.  Community, Social and [place]   Number of elements (examined above) that affect the Plan of Care: 4+: HIGH COMPLEXITY   CLINICAL PRESENTATION:   Presentation: Evolving clinical presentation with changing clinical characteristics: MODERATE COMPLEXITY   CLINICAL DECISION MAKING:   Outcome Measure: Tool Used: Lower Extremity Functional Scale (LEFS)  Score:  Initial: 5/80 20/80 (Date: 4-16-18 ) Most recent: 44/80 (7-6-18)   Interpretation of Score: 20 questions each scored on a 5 point scale with 0 representing \"extreme difficulty or unable to perform\" and 4 representing \"no difficulty\". The lower the score, the greater the functional disability. 80/80 represents no disability. Minimal detectable change is 9 points. Score 80 79-63 62-48 47-32 31-16 15-1 0   Modifier CH CI CJ CK CL CM CN     Medical Necessity:   · Patient is expected to demonstrate progress in strength, range of motion and balance to improve gait. Reason for Services/Other Comments:  · Patient continues to require skilled intervention due to post-op R knee, decreased ROM, strength, gait. Use of outcome tool(s) and clinical judgement create a POC that gives a: Questionable prediction of patient's progress: MODERATE COMPLEXITY            TREATMENT:   (In addition to Assessment/Re-Assessment sessions the following treatments were rendered)  7/9/2018    Pre-treatment Symptoms/Complaints: Pt reports minimal R knee pain today upon arrival.  Pain: Initial:   1-2/10 Post Session:  4/10. Therapeutic Exercise: ( 40 minutes) for LE strengthening. Moderate visual and verbal cues for body alignment with each exercise. Passive ranging of R knee performed for improved tissue warmth prior to loading. Verbal cues to keep heels down and generate squat with hips when performing squatting movements. Hip flexor stretch in Irvin Test position (3 x 20\") to improve R hip flexor flexibility and R hamstring stretch (5 x 20\") in supine for reduced R knee discomfot.      Date  6-21-18 Date  6-26-18 7/3/18 Date  7/5/18 7-6-18 7-9-18   Activity/Exercise  parameters       Sit to stand  2x10 2 x 10  2 x 10 from standard chair 1 x 10 from standard chair, 2 x 10 from weight bench due to compensatory forward trunk lean when standing from lower surface   Lunge slides 2x10 B        Lunge to floor         Long arc quad  6# 2x10 5# 2 x 10      4 -way hip on cables         Shuttle press single leg  50# 3x8 37# 2 x 10 B - 75# 2 x 15  R 37# 2 x 15 B 75# x 10  5-rep 25# x 5 on R  75# x 5 on L R 25# 3 x 8, unable to perform 37#    Hamstring curls on machine  12# 2x10 12# 2 x 10 10# 3 x 10     Slant board gastroc stretch 20\"x3 20\"x3 20' x 3 20 sec x 3 2 x 30\" bilateral 2 x 30\" bilateral   Standing weight shifts with overhead press with ball  2x10 yellow ball       Squats with ball  2x10 yellow ball 2 x 10  Wall squats with yellow ball  2 x 5  Squats with cables for UE support, 2 x 10 with 5 sec hold at bottom of squat   Lunges to the side and forward mini  Forward 10x, side 10x       Side stepping with t-band With cables 10# 5 ea way        SLR 1#2x10    1# 3 x 10 1.5# 3 x 5-6 R LE   Side lying hip abduction 1#2x10        Prone hip extension 1#2x10        Single leg bridge 2x10     3 x 10 bilateral (not single limb)   Walking forward and backward cables around waist 10# 5x ea way        Standing hip flexion at doorway         balance Tandem 30\"x3; SLB 10\"x2 Tandem 30\"x3; SLB 10\"x2  Single leg stance throwing ball into tramp  2 bouts each leg  Single limb stance x max time; tandem stance with R behind L, 3 x 20\" w/o UE support    Semitandem stance on Airex pad, 3 x 20\" R behind L   Step ups 4\" 2x10 B 6\" 2x10B 6' 2 x 10 B 4' x 10  6' x 10  Tried stairs with 1 railing. 4\" x 10, regressed to 2\" to reduce compensatory movements, 2 x 10.    2\" eccentric stepdown, 2 x 10        Therapeutic Modalities: not today                                                                                                HEP: No changes made to existing HEP.     Treatment/Session Assessment:    · Response to Treatment: Pt demonstrated improved gait performance today with decreased antalgic gait pattern. Patient very tight in R hip flexors. Has difficutly with single limb balance on R LE.  · Compliance with Program/Exercises:  Appears compliant   · Recommendations/Intent for next treatment session: \"Next visit will focus on gait, knee ROM, strength\".    Total Treatment Duration: 40 Minutes   PT Patient Time In/Time Out  Time In: 0820  Time Out: 0900    Lola Fothergill, PT

## 2018-07-09 NOTE — THERAPY RECERTIFICATION
Haley Miller  : 1965  Payor: Earl Soto / Plan: Ajay Mason / Product Type: Workers Comp /  [address]  at [facility] [place] [employer]  [address] [place] [address], [facility], [address] [place] [address]  [phone]   [phone]       OUTPATIENT PHYSICAL [address] 8/3/3723      ICD-10: Treatment Diagnosis: Sprain of posterior cruciate ligament of right knee, sequela (S83.521S); Pain in right knee (M25.561); Stiffness of right knee, not elsewhere classified (M25.661); Difficulty in walking, not elsewhere classified (R26.2)  Precautions/Allergies:   Review of patient's allergies indicates no known allergies. Fall Risk Score: 1 (? 5 = High Risk)  MD Orders:Evaluate and treat, HEP, ROM, strengthening MEDICAL/REFERRING DIAGNOSIS:  S/p R knee scope, PCL reconstruction   DATE OF ONSET: Surgery 17  REFERRING PHYSICIAN: Karlo Chawla MD  RETURN PHYSICIAN APPOINTMENT: [facility]     18 ASSESSMENT:  Ms. Roxana Pittman presents s/p right knee scope with PCL reconstruction. She has made excellent progress toward the goals established at her initial evaluation, and demonstrates good R knee ROM. She does demonstrate R quad weakness, ongoing pain in R knee, and difficulty descending stairs. R quadriceps eccentric weakness is evident with stair descent. Patient needs continued PT to optimize R LE function to facilitate return to work. PROBLEM LIST (Impacting functional limitations):  1. Post-op pain and swelling right knee  2. Decreased ROM right knee   3. Decreased functional strength right knee/LE   4. Decreased gait skills INTERVENTIONS PLANNED:  1. aquatic therapy  2. Thermal and electric modalities, manual therapies for pain. 3. Manual therapies and therapeutic exercises for ROM and strength. 4. Therapeutic exercises for gait and balance   TREATMENT PLAN:  Effective Dates: 18 to 18.   Frequency/Duration: 2-3 times a week for 8 more weeks  GOALS: (Goals have been discussed and agreed upon with patient.)   Short-Term Functional Goals: Time Frame: 6 weeks  1. Pt will be able to perform a good quad set and SLR with no extensor lag with for improved strength for gait. MET 7-6-18  2. Pt will increase R knee ROM to 0-90 for improved mobility. GOAL MET  3. Pt will improved R ankle DF AROM to 0 for improved gait. GOAL MET  4. Pt will report pain 5/10 with modified daily activities. GOAL MET  Discharge Goals: Time Frame: 12 weeks   1. Pt will increase R LE strength to 5/5 with manual muscle testing for improved strength for ADLs and work. Progressing towards, 7-6-18  2. Pt will negotiate 1 flight of stairs with step over step with good control. Progressing towards 7-6-18  3. Pt will increase R knee ROM to 0-120 for mobility. GOAL MET  4. Pt will report pain 3/10 with daily activities. Progressing towards  5. Pt will score 45/80 on LEFS. Progressing towards  Rehabilitation Potential For Stated Goals: Good    Regarding [facility]  therapy, I certify that the treatment plan above will be carried out by a therapist or under their direction. Thank you for this referral,    Israel Newman, PT       Referring Physician Signature:     Derrek Cooney MD          Date                          HISTORY:   History of Present Injury/Illness (Reason for Referral): She works at [employer] and she was entering the cooler and there was oil on the floor. She slipped and the feet slipped out from under her. She tried to get up and slipped again. Injury was August 14-17. She did go to physical therapy but it was making her worse. They decided to have surgery. Surgery 12-21-17. Stayed 2 nights in the hospital. She did have home health physical therapy 3-4x. Pain increases with walking. Past Medical History/Comorbidities: diabetes type 2 controlled with diet, HTN, L LE varicose vein surgery  Social History/Living Environment: Lives with . 2 steps to get inside.      Prior Level of Function/Work/Activity: Works at [employer]. 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 Job does require lifting. Would like to get back to walking and walking dogs. Current Medications:       Current Outpatient Prescriptions:     magnesium hydroxide (BEAUCHAMP MILK OF MAGNESIA) 400 mg/5 mL suspension, Take 15 mL by mouth as needed for Constipation. as needed daily, Disp: , Rfl:    tramodol   Steroid *    atorvastatin (LIPITOR) 20 mg tablet, Take 20 mg by mouth nightly., Disp: , Rfl:     lisinopril-hydroCHLOROthiazide (PRINZIDE, ZESTORETIC) 10-12.5 mg per tablet, Take  by mouth daily. Indications: hypertension, Disp: , Rfl:     gemfibrozil (LOPID) 600 mg tablet, Take 600 mg by mouth two (2) times a day., Disp: , Rfl:     Omega-3 Fatty Acids 60- mg cpDR, Take  by mouth daily. , Disp: , Rfl:     acetaminophen (TYLENOL) 325 mg tablet, Take 325 mg by mouth every four (4) hours as needed for Pain., Disp: , Rfl:    Date Last Reviewed:  6-26-18   Number of Personal Factors/Comorbidities that affect the Plan of Care: 1-2: MODERATE COMPLEXITY   EXAMINATION:   7/9/2018   Observation/Orthostatic Postural Assessment: Comes into clinic with no assistive device, knee functional brace on R knee. Palpation:n/t.    ROM:     L Knee: 2 degrees of hyperextension to 124 degrees            Strength:   L Knee extension: 4+/5    L knee flexion: 4+/5   5-rep leg press max: L 75#, R 25#                    Functional Mobility: Patient ambulates on level ground with mild antalgic gait pattern when in stance phase on R LE. Patient ascends stairs with reciprocal gait pattern with use of handrail. Patient descends stairs with step-to gait pattern with L LE descending stair before R wit use of single handrail. Patient able to stand from standard chair without use of hands, but performs significant forward trunk lean to complete the movement. Balance: not tested.        Body Structures Involved:  1. Joints  2. Muscles  3. Ligaments Body Functions Affected:  1. Neuromusculoskeletal Activities and Participation Affected:  1. Mobility  2. Community, Social and [place]   Number of elements (examined above) that affect the Plan of Care: 4+: HIGH COMPLEXITY   CLINICAL PRESENTATION:   Presentation: Evolving clinical presentation with changing clinical characteristics: MODERATE COMPLEXITY   CLINICAL DECISION MAKING:   Outcome Measure: Tool Used: Lower Extremity Functional Scale (LEFS)  Score:  Initial: 5/80 20/80 (Date: 4-16-18 ) Most recent: 44/80 (7-6-18)   Interpretation of Score: 20 questions each scored on a 5 point scale with 0 representing \"extreme difficulty or unable to perform\" and 4 representing \"no difficulty\". The lower the score, the greater the functional disability. 80/80 represents no disability. Minimal detectable change is 9 points. Score 80 79-63 62-48 47-32 31-16 15-1 0   Modifier CH CI CJ CK CL CM CN     Medical Necessity:   · Patient is expected to demonstrate progress in strength, range of motion and balance to improve gait. Reason for Services/Other Comments:  · Patient continues to require skilled intervention due to post-op R knee, decreased ROM, strength, gait.    Use of outcome tool(s) and clinical judgement create a POC that gives a: Questionable prediction of patient's progress: MODERATE COMPLEXITY

## 2018-07-10 ENCOUNTER — HOSPITAL ENCOUNTER (OUTPATIENT)
Dept: PHYSICAL THERAPY | Age: 53
Discharge: HOME | End: 2018-07-10
Payer: COMMERCIAL

## 2018-07-10 PROCEDURE — 97110 THERAPEUTIC EXERCISES: CPT

## 2018-07-10 NOTE — PROGRESS NOTES
Waymond Goltz  : 1965  Payor: [address] [place] [address] Road / Plan: Javier Sin / Product Type: Workers Comp /  [address]  at [facility] [place] [employer]  [address] [place] [address], [facility] [place] [facility]", [address] [place] [address]  [phone]   [phone]       OUTPATIENT PHYSICAL THERAPY:Daily Note 7/10/2018      ICD-10: Treatment Diagnosis: Sprain of posterior cruciate ligament of right knee, sequela (S83.521S); Pain in right knee (M25.561); Stiffness of right knee, not elsewhere classified (M25.661); Difficulty in walking, not elsewhere classified (R26.2)  Precautions/Allergies:   Review of patient's allergies indicates no known allergies. Fall Risk Score: 1 (? 5 = High Risk)  MD Orders:Evaluate and treat, HEP, ROM, strengthening MEDICAL/REFERRING DIAGNOSIS:  S/p R knee scope, PCL reconstruction   DATE OF ONSET: Surgery 17  REFERRING PHYSICIAN: Amish Gayle MD  RETURN PHYSICIAN APPOINTMENT: [facility]     18 ASSESSMENT:  Ms. Mary Truong presents s/p right knee scope with PCL reconstruction. She has made excellent progress toward the goals established at her initial evaluation, and demonstrates good R knee ROM. She does demonstrate R quad weakness, ongoing pain in R knee, and difficulty descending stairs. R quadriceps eccentric weakness is evident with stair descent. Patient needs continued PT to optimize R LE function to facilitate return to work. PROBLEM LIST (Impacting functional limitations):  1. Post-op pain and swelling right knee  2. Decreased ROM right knee   3. Decreased functional strength right knee/LE   4. Decreased gait skills INTERVENTIONS PLANNED:  1. aquatic therapy  2. Thermal and electric modalities, manual therapies for pain. 3. Manual therapies and therapeutic exercises for ROM and strength. 4. Therapeutic exercises for gait and balance   TREATMENT PLAN:  Effective Dates: 18 to 18.   Frequency/Duration: 2-3 times a week for 8 more weeks  GOALS: (Goals have been discussed and agreed upon with patient.)   Short-Term Functional Goals: Time Frame: 6 weeks  1. Pt will be able to perform a good quad set and SLR with no extensor lag with for improved strength for gait. MET 7-6-18  2. Pt will increase R knee ROM to 0-90 for improved mobility. GOAL MET  3. Pt will improved R ankle DF AROM to 0 for improved gait. GOAL MET  4. Pt will report pain 5/10 with modified daily activities. GOAL MET  Discharge Goals: Time Frame: 12 weeks   1. Pt will increase R LE strength to 5/5 with manual muscle testing for improved strength for ADLs and work. Progressing towards, 7-6-18  2. Pt will negotiate 1 flight of stairs with step over step with good control. Progressing towards 7-6-18  3. Pt will increase R knee ROM to 0-120 for mobility. GOAL MET  4. Pt will report pain 3/10 with daily activities. Progressing towards  5. Pt will score 45/80 on LEFS. Progressing towards  Rehabilitation Potential For Stated Goals: Good                HISTORY:   History of Present Injury/Illness (Reason for Referral): She works at [employer] and she was entering the cooler and there was oil on the floor. She slipped and the feet slipped out from under her. She tried to get up and slipped again. Injury was August 14-17. She did go to physical therapy but it was making her worse. They decided to have surgery. Surgery 12-21-17. Stayed 2 nights in the hospital. She did have home health physical therapy 3-4x. Pain increases with walking. Past Medical History/Comorbidities: diabetes type 2 controlled with diet, HTN, L LE varicose vein surgery  Social History/Living Environment: Lives with . 2 steps to get inside. Prior Level of Function/Work/Activity: Works at [employer]. 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 Job does require lifting. Would like to get back to walking and walking dogs.    Current Medications:       Current Outpatient Prescriptions:     magnesium hydroxide (BEAUCHAMP MILK OF MAGNESIA) 400 mg/5 mL suspension, Take 15 mL by mouth as needed for Constipation. as needed daily, Disp: , Rfl:    tramodol   Steroid *    atorvastatin (LIPITOR) 20 mg tablet, Take 20 mg by mouth nightly., Disp: , Rfl:     lisinopril-hydroCHLOROthiazide (PRINZIDE, ZESTORETIC) 10-12.5 mg per tablet, Take  by mouth daily. Indications: hypertension, Disp: , Rfl:     gemfibrozil (LOPID) 600 mg tablet, Take 600 mg by mouth two (2) times a day., Disp: , Rfl:     Omega-3 Fatty Acids 60- mg cpDR, Take  by mouth daily. , Disp: , Rfl:     acetaminophen (TYLENOL) 325 mg tablet, Take 325 mg by mouth every four (4) hours as needed for Pain., Disp: , Rfl:    Date Last Reviewed:  7/10/2018   Number of Personal Factors/Comorbidities that affect the Plan of Care: 1-2: MODERATE COMPLEXITY   EXAMINATION:   7/10/2018   Observation/Orthostatic Postural Assessment: Comes into clinic with no assistive device, knee functional brace on R knee. Palpation:n/t.    ROM:     L Knee: 2 degrees of hyperextension to 124 degrees            Strength:   L Knee extension: 4+/5    L knee flexion: 4+/5   5-rep leg press max: L 75#, R 25#                    Functional Mobility: Patient ambulates on level ground with mild antalgic gait pattern when in stance phase on R LE. Patient ascends stairs with reciprocal gait pattern with use of handrail. Patient descends stairs with step-to gait pattern with L LE descending stair before R wit use of single handrail. Patient able to stand from standard chair without use of hands, but performs significant forward trunk lean to complete the movement. Balance: not tested. Body Structures Involved:  1. Joints  2. Muscles  3. Ligaments Body Functions Affected:  1. Neuromusculoskeletal Activities and Participation Affected:  1. Mobility  2.  Community, Social and [place]   Number of elements (examined above) that affect the Plan of Care: 4+: HIGH COMPLEXITY   CLINICAL PRESENTATION:   Presentation: Evolving clinical presentation with changing clinical characteristics: MODERATE COMPLEXITY   CLINICAL DECISION MAKING:   Outcome Measure: Tool Used: Lower Extremity Functional Scale (LEFS)  Score:  Initial: 5/80 20/80 (Date: 4-16-18 ) Most recent: 44/80 (7-6-18)   Interpretation of Score: 20 questions each scored on a 5 point scale with 0 representing \"extreme difficulty or unable to perform\" and 4 representing \"no difficulty\". The lower the score, the greater the functional disability. 80/80 represents no disability. Minimal detectable change is 9 points. Score 80 79-63 62-48 47-32 31-16 15-1 0   Modifier CH CI CJ CK CL CM CN     Medical Necessity:   · Patient is expected to demonstrate progress in strength, range of motion and balance to improve gait. Reason for Services/Other Comments:  · Patient continues to require skilled intervention due to post-op R knee, decreased ROM, strength, gait. Use of outcome tool(s) and clinical judgement create a POC that gives a: Questionable prediction of patient's progress: MODERATE COMPLEXITY            TREATMENT:   (In addition to Assessment/Re-Assessment sessions the following treatments were rendered)  7/10/2018    Pre-treatment Symptoms/Complaints: Pt reports  R knee pain today upon arrival.    Pain: Initial:   3-4/10 Post Session:  4/10. Therapeutic Exercise: ( 40 minutes) for LE strengthening. Moderate visual and verbal cues for body alignment with each exercise.       Date  6-21-18 Date  6-26-18 7/3/18 Date  7/5/18 7-6-18 7-9-18 7/10/18   Activity/Exercise  parameters        Sit to stand  2x10 2 x 10  2 x 10 from standard chair 1 x 10 from standard chair, 2 x 10 from weight bench due to compensatory forward trunk lean when standing from lower surface    Lunge slides 2x10 B         Lunge to floor          Long arc quad  6# 2x10 5# 2 x 10       4 -way hip on cables          Shuttle press single leg  50# 3x8 37# 2 x 10 B - 75# 2 x 15  R 37# 2 x 15 B 75# x 10  5-rep 25# x 5 on R  75# x 5 on L R 25# 3 x 8, unable to perform 37#     Hamstring curls on machine  12# 2x10 12# 2 x 10 10# 3 x 10   On mat 2 x 10  2#   Slant board gastroc stretch 20\"x3 20\"x3 20' x 3 20 sec x 3 2 x 30\" bilateral 2 x 30\" bilateral    Standing weight shifts with overhead press with ball  2x10 yellow ball        Squats with ball  2x10 yellow ball 2 x 10  Wall squats with yellow ball  2 x 5  Squats with cables for UE support, 2 x 10 with 5 sec hold at bottom of squat    Lunges to the side and forward mini  Forward 10x, side 10x        Side stepping with t-band With cables 10# 5 ea way         SLR 1#2x10    1# 3 x 10 1.5# 3 x 5-6 R LE 2# 2 x 10   Side lying hip abduction 1#2x10      2# 1 x10    0# 1 x 10   Prone hip extension 1#2x10      2# 2 x10   Single leg bridge 2x10     3 x 10 bilateral (not single limb) bilat bridge 2 x 10   Walking forward and backward cables around waist 10# 5x ea way         Standing march                   abd   heel raises       10  10  10   balance Tandem 30\"x3; SLB 10\"x2 Tandem 30\"x3; SLB 10\"x2  Single leg stance throwing ball into tramp  2 bouts each leg  Single limb stance x max time; tandem stance with R behind L, 3 x 20\" w/o UE support    Semitandem stance on Airex pad, 3 x 20\" R behind L    Step ups 4\" 2x10 B 6\" 2x10B 6' 2 x 10 B 4' x 10  6' x 10  Tried stairs with 1 railing. 4\" x 10, regressed to 2\" to reduce compensatory movements, 2 x 10.    2\" eccentric stepdown, 2 x 10         Therapeutic Modalities: not today                                                                                                HEP: performed mat ex with emphasis on performing same ex at home. SLR 3 directions, knee flex, bridge, standing march, standing hip abd, standing heel raise. Treatment/Session Assessment:    · Response to Treatment: Pt has lateral thigh pain and posterior knee pain with flexion activities. Continuing to work on balance, gait and strength.   · Compliance with Program/Exercises:  Appears compliant   · Recommendations/Intent for next treatment session: \"Next visit will focus on gait, knee ROM, strength\".    Total Treatment Duration: 40 Minutes   PT Patient Time In/Time Out  Time In: 0800  Time Out: 0845    Aarti Cook, PT

## 2018-07-12 ENCOUNTER — HOSPITAL ENCOUNTER (OUTPATIENT)
Dept: PHYSICAL THERAPY | Age: 53
Discharge: HOME | End: 2018-07-12
Payer: COMMERCIAL

## 2018-07-12 PROCEDURE — 97110 THERAPEUTIC EXERCISES: CPT

## 2018-07-12 NOTE — PROGRESS NOTES
Finesse Spann  : 1965  Payor: Aarti Finders / Plan: Arlette Joe / Product Type: Workers Comp /  [address]  [facility], [address] [place] [address], [address] [place] [address]  [phone]   [phone]       OUTPATIENT PHYSICAL THERAPY:Daily Note 2018      ICD-10: Treatment Diagnosis: Sprain of posterior cruciate ligament of right knee, sequela (S83.521S); Pain in right knee (M25.561); Stiffness of right knee, not elsewhere classified (M25.661); Difficulty in walking, not elsewhere classified (R26.2)  Precautions/Allergies:   Review of patient's allergies indicates no known allergies. Fall Risk Score: 1 (? 5 = High Risk)  MD Orders:Evaluate and treat, HEP, ROM, strengthening MEDICAL/REFERRING DIAGNOSIS:  S/p R knee scope, PCL reconstruction   DATE OF ONSET: Surgery 17  REFERRING PHYSICIAN: Fletcher Rai MD  RETURN PHYSICIAN APPOINTMENT: 18 ASSESSMENT:  Ms. Dorian Lozano presents s/p right knee scope with PCL reconstruction. She has made excellent progress toward the goals established at her initial evaluation, and demonstrates good R knee ROM. She does demonstrate R quad weakness, ongoing pain in R knee, and difficulty descending stairs. R quadriceps eccentric weakness is evident with stair descent. Patient needs continued PT to optimize R LE function to facilitate return to work. PROBLEM LIST (Impacting functional limitations):  1. Post-op pain and swelling right knee  2. Decreased ROM right knee   3. Decreased functional strength right knee/LE   4. Decreased gait skills INTERVENTIONS PLANNED:  1. aquatic therapy  2. Thermal and electric modalities, manual therapies for pain. 3. Manual therapies and therapeutic exercises for ROM and strength. 4. Therapeutic exercises for gait and balance   TREATMENT PLAN:  Effective Dates: 18 to 18.   Frequency/Duration: 2-3 times a week for 8 more weeks  GOALS: (Goals have been discussed and agreed upon with patient.)   Short-Term Functional Goals: Time Frame: 6 weeks  1. Pt will be able to perform a good quad set and SLR with no extensor lag with for improved strength for gait. MET 7-6-18  2. Pt will increase R knee ROM to 0-90 for improved mobility. GOAL MET  3. Pt will improved R ankle DF AROM to 0 for improved gait. GOAL MET  4. Pt will report pain 5/10 with modified daily activities. GOAL MET  Discharge Goals: Time Frame: 12 weeks   1. Pt will increase R LE strength to 5/5 with manual muscle testing for improved strength for ADLs and work. Progressing towards, 7-6-18  2. Pt will negotiate 1 flight of stairs with step over step with good control. Progressing towards 7-6-18  3. Pt will increase R knee ROM to 0-120 for mobility. GOAL MET  4. Pt will report pain 3/10 with daily activities. Progressing towards  5. Pt will score 45/80 on LEFS. Progressing towards  Rehabilitation Potential For Stated Goals: Good                HISTORY:   History of Present Injury/Illness (Reason for Referral): She works at [employer] and she was entering the cooler and there was oil on the floor. She slipped and the feet slipped out from under her. She tried to get up and slipped again. Injury was August 14-17. She did go to physical therapy but it was making her worse. They decided to have surgery. Surgery 12-21-17. Stayed 2 nights in the hospital. She did have home health physical therapy 3-4x. Pain increases with walking. Past Medical History/Comorbidities: diabetes type 2 controlled with diet, HTN, L LE varicose vein surgery  Social History/Living Environment: Lives with . 2 steps to get inside. Prior Level of Function/Work/Activity: Works at [employer]. 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 Job does require lifting. Would like to get back to walking and walking dogs.    Current Medications:       Current Outpatient Prescriptions:     magnesium hydroxide (BEAUCHAMP MILK OF MAGNESIA) 400 mg/5 mL suspension, Take 15 mL by mouth as needed for Constipation. as needed daily, Disp: , Rfl:    tramodol   Steroid *    atorvastatin (LIPITOR) 20 mg tablet, Take 20 mg by mouth nightly., Disp: , Rfl:     lisinopril-hydroCHLOROthiazide (PRINZIDE, ZESTORETIC) 10-12.5 mg per tablet, Take  by mouth daily. Indications: hypertension, Disp: , Rfl:     gemfibrozil (LOPID) 600 mg tablet, Take 600 mg by mouth two (2) times a day., Disp: , Rfl:     Omega-3 Fatty Acids 60- mg cpDR, Take  by mouth daily. , Disp: , Rfl:     acetaminophen (TYLENOL) 325 mg tablet, Take 325 mg by mouth every four (4) hours as needed for Pain., Disp: , Rfl:    Date Last Reviewed:  7/12/2018   Number of Personal Factors/Comorbidities that affect the Plan of Care: 1-2: MODERATE COMPLEXITY   EXAMINATION:     Observation/Orthostatic Postural Assessment: Comes into clinic with no assistive device, knee functional brace on R knee. Palpation:n/t.    ROM:     L Knee: 2 degrees of hyperextension to 124 degrees            Strength:   L Knee extension: 4+/5   7/12/18   L knee flexion: 4-/5   7/12/18   5-rep leg press max: L 75#, R 25#                    Functional Mobility: Patient ambulates on level ground with mild antalgic gait pattern when in stance phase on R LE. Patient ascends stairs with reciprocal gait pattern with use of handrail. Patient descends stairs with step-to gait pattern with L LE descending stair before R wit use of single handrail. Patient able to stand from standard chair without use of hands, but performs significant forward trunk lean to complete the movement. Balance: not tested. Body Structures Involved:  1. Joints  2. Muscles  3. Ligaments Body Functions Affected:  1. Neuromusculoskeletal Activities and Participation Affected:  1. Mobility  2.  Community, Social and [place]   Number of elements (examined above) that affect the Plan of Care: 4+: HIGH COMPLEXITY   CLINICAL PRESENTATION: Presentation: Evolving clinical presentation with changing clinical characteristics: MODERATE COMPLEXITY   CLINICAL DECISION MAKING:   Outcome Measure: Tool Used: Lower Extremity Functional Scale (LEFS)  Score:  Initial: 5/80 20/80 (Date: 4-16-18 ) Most recent: 44/80 (7-6-18)   Interpretation of Score: 20 questions each scored on a 5 point scale with 0 representing \"extreme difficulty or unable to perform\" and 4 representing \"no difficulty\". The lower the score, the greater the functional disability. 80/80 represents no disability. Minimal detectable change is 9 points. Score 80 79-63 62-48 47-32 31-16 15-1 0   Modifier CH CI CJ CK CL CM CN     Medical Necessity:   · Patient is expected to demonstrate progress in strength, range of motion and balance to improve gait. Reason for Services/Other Comments:  · Patient continues to require skilled intervention due to post-op R knee, decreased ROM, strength, gait. Use of outcome tool(s) and clinical judgement create a POC that gives a: Questionable prediction of patient's progress: MODERATE COMPLEXITY            TREATMENT:   (In addition to Assessment/Re-Assessment sessions the following treatments were rendered)  7/12/2018    Pre-treatment Symptoms/Complaints: Pt reports knee pain is not too bad today. Pain: Initial:   3/10 Post Session:  4/10. Therapeutic Exercise: ( 40 minutes) for LE strengthening. Moderate visual and verbal cues for body alignment with each exercise.       Date:  7/12/18 Date:   Date:     Activity/Exercise Parameters Parameters Parameters   SLR  Sidelying SLR  Prone SLR 10 each     Prone knee flexion 10     Standing march                   Heel raise 10  10     Slant board stretch 3 x 20 sec     Step up / step down 2' x7  pain  1' x 10     Shuttle  No wt 30x  12#    30x     HS machine 10# x 15  15# x 10     LAQ 4# x 20                           Therapeutic Modalities: not today HEP: reviewed and emphasized ex for home- SLR 3 ways, prone flexion, standing march and heel raise. Treatment/Session Assessment:    · Response to Treatment: Pt complains of pain and clicking during exercises. Pain with 2' step up prevented doing 2' step up today. · Compliance with Program/Exercises:  Appears compliant   · Recommendations/Intent for next treatment session: \"Next visit will focus on gait, knee ROM, strength\".    Total Treatment Duration: 45 Minutes   PT Patient Time In/Time Out  Time In: 0800  Time Out: 0845    Jase Díaz PT

## 2018-07-16 ENCOUNTER — HOSPITAL ENCOUNTER (OUTPATIENT)
Dept: PHYSICAL THERAPY | Age: 53
Discharge: HOME | End: 2018-07-16
Payer: COMMERCIAL

## 2018-07-16 PROCEDURE — 97110 THERAPEUTIC EXERCISES: CPT

## 2018-07-16 NOTE — PROGRESS NOTES
Javan Jack  : 1965  Payor: [address] [place] [id number] Road / Plan: Shira Sigala / Product Type: Workers Comp /  [address]  at [facility] [place] [employer]  [address] [place] [address], [address].  [phone]   [phone]       OUTPATIENT PHYSICAL THERAPY:Daily Note 2018      ICD-10: Treatment Diagnosis: Sprain of posterior cruciate ligament of right knee, sequela (S83.521S); Pain in right knee (M25.561); Stiffness of right knee, not elsewhere classified (M25.661); Difficulty in walking, not elsewhere classified (R26.2)  Precautions/Allergies:   Review of patient's allergies indicates no known allergies. Fall Risk Score: 1 (? 5 = High Risk)  MD Orders:Evaluate and treat, HEP, ROM, strengthening MEDICAL/REFERRING DIAGNOSIS:  S/p R knee scope, PCL reconstruction   DATE OF ONSET: Surgery 17  REFERRING PHYSICIAN: Elaine Delgado MD  RETURN PHYSICIAN APPOINTMENT: 18 ASSESSMENT:  Ms. Kaitlyn Colon presents s/p right knee scope with PCL reconstruction. She has made excellent progress toward the goals established at her initial evaluation, and demonstrates good R knee ROM. She does demonstrate R quad weakness, ongoing pain in R knee, and difficulty descending stairs. R quadriceps eccentric weakness is evident with stair descent. Patient needs continued PT to optimize R LE function to facilitate return to work. PROBLEM LIST (Impacting functional limitations):  1. Post-op pain and swelling right knee  2. Decreased ROM right knee   3. Decreased functional strength right knee/LE   4. Decreased gait skills INTERVENTIONS PLANNED:  1. aquatic therapy  2. Thermal and electric modalities, manual therapies for pain. 3. Manual therapies and therapeutic exercises for ROM and strength. 4. Therapeutic exercises for gait and balance   TREATMENT PLAN:  Effective Dates: 18 to 18.   Frequency/Duration: 2-3 times a week for 8 more weeks  GOALS: (Goals have been discussed and agreed upon with patient.)   Short-Term Functional Goals: Time Frame: 6 weeks  1. Pt will be able to perform a good quad set and SLR with no extensor lag with for improved strength for gait. MET 7-6-18  2. Pt will increase R knee ROM to 0-90 for improved mobility. GOAL MET  3. Pt will improved R ankle DF AROM to 0 for improved gait. GOAL MET  4. Pt will report pain 5/10 with modified daily activities. GOAL MET  Discharge Goals: Time Frame: 12 weeks   1. Pt will increase R LE strength to 5/5 with manual muscle testing for improved strength for ADLs and work. Progressing towards, 7-6-18  2. Pt will negotiate 1 flight of stairs with step over step with good control. Progressing towards 7-6-18  3. Pt will increase R knee ROM to 0-120 for mobility. GOAL MET  4. Pt will report pain 3/10 with daily activities. Progressing towards  5. Pt will score 45/80 on LEFS. Progressing towards  Rehabilitation Potential For Stated Goals: Good                HISTORY:   History of Present Injury/Illness (Reason for Referral): She works at [employer] and she was entering the cooler and there was oil on the floor. She slipped and the feet slipped out from under her. She tried to get up and slipped again. Injury was August 14-17. She did go to physical therapy but it was making her worse. They decided to have surgery. Surgery 12-21-17. Stayed 2 nights in the hospital. She did have home health physical therapy 3-4x. Pain increases with walking. Past Medical History/Comorbidities: diabetes type 2 controlled with diet, HTN, L LE varicose vein surgery  Social History/Living Environment: Lives with . 2 steps to get inside. Prior Level of Function/Work/Activity: Works at [employer]. 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 Job does require lifting. Would like to get back to walking and walking dogs.    Current Medications:       Current Outpatient Prescriptions:     magnesium hydroxide (BEAUCHAMP MILK OF MAGNESIA) 400 mg/5 mL suspension, Take 15 mL by mouth as needed for Constipation. as needed daily, Disp: , Rfl:    tramodol   Steroid *    atorvastatin (LIPITOR) 20 mg tablet, Take 20 mg by mouth nightly., Disp: , Rfl:     lisinopril-hydroCHLOROthiazide (PRINZIDE, ZESTORETIC) 10-12.5 mg per tablet, Take  by mouth daily. Indications: hypertension, Disp: , Rfl:     gemfibrozil (LOPID) 600 mg tablet, Take 600 mg by mouth two (2) times a day., Disp: , Rfl:     Omega-3 Fatty Acids 60- mg cpDR, Take  by mouth daily. , Disp: , Rfl:     acetaminophen (TYLENOL) 325 mg tablet, Take 325 mg by mouth every four (4) hours as needed for Pain., Disp: , Rfl:    Date Last Reviewed:  7/16/2018   Number of Personal Factors/Comorbidities that affect the Plan of Care: 1-2: MODERATE COMPLEXITY   EXAMINATION:     Observation/Orthostatic Postural Assessment: Comes into clinic with no assistive device, knee functional brace on R knee. Palpation:n/t.    ROM:     L Knee: 2 degrees of hyperextension to 124 degrees            Strength:   L Knee extension: 4+/5   7/12/18   L knee flexion: 4-/5   7/12/18   5-rep leg press max: L 75#, R 25#                    Functional Mobility: Patient ambulates on level ground with mild antalgic gait pattern when in stance phase on R LE. Patient ascends stairs with reciprocal gait pattern with use of handrail. Patient descends stairs with step-to gait pattern with L LE descending stair before R wit use of single handrail. Patient able to stand from standard chair without use of hands, but performs significant forward trunk lean to complete the movement. Balance: not tested. Body Structures Involved:  1. Joints  2. Muscles  3. Ligaments Body Functions Affected:  1. Neuromusculoskeletal Activities and Participation Affected:  1. Mobility  2.  Community, Social and [place]   Number of elements (examined above) that affect the Plan of Care: 4+: HIGH COMPLEXITY   CLINICAL PRESENTATION: Presentation: Evolving clinical presentation with changing clinical characteristics: MODERATE COMPLEXITY   CLINICAL DECISION MAKING:   Outcome Measure: Tool Used: Lower Extremity Functional Scale (LEFS)  Score:  Initial: 5/80 20/80 (Date: 4-16-18 ) Most recent: 44/80 (7-6-18)   Interpretation of Score: 20 questions each scored on a 5 point scale with 0 representing \"extreme difficulty or unable to perform\" and 4 representing \"no difficulty\". The lower the score, the greater the functional disability. 80/80 represents no disability. Minimal detectable change is 9 points. Score 80 79-63 62-48 47-32 31-16 15-1 0   Modifier CH CI CJ CK CL CM CN     Medical Necessity:   · Patient is expected to demonstrate progress in strength, range of motion and balance to improve gait. Reason for Services/Other Comments:  · Patient continues to require skilled intervention due to post-op R knee, decreased ROM, strength, gait. Use of outcome tool(s) and clinical judgement create a POC that gives a: Questionable prediction of patient's progress: MODERATE COMPLEXITY            TREATMENT:   (In addition to Assessment/Re-Assessment sessions the following treatments were rendered)      Pre-treatment Symptoms/Complaints: Pt reports that she is able to walk approximately 10 minutes before she begins to have R knee pain, which is much less than she was able to do prior to the surgery. Hopes to return to walking 2 miles per day. Wears knee brace on R LE when walking, which helps she states. Pain: Initial:   2-3/10 Post Session:  4/10. Did not wear knee brace to PT. Accompanied by hospital  and workers comp  throughout entire session. Therapeutic Exercise: ( 40 minutes) for LE strengthening. Moderate visual and verbal cues for body alignment with each exercise. Hip flexor stretch in [facility] position, (5 x 20\") to improve R LE flexibility.  Quick stretches to R hamstring for improved tissue extensibility. Passive ranging to R knee prior to strengthening to improve warmth to contractile tissues. Rest breaks provided as needed. Date:  7/12/18 Date:  7/16/18 Date:     Activity/Exercise Parameters Parameters Parameters   SLR  Sidelying SLR  Prone SLR 10 each Flexion, 3 x 10     Prone knee flexion 10     Standing march                   Heel raise 10  10     Slant board stretch 3 x 20 sec 2 x 30\" B    Step up / step down 2' x7  pain  1' x 10     Shuttle  No wt 30x  12#    30x     HS machine 10# x 15  15# x 10     LAQ 4# x 20       Gait drills  Retrowalking, 40' x 6    Stepping fwd/bwd with L LE while in stance on R     Ambulated 500' x 5 with cues to land on heel when ambulating to facilitate heel strike at initial contact    Sit to stands  2 x 10 from weight bench w/o hands    Squats  minisquat with ball toss against rebounder, 3 x 10     Bridging  3 x 10 B                          Therapeutic Modalities: not today                                                                                                HEP: no changes to HEP today    Treatment/Session Assessment:    · Response to Treatment: Pt exhibited decline in gait performance today, with reduced heel strike and tendency to make initial contact with ball of R foot. Patient continues to be limited by R quad weakness, as evidenced by difficulty with retrowalking. · Compliance with Program/Exercises:  Appears compliant   · Recommendations/Intent for next treatment session: \"Next visit will focus on gait, knee ROM, strength\".    Total Treatment Duration: 40 Minutes   PT Patient Time In/Time Out  Time In: 0900  Time Out: Chad 40, PT

## 2018-07-17 ENCOUNTER — HOSPITAL ENCOUNTER (OUTPATIENT)
Dept: PHYSICAL THERAPY | Age: 53
Discharge: HOME | End: 2018-07-17
Payer: COMMERCIAL

## 2018-07-17 PROCEDURE — 97110 THERAPEUTIC EXERCISES: CPT

## 2018-07-19 ENCOUNTER — HOSPITAL ENCOUNTER (OUTPATIENT)
Dept: PHYSICAL THERAPY | Age: 53
Discharge: HOME | End: 2018-07-19
Payer: COMMERCIAL

## 2018-07-19 PROCEDURE — 97110 THERAPEUTIC EXERCISES: CPT

## 2018-07-19 NOTE — PROGRESS NOTES
Booker Faust  : 1965  Payor: Pritesh Love / Plan: [place] [place] / Product Type: Workers Comp /  [address]  at [facility] [place] [employer]  [address] [place] [address],  [place].  [phone]   [phone]       OUTPATIENT PHYSICAL THERAPY:Daily Note 2018      ICD-10: Treatment Diagnosis: Sprain of posterior cruciate ligament of right knee, sequela (S83.521S); Pain in right knee (M25.561); Stiffness of right knee, not elsewhere classified (M25.661); Difficulty in walking, not elsewhere classified (R26.2)  Precautions/Allergies:   Review of patient's allergies indicates no known allergies. Fall Risk Score: 1 (? 5 = High Risk)  MD Orders:Evaluate and treat, HEP, ROM, strengthening MEDICAL/REFERRING DIAGNOSIS:  S/p R knee scope, PCL reconstruction   DATE OF ONSET: Surgery 17  REFERRING PHYSICIAN: Val Garcia MD  RETURN PHYSICIAN APPOINTMENT: 18 ASSESSMENT:  Ms. Shabnam Wilder presents s/p right knee scope with PCL reconstruction. She has made excellent progress toward the goals established at her initial evaluation, and demonstrates good R knee ROM. She does demonstrate R quad weakness, ongoing pain in R knee, and difficulty descending stairs. R quadriceps eccentric weakness is evident with stair descent. Patient needs continued PT to optimize R LE function to facilitate return to work. PROBLEM LIST (Impacting functional limitations):  1. Post-op pain and swelling right knee  2. Decreased ROM right knee   3. Decreased functional strength right knee/LE   4. Decreased gait skills INTERVENTIONS PLANNED:  1. aquatic therapy  2. Thermal and electric modalities, manual therapies for pain. 3. Manual therapies and therapeutic exercises for ROM and strength. 4. Therapeutic exercises for gait and balance   TREATMENT PLAN:  Effective Dates: 18 to 18.   Frequency/Duration: 2-3 times a week for 8 more weeks  GOALS: (Goals have been discussed and agreed upon with patient.)   Short-Term Functional Goals: Time Frame: 6 weeks  1. Pt will be able to perform a good quad set and SLR with no extensor lag with for improved strength for gait. MET 7-6-18  2. Pt will increase R knee ROM to 0-90 for improved mobility. GOAL MET  3. Pt will improved R ankle DF AROM to 0 for improved gait. GOAL MET  4. Pt will report pain 5/10 with modified daily activities. GOAL MET  Discharge Goals: Time Frame: 12 weeks   1. Pt will increase R LE strength to 5/5 with manual muscle testing for improved strength for ADLs and work. Progressing towards, 7-6-18  2. Pt will negotiate 1 flight of stairs with step over step with good control. Progressing towards 7-6-18  3. Pt will increase R knee ROM to 0-120 for mobility. GOAL MET  4. Pt will report pain 3/10 with daily activities. Progressing towards  5. Pt will score 45/80 on LEFS. Progressing towards  Rehabilitation Potential For Stated Goals: Good                HISTORY:   History of Present Injury/Illness (Reason for Referral): She works at [employer] and she was entering the cooler and there was oil on the floor. She slipped and the feet slipped out from under her. She tried to get up and slipped again. Injury was August 14-17. She did go to physical therapy but it was making her worse. They decided to have surgery. Surgery 12-21-17. Stayed 2 nights in the hospital. She did have home health physical therapy 3-4x. Pain increases with walking. Past Medical History/Comorbidities: diabetes type 2 controlled with diet, HTN, L LE varicose vein surgery  Social History/Living Environment: Lives with . 2 steps to get inside. Prior Level of Function/Work/Activity: Works at [employer]. 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 Job does require lifting. Would like to get back to walking and walking dogs.    Current Medications:       Current Outpatient Prescriptions:     magnesium hydroxide (BEAUCHAMP MILK OF MAGNESIA) 400 mg/5 mL suspension, Take 15 mL by mouth as needed for Constipation. as needed daily, Disp: , Rfl:    tramodol   Steroid *    atorvastatin (LIPITOR) 20 mg tablet, Take 20 mg by mouth nightly., Disp: , Rfl:     lisinopril-hydroCHLOROthiazide (PRINZIDE, ZESTORETIC) 10-12.5 mg per tablet, Take  by mouth daily. Indications: hypertension, Disp: , Rfl:     gemfibrozil (LOPID) 600 mg tablet, Take 600 mg by mouth two (2) times a day., Disp: , Rfl:     Omega-3 Fatty Acids 60- mg cpDR, Take  by mouth daily. , Disp: , Rfl:     acetaminophen (TYLENOL) 325 mg tablet, Take 325 mg by mouth every four (4) hours as needed for Pain., Disp: , Rfl:    Date Last Reviewed:  7/19/2018   Number of Personal Factors/Comorbidities that affect the Plan of Care: 1-2: MODERATE COMPLEXITY   EXAMINATION:     Observation/Orthostatic Postural Assessment: Comes into clinic with no assistive device, knee functional brace on R knee. Palpation:n/t.    ROM:     L Knee: 2 degrees of hyperextension to 124 degrees            Strength:   L Knee extension: 4+/5   7/12/18   L knee flexion: 4-/5   7/12/18   5-rep leg press max: L 75#, R 25#                    Functional Mobility: Patient ambulates on level ground with mild antalgic gait pattern when in stance phase on R LE. Patient ascends stairs with reciprocal gait pattern with use of handrail. Patient descends stairs with step-to gait pattern with L LE descending stair before R wit use of single handrail. Patient able to stand from standard chair without use of hands, but performs significant forward trunk lean to complete the movement. Balance: not tested. Body Structures Involved:  1. Joints  2. Muscles  3. Ligaments Body Functions Affected:  1. Neuromusculoskeletal Activities and Participation Affected:  1. Mobility  2.  Community, Social and [place]   Number of elements (examined above) that affect the Plan of Care: 4+: HIGH COMPLEXITY   CLINICAL PRESENTATION: Presentation: Evolving clinical presentation with changing clinical characteristics: MODERATE COMPLEXITY   CLINICAL DECISION MAKING:   Outcome Measure: Tool Used: Lower Extremity Functional Scale (LEFS)  Score:  Initial: 5/80 20/80 (Date: 4-16-18 ) Most recent: 44/80 (7-6-18)   Interpretation of Score: 20 questions each scored on a 5 point scale with 0 representing \"extreme difficulty or unable to perform\" and 4 representing \"no difficulty\". The lower the score, the greater the functional disability. 80/80 represents no disability. Minimal detectable change is 9 points. Score 80 79-63 62-48 47-32 31-16 15-1 0   Modifier CH CI CJ CK CL CM CN     Medical Necessity:   · Patient is expected to demonstrate progress in strength, range of motion and balance to improve gait. Reason for Services/Other Comments:  · Patient continues to require skilled intervention due to post-op R knee, decreased ROM, strength, gait. Use of outcome tool(s) and clinical judgement create a POC that gives a: Questionable prediction of patient's progress: MODERATE COMPLEXITY            TREATMENT:   (In addition to Assessment/Re-Assessment sessions the following treatments were rendered)      Pre-treatment Symptoms/Complaints: Pt states her knee continues to hurt. Pain: Initial:   3/10 Post Session:  4/10. Did not wear knee brace to PT. Accompanied by hospital  and workers comp  throughout entire session. Therapeutic Exercise: ( 45 minutes) for LE strengthening. Moderate visual and verbal cues for body alignment with each exercise. Date:  7/12/18 Date:  7/16/18 Date:  7/17 Date  7/19/18   Activity/Exercise Parameters Parameters Parameters    SLR  Sidelying SLR  Prone SLR 10 each Flexion, 3 x 10  3# 3 x 10 Standing with green t band 2 x 10 flex, abd and ext  B   Prone knee flexion 10  For quad stretching 5 x 15 sec.  Standing quad stretch with assist  4 x 10 sec   Standing march                   Heel raise 10  10   2 x 15 each   Slant board stretch 3 x 20 sec 2 x 30\" B  3 x 20 sec   Step up / step down 2' x7  pain  1' x 10      Shuttle  No wt 30x  12#    30x  No wt 30x  12# 15 x    HS machine 10# x 15  15# x 10  15# 2 x 15    LAQ 4# x 20        Gait drills  Retrowalking, 40' x 6    Stepping fwd/bwd with L LE while in stance on R     Ambulated 500' x 5 with cues to land on heel when ambulating to facilitate heel strike at initial contact     Sit to stands  2 x 10 from weight bench w/o hands     Squats  minisquat with ball toss against rebounder, 3 x 10   1 x 10 using table as goal   Bridging  3 x 10 B     Short arc quads   3# 3 x 10    lunge   Mini 2 x 10               Therapeutic Modalities: not today                                                                                                HEP: no changes to HEP today    Treatment/Session Assessment:    · Response to Treatment: Pt has pain behind the knee with flexion. Continued crepitus with wt bearing as in squats. · Compliance with Program/Exercises:  Appears compliant   · Recommendations/Intent for next treatment session: \"Next visit will focus on gait, knee ROM, strength\".    Total Treatment Duration: 45 Minutes   PT Patient Time In/Time Out  Time In: 0800  Time Out: 0845    Liseth Plata PT

## 2018-07-19 NOTE — PROGRESS NOTES
Natalio Conner  : 1965  Primary: Jessica Downey [employer]  Secondary:  [address]  at [facility] [place] [employer]  [address] [facility], [address] [place] [address] [address], [address] [facility]  [phone]   [phone]      Outpatient PHYSICAL THERAPY: PHYSICIAN COMMUNICATION    REFERRING PHYSICIAN: Gustavo Huff MD  Return Physician Appointment: 18  MEDICAL/REFERRING DIAGNOSIS:  · RT Knee  ATTENDANCE: Natalio Conner has attended 75 out of 76 visits, with 1 cancellation(s) and 0 no shows. ASSESSMENT:  DATE: 2018    PROGRESS: Natalio Conner  presents s/p right knee scope with PCL reconstruction. She has made excellent progress toward the goals established at her initial evaluation, and demonstrates good R knee ROM. She does demonstrate R quad weakness, ongoing pain in R knee, and difficulty descending stairs. R quadriceps eccentric weakness is evident with stair descent. She continues to have pain behind her knee with flexion activities and marked crepitus with weight bearing activities. Patient needs continued PT to optimize R LE function to facilitate return to work. RECOMMENDATIONS: Continue PT until MD visit on 18 and follow orders received at that time.     Thank you for this referral,  Caterina Krishna, PT

## 2018-07-23 ENCOUNTER — APPOINTMENT (OUTPATIENT)
Dept: PHYSICAL THERAPY | Age: 53
End: 2018-07-23
Payer: COMMERCIAL

## 2018-07-24 ENCOUNTER — HOSPITAL ENCOUNTER (OUTPATIENT)
Dept: PHYSICAL THERAPY | Age: 53
Discharge: HOME | End: 2018-07-24
Payer: COMMERCIAL

## 2018-07-24 PROCEDURE — 97110 THERAPEUTIC EXERCISES: CPT

## 2018-07-24 NOTE — PROGRESS NOTES
Vonnie Robles : 1965 Payor: [address] [place] [address] Road / Plan: Marina [place] / Product Type: Workers Comp /  [address]  at [address] [place] [address] [address] [facility], [address] [place] [address], [facility]. [phone]   [phone] OUTPATIENT PHYSICAL THERAPY:Daily Note 2018 ICD-10: Treatment Diagnosis: Sprain of posterior cruciate ligament of right knee, sequela (S83.521S); Pain in right knee (M25.561); Stiffness of right knee, not elsewhere classified (M25.661); Difficulty in walking, not elsewhere classified (R26.2) Precautions/Allergies:  
Review of patient's allergies indicates no known allergies. Fall Risk Score: 1 (? 5 = High Risk) MD Orders:Evaluate and treat, HEP, ROM, strengthening MEDICAL/REFERRING DIAGNOSIS: 
S/p R knee scope, PCL reconstruction DATE OF ONSET: Surgery 17 REFERRING PHYSICIAN: Jaja Rouse, MD 
RETURN PHYSICIAN APPOINTMENT: 18 ASSESSMENT:  Ms. Michael Reis presents s/p right knee scope with PCL reconstruction. She has made excellent progress toward the goals established at her initial evaluation, and demonstrates good R knee ROM. She does demonstrate R quad weakness, ongoing pain in R knee, and difficulty descending stairs. R quadriceps eccentric weakness is evident with stair descent. Patient needs continued PT to optimize R LE function to facilitate return to work. PROBLEM LIST (Impacting functional limitations): 1. Post-op pain and swelling right knee 2. Decreased ROM right knee 3. Decreased functional strength right knee/LE 4. Decreased gait skills INTERVENTIONS PLANNED: 
1. aquatic therapy 2. Thermal and electric modalities, manual therapies for pain. 3. Manual therapies and therapeutic exercises for ROM and strength. 4. Therapeutic exercises for gait and balance TREATMENT PLAN: 
Effective Dates: 18 to 18. Frequency/Duration: 2-3 times a week for 8 more weeks GOALS: (Goals have been discussed and agreed upon with patient.) Short-Term Functional Goals: Time Frame: 6 weeks 1. Pt will be able to perform a good quad set and SLR with no extensor lag with for improved strength for gait. MET 7-6-18 2. Pt will increase R knee ROM to 0-90 for improved mobility. GOAL MET 3. Pt will improved R ankle DF AROM to 0 for improved gait. GOAL MET 4. Pt will report pain 5/10 with modified daily activities. GOAL MET Discharge Goals: Time Frame: 12 weeks 1. Pt will increase R LE strength to 5/5 with manual muscle testing for improved strength for ADLs and work. Progressing towards, 7-6-18 2. Pt will negotiate 1 flight of stairs with step over step with good control. Progressing towards 7-6-18 3. Pt will increase R knee ROM to 0-120 for mobility. GOAL MET 4. Pt will report pain 3/10 with daily activities. Progressing towards 5. Pt will score 45/80 on LEFS. Progressing towards Rehabilitation Potential For Stated Goals: Good HISTORY:  
History of Present Injury/Illness (Reason for Referral): She works at [employer] and she was entering the cooler and there was oil on the floor. She slipped and the feet slipped out from under her. She tried to get up and slipped again. Injury was August 14-17. She did go to physical therapy but it was making her worse. They decided to have surgery. Surgery 12-21-17. Stayed 2 nights in the hospital. She did have home health physical therapy 3-4x. Pain increases with walking. Past Medical History/Comorbidities: diabetes type 2 controlled with diet, HTN, L LE varicose vein surgery Social History/Living Environment: Lives with . 2 steps to get inside. Prior Level of Function/Work/Activity: Works at [employer]. 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 Job does require lifting. Would like to get back to walking and walking dogs.   
Current Medications:   
  
Current Outpatient Prescriptions:  
  magnesium hydroxide (BEAUCHAMP MILK OF MAGNESIA) 400 mg/5 mL suspension, Take 15 mL by mouth as needed for Constipation. as needed daily, Disp: , Rfl:  
 tramodol Steroid * 
  atorvastatin (LIPITOR) 20 mg tablet, Take 20 mg by mouth nightly., Disp: , Rfl:  
  lisinopril-hydroCHLOROthiazide (PRINZIDE, ZESTORETIC) 10-12.5 mg per tablet, Take  by mouth daily. Indications: hypertension, Disp: , Rfl:  
  gemfibrozil (LOPID) 600 mg tablet, Take 600 mg by mouth two (2) times a day., Disp: , Rfl:  
  Omega-3 Fatty Acids 60- mg cpDR, Take  by mouth daily. , Disp: , Rfl:  
  acetaminophen (TYLENOL) 325 mg tablet, Take 325 mg by mouth every four (4) hours as needed for Pain., Disp: , Rfl:   
Date Last Reviewed:  7/24/2018 Number of Personal Factors/Comorbidities that affect the Plan of Care: 1-2: MODERATE COMPLEXITY EXAMINATION:  
 
Observation/Orthostatic Postural Assessment: Comes into clinic with no assistive device, knee functional brace on R knee. Palpation:n/t.   
ROM:  
  L Knee: 2 degrees of hyperextension to 124 degrees Strength: L Knee extension: 4+/5   7/12/18 L knee flexion: 4-/5   7/12/18 5-rep leg press max: L 75#, R 25# Functional Mobility: Patient ambulates on level ground with mild antalgic gait pattern when in stance phase on R LE. Patient ascends stairs with reciprocal gait pattern with use of handrail. Patient descends stairs with step-to gait pattern with L LE descending stair before R wit use of single handrail. Patient able to stand from standard chair without use of hands, but performs significant forward trunk lean to complete the movement. Balance: not tested. Body Structures Involved: 1. Joints 2. Muscles 3. Ligaments Body Functions Affected: 1. Neuromusculoskeletal Activities and Participation Affected: 1. Mobility 2. Community, Social and [place] Number of elements (examined above) that affect the Plan of Care: 4+: HIGH COMPLEXITY CLINICAL PRESENTATION: Presentation: Evolving clinical presentation with changing clinical characteristics: MODERATE COMPLEXITY CLINICAL DECISION MAKING:  
Outcome Measure: Tool Used: Lower Extremity Functional Scale (LEFS) Score:  Initial: 5/80 20/80 (Date: 4-16-18 ) Most recent: 44/80 (7-6-18) Interpretation of Score: 20 questions each scored on a 5 point scale with 0 representing \"extreme difficulty or unable to perform\" and 4 representing \"no difficulty\". The lower the score, the greater the functional disability. 80/80 represents no disability. Minimal detectable change is 9 points. Score 80 79-63 62-48 47-32 31-16 15-1 0 Modifier CH CI CJ CK CL CM CN Medical Necessity:  
· Patient is expected to demonstrate progress in strength, range of motion and balance to improve gait. Reason for Services/Other Comments: 
· Patient continues to require skilled intervention due to post-op R knee, decreased ROM, strength, gait. Use of outcome tool(s) and clinical judgement create a POC that gives a: Questionable prediction of patient's progress: MODERATE COMPLEXITY  
  
 
 
 
TREATMENT:  
(In addition to Assessment/Re-Assessment sessions the following treatments were rendered) Pre-treatment Symptoms/Complaints: Pt states she saw Dr Abraham Delgado and he instructed her to continue to work on strengthening and to decrease walking and use stationery bike or elliptical. 
Pain: Initial:   3/10 Post Session:  4/10. Pt had brace on entering PT today. Accompanied by hospital  and workers comp  throughout entire session. Therapeutic Exercise: ( 45 minutes) for LE strengthening. Moderate visual and verbal cues for body alignment with each exercise. 7/24/18 SLR 3# 3 X10 SAQ 3# 3 X10     
LAQ 3# 3 X 10 Sidelying hip abd 0# 3 X 10 Prone hip ext 3# 3 X 10 Prone knee flex 3# 3 X 10 Seated knee flex tband 3 x 10 Bridging  With tband abduction 2 x 10 Hip abd t band 2 x 10     
      
      
      
      
      
      
 
 
Therapeutic Modalities: not today HEP: encouraged pt to get ankle weight for HEP as above. Treatment/Session Assessment:   
· Response to Treatment: Pt complaining of pain in the muscles with strengthening ex today. Crepitus with LAQ. · Compliance with Program/Exercises:  Appears compliant · Recommendations/Intent for next treatment session: \"Next visit will focus on gait, knee ROM, strength\". Total Treatment Duration: 45 Minutes Laly Carter, PT

## 2018-07-24 NOTE — PROGRESS NOTES
Verena Gil : 1965 Payor: [address] [place] [address] Road / Plan: Rennis Loose / Product Type: Workers Comp /  [address]  at [address] [place] [address] Rd [address] [facility], [address] [place] [address] [address], 9961 [facility] [phone]   [phone] OUTPATIENT PHYSICAL THERAPY:Daily Note 2018 ICD-10: Treatment Diagnosis: Sprain of posterior cruciate ligament of right knee, sequela (S83.521S); Pain in right knee (M25.561); Stiffness of right knee, not elsewhere classified (M25.661); Difficulty in walking, not elsewhere classified (R26.2) Precautions/Allergies:  
Review of patient's allergies indicates no known allergies. Fall Risk Score: 1 (? 5 = High Risk) MD Orders:Evaluate and treat, HEP, ROM, strengthening MEDICAL/REFERRING DIAGNOSIS: 
S/p R knee scope, PCL reconstruction DATE OF ONSET: Surgery 17 REFERRING PHYSICIAN: Manuel Rouse MD 
RETURN PHYSICIAN APPOINTMENT: 18 ASSESSMENT:  Ms. Susie Marie presents s/p right knee scope with PCL reconstruction. She has made excellent progress toward the goals established at her initial evaluation, and demonstrates good R knee ROM. She does demonstrate R quad weakness, ongoing pain in R knee, and difficulty descending stairs. R quadriceps eccentric weakness is evident with stair descent. Patient needs continued PT to optimize R LE function to facilitate return to work. PROBLEM LIST (Impacting functional limitations): 1. Post-op pain and swelling right knee 2. Decreased ROM right knee 3. Decreased functional strength right knee/LE 4. Decreased gait skills INTERVENTIONS PLANNED: 
1. aquatic therapy 2. Thermal and electric modalities, manual therapies for pain. 3. Manual therapies and therapeutic exercises for ROM and strength. 4. Therapeutic exercises for gait and balance TREATMENT PLAN: 
Effective Dates: 18 to 18. Frequency/Duration: 2-3 times a week for 8 more weeks GOALS: (Goals have been discussed and agreed upon with patient.) Short-Term Functional Goals: Time Frame: 6 weeks 1. Pt will be able to perform a good quad set and SLR with no extensor lag with for improved strength for gait. MET 7-6-18 2. Pt will increase R knee ROM to 0-90 for improved mobility. GOAL MET 3. Pt will improved R ankle DF AROM to 0 for improved gait. GOAL MET 4. Pt will report pain 5/10 with modified daily activities. GOAL MET Discharge Goals: Time Frame: 12 weeks 1. Pt will increase R LE strength to 5/5 with manual muscle testing for improved strength for ADLs and work. Progressing towards, 7-6-18 2. Pt will negotiate 1 flight of stairs with step over step with good control. Progressing towards 7-6-18 3. Pt will increase R knee ROM to 0-120 for mobility. GOAL MET 4. Pt will report pain 3/10 with daily activities. Progressing towards 5. Pt will score 45/80 on LEFS. Progressing towards Rehabilitation Potential For Stated Goals: Good HISTORY:  
History of Present Injury/Illness (Reason for Referral): She works at [employer] and she was entering the cooler and there was oil on the floor. She slipped and the feet slipped out from under her. She tried to get up and slipped again. Injury was August 14-17. She did go to physical therapy but it was making her worse. They decided to have surgery. Surgery 12-21-17. Stayed 2 nights in the hospital. She did have home health physical therapy 3-4x. Pain increases with walking. Past Medical History/Comorbidities: diabetes type 2 controlled with diet, HTN, L LE varicose vein surgery Social History/Living Environment: Lives with . 2 steps to get inside. Prior Level of Function/Work/Activity: Works at [employer]. 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 Job does require lifting. Would like to get back to walking and walking dogs.   
Current Medications:   
  
Current Outpatient Prescriptions:  
  magnesium hydroxide (BEAUCHAMP MILK OF MAGNESIA) 400 mg/5 mL suspension, Take 15 mL by mouth as needed for Constipation. as needed daily, Disp: , Rfl:  
 tramodol Steroid * 
  atorvastatin (LIPITOR) 20 mg tablet, Take 20 mg by mouth nightly., Disp: , Rfl:  
  lisinopril-hydroCHLOROthiazide (PRINZIDE, ZESTORETIC) 10-12.5 mg per tablet, Take  by mouth daily. Indications: hypertension, Disp: , Rfl:  
  gemfibrozil (LOPID) 600 mg tablet, Take 600 mg by mouth two (2) times a day., Disp: , Rfl:  
  Omega-3 Fatty Acids 60- mg cpDR, Take  by mouth daily. , Disp: , Rfl:  
  acetaminophen (TYLENOL) 325 mg tablet, Take 325 mg by mouth every four (4) hours as needed for Pain., Disp: , Rfl:   
Date Last Reviewed:  7/24/2018 Number of Personal Factors/Comorbidities that affect the Plan of Care: 1-2: MODERATE COMPLEXITY EXAMINATION:  
 
Observation/Orthostatic Postural Assessment: Comes into clinic with no assistive device, knee functional brace on R knee. Palpation:n/t.   
ROM:  
  L Knee: 2 degrees of hyperextension to 124 degrees Strength: L Knee extension: 4+/5   7/12/18 L knee flexion: 4-/5   7/12/18 5-rep leg press max: L 75#, R 25# Functional Mobility: Patient ambulates on level ground with mild antalgic gait pattern when in stance phase on R LE. Patient ascends stairs with reciprocal gait pattern with use of handrail. Patient descends stairs with step-to gait pattern with L LE descending stair before R wit use of single handrail. Patient able to stand from standard chair without use of hands, but performs significant forward trunk lean to complete the movement. Balance: not tested. Body Structures Involved: 1. Joints 2. Muscles 3. Ligaments Body Functions Affected: 1. Neuromusculoskeletal Activities and Participation Affected: 1. Mobility 2. Community, Social and [place] Number of elements (examined above) that affect the Plan of Care: 4+: HIGH COMPLEXITY CLINICAL PRESENTATION: Presentation: Evolving clinical presentation with changing clinical characteristics: MODERATE COMPLEXITY CLINICAL DECISION MAKING:  
Outcome Measure: Tool Used: Lower Extremity Functional Scale (LEFS) Score:  Initial: 5/80 20/80 (Date: 4-16-18 ) Most recent: 44/80 (7-6-18) Interpretation of Score: 20 questions each scored on a 5 point scale with 0 representing \"extreme difficulty or unable to perform\" and 4 representing \"no difficulty\". The lower the score, the greater the functional disability. 80/80 represents no disability. Minimal detectable change is 9 points. Score 80 79-63 62-48 47-32 31-16 15-1 0 Modifier CH CI CJ CK CL CM CN Medical Necessity:  
· Patient is expected to demonstrate progress in strength, range of motion and balance to improve gait. Reason for Services/Other Comments: 
· Patient continues to require skilled intervention due to post-op R knee, decreased ROM, strength, gait. Use of outcome tool(s) and clinical judgement create a POC that gives a: Questionable prediction of patient's progress: MODERATE COMPLEXITY  
  
 
 
 
TREATMENT:  
(In addition to Assessment/Re-Assessment sessions the following treatments were rendered) Pre-treatment Symptoms/Complaints: Pt states she saw Dr Lisa Tellez and he instructed her to continue to work on strengthening and to decrease walking and use stationery bike or elliptical. 
Pain: Initial:   3/10 Post Session:  4/10. Pt had brace on entering PT today. Accompanied by hospital  and workers comp  throughout entire session. Therapeutic Exercise: ( 45 minutes) for LE strengthening. Moderate visual and verbal cues for body alignment with each exercise. 7/24/18 SLR 3# 3 X10 SAQ 3# 3 X10     
LAQ 3# 3 X 10 Sidelying hip abd 0# 3 X 10 Prone hip ext 3# 3 X 10 Prone knee flex 3# 3 X 10 Seated knee flex tband 3 x 10 Bridging  With tband abduction 2 x 10 Hip abd t band 2 x 10     
      
      
      
      
      
      
 
 
Therapeutic Modalities: not today HEP: no changes to HEP today Treatment/Session Assessment:   
· Response to Treatment: Pt complaining of pain in the muscles with strengthening ex today. Crepitus with LAQ. · Compliance with Program/Exercises:  Appears compliant · Recommendations/Intent for next treatment session: \"Next visit will focus on gait, knee ROM, strength\". Total Treatment Duration: 45 Minutes Heather Qureshi PT

## 2018-07-26 ENCOUNTER — HOSPITAL ENCOUNTER (OUTPATIENT)
Dept: PHYSICAL THERAPY | Age: 53
Discharge: HOME | End: 2018-07-26
Payer: COMMERCIAL

## 2018-07-26 PROCEDURE — 97110 THERAPEUTIC EXERCISES: CPT

## 2018-07-26 NOTE — PROGRESS NOTES
Coni Jay : 1965 Payor: [address] [place] [address] Road / Plan: Gio Mark / Product Type: Workers Comp /  [address]  at [address] [place] [address] Rd 1101 [facility], [address] [place] [address],[address], [facility] [address] [phone]   [phone] OUTPATIENT PHYSICAL THERAPY:Daily Note 2018 ICD-10: Treatment Diagnosis: Sprain of posterior cruciate ligament of right knee, sequela (S83.521S); Pain in right knee (M25.561); Stiffness of right knee, not elsewhere classified (M25.661); Difficulty in walking, not elsewhere classified (R26.2) Precautions/Allergies:  
Review of patient's allergies indicates no known allergies. Fall Risk Score: 1 (? 5 = High Risk) MD Orders:Evaluate and treat, HEP, ROM, strengthening MEDICAL/REFERRING DIAGNOSIS: 
S/p R knee scope, PCL reconstruction DATE OF ONSET: Surgery 17 REFERRING PHYSICIAN: Posta, Lazarus Hansen., MD 
RETURN PHYSICIAN APPOINTMENT: 18 ASSESSMENT:  Ms. Ramón Barron presents s/p right knee scope with PCL reconstruction. She has made excellent progress toward the goals established at her initial evaluation, and demonstrates good R knee ROM. She does demonstrate R quad weakness, ongoing pain in R knee, and difficulty descending stairs. R quadriceps eccentric weakness is evident with stair descent. Patient needs continued PT to optimize R LE function to facilitate return to work. PROBLEM LIST (Impacting functional limitations): 1. Post-op pain and swelling right knee 2. Decreased ROM right knee 3. Decreased functional strength right knee/LE 4. Decreased gait skills INTERVENTIONS PLANNED: 
1. aquatic therapy 2. Thermal and electric modalities, manual therapies for pain. 3. Manual therapies and therapeutic exercises for ROM and strength. 4. Therapeutic exercises for gait and balance TREATMENT PLAN: 
Effective Dates: 18 to 18. Frequency/Duration: 2-3 times a week for 8 more weeks GOALS: (Goals have been discussed and agreed upon with patient.) Short-Term Functional Goals: Time Frame: 6 weeks 1. Pt will be able to perform a good quad set and SLR with no extensor lag with for improved strength for gait. MET 7-6-18 2. Pt will increase R knee ROM to 0-90 for improved mobility. GOAL MET 3. Pt will improved R ankle DF AROM to 0 for improved gait. GOAL MET 4. Pt will report pain 5/10 with modified daily activities. GOAL MET Discharge Goals: Time Frame: 12 weeks 1. Pt will increase R LE strength to 5/5 with manual muscle testing for improved strength for ADLs and work. Progressing towards, 7-6-18 2. Pt will negotiate 1 flight of stairs with step over step with good control. Progressing towards 7-6-18 3. Pt will increase R knee ROM to 0-120 for mobility. GOAL MET 4. Pt will report pain 3/10 with daily activities. Progressing towards 5. Pt will score 45/80 on LEFS. Progressing towards Rehabilitation Potential For Stated Goals: Good HISTORY:  
History of Present Injury/Illness (Reason for Referral): She works at [employer] and she was entering the cooler and there was oil on the floor. She slipped and the feet slipped out from under her. She tried to get up and slipped again. Injury was August 14-17. She did go to physical therapy but it was making her worse. They decided to have surgery. Surgery 12-21-17. Stayed 2 nights in the hospital. She did have home health physical therapy 3-4x. Pain increases with walking. Past Medical History/Comorbidities: diabetes type 2 controlled with diet, HTN, L LE varicose vein surgery Social History/Living Environment: Lives with . 2 steps to get inside. Prior Level of Function/Work/Activity: Works at [employer]. 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 Job does require lifting. Would like to get back to walking and walking dogs.   
Current Medications:   
  
Current Outpatient Prescriptions:  
  magnesium hydroxide (BEAUCHAMP MILK OF MAGNESIA) 400 mg/5 mL suspension, Take 15 mL by mouth as needed for Constipation. as needed daily, Disp: , Rfl:  
 tramodol Steroid * 
  atorvastatin (LIPITOR) 20 mg tablet, Take 20 mg by mouth nightly., Disp: , Rfl:  
  lisinopril-hydroCHLOROthiazide (PRINZIDE, ZESTORETIC) 10-12.5 mg per tablet, Take  by mouth daily. Indications: hypertension, Disp: , Rfl:  
  gemfibrozil (LOPID) 600 mg tablet, Take 600 mg by mouth two (2) times a day., Disp: , Rfl:  
  Omega-3 Fatty Acids 60- mg cpDR, Take  by mouth daily. , Disp: , Rfl:  
  acetaminophen (TYLENOL) 325 mg tablet, Take 325 mg by mouth every four (4) hours as needed for Pain., Disp: , Rfl:   
Date Last Reviewed:  7/26/2018 Number of Personal Factors/Comorbidities that affect the Plan of Care: 1-2: MODERATE COMPLEXITY EXAMINATION:  
 
Observation/Orthostatic Postural Assessment: Comes into clinic with no assistive device, knee functional brace on R knee. Palpation:n/t.   
ROM:  
  L Knee: 2 degrees of hyperextension to 124 degrees Strength: L Knee extension: 4+/5   7/12/18 L knee flexion: 4-/5   7/12/18 5-rep leg press max: L 75#, R 25# Functional Mobility: Patient ambulates on level ground with mild antalgic gait pattern when in stance phase on R LE. Patient ascends stairs with reciprocal gait pattern with use of handrail. Patient descends stairs with step-to gait pattern with L LE descending stair before R wit use of single handrail. Patient able to stand from standard chair without use of hands, but performs significant forward trunk lean to complete the movement. Balance: not tested. Body Structures Involved: 1. Joints 2. Muscles 3. Ligaments Body Functions Affected: 1. Neuromusculoskeletal Activities and Participation Affected: 1. Mobility 2. Community, Social and [place] Number of elements (examined above) that affect the Plan of Care: 4+: HIGH COMPLEXITY CLINICAL PRESENTATION: Presentation: Evolving clinical presentation with changing clinical characteristics: MODERATE COMPLEXITY CLINICAL DECISION MAKING:  
Outcome Measure: Tool Used: Lower Extremity Functional Scale (LEFS) Score:  Initial: 5/80 20/80 (Date: 4-16-18 ) Most recent: 44/80 (7-6-18) Interpretation of Score: 20 questions each scored on a 5 point scale with 0 representing \"extreme difficulty or unable to perform\" and 4 representing \"no difficulty\". The lower the score, the greater the functional disability. 80/80 represents no disability. Minimal detectable change is 9 points. Score 80 79-63 62-48 47-32 31-16 15-1 0 Modifier CH CI CJ CK CL CM CN Medical Necessity:  
· Patient is expected to demonstrate progress in strength, range of motion and balance to improve gait. Reason for Services/Other Comments: 
· Patient continues to require skilled intervention due to post-op R knee, decreased ROM, strength, gait. Use of outcome tool(s) and clinical judgement create a POC that gives a: Questionable prediction of patient's progress: MODERATE COMPLEXITY  
  
 
 
 
TREATMENT:  
(In addition to Assessment/Re-Assessment sessions the following treatments were rendered) Pre-treatment Symptoms/Complaints: Pt states her muscles are sore. Pain: Initial:   3/10 Post Session:  4/10. Pt did not have brace on entering PT today. Accompanied by hospital  and workers comp  throughout entire session. Therapeutic Exercise: ( 55 minutes) for LE strengthening. Moderate visual and verbal cues for body alignment with each exercise. 7/24/18 7/26/18 SLR 3# 3 X10 3# 3 x 10 SAQ 3# 3 X10 3# 3 x 10 LAQ 3# 3 X 10 3# 3 x10 Sidelying hip abd 0# 3 X 10 0# 3 x 10 Prone hip ext 3# 3 X 10 3# 3 x 10 Prone knee flex 3# 3 X 10 3# 3 x 10 Seated knee flex tband 3 x 10 Black t band 3 x 10 Bridging  With tband abduction 2 x 10 With t band abduction 2 x 10 Hip abd t band 2 x 10 t band 3 x 10 It band stretch in sidelying  3 x 20 sec Therapeutic Modalities: not today HEP: encouraged pt to get ankle weight for HEP as above. Treatment/Session Assessment:   
· Response to Treatment: Hip abduction , hip extension cause complaints of muscle pain. Crepitus with LAQ. IT band stretching helped the muscle soreness. · Compliance with Program/Exercises:  Appears compliant · Recommendations/Intent for next treatment session: \"Next visit will focus on gait, knee ROM, strength\". Total Treatment Duration: 55 Minutes PT Patient Time In/Time Out Time In: 0800 Time Out: [id number] Russell [place], PT

## 2018-07-27 ENCOUNTER — HOSPITAL ENCOUNTER (OUTPATIENT)
Dept: PHYSICAL THERAPY | Age: 53
Discharge: HOME | End: 2018-07-27
Payer: COMMERCIAL

## 2018-07-27 PROCEDURE — 97110 THERAPEUTIC EXERCISES: CPT

## 2018-07-27 NOTE — PROGRESS NOTES
Mortimer Nelson : 1965 Payor: [facility] [facility] / Plan: Raoultalha Bills / Product Type: Workers Comp /  [address]  at [address] [place] [address] Rd 1101 [facility], [address] [place] [address],[address], [facility] [address]. [phone]   [phone] OUTPATIENT PHYSICAL THERAPY:Daily Note 2018 ICD-10: Treatment Diagnosis: Sprain of posterior cruciate ligament of right knee, sequela (S83.521S); Pain in right knee (M25.561); Stiffness of right knee, not elsewhere classified (M25.661); Difficulty in walking, not elsewhere classified (R26.2) Precautions/Allergies:  
Review of patient's allergies indicates no known allergies. Fall Risk Score: 1 (? 5 = High Risk) MD Orders:Evaluate and treat, HEP, ROM, strengthening; Quad strengthening; \"continue kneeHab\" (18) MEDICAL/REFERRING DIAGNOSIS: 
S/p R knee scope, PCL reconstruction DATE OF ONSET: Surgery 17 REFERRING PHYSICIAN: Elia Rouse., MD 
RETURN PHYSICIAN APPOINTMENT:    
 
18 ASSESSMENT:  Ms. Junior Zapata presents s/p right knee scope with PCL reconstruction. She has made excellent progress toward the goals established at her initial evaluation, and demonstrates good R knee ROM. She does demonstrate R quad weakness, ongoing pain in R knee, and difficulty descending stairs. R quadriceps eccentric weakness is evident with stair descent. Patient needs continued PT to optimize R LE function to facilitate return to work. PROBLEM LIST (Impacting functional limitations): 1. Post-op pain and swelling right knee 2. Decreased ROM right knee 3. Decreased functional strength right knee/LE 4. Decreased gait skills INTERVENTIONS PLANNED: 
1. aquatic therapy 2. Thermal and electric modalities, manual therapies for pain. 3. Manual therapies and therapeutic exercises for ROM and strength. 4. Therapeutic exercises for gait and balance TREATMENT PLAN: 
Effective Dates: 18 to 18.   Frequency/Duration: 2-3 times a week for 8 more weeks GOALS: (Goals have been discussed and agreed upon with patient.) Short-Term Functional Goals: Time Frame: 6 weeks 1. Pt will be able to perform a good quad set and SLR with no extensor lag with for improved strength for gait. MET 7-6-18 2. Pt will increase R knee ROM to 0-90 for improved mobility. GOAL MET 3. Pt will improved R ankle DF AROM to 0 for improved gait. GOAL MET 4. Pt will report pain 5/10 with modified daily activities. GOAL MET Discharge Goals: Time Frame: 12 weeks 1. Pt will increase R LE strength to 5/5 with manual muscle testing for improved strength for ADLs and work. Progressing towards, 7-6-18 2. Pt will negotiate 1 flight of stairs with step over step with good control. Progressing towards 7-6-18 3. Pt will increase R knee ROM to 0-120 for mobility. GOAL MET 4. Pt will report pain 3/10 with daily activities. Progressing towards 5. Pt will score 45/80 on LEFS. Progressing towards Rehabilitation Potential For Stated Goals: Good HISTORY:  
History of Present Injury/Illness (Reason for Referral): She works at [employer] and she was entering the cooler and there was oil on the floor. She slipped and the feet slipped out from under her. She tried to get up and slipped again. Injury was August 14-17. She did go to physical therapy but it was making her worse. They decided to have surgery. Surgery 12-21-17. Stayed 2 nights in the hospital. She did have home health physical therapy 3-4x. Pain increases with walking. Past Medical History/Comorbidities: diabetes type 2 controlled with diet, HTN, L LE varicose vein surgery Social History/Living Environment: Lives with . 2 steps to get inside. Prior Level of Function/Work/Activity: Works at [employer]. 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 Job does require lifting. Would like to get back to walking and walking dogs.   
Current Medications:   
  
Current Outpatient Prescriptions:  
  magnesium hydroxide (BEAUCHAMP MILK OF MAGNESIA) 400 mg/5 mL suspension, Take 15 mL by mouth as needed for Constipation. as needed daily, Disp: , Rfl:  
 tramodol Steroid * 
  atorvastatin (LIPITOR) 20 mg tablet, Take 20 mg by mouth nightly., Disp: , Rfl:  
  lisinopril-hydroCHLOROthiazide (PRINZIDE, ZESTORETIC) 10-12.5 mg per tablet, Take  by mouth daily. Indications: hypertension, Disp: , Rfl:  
  gemfibrozil (LOPID) 600 mg tablet, Take 600 mg by mouth two (2) times a day., Disp: , Rfl:  
  Omega-3 Fatty Acids 60- mg cpDR, Take  by mouth daily. , Disp: , Rfl:  
  acetaminophen (TYLENOL) 325 mg tablet, Take 325 mg by mouth every four (4) hours as needed for Pain., Disp: , Rfl:   
Date Last Reviewed:  7/27/2018 Number of Personal Factors/Comorbidities that affect the Plan of Care: 1-2: MODERATE COMPLEXITY EXAMINATION:  
 
Observation/Orthostatic Postural Assessment: Comes into clinic without assistive device or brace. Walks with limp on R. Palpation: Audible and palpable crepitus noted to R knee patellofemoral and tibiofemoral joints. ROM: Not measured. Strength:Not measured. Functional Mobility: Walking on level ground: limp on R stance, and tends to walk on forefoot with increased knee flexion in mid stance. Balance: Not tested. Body Structures Involved: 1. Joints 2. Muscles 3. Ligaments Body Functions Affected: 1. Neuromusculoskeletal Activities and Participation Affected: 1. Mobility 2. Community, Social and [place] Number of elements (examined above) that affect the Plan of Care: 4+: HIGH COMPLEXITY CLINICAL PRESENTATION:  
Presentation: Evolving clinical presentation with changing clinical characteristics: MODERATE COMPLEXITY CLINICAL DECISION MAKING:  
Outcome Measure: Tool Used: Lower Extremity Functional Scale (LEFS) Score:  Initial: 5/80 20/80 (Date: 4-16-18 ) Most recent: 44/80 (7-6-18) Interpretation of Score: 20 questions each scored on a 5 point scale with 0 representing \"extreme difficulty or unable to perform\" and 4 representing \"no difficulty\". The lower the score, the greater the functional disability. 80/80 represents no disability. Minimal detectable change is 9 points. Medical Necessity:  
· Patient is expected to demonstrate progress in strength, range of motion and balance to improve gait. Reason for Services/Other Comments: 
· Patient continues to require skilled intervention due to post-op R knee, decreased ROM, strength, gait. Use of outcome tool(s) and clinical judgement create a POC that gives a: Questionable prediction of patient's progress: MODERATE COMPLEXITY  
  
 
 
 
TREATMENT:  
(In addition to Assessment/Re-Assessment sessions the following treatments were rendered) 7/27/2018 Accompanied by hospital  and workers comp  throughout entire session. Pre-treatment Symptoms/Complaints: Knee is sore from yesterday's session. Soreness in the joint and thigh muscles. Continues to have pain to anterior knee inferior to patella with frequetn catching/locking sensation. Pain: Initial:   No VAS. Post Session: Increased pain reported post.   
 
Therapeutic Exercise: (50 Minutes): Recumbent bike x6' with 1' slow pedal/1' fast pedal alternating. Performed exercises for pelvic re-positioning in hook-lying with hip \"shift\", then knee and LE strengthening as per grid below. Exercises and resistance modified as indicated. Cues and manual guiding for correct alignment and movement patterns. HEP: She is to ice at home. No new addition to her current HEP. 
 7/24/18 7/26/18 7/27/18 SLR 3# 3 X10 3# 3 x 10 3# 1x30 total ea SAQ 3# 3 X10 3# 3 x 10 Terminal knee ext 0# 1x10 
1# 1x10 LAQ 3# 3 X 10 3# 3 x10 Short arc 3# x15 
4# x10 
5# 2x5 Sidelying hip abd 0# 3 X 10 0# 3 x 10 1# 1x15 Prone hip ext 3# 3 X 10 3# 3 x 10 -   
Prone knee flex 3# 3 X 10 3# 3 x 10 3# 1x30 total   
Seated knee flex tband 3 x 10 Black t band 3 x 10 -   
Bridging  With tband abduction 2 x 10 With t band abduction 2 x 10 With ball adduction 5\"x10 Hip abd t band 2 x 10 t band 3 x 10 sdie-lying straight knee 1# 1x10 Bent knee Knee toward knee 2x5 It band stretch in sidelying  3 x 20 sec - Treatment/Session Assessment:   
· Response to Treatment:  Good effort with the exercises done. Continues to be quite irritable to the knee. Marked creptius noted. Marked weakness to the whole knee and LE. Pain limits loading for strength progression. · Compliance with Program/Exercises:  Appears compliant · Recommendations/Intent for next treatment session: Continue with progressing isolated and whole chain muscle activation and strength R knee. Total Treatment Duration: 50 Minutes PT Patient Time In/Time Out Time In: 1000 Time Out: [facility], PT, MSPT, OCS

## 2018-07-31 ENCOUNTER — HOSPITAL ENCOUNTER (OUTPATIENT)
Dept: PHYSICAL THERAPY | Age: 53
Discharge: HOME | End: 2018-07-31
Payer: COMMERCIAL

## 2018-07-31 PROCEDURE — 97110 THERAPEUTIC EXERCISES: CPT

## 2018-07-31 PROCEDURE — 97140 MANUAL THERAPY 1/> REGIONS: CPT

## 2018-07-31 NOTE — PROGRESS NOTES
Vonnie Robles : 1965 Payor: [address] [place] [address] Road / Plan: Marina Javan / Product Type: Workers Comp /  [address]  at [address] [place] [address] Rd 1101 [facility], [address] [place] [address],[address], [address] [facility] [place] [phone]   [phone] OUTPATIENT PHYSICAL THERAPY:Daily Note and Progress Report 2018 ICD-10: Treatment Diagnosis: Sprain of posterior cruciate ligament of right knee, sequela (S83.521S); Pain in right knee (M25.561); Stiffness of right knee, not elsewhere classified (M25.661); Difficulty in walking, not elsewhere classified (R26.2) Precautions/Allergies:  
Review of patient's allergies indicates no known allergies. Fall Risk Score: 1 (? 5 = High Risk) MD Orders:Evaluate and treat, HEP, ROM, strengthening; Quad strengthening; \"continue kneeHab\" (18) MEDICAL/REFERRING DIAGNOSIS: 
S/p R knee scope, PCL reconstruction DATE OF ONSET: Surgery 17 REFERRING PHYSICIAN: Jaja Rouse MD 
RETURN PHYSICIAN APPOINTMENT:    
 
18 ASSESSMENT:  Ms. Michael Reis presents s/p right knee scope with PCL reconstruction. She is progressing with her gait mechanics on level ground, but continues to have difficulty with stair ascent/descent. Patient also inhibited by pain when ambulating and this leads to an increased antalgic gait pattern. Patient will benefit from continued PT to increase R LE strength, reduce discomfort and improve R LE function. PROBLEM LIST (Impacting functional limitations): 1. Post-op pain and swelling right knee 2. Decreased ROM right knee 3. Decreased functional strength right knee/LE 4. Decreased gait skills INTERVENTIONS PLANNED: 
1. aquatic therapy 2. Thermal and electric modalities, manual therapies for pain. 3. Manual therapies and therapeutic exercises for ROM and strength. 4. Therapeutic exercises for gait and balance TREATMENT PLAN: 
Effective Dates: 18 to 18.   Frequency/Duration: 2-3 times a week for 8 more weeks GOALS: (Goals have been discussed and agreed upon with patient.) Short-Term Functional Goals: Time Frame: 6 weeks 1. Pt will be able to perform a good quad set and SLR with no extensor lag with for improved strength for gait. MET 7-6-18 2. Pt will increase R knee ROM to 0-90 for improved mobility. GOAL MET 3. Pt will improved R ankle DF AROM to 0 for improved gait. GOAL MET 4. Pt will report pain 5/10 with modified daily activities. GOAL MET Discharge Goals: Time Frame: 12 weeks 1. Pt will increase R LE strength to 5/5 with manual muscle testing for improved strength for ADLs and work. Progressing towards, not tested on 7/31/18 2. Pt will negotiate 1 flight of stairs with step over step with good control. Progressing towards, ongoing 7-31-18 3. Pt will increase R knee ROM to 0-120 for mobility. GOAL MET 4. Pt will report pain 3/10 with daily activities. Progressing towards, ongoing 7-31-18 5. Pt will score 45/80 on LEFS. Progressing towards, ongiong 7-31-18 Rehabilitation Potential For Stated Goals: Good HISTORY:  
History of Present Injury/Illness (Reason for Referral): She works at [employer] and she was entering the cooler and there was oil on the floor. She slipped and the feet slipped out from under her. She tried to get up and slipped again. Injury was August 14-17. She did go to physical therapy but it was making her worse. They decided to have surgery. Surgery 12-21-17. Stayed 2 nights in the hospital. She did have home health physical therapy 3-4x. Pain increases with walking. Past Medical History/Comorbidities: diabetes type 2 controlled with diet, HTN, L LE varicose vein surgery Social History/Living Environment: Lives with . 2 steps to get inside. Prior Level of Function/Work/Activity: Works at [employer]. 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 Job does require lifting. Would like to get back to walking and walking dogs. Current Medications:   
  
Current Outpatient Prescriptions:  
  magnesium hydroxide (BEAUCHAMP MILK OF MAGNESIA) 400 mg/5 mL suspension, Take 15 mL by mouth as needed for Constipation. as needed daily, Disp: , Rfl:  
 tramodol Steroid * 
  atorvastatin (LIPITOR) 20 mg tablet, Take 20 mg by mouth nightly., Disp: , Rfl:  
  lisinopril-hydroCHLOROthiazide (PRINZIDE, ZESTORETIC) 10-12.5 mg per tablet, Take  by mouth daily. Indications: hypertension, Disp: , Rfl:  
  gemfibrozil (LOPID) 600 mg tablet, Take 600 mg by mouth two (2) times a day., Disp: , Rfl:  
  Omega-3 Fatty Acids 60- mg cpDR, Take  by mouth daily. , Disp: , Rfl:  
  acetaminophen (TYLENOL) 325 mg tablet, Take 325 mg by mouth every four (4) hours as needed for Pain., Disp: , Rfl:   
Date Last Reviewed:  7/31/2018 Number of Personal Factors/Comorbidities that affect the Plan of Care: 1-2: MODERATE COMPLEXITY EXAMINATION:  
7-31-18 Observation/Orthostatic Postural Assessment: Comes into clinic without assistive device or brace. Walks with limp on R. Palpation: Audible and palpable crepitus noted to R knee patellofemoral and tibiofemoral joints. ROM:  
  L AROM: flexion 125 degrees, extension 0 deg L knee flexion PROM: 128 deg flexion, limited by pain Strength: 
 n/t Functional Mobility: Walking on level ground: initially able to ambulate with minimal antalgic gait pattern. With fatigue/pain patient begins to minimize landing on R heel when ambulating and exhibits forefoot initial contact with increased R knee flexion. Stair climbing: Patient ascends/descends with step-to gait pattern. Able to ascend stairs with R LE but requires forward trunk lean over R LE. Requires use of one handrail to ascend and descend stairs. Turns body sideways to descend stairs with step-to gait pattern. Balance: Not tested. Body Structures Involved: 1. Joints 2. Muscles 3.  Ligaments Body Functions Affected: 1. Neuromusculoskeletal Activities and Participation Affected: 1. Mobility 2. Community, Social and [place] Number of elements (examined above) that affect the Plan of Care: 4+: HIGH COMPLEXITY CLINICAL PRESENTATION:  
Presentation: Evolving clinical presentation with changing clinical characteristics: MODERATE COMPLEXITY CLINICAL DECISION MAKING:  
Outcome Measure: Tool Used: Lower Extremity Functional Scale (LEFS) Score:  Initial: 5/80 20/80 (Date: 4-16-18 ) 44/80 (7-6-18) Most recent: 35/80 (7-31-18) Interpretation of Score: 20 questions each scored on a 5 point scale with 0 representing \"extreme difficulty or unable to perform\" and 4 representing \"no difficulty\". The lower the score, the greater the functional disability. 80/80 represents no disability. Minimal detectable change is 9 points. Medical Necessity:  
· Patient is expected to demonstrate progress in strength, range of motion and balance to improve gait. Reason for Services/Other Comments: 
· Patient continues to require skilled intervention due to post-op R knee, decreased ROM, strength, gait. Use of outcome tool(s) and clinical judgement create a POC that gives a: Questionable prediction of patient's progress: MODERATE COMPLEXITY  
  
 
 
 
TREATMENT:  
(In addition to Assessment/Re-Assessment sessions the following treatments were rendered) 7/31/2018 Accompanied by hospital  and workers comp  throughout entire session. Pre-treatment Symptoms/Complaints: Pt reports \"very little\" pain with R knee today. States that she feels it catching and popping inside the joint. Pain: Initial:   No numeric value. Reports \"very little\" upon arrival. Post Session: No numeric value reported Manual Therapy (15 minutes) for reduced R knee discomfort. Physio mobilizations grade 2-3 for reduced R knee discomfort and increased ROM. Grade 3-4 patellar glides to improve patellar mobility.  Soft tissue mobilizations to R hamstrings to reduce discomfort. Therapeutic Exercise: ( 25 minutes): Recumbent bike warm up x 6 minutes to begin PT. Therapeutic exercises performed per grid below to improve R LE function and strength. HEP: She is to continue with ice at home. No new addition to her current HEP. 
 7/24/18 7/26/18 7/27/18 7-31-18 SLR 3# 3 X10 3# 3 x 10 3# 1x30 total ea 3# 3 x 10 R  
SAQ 3# 3 X10 3# 3 x 10 Terminal knee ext 0# 1x10 
1# 1x10 Terminal knee ext LAQ 3# 3 X 10 3# 3 x10 Short arc 3# x15 
4# x10 
5# 2x5 Short arc 5# 3 x 10 Sidelying hip abd 0# 3 X 10 0# 3 x 10 1# 1x15 1# 2 x 10 Prone hip ext 3# 3 X 10 3# 3 x 10 - - Prone knee flex 3# 3 X 10 3# 3 x 10 3# 1x30 total 3# 3 x 10 Seated knee flex tband 3 x 10 Black t band 3 x 10 - - Bridging  With tband abduction 2 x 10 With t band abduction 2 x 10 With ball adduction 5\"x10 With ball adduction 3 x 10 Hip abd t band 2 x 10 t band 3 x 10 sdie-lying straight knee 1# 1x10 Bent knee Knee toward knee 2x5 Side-lying 0# x 10 
1# 2 x 10 It band stretch in sidelying  3 x 20 sec - Treatment/Session Assessment:   
· Response to Treatment:  Patient performed exercises with good effort. Crepitus continues to occur with discomfort. Patient has difficulty climbing stairs. · Compliance with Program/Exercises:  Appears compliant · Recommendations/Intent for next treatment session: Continue with progressing isolated and whole chain muscle activation and strength R knee. Total Treatment Duration: 40 Minutes PT Patient Time In/Time Out Time In: 0945 Time Out: 1040 Ron Chester PT

## 2018-08-01 ENCOUNTER — HOSPITAL ENCOUNTER (OUTPATIENT)
Dept: PHYSICAL THERAPY | Age: 53
Discharge: HOME | End: 2018-08-01
Payer: COMMERCIAL

## 2018-08-01 PROCEDURE — 97110 THERAPEUTIC EXERCISES: CPT

## 2018-08-01 PROCEDURE — 97140 MANUAL THERAPY 1/> REGIONS: CPT

## 2018-08-01 NOTE — PROGRESS NOTES
Pancho Nelosn  : 1965  Payor: [address] [place] [address] Road / Plan:  Surinder / Product Type: Workers Comp /  [address]  at [address] [place] [address] Rd  1101 [facility],  [place].  [phone]   [phone]       OUTPATIENT PHYSICAL THERAPY:Daily Note 2018      ICD-10: Treatment Diagnosis: Sprain of posterior cruciate ligament of right knee, sequela (S83.521S); Pain in right knee (M25.561); Stiffness of right knee, not elsewhere classified (M25.661); Difficulty in walking, not elsewhere classified (R26.2)  Precautions/Allergies:   Review of patient's allergies indicates no known allergies. Fall Risk Score: 1 (? 5 = High Risk)  MD Orders:Evaluate and treat, HEP, ROM, strengthening; Quad strengthening; \"continue kneeHab\" (18) MEDICAL/REFERRING DIAGNOSIS:  S/p R knee scope, PCL reconstruction   DATE OF ONSET: Surgery 17  REFERRING PHYSICIAN: Wagner Hines MD  RETURN PHYSICIAN APPOINTMENT:       18 ASSESSMENT:  Ms. Richie Silvestre presents s/p right knee scope with PCL reconstruction. She is progressing with her gait mechanics on level ground, but continues to have difficulty with stair ascent/descent. Patient also inhibited by pain when ambulating and this leads to an increased antalgic gait pattern. Patient will benefit from continued PT to increase R LE strength, reduce discomfort and improve R LE function. PROBLEM LIST (Impacting functional limitations):  1. Post-op pain and swelling right knee  2. Decreased ROM right knee   3. Decreased functional strength right knee/LE   4. Decreased gait skills INTERVENTIONS PLANNED:  1. aquatic therapy  2. Thermal and electric modalities, manual therapies for pain. 3. Manual therapies and therapeutic exercises for ROM and strength. 4. Therapeutic exercises for gait and balance   TREATMENT PLAN:  Effective Dates: 18 to 18.   Frequency/Duration: 2-3 times a week for 8 more weeks  GOALS: (Goals have been discussed and agreed upon with patient.)   Short-Term Functional Goals: Time Frame: 6 weeks  1. Pt will be able to perform a good quad set and SLR with no extensor lag with for improved strength for gait. MET 7-6-18  2. Pt will increase R knee ROM to 0-90 for improved mobility. GOAL MET  3. Pt will improved R ankle DF AROM to 0 for improved gait. GOAL MET  4. Pt will report pain 5/10 with modified daily activities. GOAL MET  Discharge Goals: Time Frame: 12 weeks   1. Pt will increase R LE strength to 5/5 with manual muscle testing for improved strength for ADLs and work. Progressing towards, not tested on 7/31/18  2. Pt will negotiate 1 flight of stairs with step over step with good control. Progressing towards, ongoing 7-31-18  3. Pt will increase R knee ROM to 0-120 for mobility. GOAL MET  4. Pt will report pain 3/10 with daily activities. Progressing towards, ongoing 7-31-18  5. Pt will score 45/80 on LEFS. Progressing towards, ongiong 7-31-18  Rehabilitation Potential For Stated Goals: Good                HISTORY:   History of Present Injury/Illness (Reason for Referral): She works at [employer] and she was entering the cooler and there was oil on the floor. She slipped and the feet slipped out from under her. She tried to get up and slipped again. Injury was August 14-17. She did go to physical therapy but it was making her worse. They decided to have surgery. Surgery 12-21-17. Stayed 2 nights in the hospital. She did have home health physical therapy 3-4x. Pain increases with walking. Past Medical History/Comorbidities: diabetes type 2 controlled with diet, HTN, L LE varicose vein surgery  Social History/Living Environment: Lives with . 2 steps to get inside. Prior Level of Function/Work/Activity: Works at [employer]. 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 Job does require lifting. Would like to get back to walking and walking dogs.    Current Medications: Current Outpatient Prescriptions:     magnesium hydroxide (BEAUCHAMP MILK OF MAGNESIA) 400 mg/5 mL suspension, Take 15 mL by mouth as needed for Constipation. as needed daily, Disp: , Rfl:    tramodol   Steroid *    atorvastatin (LIPITOR) 20 mg tablet, Take 20 mg by mouth nightly., Disp: , Rfl:     lisinopril-hydroCHLOROthiazide (PRINZIDE, ZESTORETIC) 10-12.5 mg per tablet, Take  by mouth daily. Indications: hypertension, Disp: , Rfl:     gemfibrozil (LOPID) 600 mg tablet, Take 600 mg by mouth two (2) times a day., Disp: , Rfl:     Omega-3 Fatty Acids 60- mg cpDR, Take  by mouth daily. , Disp: , Rfl:     acetaminophen (TYLENOL) 325 mg tablet, Take 325 mg by mouth every four (4) hours as needed for Pain., Disp: , Rfl:    Date Last Reviewed:  8/1/2018   Number of Personal Factors/Comorbidities that affect the Plan of Care: 1-2: MODERATE COMPLEXITY   EXAMINATION:   7-31-18  Observation/Orthostatic Postural Assessment: Comes into clinic without assistive device or brace. Walks with limp on R. Palpation: Audible and palpable crepitus noted to R knee patellofemoral and tibiofemoral joints. ROM:     L AROM: flexion 125 degrees, extension 0 deg   L knee flexion PROM: 128 deg flexion, limited by pain           Strength:   n/t                  Functional Mobility: Walking on level ground: initially able to ambulate with minimal antalgic gait pattern. With fatigue/pain patient begins to minimize landing on R heel when ambulating and exhibits forefoot initial contact with increased R knee flexion. Stair climbing: Patient ascends/descends with step-to gait pattern. Able to ascend stairs with R LE but requires forward trunk lean over R LE. Requires use of one handrail to ascend and descend stairs. Turns body sideways to descend stairs with step-to gait pattern. Balance: Not tested. Body Structures Involved:  1. Joints  2. Muscles  3. Ligaments Body Functions Affected:  1.  Neuromusculoskeletal Activities and Participation Affected:  1. Mobility  2. Community, Social and [place]   Number of elements (examined above) that affect the Plan of Care: 4+: HIGH COMPLEXITY   CLINICAL PRESENTATION:   Presentation: Evolving clinical presentation with changing clinical characteristics: MODERATE COMPLEXITY   CLINICAL DECISION MAKING:   Outcome Measure: Tool Used: Lower Extremity Functional Scale (LEFS)  Score:  Initial: 5/80 20/80 (Date: 4-16-18 ) 44/80 (7-6-18) Most recent: 35/80 (7-31-18)   Interpretation of Score: 20 questions each scored on a 5 point scale with 0 representing \"extreme difficulty or unable to perform\" and 4 representing \"no difficulty\". The lower the score, the greater the functional disability. 80/80 represents no disability. Minimal detectable change is 9 points. Medical Necessity:   · Patient is expected to demonstrate progress in strength, range of motion and balance to improve gait. Reason for Services/Other Comments:  · Patient continues to require skilled intervention due to post-op R knee, decreased ROM, strength, gait. Use of outcome tool(s) and clinical judgement create a POC that gives a: Questionable prediction of patient's progress: MODERATE COMPLEXITY            TREATMENT:   (In addition to Assessment/Re-Assessment sessions the following treatments were rendered)  8/1/2018   Accompanied by hospital  and workers comp  throughout entire session. Pre-treatment Symptoms/Complaints: Pt reports via  that R knee has been very uncomfortable since yesterday's PT. Very sore in the R LE muscles and the knee. Pain: Initial:  6-7/10 Post Session: 6/10     Manual Therapy (18 minutes) for reduced R knee discomfort. Physio mobilizations grade 2-3 for reduced R knee discomfort and increased ROM. Grade 3-4 patellar glides to improve patellar mobility. Soft tissue mobilizations to R hamstrings to reduce discomfort with patient in prone.  Grade 3-4 ankle joint mobilizations to improve R ankle mobility. Soft tissue mobilizations to R gastroc and soleus to reduce discomfort and tightness. Therapeutic Exercise: ( 27 minutes): Recumbent bike warm up x 6 minutes to begin PT. Therapeutic exercises performed per grid below to improve R LE function and strength. Patient brought Manolo Sloan for R LE which she has been using at home. Patient demonstrated independence with placing pads on R kneehab device. Increased intensity to 58 on both channels, which elicited a palpable contraction of R quadriceps. Patient stated that she had not received instruction on use of device, and had been using 28 intensity. Also instructed patient on proper fitting of device, as she had placed it to far down her knee and the pads were located over the tibiofemoral medial joint line. Adjusted this to be over the VMO, and confirmed that pads were located approrpiately. Educated patient that distal cutout should be above R patella, in order to ensure proper placement. Pt verbalized understanding. Performed stretches to R hamstring with pt supine (5 x 20\") to reduce tightness in R hamstrings. HEP: She is to continue with ice at home.  No new addition to her current HEP.   7/24/18 7/26/18 7/27/18 7-31-18 8-1-18           SLR 3# 3 X10 3# 3 x 10 3# 1x30 total ea 3# 3 x 10 R With Kneehab, 1 x 15   SAQ 3# 3 X10 3# 3 x 10 Terminal knee ext  0# 1x10  1# 1x10 Terminal knee ext With kneehab 1 x 15   LAQ 3# 3 X 10 3# 3 x10 Short arc  3# x15  4# x10  5# 2x5 Short arc  5# 3 x 10 Through limited ROM to minimize feeling that patella would \"Catch\", 2 x 10   Sidelying hip abd 0# 3 X 10 0# 3 x 10 1# 1x15 1# 2 x 10    Prone hip ext 3# 3 X 10 3# 3 x 10 - -    Prone knee flex 3# 3 X 10 3# 3 x 10 3# 1x30 total 3# 3 x 10     Seated knee flex tband 3 x 10 Black t band 3 x 10 - -    Bridging  With tband abduction 2 x 10 With t band abduction 2 x 10 With ball adduction  5\"x10 With ball adduction 3 x 10     Hip abd t band 2 x 10 t band 3 x 10 sdie-lying straight knee   1# 1x10  Bent knee  Knee toward knee  2x5 Side-lying 0# x 10  1# 2 x 10    It band stretch in sidelying  3 x 20 sec -       Treatment/Session Assessment:    · Response to Treatment:  Patient limited by discomfort today. Continues to ambulate with noticeable limp with increased knee flexion and forefoot initial contact when ambulating. Significant crepitus with long arc quads and with patellar mobilizations. Patient very limited by pain. · Compliance with Program/Exercises:  Appears compliant   · Recommendations/Intent for next treatment session: Continue with progressing isolated and whole chain muscle activation and strength R knee.   Total Treatment Duration: 45 Minutes   PT Patient Time In/Time Out  Time In: 1700  Time Out: 1200 [facility], PT

## 2018-08-01 NOTE — PROGRESS NOTES
present for PT session        [email or web address]  c: Ino 97  udeve 68 / Justin, [address] [facility]  [email or web address]

## 2018-08-07 ENCOUNTER — HOSPITAL ENCOUNTER (OUTPATIENT)
Dept: PHYSICAL THERAPY | Age: 53
Discharge: HOME | End: 2018-08-07
Payer: COMMERCIAL

## 2018-08-07 PROCEDURE — 97110 THERAPEUTIC EXERCISES: CPT

## 2018-08-07 PROCEDURE — 97140 MANUAL THERAPY 1/> REGIONS: CPT

## 2018-08-07 NOTE — PROGRESS NOTES
Jeremiah Lane  : 1965  Payor: [address] [place] [address] Road / Plan: Leigh Beat / Product Type: Workers Comp /  [address]  at [address] [place] [address] Rd  1101 [facility], [address] [place].  [phone]   [phone]       OUTPATIENT PHYSICAL THERAPY:Daily Note 2018      ICD-10: Treatment Diagnosis: Sprain of posterior cruciate ligament of right knee, sequela (S83.521S); Pain in right knee (M25.561); Stiffness of right knee, not elsewhere classified (M25.661); Difficulty in walking, not elsewhere classified (R26.2)  Precautions/Allergies:   Review of patient's allergies indicates no known allergies. Fall Risk Score: 1 (? 5 = High Risk)  MD Orders:Evaluate and treat, HEP, ROM, strengthening; Quad strengthening; \"continue kneeHab\" (18) MEDICAL/REFERRING DIAGNOSIS:  S/p R knee scope, PCL reconstruction   DATE OF ONSET: Surgery 17  REFERRING PHYSICIAN: Jose Manuel Sotelo MD  RETURN PHYSICIAN APPOINTMENT:  18 ASSESSMENT:  Ms. Kailey Smiley presents s/p right knee scope with PCL reconstruction. She is progressing with her gait mechanics on level ground, but continues to have difficulty with stair ascent/descent. Patient also inhibited by pain when ambulating and this leads to an increased antalgic gait pattern. Patient will benefit from continued PT to increase R LE strength, reduce discomfort and improve R LE function. PROBLEM LIST (Impacting functional limitations):  1. Post-op pain and swelling right knee  2. Decreased ROM right knee   3. Decreased functional strength right knee/LE   4. Decreased gait skills INTERVENTIONS PLANNED:  1. aquatic therapy  2. Thermal and electric modalities, manual therapies for pain. 3. Manual therapies and therapeutic exercises for ROM and strength. 4. Therapeutic exercises for gait and balance   TREATMENT PLAN:  Effective Dates: 18 to 18.   Frequency/Duration: 2-3 times a week for 8 more weeks  GOALS: (Goals have been discussed and agreed upon with patient.)   Short-Term Functional Goals: Time Frame: 6 weeks  1. Pt will be able to perform a good quad set and SLR with no extensor lag with for improved strength for gait. MET 7-6-18  2. Pt will increase R knee ROM to 0-90 for improved mobility. GOAL MET  3. Pt will improved R ankle DF AROM to 0 for improved gait. GOAL MET  4. Pt will report pain 5/10 with modified daily activities. GOAL MET  Discharge Goals: Time Frame: 12 weeks   1. Pt will increase R LE strength to 5/5 with manual muscle testing for improved strength for ADLs and work. Progressing towards, not tested on 7/31/18  2. Pt will negotiate 1 flight of stairs with step over step with good control. Progressing towards, ongoing 7-31-18  3. Pt will increase R knee ROM to 0-120 for mobility. GOAL MET  4. Pt will report pain 3/10 with daily activities. Progressing towards, ongoing 7-31-18  5. Pt will score 45/80 on LEFS. Progressing towards, ongiong 7-31-18  Rehabilitation Potential For Stated Goals: Good                HISTORY:   History of Present Injury/Illness (Reason for Referral): She works at [employer] and she was entering the cooler and there was oil on the floor. She slipped and the feet slipped out from under her. She tried to get up and slipped again. Injury was August 14-17. She did go to physical therapy but it was making her worse. They decided to have surgery. Surgery 12-21-17. Stayed 2 nights in the hospital. She did have home health physical therapy 3-4x. Pain increases with walking. Past Medical History/Comorbidities: diabetes type 2 controlled with diet, HTN, L LE varicose vein surgery  Social History/Living Environment: Lives with . 2 steps to get inside. Prior Level of Function/Work/Activity: Works at [employer]. 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 Job does require lifting. Would like to get back to walking and walking dogs.    Current Medications: Current Outpatient Prescriptions:     magnesium hydroxide (BEAUCHAMP MILK OF MAGNESIA) 400 mg/5 mL suspension, Take 15 mL by mouth as needed for Constipation. as needed daily, Disp: , Rfl:    tramodol   Steroid *    atorvastatin (LIPITOR) 20 mg tablet, Take 20 mg by mouth nightly., Disp: , Rfl:     lisinopril-hydroCHLOROthiazide (PRINZIDE, ZESTORETIC) 10-12.5 mg per tablet, Take  by mouth daily. Indications: hypertension, Disp: , Rfl:     gemfibrozil (LOPID) 600 mg tablet, Take 600 mg by mouth two (2) times a day., Disp: , Rfl:     Omega-3 Fatty Acids 60- mg cpDR, Take  by mouth daily. , Disp: , Rfl:     acetaminophen (TYLENOL) 325 mg tablet, Take 325 mg by mouth every four (4) hours as needed for Pain., Disp: , Rfl:    Date Last Reviewed:  8/7/2018   EXAMINATION:   7-31-18  Observation/Orthostatic Postural Assessment: Comes into clinic without assistive device or brace. Walks with limp on R. Palpation: Audible and palpable crepitus noted to R knee patellofemoral and tibiofemoral joints. ROM:     L AROM: flexion 125 degrees, extension 0 deg   L knee flexion PROM: 128 deg flexion, limited by pain           Strength:   n/t                  Functional Mobility: Walking on level ground: initially able to ambulate with minimal antalgic gait pattern. With fatigue/pain patient begins to minimize landing on R heel when ambulating and exhibits forefoot initial contact with increased R knee flexion. Stair climbing: Patient ascends/descends with step-to gait pattern. Able to ascend stairs with R LE but requires forward trunk lean over R LE. Requires use of one handrail to ascend and descend stairs. Turns body sideways to descend stairs with step-to gait pattern. Balance: Not tested. Body Structures Involved:  1. Joints  2. Muscles  3. Ligaments Body Functions Affected:  1. Neuromusculoskeletal Activities and Participation Affected:  1. Mobility  2.  Community, Social and [address] [place] [address] DECISION MAKING:   Outcome Measure: Tool Used: Lower Extremity Functional Scale (LEFS)  Score:  Initial: 5/80 20/80 (Date: 4-16-18 ) 44/80 (7-6-18) Most recent: 35/80 (7-31-18)   Interpretation of Score: 20 questions each scored on a 5 point scale with 0 representing \"extreme difficulty or unable to perform\" and 4 representing \"no difficulty\". The lower the score, the greater the functional disability. 80/80 represents no disability. Minimal detectable change is 9 points. Medical Necessity:   · Patient is expected to demonstrate progress in strength, range of motion and balance to improve gait. Reason for Services/Other Comments:  · Patient continues to require skilled intervention due to post-op R knee, decreased ROM, strength, gait. TREATMENT:   (In addition to Assessment/Re-Assessment sessions the following treatments were rendered)  8/7/2018   Accompanied by hospital  and workers comp  throughout entire session. Pre-treatment Symptoms/Complaints: Pt reports via  that R knee is sore but not too bad. She has been using the [email or web address] now that she was shown how to use it. She asks if it is normal to be coming to PT for so long after surgery. Pain: Initial: Pain Intensity 1: 4 (\"3 or 4\")   Post Session: No change in overall pain levels per patient, just very tired in leg. Recumbent bike warm up x 5 minutes to begin PT. Manual Therapy (25  minutes) for reduced R knee discomfort. Grade 2 to 4 Physio mobilizations R knee flexion and extension. Grade 3-4 patellar glides in all directions. Patient noted pain in anterior knee with flexion and in posterior knee with extn. Therapeutic Exercise: (15 Minutes  ): Therapeutic exercises performed per grid below to improve R LE function and strength.       HEP: She is to continue  her current HEP.   7/24/18 7/26/18 7/27/18 7-31-18 8-1-18 8/7/18            SLR 3# 3 X10 3# 3 x 10 3# 1x30 total ea 3# 3 x 10 R With Kneehab, 1 x 15 R 5# 2x10  4# 1x15   SAQ 3# 3 X10 3# 3 x 10 Terminal knee ext  0# 1x10  1# 1x10 Terminal knee ext With kneehab 1 x 15    LAQ 3# 3 X 10 3# 3 x10 Short arc  3# x15  4# x10  5# 2x5 Short arc  5# 3 x 10 Through limited ROM to minimize feeling that patella would \"Catch\", 2 x 10 90 to 45 degrees  5# 1x20   Sidelying hip abd 0# 3 X 10 0# 3 x 10 1# 1x15 1# 2 x 10  -   Prone hip ext 3# 3 X 10 3# 3 x 10 - -  -   Prone knee flex 3# 3 X 10 3# 3 x 10 3# 1x30 total 3# 3 x 10   -   Seated knee flex tband 3 x 10 Black t band 3 x 10 - -  -   Bridging  With tband abduction 2 x 10 With t band abduction 2 x 10 With ball adduction  5\"x10 With ball adduction 3 x 10   -   Hip abd t band 2 x 10 t band 3 x 10 sdie-lying straight knee   1# 1x10  Bent knee  Knee toward knee  2x5 Side-lying 0# x 10  1# 2 x 10  -   It band stretch in sidelying  3 x 20 sec -   -     Treatment/Session Assessment:    · Response to Treatment:  Patient's knee ROM appeared to improve with manual therapy. She is still weak in R LE and there is audible crepitus if she does LAQ beyond 45 degree flex (starting at 90). She reports that leg was tired, but pain did not increase. · Compliance with Program/Exercises:  Appears compliant   · Recommendations/Intent for next treatment session: Continue with ROM and strength work on R knee.    Total Treatment Duration: 40 Minutes   PT Patient Time In/Time Out  Time In: 1220  Time Out: 0815    Manoj Vogt PT

## 2018-08-08 ENCOUNTER — HOSPITAL ENCOUNTER (OUTPATIENT)
Dept: PHYSICAL THERAPY | Age: 53
Discharge: HOME | End: 2018-08-08
Payer: COMMERCIAL

## 2018-08-08 PROCEDURE — 97140 MANUAL THERAPY 1/> REGIONS: CPT

## 2018-08-08 NOTE — PROGRESS NOTES
Angela Meyer  : 1965  Payor: Karey Peres / Plan: Dago Trevizo / Product Type: Workers Comp /  [address]  at [facility] [place] [employer]  [address] [place] [address],  Kings Reyna  Wu.  [phone]   [phone]       OUTPATIENT PHYSICAL THERAPY:Daily Note 2018      ICD-10: Treatment Diagnosis: Sprain of posterior cruciate ligament of right knee, sequela (S83.521S); Pain in right knee (M25.561); Stiffness of right knee, not elsewhere classified (M25.661); Difficulty in walking, not elsewhere classified (R26.2)  Precautions/Allergies:   Review of patient's allergies indicates no known allergies. Fall Risk Score: 1 (? 5 = High Risk)  MD Orders:Evaluate and treat, HEP, ROM, strengthening; Quad strengthening; \"continue kneeHab\" (18) MEDICAL/REFERRING DIAGNOSIS:  S/p R knee scope, PCL reconstruction   DATE OF ONSET: Surgery 17  REFERRING PHYSICIAN: Iglesia Sam MD  RETURN PHYSICIAN APPOINTMENT:  18 ASSESSMENT:  Ms. Kortney Ann presents s/p right knee scope with PCL reconstruction. She is progressing with her gait mechanics on level ground, but continues to have difficulty with stair ascent/descent. Patient also inhibited by pain when ambulating and this leads to an increased antalgic gait pattern. Patient will benefit from continued PT to increase R LE strength, reduce discomfort and improve R LE function. PROBLEM LIST (Impacting functional limitations):  1. Post-op pain and swelling right knee  2. Decreased ROM right knee   3. Decreased functional strength right knee/LE   4. Decreased gait skills INTERVENTIONS PLANNED:  1. aquatic therapy  2. Thermal and electric modalities, manual therapies for pain. 3. Manual therapies and therapeutic exercises for ROM and strength. 4. Therapeutic exercises for gait and balance   TREATMENT PLAN:  Effective Dates: 18 to 18.   Frequency/Duration: 2-3 times a week for 8 more weeks  GOALS: (Goals have been discussed and agreed upon with patient.)   Short-Term Functional Goals: Time Frame: 6 weeks  1. Pt will be able to perform a good quad set and SLR with no extensor lag with for improved strength for gait. MET 7-6-18  2. Pt will increase R knee ROM to 0-90 for improved mobility. GOAL MET  3. Pt will improved R ankle DF AROM to 0 for improved gait. GOAL MET  4. Pt will report pain 5/10 with modified daily activities. GOAL MET  Discharge Goals: Time Frame: 12 weeks   1. Pt will increase R LE strength to 5/5 with manual muscle testing for improved strength for ADLs and work. Progressing towards, not tested on 7/31/18  2. Pt will negotiate 1 flight of stairs with step over step with good control. Progressing towards, ongoing 7-31-18  3. Pt will increase R knee ROM to 0-120 for mobility. GOAL MET  4. Pt will report pain 3/10 with daily activities. Progressing towards, ongoing 7-31-18  5. Pt will score 45/80 on LEFS. Progressing towards, ongiong 7-31-18  Rehabilitation Potential For Stated Goals: Good                HISTORY:   History of Present Injury/Illness (Reason for Referral): She works at [employer] and she was entering the cooler and there was oil on the floor. She slipped and the feet slipped out from under her. She tried to get up and slipped again. Injury was August 14-17. She did go to physical therapy but it was making her worse. They decided to have surgery. Surgery 12-21-17. Stayed 2 nights in the hospital. She did have home health physical therapy 3-4x. Pain increases with walking. Past Medical History/Comorbidities: diabetes type 2 controlled with diet, HTN, L LE varicose vein surgery  Social History/Living Environment: Lives with . 2 steps to get inside. Prior Level of Function/Work/Activity: Works at [employer]. 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 Job does require lifting. Would like to get back to walking and walking dogs.    Current Medications: Current Outpatient Prescriptions:     magnesium hydroxide (BEAUCHAMP MILK OF MAGNESIA) 400 mg/5 mL suspension, Take 15 mL by mouth as needed for Constipation. as needed daily, Disp: , Rfl:    tramodol   Steroid *    atorvastatin (LIPITOR) 20 mg tablet, Take 20 mg by mouth nightly., Disp: , Rfl:     lisinopril-hydroCHLOROthiazide (PRINZIDE, ZESTORETIC) 10-12.5 mg per tablet, Take  by mouth daily. Indications: hypertension, Disp: , Rfl:     gemfibrozil (LOPID) 600 mg tablet, Take 600 mg by mouth two (2) times a day., Disp: , Rfl:     Omega-3 Fatty Acids 60- mg cpDR, Take  by mouth daily. , Disp: , Rfl:     acetaminophen (TYLENOL) 325 mg tablet, Take 325 mg by mouth every four (4) hours as needed for Pain., Disp: , Rfl:    Date Last Reviewed:  8/7/2018   EXAMINATION:   7-31-18  Observation/Orthostatic Postural Assessment: Comes into clinic without assistive device or brace. Walks with limp on R. Palpation: Audible and palpable crepitus noted to R knee patellofemoral and tibiofemoral joints. ROM:     L AROM: flexion 125 degrees, extension 0 deg   L knee flexion PROM: 128 deg flexion, limited by pain           Strength:   n/t                  Functional Mobility: Walking on level ground: initially able to ambulate with minimal antalgic gait pattern. With fatigue/pain patient begins to minimize landing on R heel when ambulating and exhibits forefoot initial contact with increased R knee flexion. Stair climbing: Patient ascends/descends with step-to gait pattern. Able to ascend stairs with R LE but requires forward trunk lean over R LE. Requires use of one handrail to ascend and descend stairs. Turns body sideways to descend stairs with step-to gait pattern. Balance: Not tested. Body Structures Involved:  1. Joints  2. Muscles  3. Ligaments Body Functions Affected:  1. Neuromusculoskeletal Activities and Participation Affected:  1. Mobility  2.  Community, Social and [address] [place] [address] DECISION MAKING:   Outcome Measure: Tool Used: Lower Extremity Functional Scale (LEFS)  Score:  Initial: 5/80 20/80 (Date: 4-16-18 ) 44/80 (7-6-18) Most recent: 35/80 (7-31-18)   Interpretation of Score: 20 questions each scored on a 5 point scale with 0 representing \"extreme difficulty or unable to perform\" and 4 representing \"no difficulty\". The lower the score, the greater the functional disability. 80/80 represents no disability. Minimal detectable change is 9 points. Medical Necessity:   · Patient is expected to demonstrate progress in strength, range of motion and balance to improve gait. Reason for Services/Other Comments:  · Patient continues to require skilled intervention due to post-op R knee, decreased ROM, strength, gait. TREATMENT:   (In addition to Assessment/Re-Assessment sessions the following treatments were rendered)  8/8/2018   Accompanied by hospital  and workers comp  throughout entire session. Pre-treatment Symptoms/Complaints: Pt reports via  that R knee was very sore yesterday, but not too bad today. She took her medicine before coming today. Pain: Initial: Pain Intensity 1: 2   Post Session: pain rated as 2 or 3 at end of session. Manual Therapy 30  minutes) for reduced R knee discomfort. Grade 2 to 4 Physio mobilizations R knee flexion and extension. Grade 3-4 patellar glides in all directions. Patient noted pain in anterior knee with flexion and in posterior knee with extn.        Therapeutic Exercise: (   ): none today     HEP: She is to continue  her current HEP.   7/24/18 7/26/18 7/27/18 7-31-18 8-1-18 8/7/18            SLR 3# 3 X10 3# 3 x 10 3# 1x30 total ea 3# 3 x 10 R With Kneehab, 1 x 15 R 5# 2x10  4# 1x15   SAQ 3# 3 X10 3# 3 x 10 Terminal knee ext  0# 1x10  1# 1x10 Terminal knee ext With kneehab 1 x 15    LAQ 3# 3 X 10 3# 3 x10 Short arc  3# x15  4# x10  5# 2x5 Short arc  5# 3 x 10 Through limited ROM to minimize feeling that patella would \"Catch\", 2 x 10 90 to 45 degrees  5# 1x20   Sidelying hip abd 0# 3 X 10 0# 3 x 10 1# 1x15 1# 2 x 10  -   Prone hip ext 3# 3 X 10 3# 3 x 10 - -  -   Prone knee flex 3# 3 X 10 3# 3 x 10 3# 1x30 total 3# 3 x 10   -   Seated knee flex tband 3 x 10 Black t band 3 x 10 - -  -   Bridging  With tband abduction 2 x 10 With t band abduction 2 x 10 With ball adduction  5\"x10 With ball adduction 3 x 10   -   Hip abd t band 2 x 10 t band 3 x 10 sdie-lying straight knee   1# 1x10  Bent knee  Knee toward knee  2x5 Side-lying 0# x 10  1# 2 x 10  -   It band stretch in sidelying  3 x 20 sec -   -     Treatment/Session Assessment:    · Response to Treatment:  Patient noted that end ROM was not as painful today as yesterday, but she still ambulated with antalgic gait when leaving the clinic. Focused on ROM today since strengthening was done yesterday. · Compliance with Program/Exercises:  Appears compliant   · Recommendations/Intent for next treatment session: Continue with ROM and strength work on R knee.    Total Treatment Duration: 30 Minutes   PT Patient Time In/Time Out  Time In: 1223  Time Out: 415 [address] Julio Comer

## 2018-08-09 ENCOUNTER — HOSPITAL ENCOUNTER (OUTPATIENT)
Dept: PHYSICAL THERAPY | Age: 53
Discharge: HOME | End: 2018-08-09
Payer: COMMERCIAL

## 2018-08-09 NOTE — PROGRESS NOTES
Graham Luciana  : 1965  Payor: [address] [place] [address] Road / Plan: Birtha Oak / Product Type: Workers Comp /  [address]  at [facility] [place] [employer]  [address] [place] [address], [address]a.  [phone]   [phone]       OUTPATIENT PHYSICAL Paulo Vaughan  65. 2018      ICD-10: Treatment Diagnosis: Sprain of posterior cruciate ligament of right knee, sequela (S83.521S); Pain in right knee (M25.561); Stiffness of right knee, not elsewhere classified (M25.661); Difficulty in walking, not elsewhere classified (R26.2)  Precautions/Allergies:   Review of patient's allergies indicates no known allergies. Fall Risk Score: 1 (? 5 = High Risk)  MD Orders:Evaluate and treat, HEP, ROM, strengthening; Quad strengthening; \"continue kneeHab\" (18) MEDICAL/REFERRING DIAGNOSIS:  S/p R knee scope, PCL reconstruction   DATE OF ONSET: Surgery 17  REFERRING PHYSICIAN: Caroline Caldera MD  RETURN PHYSICIAN APPOINTMENT:  18   Patient did not show up for scheduled appointment. She was contacted by hospital  and said she did not feel well because her blood sugar was very high. She had cancelled her ride and had asked her workers comp  to notify the PT clinic.

## 2018-08-10 ENCOUNTER — APPOINTMENT (OUTPATIENT)
Dept: PHYSICAL THERAPY | Age: 53
End: 2018-08-10
Payer: COMMERCIAL

## 2018-08-14 ENCOUNTER — HOSPITAL ENCOUNTER (OUTPATIENT)
Dept: PHYSICAL THERAPY | Age: 53
Discharge: HOME | End: 2018-08-14
Payer: COMMERCIAL

## 2018-08-14 PROCEDURE — 97110 THERAPEUTIC EXERCISES: CPT

## 2018-08-14 NOTE — PROGRESS NOTES
Barry Breaux  : 1965  Payor: [address] [place] [address] Road / Plan: Gladys Perera / Product Type: Workers Comp /  [address]  at [facility] [place] [employer]  [address] [place] [address], [address] Cassidy, Roh[address].  [phone]   [phone]       OUTPATIENT PHYSICAL THERAPY:Daily Note 2018      ICD-10: Treatment Diagnosis: Sprain of posterior cruciate ligament of right knee, sequela (S83.521S); Pain in right knee (M25.561); Stiffness of right knee, not elsewhere classified (M25.661); Difficulty in walking, not elsewhere classified (R26.2)  Precautions/Allergies:   Review of patient's allergies indicates no known allergies. Fall Risk Score: 1 (? 5 = High Risk)  MD Orders:Evaluate and treat, HEP, ROM, strengthening; Quad strengthening; \"continue kneeHab\" (18) MEDICAL/REFERRING DIAGNOSIS:  S/p R knee scope, PCL reconstruction   DATE OF ONSET: Surgery 17  REFERRING PHYSICIAN: Joaquin Roberto MD  RETURN PHYSICIAN APPOINTMENT:  18 ASSESSMENT:  Ms. Opal Moore presents s/p right knee scope with PCL reconstruction. She is progressing with her gait mechanics on level ground, but continues to have difficulty with stair ascent/descent. Patient also inhibited by pain when ambulating and this leads to an increased antalgic gait pattern. Patient will benefit from continued PT to increase R LE strength, reduce discomfort and improve R LE function. PROBLEM LIST (Impacting functional limitations):  1. Post-op pain and swelling right knee  2. Decreased ROM right knee   3. Decreased functional strength right knee/LE   4. Decreased gait skills INTERVENTIONS PLANNED:  1. aquatic therapy  2. Thermal and electric modalities, manual therapies for pain. 3. Manual therapies and therapeutic exercises for ROM and strength. 4. Therapeutic exercises for gait and balance   TREATMENT PLAN:  Effective Dates: 18 to 18.   Frequency/Duration: 2-3 times a week for 8 more weeks  GOALS: (Goals have been discussed and agreed upon with patient.)   Short-Term Functional Goals: Time Frame: 6 weeks  1. Pt will be able to perform a good quad set and SLR with no extensor lag with for improved strength for gait. MET 7-6-18  2. Pt will increase R knee ROM to 0-90 for improved mobility. GOAL MET  3. Pt will improved R ankle DF AROM to 0 for improved gait. GOAL MET  4. Pt will report pain 5/10 with modified daily activities. GOAL MET  Discharge Goals: Time Frame: 12 weeks   1. Pt will increase R LE strength to 5/5 with manual muscle testing for improved strength for ADLs and work. Progressing towards, not tested on 7/31/18  2. Pt will negotiate 1 flight of stairs with step over step with good control. Progressing towards, ongoing 7-31-18  3. Pt will increase R knee ROM to 0-120 for mobility. GOAL MET  4. Pt will report pain 3/10 with daily activities. Progressing towards, ongoing 7-31-18  5. Pt will score 45/80 on LEFS. Progressing towards, ongiong 7-31-18  Rehabilitation Potential For Stated Goals: Good                HISTORY:   History of Present Injury/Illness (Reason for Referral): She works at [employer] and she was entering the cooler and there was oil on the floor. She slipped and the feet slipped out from under her. She tried to get up and slipped again. Injury was August 14-17. She did go to physical therapy but it was making her worse. They decided to have surgery. Surgery 12-21-17. Stayed 2 nights in the hospital. She did have home health physical therapy 3-4x. Pain increases with walking. Past Medical History/Comorbidities: diabetes type 2 controlled with diet, HTN, L LE varicose vein surgery  Social History/Living Environment: Lives with . 2 steps to get inside. Prior Level of Function/Work/Activity: Works at [employer]. 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 Job does require lifting. Would like to get back to walking and walking dogs.    Current Medications: Current Outpatient Prescriptions:     magnesium hydroxide (BEAUCHAMP MILK OF MAGNESIA) 400 mg/5 mL suspension, Take 15 mL by mouth as needed for Constipation. as needed daily, Disp: , Rfl:    tramodol   Steroid *    atorvastatin (LIPITOR) 20 mg tablet, Take 20 mg by mouth nightly., Disp: , Rfl:     lisinopril-hydroCHLOROthiazide (PRINZIDE, ZESTORETIC) 10-12.5 mg per tablet, Take  by mouth daily. Indications: hypertension, Disp: , Rfl:     gemfibrozil (LOPID) 600 mg tablet, Take 600 mg by mouth two (2) times a day., Disp: , Rfl:     Omega-3 Fatty Acids 60- mg cpDR, Take  by mouth daily. , Disp: , Rfl:     acetaminophen (TYLENOL) 325 mg tablet, Take 325 mg by mouth every four (4) hours as needed for Pain., Disp: , Rfl:    Date Last Reviewed:  8/7/2018   EXAMINATION:   7-31-18  Observation/Orthostatic Postural Assessment: Comes into clinic without assistive device or brace. Walks with limp on R. Palpation: Audible and palpable crepitus noted to R knee patellofemoral and tibiofemoral joints. ROM:     L AROM: flexion 125 degrees, extension 0 deg   L knee flexion PROM: 128 deg flexion, limited by pain           Strength:   n/t                  Functional Mobility: Walking on level ground: initially able to ambulate with minimal antalgic gait pattern. With fatigue/pain patient begins to minimize landing on R heel when ambulating and exhibits forefoot initial contact with increased R knee flexion. Stair climbing: Patient ascends/descends with step-to gait pattern. Able to ascend stairs with R LE but requires forward trunk lean over R LE. Requires use of one handrail to ascend and descend stairs. Turns body sideways to descend stairs with step-to gait pattern. Balance: Not tested. Body Structures Involved:  1. Joints  2. Muscles  3. Ligaments Body Functions Affected:  1. Neuromusculoskeletal Activities and Participation Affected:  1. Mobility  2.  Community, Social and [address] [place] [address] DECISION MAKING:   Outcome Measure: Tool Used: Lower Extremity Functional Scale (LEFS)  Score:  Initial: 5/80 20/80 (Date: 4-16-18 ) 44/80 (7-6-18) Most recent: 35/80 (7-31-18)   Interpretation of Score: 20 questions each scored on a 5 point scale with 0 representing \"extreme difficulty or unable to perform\" and 4 representing \"no difficulty\". The lower the score, the greater the functional disability. 80/80 represents no disability. Minimal detectable change is 9 points. Medical Necessity:   · Patient is expected to demonstrate progress in strength, range of motion and balance to improve gait. Reason for Services/Other Comments:  · Patient continues to require skilled intervention due to post-op R knee, decreased ROM, strength, gait. TREATMENT:   (In addition to Assessment/Re-Assessment sessions the following treatments were rendered)  8/14/2018   Accompanied by hospital  and workers comp  throughout entire session. Pre-treatment Symptoms/Complaints: Pt reports via  that R knee was very sore yesterday, but not too bad today. She took her medicine before coming today. Pain: Initial:    3/10 Post Session: pain rated 4/10 at the end of session. Ice x 10 minutes.         Manual Therapy   minutes) fTherapeutic Exercise: (   ): none today     HEP: She is to continue  her current HEP.   7/24/18 7/26/18 7/27/18 7-31-18 8-1-18 8/7/18 8/14/18             SLR 3# 3 X10 3# 3 x 10 3# 1x30 total ea 3# 3 x 10 R With Kneehab, 1 x 15 R 5# 2x10  4# 1x15 2# 3 x 10   SAQ 3# 3 X10 3# 3 x 10 Terminal knee ext  0# 1x10  1# 1x10 Terminal knee ext With kneehab 1 x 15  1# 3 x 10   LAQ 3# 3 X 10 3# 3 x10 Short arc  3# x15  4# x10  5# 2x5 Short arc  5# 3 x 10 Through limited ROM to minimize feeling that patella would \"Catch\", 2 x 10 90 to 45 degrees  5# 1x20 90 to 45 degrees 2# 3 x 10   Sidelying hip abd 0# 3 X 10 0# 3 x 10 1# 1x15 1# 2 x 10  - 1# 3 x 10   Prone hip ext 3# 3 X 10 3# 3 x 10 - -  - 3# 3 x 10   Prone knee flex 3# 3 X 10 3# 3 x 10 3# 1x30 total 3# 3 x 10   - 3# 3 x 10   Seated knee flex tband 3 x 10 Black t band 3 x 10 - -  -    Bridging  With tband abduction 2 x 10 With t band abduction 2 x 10 With ball adduction  5\"x10 With ball adduction 3 x 10   - With ball adduction 3 x 10   Hip abd t band 2 x 10 t band 3 x 10 sdie-lying straight knee   1# 1x10  Bent knee  Knee toward knee  2x5 Side-lying 0# x 10  1# 2 x 10  -    It band stretch in sidelying  3 x 20 sec -   -    Recumbent bike- 5 min level 2  Treatment/Session Assessment:    · Response to Treatment: Pt stated that she thought it might be better since it is more rested. · Compliance with Program/Exercises:  Appears compliant   · Recommendations/Intent for next treatment session: Continue with ROM and strength work on R knee.    Total Treatment Duration: 40 Minutes        Stephania Krishna, PT

## 2018-08-16 ENCOUNTER — HOSPITAL ENCOUNTER (OUTPATIENT)
Dept: PHYSICAL THERAPY | Age: 53
Discharge: HOME | End: 2018-08-16
Payer: COMMERCIAL

## 2018-08-16 PROCEDURE — 97110 THERAPEUTIC EXERCISES: CPT

## 2018-08-16 NOTE — PROGRESS NOTES
Coni Jay  : 1965  Payor: [address] [place] [address] Road / Plan: Gio Mark / Product Type: Workers Comp /  [address]  at [facility] [place] [employer]  [address] [place] [address], [address] [place].  [phone]   [phone]       OUTPATIENT PHYSICAL [address] Note 2018      ICD-10: Treatment Diagnosis: Sprain of posterior cruciate ligament of right knee, sequela (S83.521S); Pain in right knee (M25.561); Stiffness of right knee, not elsewhere classified (M25.661); Difficulty in walking, not elsewhere classified (R26.2)  Precautions/Allergies:   Review of patient's allergies indicates no known allergies. Fall Risk Score: 1 (? 5 = High Risk)  MD Orders:Evaluate and treat, HEP, ROM, strengthening; Quad strengthening; \"continue kneeHab\" (18) MEDICAL/REFERRING DIAGNOSIS:  S/p R knee scope, PCL reconstruction   DATE OF ONSET: Surgery 17  REFERRING PHYSICIAN: Nicole Burton MD  RETURN PHYSICIAN APPOINTMENT:  18 ASSESSMENT:  Ms. Ramón Barron presents s/p right knee scope with PCL reconstruction. She is progressing with her gait mechanics on level ground, but continues to have difficulty with stair ascent/descent. Patient also inhibited by pain when ambulating and this leads to an increased antalgic gait pattern. Patient will benefit from continued PT to increase R LE strength, reduce discomfort and improve R LE function. PROBLEM LIST (Impacting functional limitations):  1. Post-op pain and swelling right knee  2. Decreased ROM right knee   3. Decreased functional strength right knee/LE   4. Decreased gait skills INTERVENTIONS PLANNED:  1. aquatic therapy  2. Thermal and electric modalities, manual therapies for pain. 3. Manual therapies and therapeutic exercises for ROM and strength. 4. Therapeutic exercises for gait and balance   TREATMENT PLAN:  Effective Dates: 18 to 18.   Frequency/Duration: 2-3 times a week for 8 more weeks  GOALS: (Goals have been discussed and agreed upon with patient.)   Short-Term Functional Goals: Time Frame: 6 weeks  1. Pt will be able to perform a good quad set and SLR with no extensor lag with for improved strength for gait. MET 7-6-18  2. Pt will increase R knee ROM to 0-90 for improved mobility. GOAL MET  3. Pt will improved R ankle DF AROM to 0 for improved gait. GOAL MET  4. Pt will report pain 5/10 with modified daily activities. GOAL MET  Discharge Goals: Time Frame: 12 weeks   1. Pt will increase R LE strength to 5/5 with manual muscle testing for improved strength for ADLs and work. Progressing towards, not tested on 7/31/18  2. Pt will negotiate 1 flight of stairs with step over step with good control. Progressing towards, ongoing 7-31-18  3. Pt will increase R knee ROM to 0-120 for mobility. GOAL MET  4. Pt will report pain 3/10 with daily activities. Progressing towards, ongoing 7-31-18  5. Pt will score 45/80 on LEFS. Progressing towards, ongiong 7-31-18  Rehabilitation Potential For Stated Goals: Good                HISTORY:   History of Present Injury/Illness (Reason for Referral): She works at [employer] and she was entering the cooler and there was oil on the floor. She slipped and the feet slipped out from under her. She tried to get up and slipped again. Injury was August 14-17. She did go to physical therapy but it was making her worse. They decided to have surgery. Surgery 12-21-17. Stayed 2 nights in the hospital. She did have home health physical therapy 3-4x. Pain increases with walking. Past Medical History/Comorbidities: diabetes type 2 controlled with diet, HTN, L LE varicose vein surgery  Social History/Living Environment: Lives with . 2 steps to get inside. Prior Level of Function/Work/Activity: Works at [employer]. 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 Job does require lifting. Would like to get back to walking and walking dogs.    Current Medications: Current Outpatient Prescriptions:     magnesium hydroxide (BEAUCHAMP MILK OF MAGNESIA) 400 mg/5 mL suspension, Take 15 mL by mouth as needed for Constipation. as needed daily, Disp: , Rfl:    tramodol   Steroid *    atorvastatin (LIPITOR) 20 mg tablet, Take 20 mg by mouth nightly., Disp: , Rfl:     lisinopril-hydroCHLOROthiazide (PRINZIDE, ZESTORETIC) 10-12.5 mg per tablet, Take  by mouth daily. Indications: hypertension, Disp: , Rfl:     gemfibrozil (LOPID) 600 mg tablet, Take 600 mg by mouth two (2) times a day., Disp: , Rfl:     Omega-3 Fatty Acids 60- mg cpDR, Take  by mouth daily. , Disp: , Rfl:     acetaminophen (TYLENOL) 325 mg tablet, Take 325 mg by mouth every four (4) hours as needed for Pain., Disp: , Rfl:    Date Last Reviewed:  8/16/2018   EXAMINATION:   7-31-18  Observation/Orthostatic Postural Assessment: Comes into clinic without assistive device or brace. Walks with limp on R. Palpation: Audible and palpable crepitus noted to R knee patellofemoral and tibiofemoral joints. ROM:     L AROM: flexion 125 degrees, extension 0 deg   L knee flexion PROM: 128 deg flexion, limited by pain           Strength:   n/t                  Functional Mobility: Walking on level ground: initially able to ambulate with minimal antalgic gait pattern. With fatigue/pain patient begins to minimize landing on R heel when ambulating and exhibits forefoot initial contact with increased R knee flexion. Stair climbing: Patient ascends/descends with step-to gait pattern. Able to ascend stairs with R LE but requires forward trunk lean over R LE. Requires use of one handrail to ascend and descend stairs. Turns body sideways to descend stairs with step-to gait pattern. Balance: Not tested. Body Structures Involved:  1. Joints  2. Muscles  3. Ligaments Body Functions Affected:  1. Neuromusculoskeletal Activities and Participation Affected:  1. Mobility  2.  Community, Social and [address] [place] [address] DECISION MAKING:   Outcome Measure: Tool Used: Lower Extremity Functional Scale (LEFS)  Score:  Initial: 5/80 20/80 (Date: 4-16-18 ) 44/80 (7-6-18) Most recent: 35/80 (7-31-18)   Interpretation of Score: 20 questions each scored on a 5 point scale with 0 representing \"extreme difficulty or unable to perform\" and 4 representing \"no difficulty\". The lower the score, the greater the functional disability. 80/80 represents no disability. Minimal detectable change is 9 points. Medical Necessity:   · Patient is expected to demonstrate progress in strength, range of motion and balance to improve gait. Reason for Services/Other Comments:  · Patient continues to require skilled intervention due to post-op R knee, decreased ROM, strength, gait. TREATMENT:   (In addition to Assessment/Re-Assessment sessions the following treatments were rendered)  8/16/2018   Accompanied by hospital  and workers comp  throughout entire session. Pre-treatment Symptoms/Complaints: Pt reports via  that she is sore and it feels muscular. Pain: Initial:    3/10 Post Session: pain rated 4/10 at the end of session.           Manual Therapy   minutes) fTherapeutic Exercise: (   ): none today     HEP: She is to continue  her current HEP.   7/24/18 7/26/18 7/27/18 7-31-18 8-1-18 8/7/18 8/14/18 8/16/18              SLR 3# 3 X10 3# 3 x 10 3# 1x30 total ea 3# 3 x 10 R With Kneehab, 1 x 15 R 5# 2x10  4# 1x15 2# 3 x 10 2# 3 x 10   SAQ 3# 3 X10 3# 3 x 10 Terminal knee ext  0# 1x10  1# 1x10 Terminal knee ext With kneehab 1 x 15  1# 3 x 10 2# 3 x 10   LAQ 3# 3 X 10 3# 3 x10 Short arc  3# x15  4# x10  5# 2x5 Short arc  5# 3 x 10 Through limited ROM to minimize feeling that patella would \"Catch\", 2 x 10 90 to 45 degrees  5# 1x20 90 to 45 degrees 2# 3 x 10    Sidelying hip abd 0# 3 X 10 0# 3 x 10 1# 1x15 1# 2 x 10  - 1# 3 x 10 0 # 3 x 10   Prone hip ext 3# 3 X 10 3# 3 x 10 - -  - 3# 3 x 10 3# 3 x10   Prone knee flex 3# 3 X 10 3# 3 x 10 3# 1x30 total 3# 3 x 10   - 3# 3 x 10 3# 3 x 10   Seated knee flex tband 3 x 10 Black t band 3 x 10 - -  -     Bridging  With tband abduction 2 x 10 With t band abduction 2 x 10 With ball adduction  5\"x10 With ball adduction 3 x 10   - With ball adduction 3 x 10    Hip abd t band 2 x 10 t band 3 x 10 sdie-lying straight knee   1# 1x10  Bent knee  Knee toward knee  2x5 Side-lying 0# x 10  1# 2 x 10  -     It band stretch in sidelying  Prone quad stretch  3 x 20 sec -   -  3 x 20  3 x 20   Recumbent bike- 5 min level 2  Treatment/Session Assessment:    · Response to Treatment: Difficult to stretch quads with knee flexion as pt reports pain behind knee with flexion. Pt states she can tell the LE is stronger. · Compliance with Program/Exercises:  Appears compliant   · Recommendations/Intent for next treatment session: Continue with ROM and strength work on R knee.    Total Treatment Duration: 40 Minutes   PT Patient Time In/Time Out  Time In: 0930  Time Out: [address] PT

## 2018-08-17 ENCOUNTER — HOSPITAL ENCOUNTER (OUTPATIENT)
Dept: PHYSICAL THERAPY | Age: 53
Discharge: HOME | End: 2018-08-17
Payer: COMMERCIAL

## 2018-08-17 PROCEDURE — 97110 THERAPEUTIC EXERCISES: CPT

## 2018-08-17 NOTE — PROGRESS NOTES
Oksana Rabago  : 1965  Payor: [address] [place] [address] Road / Plan: Elisa Escalante / Product Type: Workers Comp /  [address]  [facility] [place] [facility]  [address] [place] [address], [address] [place], [place] [address], [address] [place] [address]  [phone]   [phone]       OUTPATIENT PHYSICAL THERAPY:Daily Note 2018      ICD-10: Treatment Diagnosis: Sprain of posterior cruciate ligament of right knee, sequela (S83.521S); Pain in right knee (M25.561); Stiffness of right knee, not elsewhere classified (M25.661); Difficulty in walking, not elsewhere classified (R26.2)  Precautions/Allergies:   Review of patient's allergies indicates no known allergies. Fall Risk Score: 1 (? 5 = High Risk)  MD Orders:Evaluate and treat, HEP, ROM, strengthening; Quad strengthening; \"continue kneeHab\" (18) MEDICAL/REFERRING DIAGNOSIS:  S/p R knee scope, PCL reconstruction   DATE OF ONSET: Surgery 17  REFERRING PHYSICIAN: Jesu Jarquin MD  RETURN PHYSICIAN APPOINTMENT:  18 ASSESSMENT:  Ms. Diane Rivera presents s/p right knee scope with PCL reconstruction. She is progressing with her gait mechanics on level ground, but continues to have difficulty with stair ascent/descent. Patient also inhibited by pain when ambulating and this leads to an increased antalgic gait pattern. Patient will benefit from continued PT to increase R LE strength, reduce discomfort and improve R LE function. PROBLEM LIST (Impacting functional limitations):  1. Post-op pain and swelling right knee  2. Decreased ROM right knee   3. Decreased functional strength right knee/LE   4. Decreased gait skills INTERVENTIONS PLANNED:  1. aquatic therapy  2. Thermal and electric modalities, manual therapies for pain. 3. Manual therapies and therapeutic exercises for ROM and strength. 4. Therapeutic exercises for gait and balance   TREATMENT PLAN:  Effective Dates: 18 to 18.   Frequency/Duration: 2-3 times a week for 8 more weeks  GOALS: (Goals have been discussed and agreed upon with patient.)   Short-Term Functional Goals: Time Frame: 6 weeks  1. Pt will be able to perform a good quad set and SLR with no extensor lag with for improved strength for gait. MET 7-6-18  2. Pt will increase R knee ROM to 0-90 for improved mobility. GOAL MET  3. Pt will improved R ankle DF AROM to 0 for improved gait. GOAL MET  4. Pt will report pain 5/10 with modified daily activities. GOAL MET  Discharge Goals: Time Frame: 12 weeks   1. Pt will increase R LE strength to 5/5 with manual muscle testing for improved strength for ADLs and work. Progressing towards, not tested on 7/31/18  2. Pt will negotiate 1 flight of stairs with step over step with good control. Progressing towards, ongoing 7-31-18  3. Pt will increase R knee ROM to 0-120 for mobility. GOAL MET  4. Pt will report pain 3/10 with daily activities. Progressing towards, ongoing 7-31-18  5. Pt will score 45/80 on LEFS. Progressing towards, ongiong 7-31-18  Rehabilitation Potential For Stated Goals: Good                HISTORY:   History of Present Injury/Illness (Reason for Referral): She works at [employer] and she was entering the cooler and there was oil on the floor. She slipped and the feet slipped out from under her. She tried to get up and slipped again. Injury was August 14-17. She did go to physical therapy but it was making her worse. They decided to have surgery. Surgery 12-21-17. Stayed 2 nights in the hospital. She did have home health physical therapy 3-4x. Pain increases with walking. Past Medical History/Comorbidities: diabetes type 2 controlled with diet, HTN, L LE varicose vein surgery  Social History/Living Environment: Lives with . 2 steps to get inside. Prior Level of Function/Work/Activity: Works at [employer]. 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 Job does require lifting. Would like to get back to walking and walking dogs.    Current Medications: Current Outpatient Prescriptions:     magnesium hydroxide (BEAUCHAMP MILK OF MAGNESIA) 400 mg/5 mL suspension, Take 15 mL by mouth as needed for Constipation. as needed daily, Disp: , Rfl:    tramodol   Steroid *    atorvastatin (LIPITOR) 20 mg tablet, Take 20 mg by mouth nightly., Disp: , Rfl:     lisinopril-hydroCHLOROthiazide (PRINZIDE, ZESTORETIC) 10-12.5 mg per tablet, Take  by mouth daily. Indications: hypertension, Disp: , Rfl:     gemfibrozil (LOPID) 600 mg tablet, Take 600 mg by mouth two (2) times a day., Disp: , Rfl:     Omega-3 Fatty Acids 60- mg cpDR, Take  by mouth daily. , Disp: , Rfl:     acetaminophen (TYLENOL) 325 mg tablet, Take 325 mg by mouth every four (4) hours as needed for Pain., Disp: , Rfl:    Date Last Reviewed:  8/16/2018   EXAMINATION:   7-31-18  Observation/Orthostatic Postural Assessment: Comes into clinic without assistive device or brace. Walks with limp on R. Palpation: Audible and palpable crepitus noted to R knee patellofemoral and tibiofemoral joints. ROM:     L AROM: flexion 125 degrees, extension 0 deg   L knee flexion PROM: 128 deg flexion, limited by pain           Strength:   n/t                  Functional Mobility: Walking on level ground: initially able to ambulate with minimal antalgic gait pattern. With fatigue/pain patient begins to minimize landing on R heel when ambulating and exhibits forefoot initial contact with increased R knee flexion. Stair climbing: Patient ascends/descends with step-to gait pattern. Able to ascend stairs with R LE but requires forward trunk lean over R LE. Requires use of one handrail to ascend and descend stairs. Turns body sideways to descend stairs with step-to gait pattern. Balance: Not tested. Body Structures Involved:  1. Joints  2. Muscles  3. Ligaments Body Functions Affected:  1. Neuromusculoskeletal Activities and Participation Affected:  1. Mobility  2.  Community, Social and [address] [place] [address] DECISION MAKING:   Outcome Measure: Tool Used: Lower Extremity Functional Scale (LEFS)  Score:  Initial: 5/80 20/80 (Date: 4-16-18 ) 44/80 (7-6-18) Most recent: 35/80 (7-31-18)   Interpretation of Score: 20 questions each scored on a 5 point scale with 0 representing \"extreme difficulty or unable to perform\" and 4 representing \"no difficulty\". The lower the score, the greater the functional disability. 80/80 represents no disability. Minimal detectable change is 9 points. Medical Necessity:   · Patient is expected to demonstrate progress in strength, range of motion and balance to improve gait. Reason for Services/Other Comments:  · Patient continues to require skilled intervention due to post-op R knee, decreased ROM, strength, gait. TREATMENT:   (In addition to Assessment/Re-Assessment sessions the following treatments were rendered)  8/17/2018   Accompanied by hospital  and workers comp  throughout entire session. Pre-treatment Symptoms/Complaints: Pt reports via  that her knee does not feel bad today. Pain: Initial:    2-3/10 Post Session: 4/10        Manual Therapy   minutes) fTherapeutic Exercise: (  0 minutes): none today   Therapeutic Exercisess (45 minutes) to improve R LE function, strength and reduce discomfort. Passive ROM to R knee before strengthening to increase tissue pliability after 5 minute warm up on recumbent bike. Manual stretching to R hamstrings (6 x 20\") in supine, and R hip adductors in supine (6 x 20\") to reduce R knee discomfort.   HEP: She is to continue  her current HEP.   7/24/18 7/26/18 7/27/18 7-31-18 8-1-18 8/7/18 8/14/18 8/16/18 8-17-18               SLR 3# 3 X10 3# 3 x 10 3# 1x30 total ea 3# 3 x 10 R With Kneehab, 1 x 15 R 5# 2x10  4# 1x15 2# 3 x 10 2# 3 x 10 0# x 10  2# 3 x 8   SAQ 3# 3 X10 3# 3 x 10 Terminal knee ext  0# 1x10  1# 1x10 Terminal knee ext With kneehab 1 x 15  1# 3 x 10 2# 3 x 10 2# 2 x 10  3#    LAQ 3# 3 X 10 3# 3 x10 Short arc  3# x15  4# x10  5# 2x5 Short arc  5# 3 x 10 Through limited ROM to minimize feeling that patella would \"Catch\", 2 x 10 90 to 45 degrees  5# 1x20 90 to 45 degrees 2# 3 x 10  90 to 45 degrees, 2#   Sidelying hip abd 0# 3 X 10 0# 3 x 10 1# 1x15 1# 2 x 10  - 1# 3 x 10 0 # 3 x 10 0# 3 x 10   Prone hip ext 3# 3 X 10 3# 3 x 10 - -  - 3# 3 x 10 3# 3 x10 3# 3 x 10   Prone knee flex 3# 3 X 10 3# 3 x 10 3# 1x30 total 3# 3 x 10   - 3# 3 x 10 3# 3 x 10 3# 3 x 10   Seated knee flex tband 3 x 10 Black t band 3 x 10 - -  -      Bridging  With tband abduction 2 x 10 With t band abduction 2 x 10 With ball adduction  5\"x10 With ball adduction 3 x 10   - With ball adduction 3 x 10     Hip abd t band 2 x 10 t band 3 x 10 sdie-lying straight knee   1# 1x10  Bent knee  Knee toward knee  2x5 Side-lying 0# x 10  1# 2 x 10  -      It band stretch in sidelying  Prone quad stretch  3 x 20 sec -   -  3 x 20  3 x 20    Step ups         2\" 3  x10   Calf raises         2 x 15 bilateral   Recumbent bike- see above  Treatment/Session Assessment:    · Response to Treatment: Patient tolerated therapeutic exercises well with only occasional discomfort. Reduced ROM on long arc quads to prevent catching/popping. Tight in R hamstrings and adductors but this improved with stretching. · Compliance with Program/Exercises:  Appears compliant   · Recommendations/Intent for next treatment session: Continue with ROM and strength work on R knee.    Total Treatment Duration: 45 Minutes   PT Patient Time In/Time Out  Time In: 0845  Time Out: [address] [facility],

## 2018-08-21 ENCOUNTER — HOSPITAL ENCOUNTER (OUTPATIENT)
Dept: PHYSICAL THERAPY | Age: 53
Discharge: HOME | End: 2018-08-21
Payer: COMMERCIAL

## 2018-08-21 PROCEDURE — 97110 THERAPEUTIC EXERCISES: CPT

## 2018-08-21 NOTE — PROGRESS NOTES
Antwon Tracy  : 1965  Payor: [address] [place] [address] Road / Plan: Reji Barker / Product Type: Workers Comp /  [address]  at [facility] [place] [employer]  [address] [place] [address], [address].  [phone]   [phone]       OUTPATIENT PHYSICAL THERAPY:Daily Note 2018      ICD-10: Treatment Diagnosis: Sprain of posterior cruciate ligament of right knee, sequela (S83.521S); Pain in right knee (M25.561); Stiffness of right knee, not elsewhere classified (M25.661); Difficulty in walking, not elsewhere classified (R26.2)  Precautions/Allergies:   Review of patient's allergies indicates no known allergies. Fall Risk Score: 1 (? 5 = High Risk)  MD Orders:Evaluate and treat, HEP, ROM, strengthening; Quad strengthening; \"continue kneeHab\" (18) MEDICAL/REFERRING DIAGNOSIS:  S/p R knee scope, PCL reconstruction   DATE OF ONSET: Surgery 17  REFERRING PHYSICIAN: Steve Hilario MD  RETURN PHYSICIAN APPOINTMENT:  18 ASSESSMENT:  Ms. Kai Sy presents s/p right knee scope with PCL reconstruction. She is progressing with her gait mechanics on level ground, but continues to have difficulty with stair ascent/descent. Patient also inhibited by pain when ambulating and this leads to an increased antalgic gait pattern. Patient will benefit from continued PT to increase R LE strength, reduce discomfort and improve R LE function. PROBLEM LIST (Impacting functional limitations):  1. Post-op pain and swelling right knee  2. Decreased ROM right knee   3. Decreased functional strength right knee/LE   4. Decreased gait skills INTERVENTIONS PLANNED:  1. aquatic therapy  2. Thermal and electric modalities, manual therapies for pain. 3. Manual therapies and therapeutic exercises for ROM and strength. 4. Therapeutic exercises for gait and balance   TREATMENT PLAN:  Effective Dates: 18 to 18.   Frequency/Duration: 2-3 times a week for 8 more weeks  GOALS: (Goals have been discussed and agreed upon with patient.)   Short-Term Functional Goals: Time Frame: 6 weeks  1. Pt will be able to perform a good quad set and SLR with no extensor lag with for improved strength for gait. MET 7-6-18  2. Pt will increase R knee ROM to 0-90 for improved mobility. GOAL MET  3. Pt will improved R ankle DF AROM to 0 for improved gait. GOAL MET  4. Pt will report pain 5/10 with modified daily activities. GOAL MET  Discharge Goals: Time Frame: 12 weeks   1. Pt will increase R LE strength to 5/5 with manual muscle testing for improved strength for ADLs and work. Progressing towards, not tested on 7/31/18  2. Pt will negotiate 1 flight of stairs with step over step with good control. Progressing towards, ongoing 7-31-18  3. Pt will increase R knee ROM to 0-120 for mobility. GOAL MET  4. Pt will report pain 3/10 with daily activities. Progressing towards, ongoing 7-31-18  5. Pt will score 45/80 on LEFS. Progressing towards, ongiong 7-31-18  Rehabilitation Potential For Stated Goals: Good                HISTORY:   History of Present Injury/Illness (Reason for Referral): She works at [employer] and she was entering the cooler and there was oil on the floor. She slipped and the feet slipped out from under her. She tried to get up and slipped again. Injury was August 14-17. She did go to physical therapy but it was making her worse. They decided to have surgery. Surgery 12-21-17. Stayed 2 nights in the hospital. She did have home health physical therapy 3-4x. Pain increases with walking. Past Medical History/Comorbidities: diabetes type 2 controlled with diet, HTN, L LE varicose vein surgery  Social History/Living Environment: Lives with . 2 steps to get inside. Prior Level of Function/Work/Activity: Works at [employer]. 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 Job does require lifting. Would like to get back to walking and walking dogs.    Current Medications: Current Outpatient Prescriptions:     magnesium hydroxide (BEAUCHAMP MILK OF MAGNESIA) 400 mg/5 mL suspension, Take 15 mL by mouth as needed for Constipation. as needed daily, Disp: , Rfl:    tramodol   Steroid *    atorvastatin (LIPITOR) 20 mg tablet, Take 20 mg by mouth nightly., Disp: , Rfl:     lisinopril-hydroCHLOROthiazide (PRINZIDE, ZESTORETIC) 10-12.5 mg per tablet, Take  by mouth daily. Indications: hypertension, Disp: , Rfl:     gemfibrozil (LOPID) 600 mg tablet, Take 600 mg by mouth two (2) times a day., Disp: , Rfl:     Omega-3 Fatty Acids 60- mg cpDR, Take  by mouth daily. , Disp: , Rfl:     acetaminophen (TYLENOL) 325 mg tablet, Take 325 mg by mouth every four (4) hours as needed for Pain., Disp: , Rfl:    Date Last Reviewed:  8/16/2018   EXAMINATION:   7-31-18  Observation/Orthostatic Postural Assessment: Comes into clinic without assistive device or brace. Walks with limp on R. Palpation: Audible and palpable crepitus noted to R knee patellofemoral and tibiofemoral joints. ROM:     L AROM: flexion 125 degrees, extension 0 deg   L knee flexion PROM: 128 deg flexion, limited by pain           Strength:   n/t                  Functional Mobility: Walking on level ground: initially able to ambulate with minimal antalgic gait pattern. With fatigue/pain patient begins to minimize landing on R heel when ambulating and exhibits forefoot initial contact with increased R knee flexion. Stair climbing: Patient ascends/descends with step-to gait pattern. Able to ascend stairs with R LE but requires forward trunk lean over R LE. Requires use of one handrail to ascend and descend stairs. Turns body sideways to descend stairs with step-to gait pattern. Balance: Not tested. Body Structures Involved:  1. Joints  2. Muscles  3. Ligaments Body Functions Affected:  1. Neuromusculoskeletal Activities and Participation Affected:  1. Mobility  2.  Community, Social and [address] [place] [address] DECISION MAKING:   Outcome Measure: Tool Used: Lower Extremity Functional Scale (LEFS)  Score:  Initial: 5/80 20/80 (Date: 4-16-18 ) 44/80 (7-6-18) Most recent: 35/80 (7-31-18)   Interpretation of Score: 20 questions each scored on a 5 point scale with 0 representing \"extreme difficulty or unable to perform\" and 4 representing \"no difficulty\". The lower the score, the greater the functional disability. 80/80 represents no disability. Minimal detectable change is 9 points. Medical Necessity:   · Patient is expected to demonstrate progress in strength, range of motion and balance to improve gait. Reason for Services/Other Comments:  · Patient continues to require skilled intervention due to post-op R knee, decreased ROM, strength, gait. TREATMENT:   (In addition to Assessment/Re-Assessment sessions the following treatments were rendered)  8/21/2018   Accompanied by hospital  and workers comp  throughout entire session. Pre-treatment Symptoms/Complaints: Pt reports via  that she is about the same. She states she hates to complain. Pain: Initial:    2-3/10 Post Session: 4/10        Manual Therapy   minutes)   Therapeutic Exercisess (45 minutes) to improve R LE function, strength and reduce discomfort. Passive ROM to R knee before strengthening to increase tissue pliability after 5 minute warm up on recumbent bike. Manual stretching to R hamstrings (6 x 20\") in supine, and R hip adductors in supine (6 x 20\") to reduce R knee discomfort.   HEP: She is to continue  her current HEP.   8-21-18       SLR 0# x 10  2# 3 x 10   SAQ 2# 3 x 10  3#    LAQ    Sidelying hip abd 0# 3 x 10   Prone hip ext 3# 3 x 10   Prone knee flex 3# 3 x 10   Seated knee flex    Bridging     Hip abd    It band stretch in sidelying  Prone quad stretch    Step ups    Calf raises 2 x 15 bilateral   Recumbent bike- see above  Treatment/Session Assessment:    · Response to Treatment: Patient tolerated therapeutic exercises well with only occasional discomfort. · Compliance with Program/Exercises:  Appears compliant   · Recommendations/Intent for next treatment session: Continue with ROM and strength work on R knee.    Total Treatment Duration: 45 Minutes   PT Patient Time In/Time Out  Time In: 0800  Time Out: 0845    Aarti Cook PT

## 2018-08-23 ENCOUNTER — HOSPITAL ENCOUNTER (OUTPATIENT)
Dept: PHYSICAL THERAPY | Age: 53
Discharge: HOME | End: 2018-08-23
Payer: COMMERCIAL

## 2018-08-23 PROCEDURE — 97110 THERAPEUTIC EXERCISES: CPT

## 2018-08-23 NOTE — PROGRESS NOTES
Anneliese   : 1965  Payor: [address] [place] [address] Road / Plan: Elier Palmer / Product Type: Workers Comp /  [address]  at [facility] [place] [employer]  [address] [place] [address], [address].  [phone]   [phone]       OUTPATIENT PHYSICAL THERAPY:Daily Note 2018      ICD-10: Treatment Diagnosis: Sprain of posterior cruciate ligament of right knee, sequela (S83.521S); Pain in right knee (M25.561); Stiffness of right knee, not elsewhere classified (M25.661); Difficulty in walking, not elsewhere classified (R26.2)  Precautions/Allergies:   Review of patient's allergies indicates no known allergies. Fall Risk Score: 1 (? 5 = High Risk)  MD Orders:Evaluate and treat, HEP, ROM, strengthening; Quad strengthening; \"continue kneeHab\" (18) MEDICAL/REFERRING DIAGNOSIS:  S/p R knee scope, PCL reconstruction   DATE OF ONSET: Surgery 17  REFERRING PHYSICIAN: Krystal Ricardo MD  RETURN PHYSICIAN APPOINTMENT:  18 ASSESSMENT:  Ms. Leti Nassar presents s/p right knee scope with PCL reconstruction. She is progressing with her gait mechanics on level ground, but continues to have difficulty with stair ascent/descent. Patient also inhibited by pain when ambulating and this leads to an increased antalgic gait pattern. Patient will benefit from continued PT to increase R LE strength, reduce discomfort and improve R LE function. PROBLEM LIST (Impacting functional limitations):  1. Post-op pain and swelling right knee  2. Decreased ROM right knee   3. Decreased functional strength right knee/LE   4. Decreased gait skills INTERVENTIONS PLANNED:  1. aquatic therapy  2. Thermal and electric modalities, manual therapies for pain. 3. Manual therapies and therapeutic exercises for ROM and strength. 4. Therapeutic exercises for gait and balance   TREATMENT PLAN:  Effective Dates: 18 to 18.   Frequency/Duration: 2-3 times a week for 8 more weeks  GOALS: (Goals have been discussed and agreed upon with patient.)   Short-Term Functional Goals: Time Frame: 6 weeks  1. Pt will be able to perform a good quad set and SLR with no extensor lag with for improved strength for gait. MET 7-6-18  2. Pt will increase R knee ROM to 0-90 for improved mobility. GOAL MET  3. Pt will improved R ankle DF AROM to 0 for improved gait. GOAL MET  4. Pt will report pain 5/10 with modified daily activities. GOAL MET  Discharge Goals: Time Frame: 12 weeks   1. Pt will increase R LE strength to 5/5 with manual muscle testing for improved strength for ADLs and work. Progressing towards, not tested on 7/31/18  2. Pt will negotiate 1 flight of stairs with step over step with good control. Progressing towards, ongoing 7-31-18  3. Pt will increase R knee ROM to 0-120 for mobility. GOAL MET  4. Pt will report pain 3/10 with daily activities. Progressing towards, ongoing 7-31-18  5. Pt will score 45/80 on LEFS. Progressing towards, ongiong 7-31-18  Rehabilitation Potential For Stated Goals: Good                HISTORY:   History of Present Injury/Illness (Reason for Referral): She works at [employer] and she was entering the cooler and there was oil on the floor. She slipped and the feet slipped out from under her. She tried to get up and slipped again. Injury was August 14-17. She did go to physical therapy but it was making her worse. They decided to have surgery. Surgery 12-21-17. Stayed 2 nights in the hospital. She did have home health physical therapy 3-4x. Pain increases with walking. Past Medical History/Comorbidities: diabetes type 2 controlled with diet, HTN, L LE varicose vein surgery  Social History/Living Environment: Lives with . 2 steps to get inside. Prior Level of Function/Work/Activity: Works at [employer]. 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 Job does require lifting. Would like to get back to walking and walking dogs.    Current Medications: Current Outpatient Prescriptions:     magnesium hydroxide (BEAUCHAMP MILK OF MAGNESIA) 400 mg/5 mL suspension, Take 15 mL by mouth as needed for Constipation. as needed daily, Disp: , Rfl:    tramodol   Steroid *    atorvastatin (LIPITOR) 20 mg tablet, Take 20 mg by mouth nightly., Disp: , Rfl:     lisinopril-hydroCHLOROthiazide (PRINZIDE, ZESTORETIC) 10-12.5 mg per tablet, Take  by mouth daily. Indications: hypertension, Disp: , Rfl:     gemfibrozil (LOPID) 600 mg tablet, Take 600 mg by mouth two (2) times a day., Disp: , Rfl:     Omega-3 Fatty Acids 60- mg cpDR, Take  by mouth daily. , Disp: , Rfl:     acetaminophen (TYLENOL) 325 mg tablet, Take 325 mg by mouth every four (4) hours as needed for Pain., Disp: , Rfl:    Date Last Reviewed:  8/23/2018   EXAMINATION:   7-31-18  Observation/Orthostatic Postural Assessment: Comes into clinic without assistive device or brace. Walks with limp on R. Palpation: Audible and palpable crepitus noted to R knee patellofemoral and tibiofemoral joints. ROM:     L AROM: flexion 125 degrees, extension 0 deg   L knee flexion PROM: 128 deg flexion, limited by pain           Strength:   n/t                  Functional Mobility: Walking on level ground: initially able to ambulate with minimal antalgic gait pattern. With fatigue/pain patient begins to minimize landing on R heel when ambulating and exhibits forefoot initial contact with increased R knee flexion. Stair climbing: Patient ascends/descends with step-to gait pattern. Able to ascend stairs with R LE but requires forward trunk lean over R LE. Requires use of one handrail to ascend and descend stairs. Turns body sideways to descend stairs with step-to gait pattern. Balance: Not tested. Body Structures Involved:  1. Joints  2. Muscles  3. Ligaments Body Functions Affected:  1. Neuromusculoskeletal Activities and Participation Affected:  1. Mobility  2.  Community, Social and [address] [place] [address] DECISION MAKING:   Outcome Measure: Tool Used: Lower Extremity Functional Scale (LEFS)  Score:  Initial: 5/80 20/80 (Date: 4-16-18 ) 44/80 (7-6-18) Most recent: 35/80 (7-31-18)   Interpretation of Score: 20 questions each scored on a 5 point scale with 0 representing \"extreme difficulty or unable to perform\" and 4 representing \"no difficulty\". The lower the score, the greater the functional disability. 80/80 represents no disability. Minimal detectable change is 9 points. Medical Necessity:   · Patient is expected to demonstrate progress in strength, range of motion and balance to improve gait. Reason for Services/Other Comments:  · Patient continues to require skilled intervention due to post-op R knee, decreased ROM, strength, gait. TREATMENT:   (In addition to Assessment/Re-Assessment sessions the following treatments were rendered)  8/23/2018   Accompanied by hospital  and workers comp  throughout entire session. Pre-treatment Symptoms/Complaints: Pt reports via  that she is walking more but it is still painful. Pain: Initial:    2-3/10 Post Session: 4/10        Manual Therapy   minutes)   Therapeutic Exercisess (45 minutes) to improve R LE function, strength and reduce discomfort. Passive ROM to R knee before strengthening to increase tissue pliability after 5 minute warm up on recumbent bike. Manual stretching to R hamstrings (6 x 20\") in supine, and R hip adductors in supine (6 x 20\") to reduce R knee discomfort.   HEP: She is to continue  her current HEP.   8-23-18       SLR 0# x 10  2# 3 x 10   SAQ 2# 3 x 10  3#    LAQ    Sidelying hip abd 0# 3 x 10   Prone hip ext 3# 3 x 10   Prone knee flex 3# 3 x 10   Seated knee flex    Bridging  3 x 10   Hip abd    It band stretch in sidelying  Prone quad stretch    Step ups    Calf raises 2 x 15 bilateral   Recumbent bike- see above  Treatment/Session Assessment:    · Response to Treatment: Patient tolerated therapeutic exercises well with only occasional discomfort. · Compliance with Program/Exercises:  Appears compliant   · Recommendations/Intent for next treatment session: Continue with ROM and strength work on R knee.    Total Treatment Duration: 45 Minutes   PT Patient Time In/Time Out  Time In: 0845  Time Out: 0930    Renzo Ramírez, PT

## 2018-08-24 ENCOUNTER — HOSPITAL ENCOUNTER (OUTPATIENT)
Dept: PHYSICAL THERAPY | Age: 53
Discharge: HOME | End: 2018-08-24
Payer: COMMERCIAL

## 2018-08-24 PROCEDURE — 97110 THERAPEUTIC EXERCISES: CPT

## 2018-08-24 NOTE — PROGRESS NOTES
Verena Gil  : 1965  Payor: [address] [place] [address] Road / Plan: Rennis Loose / Product Type: Workers Comp /  [address]  at [address] [place] [address] Rd  [address] [facility],  [place].  [phone]   [phone]       OUTPATIENT PHYSICAL THERAPY:Daily Note 2018      ICD-10: Treatment Diagnosis: Sprain of posterior cruciate ligament of right knee, sequela (S83.521S); Pain in right knee (M25.561); Stiffness of right knee, not elsewhere classified (M25.661); Difficulty in walking, not elsewhere classified (R26.2)  Precautions/Allergies:   Review of patient's allergies indicates no known allergies. Fall Risk Score: 1 (? 5 = High Risk)  MD Orders:Evaluate and treat, HEP, ROM, strengthening; Quad strengthening; \"continue kneeHab\" (18) MEDICAL/REFERRING DIAGNOSIS:  S/p R knee scope, PCL reconstruction   DATE OF ONSET: Surgery 17  REFERRING PHYSICIAN: Radha Young MD  RETURN PHYSICIAN APPOINTMENT:  18 ASSESSMENT:  Ms. Susie Marie presents s/p right knee scope with PCL reconstruction. She is progressing with her gait mechanics on level ground, but continues to have difficulty with stair ascent/descent. Patient also inhibited by pain when ambulating and this leads to an increased antalgic gait pattern. Patient will benefit from continued PT to increase R LE strength, reduce discomfort and improve R LE function. PROBLEM LIST (Impacting functional limitations):  1. Post-op pain and swelling right knee  2. Decreased ROM right knee   3. Decreased functional strength right knee/LE   4. Decreased gait skills INTERVENTIONS PLANNED:  1. aquatic therapy  2. Thermal and electric modalities, manual therapies for pain. 3. Manual therapies and therapeutic exercises for ROM and strength. 4. Therapeutic exercises for gait and balance   TREATMENT PLAN:  Effective Dates: 18 to 18.   Frequency/Duration: 2-3 times a week for 8 more weeks  GOALS: (Goals have been discussed and agreed upon with patient.)   Short-Term Functional Goals: Time Frame: 6 weeks  1. Pt will be able to perform a good quad set and SLR with no extensor lag with for improved strength for gait. MET 7-6-18  2. Pt will increase R knee ROM to 0-90 for improved mobility. GOAL MET  3. Pt will improved R ankle DF AROM to 0 for improved gait. GOAL MET  4. Pt will report pain 5/10 with modified daily activities. GOAL MET  Discharge Goals: Time Frame: 12 weeks   1. Pt will increase R LE strength to 5/5 with manual muscle testing for improved strength for ADLs and work. Progressing towards, not tested on 7/31/18  2. Pt will negotiate 1 flight of stairs with step over step with good control. Progressing towards, ongoing 7-31-18  3. Pt will increase R knee ROM to 0-120 for mobility. GOAL MET  4. Pt will report pain 3/10 with daily activities. Progressing towards, ongoing 7-31-18  5. Pt will score 45/80 on LEFS. Progressing towards, ongiong 7-31-18  Rehabilitation Potential For Stated Goals: Good                HISTORY:   History of Present Injury/Illness (Reason for Referral): She works at [employer] and she was entering the cooler and there was oil on the floor. She slipped and the feet slipped out from under her. She tried to get up and slipped again. Injury was August 14-17. She did go to physical therapy but it was making her worse. They decided to have surgery. Surgery 12-21-17. Stayed 2 nights in the hospital. She did have home health physical therapy 3-4x. Pain increases with walking. Past Medical History/Comorbidities: diabetes type 2 controlled with diet, HTN, L LE varicose vein surgery  Social History/Living Environment: Lives with . 2 steps to get inside. Prior Level of Function/Work/Activity: Works at [employer]. 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 Job does require lifting. Would like to get back to walking and walking dogs.    Current Medications: Current Outpatient Prescriptions:     magnesium hydroxide (BEAUCHAMP MILK OF MAGNESIA) 400 mg/5 mL suspension, Take 15 mL by mouth as needed for Constipation. as needed daily, Disp: , Rfl:    tramodol   Steroid *    atorvastatin (LIPITOR) 20 mg tablet, Take 20 mg by mouth nightly., Disp: , Rfl:     lisinopril-hydroCHLOROthiazide (PRINZIDE, ZESTORETIC) 10-12.5 mg per tablet, Take  by mouth daily. Indications: hypertension, Disp: , Rfl:     gemfibrozil (LOPID) 600 mg tablet, Take 600 mg by mouth two (2) times a day., Disp: , Rfl:     Omega-3 Fatty Acids 60- mg cpDR, Take  by mouth daily. , Disp: , Rfl:     acetaminophen (TYLENOL) 325 mg tablet, Take 325 mg by mouth every four (4) hours as needed for Pain., Disp: , Rfl:    Date Last Reviewed:  8/24/2018   EXAMINATION:   7-31-18  Observation/Orthostatic Postural Assessment: Comes into clinic without assistive device or brace. Walks with limp on R. Palpation: Audible and palpable crepitus noted to R knee patellofemoral and tibiofemoral joints. ROM:     L AROM: flexion 125 degrees, extension 0 deg   L knee flexion PROM: 128 deg flexion, limited by pain           Strength:   n/t                  Functional Mobility: Walking on level ground: initially able to ambulate with minimal antalgic gait pattern. With fatigue/pain patient begins to minimize landing on R heel when ambulating and exhibits forefoot initial contact with increased R knee flexion. Stair climbing: Patient ascends/descends with step-to gait pattern. Able to ascend stairs with R LE but requires forward trunk lean over R LE. Requires use of one handrail to ascend and descend stairs. Turns body sideways to descend stairs with step-to gait pattern. Balance: Not tested. Body Structures Involved:  1. Joints  2. Muscles  3. Ligaments Body Functions Affected:  1. Neuromusculoskeletal Activities and Participation Affected:  1. Mobility  2.  Community, Social and [address] [place] [address] DECISION MAKING:   Outcome Measure: Tool Used: Lower Extremity Functional Scale (LEFS)  Score:  Initial: 5/80 20/80 (Date: 4-16-18 ) 44/80 (7-6-18) Most recent: 35/80 (7-31-18)   Interpretation of Score: 20 questions each scored on a 5 point scale with 0 representing \"extreme difficulty or unable to perform\" and 4 representing \"no difficulty\". The lower the score, the greater the functional disability. 80/80 represents no disability. Minimal detectable change is 9 points. Medical Necessity:   · Patient is expected to demonstrate progress in strength, range of motion and balance to improve gait. Reason for Services/Other Comments:  · Patient continues to require skilled intervention due to post-op R knee, decreased ROM, strength, gait. TREATMENT:   (In addition to Assessment/Re-Assessment sessions the following treatments were rendered)  8/24/2018   Accompanied by hospital  and workers comp  throughout entire session. Pre-treatment Symptoms/Complaints: Pt reports via  that she is sore today after having PT yesterday. Pain: Initial:    4-5/10 Post Session: 4-5/10, denied ice after PT        Manual Therapy (0  minutes) none today  Therapeutic Exercisess (45 minutes) to improve R LE function, strength and reduce discomfort. Passive ROM to R knee before strengthening to increase tissue pliability after 5 minute warm up on recumbent bike. Manual stretching to R hamstrings (6 x 20\") in supine, and R hip adductors in supine (6 x 20\") to reduce R knee discomfort. Side-lying hip flexor/quadriceps quick stretches to improve R quadriceps and hip flexor tigthness to reduce R knee discomfort.   HEP: She is to continue  her current HEP.   8-23-18 8-24-18        SLR 0# x 10  2# 3 x 10 0# x 10  2# 2 x 10   SAQ 2# 3 x 10  3#  2# x 12  3# x 10  4# x 10   LAQ  -   Sidelying hip abd 0# 3 x 10 0#    Prone hip ext 3# 3 x 10 3# initially x 5 repetitions;  1x5, 2 x 10 w/o added resistance   Prone knee flex 3# 3 x 10 3# 3 x 10   Seated knee flex     Bridging  3 x 10 3 x 10   Hip abd  -   It band stretch in sidelying  Prone quad stretch     Step ups     Calf raises 2 x 15 bilateral    Recumbent bike- see above  Treatment/Session Assessment:    · Response to Treatment: Patient performed short arc quads well even with increased resistance, but had to reduce weight on hamstring curls due to weakness and increased pain. · Compliance with Program/Exercises:  Appears compliant   · Recommendations/Intent for next treatment session: Continue with ROM and strength work on R knee.    Total Treatment Duration: 45 Minutes   PT Patient Time In/Time Out  Time In: 0845  Time Out: [address], PT

## 2018-08-28 ENCOUNTER — HOSPITAL ENCOUNTER (OUTPATIENT)
Dept: PHYSICAL THERAPY | Age: 53
Discharge: HOME | End: 2018-08-28
Payer: COMMERCIAL

## 2018-08-28 PROCEDURE — 97110 THERAPEUTIC EXERCISES: CPT

## 2018-08-28 NOTE — PROGRESS NOTES
Anneliese   : 1965  Payor: [address] [place] [address] Road / Plan: Elier Palmer / Product Type: Workers Comp /  [address]  at [facility] [place] [employer]  [address] [place] [address], [address].  [phone]   [phone]       OUTPATIENT PHYSICAL THERAPY:Daily Note 2018      ICD-10: Treatment Diagnosis: Sprain of posterior cruciate ligament of right knee, sequela (S83.521S); Pain in right knee (M25.561); Stiffness of right knee, not elsewhere classified (M25.661); Difficulty in walking, not elsewhere classified (R26.2)  Precautions/Allergies:   Review of patient's allergies indicates no known allergies. Fall Risk Score: 1 (? 5 = High Risk)  MD Orders:Evaluate and treat, HEP, ROM, strengthening; Quad strengthening; \"continue kneeHab\" (18) MEDICAL/REFERRING DIAGNOSIS:  S/p R knee scope, PCL reconstruction   DATE OF ONSET: Surgery 17  REFERRING PHYSICIAN: Krystal Ricardo MD  RETURN PHYSICIAN APPOINTMENT:  18 ASSESSMENT:  Ms. Leti Nassar presents s/p right knee scope with PCL reconstruction. She is progressing with her gait mechanics on level ground, but continues to have difficulty with stair ascent/descent. Patient also inhibited by pain when ambulating and this leads to an increased antalgic gait pattern. Patient will benefit from continued PT to increase R LE strength, reduce discomfort and improve R LE function. PROBLEM LIST (Impacting functional limitations):  1. Post-op pain and swelling right knee  2. Decreased ROM right knee   3. Decreased functional strength right knee/LE   4. Decreased gait skills INTERVENTIONS PLANNED:  1. aquatic therapy  2. Thermal and electric modalities, manual therapies for pain. 3. Manual therapies and therapeutic exercises for ROM and strength. 4. Therapeutic exercises for gait and balance   TREATMENT PLAN:  Effective Dates: 18 to 18.   Frequency/Duration: 2-3 times a week for 8 more weeks  GOALS: (Goals have been discussed and agreed upon with patient.)   Short-Term Functional Goals: Time Frame: 6 weeks  1. Pt will be able to perform a good quad set and SLR with no extensor lag with for improved strength for gait. MET 7-6-18  2. Pt will increase R knee ROM to 0-90 for improved mobility. GOAL MET  3. Pt will improved R ankle DF AROM to 0 for improved gait. GOAL MET  4. Pt will report pain 5/10 with modified daily activities. GOAL MET  Discharge Goals: Time Frame: 12 weeks   1. Pt will increase R LE strength to 5/5 with manual muscle testing for improved strength for ADLs and work. Progressing towards, not tested on 7/31/18  2. Pt will negotiate 1 flight of stairs with step over step with good control. Progressing towards, ongoing 7-31-18  3. Pt will increase R knee ROM to 0-120 for mobility. GOAL MET  4. Pt will report pain 3/10 with daily activities. Progressing towards, ongoing 7-31-18  5. Pt will score 45/80 on LEFS. Progressing towards, ongiong 7-31-18  Rehabilitation Potential For Stated Goals: Good                HISTORY:   History of Present Injury/Illness (Reason for Referral): She works at [employer] and she was entering the cooler and there was oil on the floor. She slipped and the feet slipped out from under her. She tried to get up and slipped again. Injury was August 14-17. She did go to physical therapy but it was making her worse. They decided to have surgery. Surgery 12-21-17. Stayed 2 nights in the hospital. She did have home health physical therapy 3-4x. Pain increases with walking. Past Medical History/Comorbidities: diabetes type 2 controlled with diet, HTN, L LE varicose vein surgery  Social History/Living Environment: Lives with . 2 steps to get inside. Prior Level of Function/Work/Activity: Works at [employer]. 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 Job does require lifting. Would like to get back to walking and walking dogs.    Current Medications: Current Outpatient Prescriptions:     magnesium hydroxide (BEAUCHAMP MILK OF MAGNESIA) 400 mg/5 mL suspension, Take 15 mL by mouth as needed for Constipation. as needed daily, Disp: , Rfl:    tramodol   Steroid *    atorvastatin (LIPITOR) 20 mg tablet, Take 20 mg by mouth nightly., Disp: , Rfl:     lisinopril-hydroCHLOROthiazide (PRINZIDE, ZESTORETIC) 10-12.5 mg per tablet, Take  by mouth daily. Indications: hypertension, Disp: , Rfl:     gemfibrozil (LOPID) 600 mg tablet, Take 600 mg by mouth two (2) times a day., Disp: , Rfl:     Omega-3 Fatty Acids 60- mg cpDR, Take  by mouth daily. , Disp: , Rfl:     acetaminophen (TYLENOL) 325 mg tablet, Take 325 mg by mouth every four (4) hours as needed for Pain., Disp: , Rfl:    Date Last Reviewed:  8/28/2018   EXAMINATION:   7-31-18  Observation/Orthostatic Postural Assessment: Comes into clinic without assistive device or brace. Walks with limp on R. Palpation: Audible and palpable crepitus noted to R knee patellofemoral and tibiofemoral joints. ROM:     L AROM: flexion 125 degrees, extension 0 deg   L knee flexion PROM: 128 deg flexion, limited by pain           Strength:   n/t                  Functional Mobility: Walking on level ground: initially able to ambulate with minimal antalgic gait pattern. With fatigue/pain patient begins to minimize landing on R heel when ambulating and exhibits forefoot initial contact with increased R knee flexion. Stair climbing: Patient ascends/descends with step-to gait pattern. Able to ascend stairs with R LE but requires forward trunk lean over R LE. Requires use of one handrail to ascend and descend stairs. Turns body sideways to descend stairs with step-to gait pattern. Balance: Not tested. Body Structures Involved:  1. Joints  2. Muscles  3. Ligaments Body Functions Affected:  1. Neuromusculoskeletal Activities and Participation Affected:  1. Mobility  2.  Community, Social and [address] [place] [address] DECISION MAKING:   Outcome Measure: Tool Used: Lower Extremity Functional Scale (LEFS)  Score:  Initial: 5/80 20/80 (Date: 4-16-18 ) 44/80 (7-6-18) Most recent: 35/80 (7-31-18)   Interpretation of Score: 20 questions each scored on a 5 point scale with 0 representing \"extreme difficulty or unable to perform\" and 4 representing \"no difficulty\". The lower the score, the greater the functional disability. 80/80 represents no disability. Minimal detectable change is 9 points. Medical Necessity:   · Patient is expected to demonstrate progress in strength, range of motion and balance to improve gait. Reason for Services/Other Comments:  · Patient continues to require skilled intervention due to post-op R knee, decreased ROM, strength, gait. TREATMENT:   (In addition to Assessment/Re-Assessment sessions the following treatments were rendered)  8/28/2018   Accompanied by hospital  and workers comp  throughout entire session. Pre-treatment Symptoms/Complaints: Pt reports via  that she is sore today after having PT yesterday. Pain: Initial:    6/10 Post Session: 6/10, denied ice after PT        Manual Therapy (0  minutes) none today  Therapeutic Exercisess (45 minutes) to improve R LE function, strength and reduce discomfort. 5 minute warm up on recumbent bike.  HEP: She is to continue  her current HEP.   8-23-18 8-24-18 8-28-18         SLR 0# x 10  2# 3 x 10 0# x 10  2# 2 x 10 2# 3 x 10   SAQ 2# 3 x 10  3#  2# x 12  3# x 10  4# x 10 3# 3 x10   LAQ  -    Sidelying hip abd 0# 3 x 10 0#  0 # 3 x 10   Prone hip ext 3# 3 x 10 3# initially x 5 repetitions;  1x5, 2 x 10 w/o added resistance 3# 3 x 10   Prone knee flex 3# 3 x 10 3# 3 x 10 3# 3 x10   Seated knee flex      Bridging  3 x 10 3 x 10 3 x 10   Hip abd  -    It band stretch in sidelying  Prone quad stretch      Step ups      Calf raises 2 x 15 bilateral     Recumbent bike- see above  Treatment/Session Assessment: · Response to Treatment: Patient states she is having more pain and catching with the knee. She has been walking 15 minutes at home which she reports is an increase. · Compliance with Program/Exercises:  Appears compliant   · Recommendations/Intent for next treatment session: Continue with ROM and strength work on R knee.    Total Treatment Duration: 45 Minutes   PT Patient Time In/Time Out  Time In: 0930  Time Out: [address] PT

## 2018-08-30 ENCOUNTER — HOSPITAL ENCOUNTER (OUTPATIENT)
Dept: PHYSICAL THERAPY | Age: 53
Discharge: HOME | End: 2018-08-30
Payer: COMMERCIAL

## 2018-08-30 PROCEDURE — 97110 THERAPEUTIC EXERCISES: CPT

## 2018-08-30 NOTE — PROGRESS NOTES
Barry Breaux  : 1965  Payor: [address] [place] [address] Road / Plan: Gladys Perera / Product Type: [employer] /  [address]  at [address] [place] [address] Rd  1101 [facility], [address] [place] [address]  [phone]   [phone]       OUTPATIENT PHYSICAL THERAPY:Daily Note 2018      ICD-10: Treatment Diagnosis: Sprain of posterior cruciate ligament of right knee, sequela (S83.521S); Pain in right knee (M25.561); Stiffness of right knee, not elsewhere classified (M25.661); Difficulty in walking, not elsewhere classified (R26.2)  Precautions/Allergies:   Review of patient's allergies indicates no known allergies. Fall Risk Score: 1 (? 5 = High Risk)  MD Orders:Evaluate and treat, HEP, ROM, strengthening; Quad strengthening; \"continue kneeHab\" (18) MEDICAL/REFERRING DIAGNOSIS:  S/p R knee scope, PCL reconstruction   DATE OF ONSET: Surgery 17  REFERRING PHYSICIAN: Joaquin Roberto MD  RETURN PHYSICIAN APPOINTMENT:  18 ASSESSMENT:  Ms. Opal Moore presents s/p right knee scope with PCL reconstruction. She is progressing with her gait mechanics on level ground, but continues to have difficulty with stair ascent/descent. Patient also inhibited by pain when ambulating and this leads to an increased antalgic gait pattern. Patient will benefit from continued PT to increase R LE strength, reduce discomfort and improve R LE function. PROBLEM LIST (Impacting functional limitations):  1. Post-op pain and swelling right knee  2. Decreased ROM right knee   3. Decreased functional strength right knee/LE   4. Decreased gait skills INTERVENTIONS PLANNED:  1. aquatic therapy  2. Thermal and electric modalities, manual therapies for pain. 3. Manual therapies and therapeutic exercises for ROM and strength. 4. Therapeutic exercises for gait and balance   TREATMENT PLAN:  Effective Dates: 18 to 18.   Frequency/Duration: 2-3 times a week for 8 more weeks  GOALS: (Goals have been discussed and agreed upon with patient.)   Short-Term Functional Goals: Time Frame: 6 weeks  1. Pt will be able to perform a good quad set and SLR with no extensor lag with for improved strength for gait. MET 7-6-18  2. Pt will increase R knee ROM to 0-90 for improved mobility. GOAL MET  3. Pt will improved R ankle DF AROM to 0 for improved gait. GOAL MET  4. Pt will report pain 5/10 with modified daily activities. GOAL MET  Discharge Goals: Time Frame: 12 weeks   1. Pt will increase R LE strength to 5/5 with manual muscle testing for improved strength for ADLs and work. Progressing towards, not tested on 7/31/18  2. Pt will negotiate 1 flight of stairs with step over step with good control. Progressing towards, ongoing 7-31-18  3. Pt will increase R knee ROM to 0-120 for mobility. GOAL MET  4. Pt will report pain 3/10 with daily activities. Progressing towards, ongoing 7-31-18  5. Pt will score 45/80 on LEFS. Progressing towards, ongiong 7-31-18  Rehabilitation Potential For Stated Goals: Good                HISTORY:   History of Present Injury/Illness (Reason for Referral): She works at [employer] and she was entering the cooler and there was oil on the floor. She slipped and the feet slipped out from under her. She tried to get up and slipped again. Injury was August 14-17. She did go to physical therapy but it was making her worse. They decided to have surgery. Surgery 12-21-17. Stayed 2 nights in the hospital. She did have home health physical therapy 3-4x. Pain increases with walking. Past Medical History/Comorbidities: diabetes type 2 controlled with diet, HTN, L LE varicose vein surgery  Social History/Living Environment: Lives with . 2 steps to get inside. Prior Level of Function/Work/Activity: Works at [employer]. 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 Job does require lifting. Would like to get back to walking and walking dogs.    Current Medications: Current Outpatient Prescriptions:     magnesium hydroxide (BEAUCHAMP MILK OF MAGNESIA) 400 mg/5 mL suspension, Take 15 mL by mouth as needed for Constipation. as needed daily, Disp: , Rfl:    tramodol   Steroid *    atorvastatin (LIPITOR) 20 mg tablet, Take 20 mg by mouth nightly., Disp: , Rfl:     lisinopril-hydroCHLOROthiazide (PRINZIDE, ZESTORETIC) 10-12.5 mg per tablet, Take  by mouth daily. Indications: hypertension, Disp: , Rfl:     gemfibrozil (LOPID) 600 mg tablet, Take 600 mg by mouth two (2) times a day., Disp: , Rfl:     Omega-3 Fatty Acids 60- mg cpDR, Take  by mouth daily. , Disp: , Rfl:     acetaminophen (TYLENOL) 325 mg tablet, Take 325 mg by mouth every four (4) hours as needed for Pain., Disp: , Rfl:    Date Last Reviewed:  8/30/2018   EXAMINATION:   7-31-18  Observation/Orthostatic Postural Assessment: Comes into clinic without assistive device or brace. Walks with limp on R. Palpation: Audible and palpable crepitus noted to R knee patellofemoral and tibiofemoral joints. ROM:     L AROM: flexion 125 degrees, extension 0 deg   L knee flexion PROM: 128 deg flexion, limited by pain           Strength:   n/t                  Functional Mobility: Walking on level ground: initially able to ambulate with minimal antalgic gait pattern. With fatigue/pain patient begins to minimize landing on R heel when ambulating and exhibits forefoot initial contact with increased R knee flexion. Stair climbing: Patient ascends/descends with step-to gait pattern. Able to ascend stairs with R LE but requires forward trunk lean over R LE. Requires use of one handrail to ascend and descend stairs. Turns body sideways to descend stairs with step-to gait pattern. Balance: Not tested. Body Structures Involved:  1. Joints  2. Muscles  3. Ligaments Body Functions Affected:  1. Neuromusculoskeletal Activities and Participation Affected:  1. Mobility  2.  Community, Social and [address] [place] [address] DECISION MAKING:   Outcome Measure: Tool Used: Lower Extremity Functional Scale (LEFS)  Score:  Initial: 5/80 20/80 (Date: 4-16-18 ) 44/80 (7-6-18) Most recent: 35/80 (7-31-18)   Interpretation of Score: 20 questions each scored on a 5 point scale with 0 representing \"extreme difficulty or unable to perform\" and 4 representing \"no difficulty\". The lower the score, the greater the functional disability. 80/80 represents no disability. Minimal detectable change is 9 points. Medical Necessity:   · Patient is expected to demonstrate progress in strength, range of motion and balance to improve gait. Reason for Services/Other Comments:  · Patient continues to require skilled intervention due to post-op R knee, decreased ROM, strength, gait. TREATMENT:   (In addition to Assessment/Re-Assessment sessions the following treatments were rendered)  8/30/2018   Accompanied by hospital  and workers comp  throughout entire session. Pre-treatment Symptoms/Complaints: Pt states she does not even want to complain any more about her knee pain. She went up to see Dr Josette Ybarra and she will be scheduled for an FCE at some point before she goes back to [employer].  Pain: Initial:    4/10 Post Session: 4/10        Manual Therapy (0  minutes) none today  Therapeutic Exercisess (45 minutes) to improve R LE function, strength and reduce discomfort. 5 minute warm up on recumbent bike.  HEP: She is to continue  her current HEP.   8-23-18 8-24-18 8-28-18 8/30/18          SLR 0# x 10  2# 3 x 10 0# x 10  2# 2 x 10 2# 3 x 10 2# 3 x 10   SAQ 2# 3 x 10  3#  2# x 12  3# x 10  4# x 10 3# 3 x10 3# 3 x 10   LAQ  -     Sidelying hip abd 0# 3 x 10 0#  0 # 3 x 10 0# 3 x 10   Prone hip ext 3# 3 x 10 3# initially x 5 repetitions;  1x5, 2 x 10 w/o added resistance 3# 3 x 10 3# 3 x 10   Prone knee flex 3# 3 x 10 3# 3 x 10 3# 3 x10 3# 3 x10   Seated knee flex       Bridging  3 x 10 3 x 10 3 x 10 3 x 10   Hip abd  -     It band stretch in sidelying  Prone quad stretch       Step ups       Calf raises 2 x 15 bilateral      Recumbent bike- see above  Treatment/Session Assessment:    · Response to Treatment: Patient states she continues to have the knee pain with walking. She was quicker with her exercises today but continued to have pain and catching. .  · Compliance with Program/Exercises:  Appears compliant   · Recommendations/Intent for next treatment session: Continue with ROM and strength work on R knee.    Total Treatment Duration: 45 Minutes   PT Patient Time In/Time Out  Time In: 0930  Time Out: 350 Roro Dixon PT

## 2018-08-31 ENCOUNTER — HOSPITAL ENCOUNTER (OUTPATIENT)
Dept: PHYSICAL THERAPY | Age: 53
Discharge: HOME | End: 2018-08-31
Payer: COMMERCIAL

## 2018-08-31 PROCEDURE — 97110 THERAPEUTIC EXERCISES: CPT

## 2018-08-31 NOTE — PROGRESS NOTES
Jono Kimi  : 1965  Payor: [address] [place] [address] Road / Plan: Paddy Mcdonald / Product Type: Workers Comp /  [address] [place] [address]  at [facility] [place] [employer]  1101 [facility], [address] [place] [address] [address] [facility]  [phone]   [phone]       OUTPATIENT PHYSICAL THERAPY:Daily Note and Progress Report 2018      ICD-10: Treatment Diagnosis: Sprain of posterior cruciate ligament of right knee, sequela (S83.521S); Pain in right knee (M25.561); Stiffness of right knee, not elsewhere classified (M25.661); Difficulty in walking, not elsewhere classified (R26.2)  Precautions/Allergies:   Review of patient's allergies indicates no known allergies. Fall Risk Score: 1 (? 5 = High Risk)  MD Orders:Evaluate and treat, HEP, ROM, strengthening; Quad strengthening; \"continue kneeHab\" (18) MEDICAL/REFERRING DIAGNOSIS:  S/p R knee scope, PCL reconstruction   DATE OF ONSET: Surgery 17  REFERRING PHYSICIAN: Niecy Epps MD  RETURN PHYSICIAN APPOINTMENT:  18 ASSESSMENT:  Ms. Gael Fabry presents s/p right knee scope with PCL reconstruction. She demonstrates some mild progress with tolerance to performance of therapeutic exercises, and gait on level ground. Patient remains limited with stair ascent/descent, as she continues to utilize a step-to gait pattern and turning her body sideways to do so. Patient may benefit from ongoing PT in preparation to return to work. PROBLEM LIST (Impacting functional limitations):  1. Post-op pain and swelling right knee  2. Decreased ROM right knee   3. Decreased functional strength right knee/LE   4. Decreased gait skills INTERVENTIONS PLANNED:  1. aquatic therapy  2. Thermal and electric modalities, manual therapies for pain. 3. Manual therapies and therapeutic exercises for ROM and strength. 4. Therapeutic exercises for gait and balance   TREATMENT PLAN:  Effective Dates: 18 to 10-5-18.   Frequency/Duration: 2-3 times a week for 6 more weeks  GOALS: (Goals have been discussed and agreed upon with patient.)   Short-Term Functional Goals: Time Frame: 6 weeks  1. Pt will be able to perform a good quad set and SLR with no extensor lag with for improved strength for gait. MET 7-6-18  2. Pt will increase R knee ROM to 0-90 for improved mobility. GOAL MET  3. Pt will improved R ankle DF AROM to 0 for improved gait. GOAL MET  4. Pt will report pain 5/10 with modified daily activities. GOAL MET  Discharge Goals: Time Frame: 12 weeks   1. Pt will increase R LE strength to 5/5 with manual muscle testing for improved strength for ADLs and work. Ongoing 8-31-18  2. Pt will negotiate 1 flight of stairs with step over step with good control. Ongoing, 8-31-18  3. Pt will increase R knee ROM to 0-120 for mobility. GOAL MET  4. Pt will report pain 3/10 with daily activities. Ongoing, 8-31-18  5. Pt will score 45/80 on LEFS. Progressing, ongoing 8-31-18  Rehabilitation Potential For Stated Goals: Good                HISTORY:   History of Present Injury/Illness (Reason for Referral): She works at [employer] and she was entering the cooler and there was oil on the floor. She slipped and the feet slipped out from under her. She tried to get up and slipped again. Injury was August 14-17. She did go to physical therapy but it was making her worse. They decided to have surgery. Surgery 12-21-17. Stayed 2 nights in the hospital. She did have home health physical therapy 3-4x. Pain increases with walking. Past Medical History/Comorbidities: diabetes type 2 controlled with diet, HTN, L LE varicose vein surgery  Social History/Living Environment: Lives with . 2 steps to get inside. Prior Level of Function/Work/Activity: Works at [employer]. 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 Job does require lifting. Would like to get back to walking and walking dogs.    Current Medications:       Current Outpatient Prescriptions:    magnesium hydroxide (BEAUCHAMP MILK OF MAGNESIA) 400 mg/5 mL suspension, Take 15 mL by mouth as needed for Constipation. as needed daily, Disp: , Rfl:    tramodol   Steroid *    atorvastatin (LIPITOR) 20 mg tablet, Take 20 mg by mouth nightly., Disp: , Rfl:     lisinopril-hydroCHLOROthiazide (PRINZIDE, ZESTORETIC) 10-12.5 mg per tablet, Take  by mouth daily. Indications: hypertension, Disp: , Rfl:     gemfibrozil (LOPID) 600 mg tablet, Take 600 mg by mouth two (2) times a day., Disp: , Rfl:     Omega-3 Fatty Acids 60- mg cpDR, Take  by mouth daily. , Disp: , Rfl:     acetaminophen (TYLENOL) 325 mg tablet, Take 325 mg by mouth every four (4) hours as needed for Pain., Disp: , Rfl:    Date Last Reviewed:  8/31/2018   EXAMINATION:   8-31-18  Observation/Orthostatic Postural Assessment: Comes into clinic without assistive device or brace on R knee. Palpation: Audible and palpable crepitus noted to R knee patellofemoral and tibiofemoral joints. ROM:          R AROM: flexion 125 degrees, extension 0 deg         Strength:            R knee extension 4/5, limited by pain         Functional Mobility: Walking on level ground: ambulates with antalgic gait pattern with stance phase on R LE. Stair ascent/descent: Ascends and descends with step-to gait pattern, turns body sideways and utilizes single handrail to assist with stair ascent/descent. Balance: Not tested. Body Structures Involved:  1. Joints  2. Muscles  3. Ligaments Body Functions Affected:  1. Neuromusculoskeletal Activities and Participation Affected:  1. Mobility  2. Community, Social and Civic Life   CLINICAL DECISION MAKING:   Outcome Measure:    Tool Used: Lower Extremity Functional Scale (LEFS)  Score:  Initial: 5/80 20/80 (Date: 4-16-18 ) 44/80 (7-6-18) 35/80 (7-31-18) Most recent: 33/80 (8-31-18)   Interpretation of Score: 20 questions each scored on a 5 point scale with 0 representing \"extreme difficulty or unable to perform\" and 4 representing \"no difficulty\". The lower the score, the greater the functional disability. 80/80 represents no disability. Minimal detectable change is 9 points. Medical Necessity:   · Patient is expected to demonstrate progress in strength, range of motion and balance to improve gait. Reason for Services/Other Comments:  · Patient continues to require skilled intervention due to post-op R knee, decreased ROM, strength, gait. TREATMENT:   (In addition to Assessment/Re-Assessment sessions the following treatments were rendered)  8/31/2018   Accompanied by workers comp  throughout entire session. [facility]  did not arrive for today's session. Pre-treatment Symptoms/Complaints: Pt states that her knee is feeling pretty good today. Will receive knee brace next week. Pain: Initial:    2-3/10 Post Session: 4/10        Manual Therapy (0  minutes) none today  Therapeutic Exercisess (45 minutes) to improve R LE function, strength and reduce discomfort. 5 minute warm up on recumbent bike.  HEP: She is to continue  her current HEP.   8-23-18 8-24-18 8-28-18 8/30/18 8-31-18           SLR 0# x 10  2# 3 x 10 0# x 10  2# 2 x 10 2# 3 x 10 2# 3 x 10 2# 3 x 10   SAQ 2# 3 x 10  3#  2# x 12  3# x 10  4# x 10 3# 3 x10 3# 3 x 10 3# 3 x 10   LAQ  -   3 x 10     Attempted machine 10# but unable to perform   Sidelying hip abd 0# 3 x 10 0#  0 # 3 x 10 0# 3 x 10 0# 3 x 10   Prone hip ext 3# 3 x 10 3# initially x 5 repetitions;  1x5, 2 x 10 w/o added resistance 3# 3 x 10 3# 3 x 10    Prone knee flex 3# 3 x 10 3# 3 x 10 3# 3 x10 3# 3 x10    Seated knee flex        Bridging  3 x 10 3 x 10 3 x 10 3 x 10 3 x 10   Hip abd  -      It band stretch in sidelying  Prone quad stretch        Step ups        Calf raises 2 x 15 bilateral    2 x 15 bilateral   Recumbent bike- see above  Treatment/Session Assessment:    · Response to Treatment: Attempted to perform leg extensions on machine, but was unable to perform a single repetition with R LE. Patient continues to be limited by discomfort with gait, stairs and functional mobility. · Compliance with Program/Exercises:  Appears compliant   · Recommendations/Intent for next treatment session: Continue with ROM and strength work on R knee.    Total Treatment Duration: 45 Minutes   PT Patient Time In/Time Out  Time In: 0840  Time Out: 0930    Akshat Abarca, PT

## 2018-09-04 ENCOUNTER — HOSPITAL ENCOUNTER (OUTPATIENT)
Dept: PHYSICAL THERAPY | Age: 53
Discharge: HOME | End: 2018-09-04
Payer: COMMERCIAL

## 2018-09-04 ENCOUNTER — APPOINTMENT (OUTPATIENT)
Dept: PHYSICAL THERAPY | Age: 53
End: 2018-09-04
Payer: COMMERCIAL

## 2018-09-04 PROCEDURE — 97110 THERAPEUTIC EXERCISES: CPT

## 2018-09-04 NOTE — PROGRESS NOTES
Dyana Silver  : 1965  Payor: [address] [place] [address] Road / Plan: Geni Payer / Product Type: Workers Comp /  [address]  at [address] [place] [address] Rd  1101 [facility], [address].  [phone]   [phone]       OUTPATIENT PHYSICAL [address] [address]      ICD-10: Treatment Diagnosis: Sprain of posterior cruciate ligament of right knee, sequela (S83.521S); Pain in right knee (M25.561); Stiffness of right knee, not elsewhere classified (M25.661); Difficulty in walking, not elsewhere classified (R26.2)  Precautions/Allergies:   Review of patient's allergies indicates no known allergies. Fall Risk Score: 1 (? 5 = High Risk)  MD Orders:Evaluate and treat, HEP, ROM, strengthening; Quad strengthening; \"continue kneeHab\" (18) MEDICAL/REFERRING DIAGNOSIS:  S/p R knee scope, PCL reconstruction   DATE OF ONSET: Surgery 17  REFERRING PHYSICIAN: Paula Teran MD  RETURN PHYSICIAN APPOINTMENT:  18 ASSESSMENT:  Ms. Brenda Young presents s/p right knee scope with PCL reconstruction. She demonstrates some mild progress with tolerance to performance of therapeutic exercises, and gait on level ground. Patient remains limited with stair ascent/descent, as she continues to utilize a step-to gait pattern and turning her body sideways to do so. Patient may benefit from ongoing PT in preparation to return to work. PROBLEM LIST (Impacting functional limitations):  1. Post-op pain and swelling right knee  2. Decreased ROM right knee   3. Decreased functional strength right knee/LE   4. Decreased gait skills INTERVENTIONS PLANNED:  1. aquatic therapy  2. Thermal and electric modalities, manual therapies for pain. 3. Manual therapies and therapeutic exercises for ROM and strength. 4. Therapeutic exercises for gait and balance   TREATMENT PLAN:  Effective Dates: 18 to 10-5-18.   Frequency/Duration: 2-3 times a week for 6 more weeks  GOALS: (Goals have been discussed and agreed upon with patient.)   Short-Term Functional Goals: Time Frame: 6 weeks  1. Pt will be able to perform a good quad set and SLR with no extensor lag with for improved strength for gait. MET 7-6-18  2. Pt will increase R knee ROM to 0-90 for improved mobility. GOAL MET  3. Pt will improved R ankle DF AROM to 0 for improved gait. GOAL MET  4. Pt will report pain 5/10 with modified daily activities. GOAL MET  Discharge Goals: Time Frame: 12 weeks   1. Pt will increase R LE strength to 5/5 with manual muscle testing for improved strength for ADLs and work. Ongoing 8-31-18  2. Pt will negotiate 1 flight of stairs with step over step with good control. Ongoing, 8-31-18  3. Pt will increase R knee ROM to 0-120 for mobility. GOAL MET  4. Pt will report pain 3/10 with daily activities. Ongoing, 8-31-18  5. Pt will score 45/80 on LEFS. Progressing, ongoing 8-31-18  Rehabilitation Potential For Stated Goals: Good    Regarding [address] therapy, I certify that the treatment plan above will be carried out by a therapist or under their direction. Thank you for this referral,      Maria C Wong, JORGE       Referring Physician Signature:     Aditya Domínguez MD          Date                          HISTORY:   History of Present Injury/Illness (Reason for Referral): She works at [employer] and she was entering the cooler and there was oil on the floor. She slipped and the feet slipped out from under her. She tried to get up and slipped again. Injury was August 14-17. She did go to physical therapy but it was making her worse. They decided to have surgery. Surgery 12-21-17. Stayed 2 nights in the hospital. She did have home health physical therapy 3-4x. Pain increases with walking. Past Medical History/Comorbidities: diabetes type 2 controlled with diet, HTN, L LE varicose vein surgery  Social History/Living Environment: Lives with . 2 steps to get inside.      Prior Level of Function/Work/Activity: Works at [employer]. 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 Job does require lifting. Would like to get back to walking and walking dogs. Current Medications:       Current Outpatient Prescriptions:     magnesium hydroxide (BEAUCHAMP MILK OF MAGNESIA) 400 mg/5 mL suspension, Take 15 mL by mouth as needed for Constipation. as needed daily, Disp: , Rfl:    tramodol   Steroid *    atorvastatin (LIPITOR) 20 mg tablet, Take 20 mg by mouth nightly., Disp: , Rfl:     lisinopril-hydroCHLOROthiazide (PRINZIDE, ZESTORETIC) 10-12.5 mg per tablet, Take  by mouth daily. Indications: hypertension, Disp: , Rfl:     gemfibrozil (LOPID) 600 mg tablet, Take 600 mg by mouth two (2) times a day., Disp: , Rfl:     Omega-3 Fatty Acids 60- mg cpDR, Take  by mouth daily. , Disp: , Rfl:     acetaminophen (TYLENOL) 325 mg tablet, Take 325 mg by mouth every four (4) hours as needed for Pain., Disp: , Rfl:    Date Last Reviewed:  9/4/2018   EXAMINATION:   8-31-18  Observation/Orthostatic Postural Assessment: Comes into clinic without assistive device or brace on R knee. Palpation: Audible and palpable crepitus noted to R knee patellofemoral and tibiofemoral joints. ROM:          R AROM: flexion 125 degrees, extension 0 deg         Strength:            R knee extension 4/5, limited by pain         Functional Mobility: Walking on level ground: ambulates with antalgic gait pattern with stance phase on R LE. Stair ascent/descent: Ascends and descends with step-to gait pattern, turns body sideways and utilizes single handrail to assist with stair ascent/descent. Balance: Not tested. Body Structures Involved:  1. Joints  2. Muscles  3. Ligaments Body Functions Affected:  1. Neuromusculoskeletal Activities and Participation Affected:  1. Mobility  2. Community, Social and Civic Life   CLINICAL DECISION MAKING:   Outcome Measure:    Tool Used: Lower Extremity Functional Scale (LEFS)  Score:  Initial: 5/80 20/80 (Date: 4-16-18 ) 44/80 (7-6-18) 35/80 (7-31-18) Most recent: 33/80 (8-31-18)   Interpretation of Score: 20 questions each scored on a 5 point scale with 0 representing \"extreme difficulty or unable to perform\" and 4 representing \"no difficulty\". The lower the score, the greater the functional disability. 80/80 represents no disability. Minimal detectable change is 9 points. Medical Necessity:   · Patient is expected to demonstrate progress in strength, range of motion and balance to improve gait. Reason for Services/Other Comments:  · Patient continues to require skilled intervention due to post-op R knee, decreased ROM, strength, gait.

## 2018-09-04 NOTE — PROGRESS NOTES
Dyana Silver  : 1965  Payor: Mary Grace Dose / Plan: Geni Payer / Product Type: Workers Comp /  [address]  at [address] [place] [address] Rd  [address] [facility],  [place].  [phone]   [phone]       OUTPATIENT PHYSICAL THERAPY:Daily Note 2018      ICD-10: Treatment Diagnosis: Sprain of posterior cruciate ligament of right knee, sequela (S83.521S); Pain in right knee (M25.561); Stiffness of right knee, not elsewhere classified (M25.661); Difficulty in walking, not elsewhere classified (R26.2)  Precautions/Allergies:   Review of patient's allergies indicates no known allergies. Fall Risk Score: 1 (? 5 = High Risk)  MD Orders:Evaluate and treat, HEP, ROM, strengthening; Quad strengthening; \"continue kneeHab\" (18) MEDICAL/REFERRING DIAGNOSIS:  S/p R knee scope, PCL reconstruction   DATE OF ONSET: Surgery 17  REFERRING PHYSICIAN: Paula Teran MD  RETURN PHYSICIAN APPOINTMENT:  18 ASSESSMENT:  Ms. Brenda Young presents s/p right knee scope with PCL reconstruction. She demonstrates some mild progress with tolerance to performance of therapeutic exercises, and gait on level ground. Patient remains limited with stair ascent/descent, as she continues to utilize a step-to gait pattern and turning her body sideways to do so. Patient may benefit from ongoing PT in preparation to return to work. PROBLEM LIST (Impacting functional limitations):  1. Post-op pain and swelling right knee  2. Decreased ROM right knee   3. Decreased functional strength right knee/LE   4. Decreased gait skills INTERVENTIONS PLANNED:  1. aquatic therapy  2. Thermal and electric modalities, manual therapies for pain. 3. Manual therapies and therapeutic exercises for ROM and strength. 4. Therapeutic exercises for gait and balance   TREATMENT PLAN:  Effective Dates: 18 to 10-5-18.   Frequency/Duration: 2-3 times a week for 6 more weeks  GOALS: (Goals have been discussed and agreed upon with patient.)   Short-Term Functional Goals: Time Frame: 6 weeks  1. Pt will be able to perform a good quad set and SLR with no extensor lag with for improved strength for gait. MET 7-6-18  2. Pt will increase R knee ROM to 0-90 for improved mobility. GOAL MET  3. Pt will improved R ankle DF AROM to 0 for improved gait. GOAL MET  4. Pt will report pain 5/10 with modified daily activities. GOAL MET  Discharge Goals: Time Frame: 12 weeks   1. Pt will increase R LE strength to 5/5 with manual muscle testing for improved strength for ADLs and work. Ongoing 8-31-18  2. Pt will negotiate 1 flight of stairs with step over step with good control. Ongoing, 8-31-18  3. Pt will increase R knee ROM to 0-120 for mobility. GOAL MET  4. Pt will report pain 3/10 with daily activities. Ongoing, 8-31-18  5. Pt will score 45/80 on LEFS. Progressing, ongoing 8-31-18  Rehabilitation Potential For Stated Goals: Good                HISTORY:   History of Present Injury/Illness (Reason for Referral): She works at [employer] and she was entering the cooler and there was oil on the floor. She slipped and the feet slipped out from under her. She tried to get up and slipped again. Injury was August 14-17. She did go to physical therapy but it was making her worse. They decided to have surgery. Surgery 12-21-17. Stayed 2 nights in the hospital. She did have home health physical therapy 3-4x. Pain increases with walking. Past Medical History/Comorbidities: diabetes type 2 controlled with diet, HTN, L LE varicose vein surgery  Social History/Living Environment: Lives with . 2 steps to get inside. Prior Level of Function/Work/Activity: Works at [employer]. 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 Job does require lifting. Would like to get back to walking and walking dogs.    Current Medications:       Current Outpatient Prescriptions:     magnesium hydroxide (BEAUCHAMP MILK OF MAGNESIA) 400 mg/5 mL suspension, Take 15 mL by mouth as needed for Constipation. as needed daily, Disp: , Rfl:    tramodol   Steroid *    atorvastatin (LIPITOR) 20 mg tablet, Take 20 mg by mouth nightly., Disp: , Rfl:     lisinopril-hydroCHLOROthiazide (PRINZIDE, ZESTORETIC) 10-12.5 mg per tablet, Take  by mouth daily. Indications: hypertension, Disp: , Rfl:     gemfibrozil (LOPID) 600 mg tablet, Take 600 mg by mouth two (2) times a day., Disp: , Rfl:     Omega-3 Fatty Acids 60- mg cpDR, Take  by mouth daily. , Disp: , Rfl:     acetaminophen (TYLENOL) 325 mg tablet, Take 325 mg by mouth every four (4) hours as needed for Pain., Disp: , Rfl:    Date Last Reviewed:  9/4/2018   EXAMINATION:   8-31-18  Observation/Orthostatic Postural Assessment: Comes into clinic without assistive device or brace on R knee. Palpation: Audible and palpable crepitus noted to R knee patellofemoral and tibiofemoral joints. ROM:          R AROM: flexion 125 degrees, extension 0 deg         Strength:            R knee extension 4/5, limited by pain         Functional Mobility: Walking on level ground: ambulates with antalgic gait pattern with stance phase on R LE. Stair ascent/descent: Ascends and descends with step-to gait pattern, turns body sideways and utilizes single handrail to assist with stair ascent/descent. Balance: Not tested. Body Structures Involved:  1. Joints  2. Muscles  3. Ligaments Body Functions Affected:  1. Neuromusculoskeletal Activities and Participation Affected:  1. Mobility  2. Community, Social and Civic Life   CLINICAL DECISION MAKING:   Outcome Measure: Tool Used: Lower Extremity Functional Scale (LEFS)  Score:  Initial: 5/80 20/80 (Date: 4-16-18 ) 44/80 (7-6-18) 35/80 (7-31-18) Most recent: 33/80 (8-31-18)   Interpretation of Score: 20 questions each scored on a 5 point scale with 0 representing \"extreme difficulty or unable to perform\" and 4 representing \"no difficulty\".   The lower the score, the greater the functional disability. 80/80 represents no disability. Minimal detectable change is 9 points. Medical Necessity:   · Patient is expected to demonstrate progress in strength, range of motion and balance to improve gait. Reason for Services/Other Comments:  · Patient continues to require skilled intervention due to post-op R knee, decreased ROM, strength, gait. TREATMENT:   (In addition to Assessment/Re-Assessment sessions the following treatments were rendered)  9/4/2018   Accompanied by workers comp and hospital  throughout entire session. Pre-treatment Symptoms/Complaints: Pt reports that her knee feels ok today. A little sore but overall not bad. Pain: Initial:    2-3/10 Post Session: 4/10        Manual Therapy (0  minutes) none today  Therapeutic Exercisess (45 minutes) to improve R LE function, strength and reduce discomfort. R hamstring stretch in supine, 4 x 20\" each, side-lying quad stretch 3 x 20\" 5 minute warm up on recumbent bike.  HEP: She is to continue  her current HEP.   8-23-18 8-24-18 8-28-18 8/30/18 8-31-18 9-4-18            SLR 0# x 10  2# 3 x 10 0# x 10  2# 2 x 10 2# 3 x 10 2# 3 x 10 2# 3 x 10 2# 3 x 10   SAQ 2# 3 x 10  3#  2# x 12  3# x 10  4# x 10 3# 3 x10 3# 3 x 10 3# 3 x 10 3# 3 x 10   LAQ  -   3 x 10     Attempted machine 10# but unable to perform 3# 3 x 10 90-30 degrees   Sidelying hip abd 0# 3 x 10 0#  0 # 3 x 10 0# 3 x 10 0# 3 x 10 0# 3 x 10   Prone hip ext 3# 3 x 10 3# initially x 5 repetitions;  1x5, 2 x 10 w/o added resistance 3# 3 x 10 3# 3 x 10  0# x 10  2# 2 x 10   Prone knee flex 3# 3 x 10 3# 3 x 10 3# 3 x10 3# 3 x10     Seated knee flex         Bridging  3 x 10 3 x 10 3 x 10 3 x 10 3 x 10 3 x 10   Hip abd  -       It band stretch in sidelying  Prone quad stretch         Step ups         Calf raises 2 x 15 bilateral    2 x 15 bilateral 2 x 15  bilateral   Recumbent bike- see above  Treatment/Session Assessment: · Response to Treatment: Continues to be limited by knee pain and reports of instability. Pain with long arc quads requiring reduced range of motion. Very minimal progress. · Compliance with Program/Exercises:  Appears compliant   · Recommendations/Intent for next treatment session: Continue with ROM and strength work on R knee.    Total Treatment Duration: 45 Minutes   PT Patient Time In/Time Out  Time In: 1300  Time Out: Qi [address]24, PT

## 2018-09-06 ENCOUNTER — APPOINTMENT (OUTPATIENT)
Dept: PHYSICAL THERAPY | Age: 53
End: 2018-09-06
Payer: COMMERCIAL

## 2018-09-06 ENCOUNTER — HOSPITAL ENCOUNTER (OUTPATIENT)
Dept: PHYSICAL THERAPY | Age: 53
Discharge: HOME | End: 2018-09-06
Payer: COMMERCIAL

## 2018-09-06 NOTE — PROGRESS NOTES
Pt has an FCE scheduled at 1 today. She came for appt but decided to just warm up on bike and prepare for test later in the day.

## 2018-09-07 ENCOUNTER — HOSPITAL ENCOUNTER (OUTPATIENT)
Dept: PHYSICAL THERAPY | Age: 53
Discharge: HOME | End: 2018-09-07
Payer: COMMERCIAL

## 2018-09-07 ENCOUNTER — APPOINTMENT (OUTPATIENT)
Dept: PHYSICAL THERAPY | Age: 53
End: 2018-09-07
Payer: COMMERCIAL

## 2018-09-07 PROCEDURE — 97110 THERAPEUTIC EXERCISES: CPT

## 2018-09-07 PROCEDURE — 97140 MANUAL THERAPY 1/> REGIONS: CPT

## 2018-09-07 PROCEDURE — 97035 APP MDLTY 1+ULTRASOUND EA 15: CPT

## 2018-09-07 NOTE — PROGRESS NOTES
Javan Jack  : 1965  Payor: [address] [place] [address] Road / Plan: Shira Sigala / Product Type: Workers Comp /  [address]  at [facility] [place] [employer]  [address] [place] [address], [address] [place].  [phone]   [phone]       OUTPATIENT PHYSICAL THERAPY:Daily Note 2018      ICD-10: Treatment Diagnosis: Sprain of posterior cruciate ligament of right knee, sequela (S83.521S); Pain in right knee (M25.561); Stiffness of right knee, not elsewhere classified (M25.661); Difficulty in walking, not elsewhere classified (R26.2)  Precautions/Allergies:   Review of patient's allergies indicates no known allergies. Fall Risk Score: 1 (? 5 = High Risk)  MD Orders:Evaluate and treat, HEP, ROM, strengthening; Quad strengthening; \"continue kneeHab\" (18) MEDICAL/REFERRING DIAGNOSIS:  S/p R knee scope, PCL reconstruction   DATE OF ONSET: Surgery 17  REFERRING PHYSICIAN: Elaine Delgado MD  RETURN PHYSICIAN APPOINTMENT:  18 ASSESSMENT:  Ms. Kaitlyn Colon presents s/p right knee scope with PCL reconstruction. She demonstrates some mild progress with tolerance to performance of therapeutic exercises, and gait on level ground. Patient remains limited with stair ascent/descent, as she continues to utilize a step-to gait pattern and turning her body sideways to do so. Patient may benefit from ongoing PT in preparation to return to work. PROBLEM LIST (Impacting functional limitations):  1. Post-op pain and swelling right knee  2. Decreased ROM right knee   3. Decreased functional strength right knee/LE   4. Decreased gait skills INTERVENTIONS PLANNED:  1. aquatic therapy  2. Thermal and electric modalities, manual therapies for pain. 3. Manual therapies and therapeutic exercises for ROM and strength. 4. Therapeutic exercises for gait and balance   TREATMENT PLAN:  Effective Dates: 18 to 10-5-18.   Frequency/Duration: 2-3 times a week for 6 more weeks  GOALS: (Goals have been discussed and agreed upon with patient.)   Short-Term Functional Goals: Time Frame: 6 weeks  1. Pt will be able to perform a good quad set and SLR with no extensor lag with for improved strength for gait. MET 7-6-18  2. Pt will increase R knee ROM to 0-90 for improved mobility. GOAL MET  3. Pt will improved R ankle DF AROM to 0 for improved gait. GOAL MET  4. Pt will report pain 5/10 with modified daily activities. GOAL MET  Discharge Goals: Time Frame: 12 weeks   1. Pt will increase R LE strength to 5/5 with manual muscle testing for improved strength for ADLs and work. Ongoing 8-31-18  2. Pt will negotiate 1 flight of stairs with step over step with good control. Ongoing, 8-31-18  3. Pt will increase R knee ROM to 0-120 for mobility. GOAL MET  4. Pt will report pain 3/10 with daily activities. Ongoing, 8-31-18  5. Pt will score 45/80 on LEFS. Progressing, ongoing 8-31-18  Rehabilitation Potential For Stated Goals: Good                HISTORY:   History of Present Injury/Illness (Reason for Referral): She works at [employer] and she was entering the cooler and there was oil on the floor. She slipped and the feet slipped out from under her. She tried to get up and slipped again. Injury was August 14-17. She did go to physical therapy but it was making her worse. They decided to have surgery. Surgery 12-21-17. Stayed 2 nights in the hospital. She did have home health physical therapy 3-4x. Pain increases with walking. Past Medical History/Comorbidities: diabetes type 2 controlled with diet, HTN, L LE varicose vein surgery  Social History/Living Environment: Lives with . 2 steps to get inside. Prior Level of Function/Work/Activity: Works at [employer]. 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 Job does require lifting. Would like to get back to walking and walking dogs.    Current Medications:       Current Outpatient Prescriptions:     magnesium hydroxide (BEAUCHAMP MILK OF MAGNESIA) 400 mg/5 mL suspension, Take 15 mL by mouth as needed for Constipation. as needed daily, Disp: , Rfl:    tramodol   Steroid *    atorvastatin (LIPITOR) 20 mg tablet, Take 20 mg by mouth nightly., Disp: , Rfl:     lisinopril-hydroCHLOROthiazide (PRINZIDE, ZESTORETIC) 10-12.5 mg per tablet, Take  by mouth daily. Indications: hypertension, Disp: , Rfl:     gemfibrozil (LOPID) 600 mg tablet, Take 600 mg by mouth two (2) times a day., Disp: , Rfl:     Omega-3 Fatty Acids 60- mg cpDR, Take  by mouth daily. , Disp: , Rfl:     acetaminophen (TYLENOL) 325 mg tablet, Take 325 mg by mouth every four (4) hours as needed for Pain., Disp: , Rfl:    Date Last Reviewed:  9/7/2018   EXAMINATION:   8-31-18  Observation/Orthostatic Postural Assessment: Comes into clinic without assistive device or brace on R knee. Palpation: Audible and palpable crepitus noted to R knee patellofemoral and tibiofemoral joints. ROM:          R AROM: flexion 125 degrees, extension 0 deg         Strength:            R knee extension 4/5, limited by pain         Functional Mobility: Walking on level ground: ambulates with antalgic gait pattern with stance phase on R LE. Stair ascent/descent: Ascends and descends with step-to gait pattern, turns body sideways and utilizes single handrail to assist with stair ascent/descent. Balance: Not tested. Body Structures Involved:  1. Joints  2. Muscles  3. Ligaments Body Functions Affected:  1. Neuromusculoskeletal Activities and Participation Affected:  1. Mobility  2. Community, Social and Civic Life   CLINICAL DECISION MAKING:   Outcome Measure: Tool Used: Lower Extremity Functional Scale (LEFS)  Score:  Initial: 5/80 20/80 (Date: 4-16-18 ) 44/80 (7-6-18) 35/80 (7-31-18) Most recent: 33/80 (8-31-18)   Interpretation of Score: 20 questions each scored on a 5 point scale with 0 representing \"extreme difficulty or unable to perform\" and 4 representing \"no difficulty\".   The lower the score, the greater the functional disability. 80/80 represents no disability. Minimal detectable change is 9 points. Medical Necessity:   · Patient is expected to demonstrate progress in strength, range of motion and balance to improve gait. Reason for Services/Other Comments:  · Patient continues to require skilled intervention due to post-op R knee, decreased ROM, strength, gait. TREATMENT:   (In addition to Assessment/Re-Assessment sessions the following treatments were rendered)  9/7/2018   Accompanied by workers comp and hospital  throughout entire session. Pre-treatment Symptoms/Complaints: Pt reports that she went to her FCE yesterday and that it did not go well. She was unable to perform the stairs and was unable to crawl as required. Limited by knee pain. Pain: Initial:    3-4/10 Post Session: None reported after PT today      Modalities (10 minutes) to reduce discomfort at anterior R knee. Ultrasound performed x 10 minutes, initially at 1.2 W/cm2 continuous, reduced to 50% and 1.0 W/cm2 after 5:30 due to discomfort reported in R patellar tendon. Manual Therapy (10  minutes) to reduce R LE discomfort and improve R LE function. Grade 3-4 R talocrural dorsiflexion mobilizations to improve R ankle mobility. Grade 2-3 physio mobilizations to R knee for ROM. Therapeutic Exercisess (25 minutes) to improve R LE function, strength and reduce discomfort. 10 min warm up on  recumbent bike. Frequent verbal cues to promote patient to load weight through R foot without supinating to compensate for pain. Cued on leg presses to keep knee in line with second-third toe.    HEP: She is to continue  her current HEP.   8-23-18 8-24-18 8-28-18 8/30/18 8-31-18 9-4-18 9-7-18             SLR 0# x 10  2# 3 x 10 0# x 10  2# 2 x 10 2# 3 x 10 2# 3 x 10 2# 3 x 10 2# 3 x 10    SAQ 2# 3 x 10  3#  2# x 12  3# x 10  4# x 10 3# 3 x10 3# 3 x 10 3# 3 x 10 3# 3 x 10    LAQ  -   3 x 10     Attempted machine 10# but unable to perform 3# 3 x 10 90-30 degrees    Sidelying hip abd 0# 3 x 10 0#  0 # 3 x 10 0# 3 x 10 0# 3 x 10 0# 3 x 10    Prone hip ext 3# 3 x 10 3# initially x 5 repetitions;  1x5, 2 x 10 w/o added resistance 3# 3 x 10 3# 3 x 10  0# x 10  2# 2 x 10    Prone knee flex 3# 3 x 10 3# 3 x 10 3# 3 x10 3# 3 x10      Seated knee flex          Bridging  3 x 10 3 x 10 3 x 10 3 x 10 3 x 10 3 x 10    Hip abd  -        It band stretch in sidelying  Prone quad stretch          Step ups       2\" with cues for pelvic stability and not to supinate,  2 x 10   Calf raises 2 x 15 bilateral    2 x 15 bilateral 2 x 15  bilateral    Gait drills       Stance phase on R LE with forward advancement of L LE to promote weightbearing through R foot without supination   Shuttle press       Bilateral 25# 2 x 10    50# x 10 B    Unilateral   12.5# 2 x 10 R   Terminal knee extensions       Green, 2 x 15 with 5 sec hold   Recumbent bike- see above  Treatment/Session Assessment:    · Response to Treatment: Patient limited by pain with nearly all activities. Has difficulty loading through medial arch of R foot, preferring to  supinated position on R LE. Making very little progress. Near constant cues to keep R foot from supinating during weightbearing activities. · Compliance with Program/Exercises:  Appears compliant   · Recommendations/Intent for next treatment session: Continue with ROM and strength work on R knee.    Total Treatment Duration: 40 Minutes   PT Patient Time In/Time Out  Time In: 0800  Time Out: [address], PT

## 2018-09-11 ENCOUNTER — APPOINTMENT (OUTPATIENT)
Dept: PHYSICAL THERAPY | Age: 53
End: 2018-09-11
Payer: COMMERCIAL

## 2018-09-11 ENCOUNTER — HOSPITAL ENCOUNTER (OUTPATIENT)
Dept: PHYSICAL THERAPY | Age: 53
Discharge: HOME | End: 2018-09-11
Payer: COMMERCIAL

## 2018-09-11 PROCEDURE — 97110 THERAPEUTIC EXERCISES: CPT

## 2018-09-11 PROCEDURE — 97035 APP MDLTY 1+ULTRASOUND EA 15: CPT

## 2018-09-11 NOTE — PROGRESS NOTES
Floresita Vieira  : 1965  Payor: [address] [place] [address] Road / Plan: Rose Marie Marion / Product Type: Workers Comp /  [address]  at [facility] [place] [employer]  [address] [place] [address], [address].  [phone]   [phone]       OUTPATIENT PHYSICAL THERAPY:Daily Note 2018      ICD-10: Treatment Diagnosis: Sprain of posterior cruciate ligament of right knee, sequela (S83.521S); Pain in right knee (M25.561); Stiffness of right knee, not elsewhere classified (M25.661); Difficulty in walking, not elsewhere classified (R26.2)  Precautions/Allergies:   Review of patient's allergies indicates no known allergies. Fall Risk Score: 1 (? 5 = High Risk)  MD Orders:Evaluate and treat, HEP, ROM, strengthening; Quad strengthening; \"continue kneeHab\" (18) MEDICAL/REFERRING DIAGNOSIS:  S/p R knee scope, PCL reconstruction   DATE OF ONSET: Surgery 17  REFERRING PHYSICIAN: Juan Brunner., MD  RETURN PHYSICIAN APPOINTMENT:  18 ASSESSMENT:  Ms. Mumtaz Fisher presents s/p right knee scope with PCL reconstruction. She demonstrates some mild progress with tolerance to performance of therapeutic exercises, and gait on level ground. Patient remains limited with stair ascent/descent, as she continues to utilize a step-to gait pattern and turning her body sideways to do so. Patient may benefit from ongoing PT in preparation to return to work. PROBLEM LIST (Impacting functional limitations):  1. Post-op pain and swelling right knee  2. Decreased ROM right knee   3. Decreased functional strength right knee/LE   4. Decreased gait skills INTERVENTIONS PLANNED:  1. aquatic therapy  2. Thermal and electric modalities, manual therapies for pain. 3. Manual therapies and therapeutic exercises for ROM and strength. 4. Therapeutic exercises for gait and balance   TREATMENT PLAN:  Effective Dates: 18 to 10-5-18.   Frequency/Duration: 2-3 times a week for 6 more weeks  GOALS: (Goals have been discussed and agreed upon with patient.)   Short-Term Functional Goals: Time Frame: 6 weeks  1. Pt will be able to perform a good quad set and SLR with no extensor lag with for improved strength for gait. MET 7-6-18  2. Pt will increase R knee ROM to 0-90 for improved mobility. GOAL MET  3. Pt will improved R ankle DF AROM to 0 for improved gait. GOAL MET  4. Pt will report pain 5/10 with modified daily activities. GOAL MET  Discharge Goals: Time Frame: 12 weeks   1. Pt will increase R LE strength to 5/5 with manual muscle testing for improved strength for ADLs and work. Ongoing 8-31-18  2. Pt will negotiate 1 flight of stairs with step over step with good control. Ongoing, 8-31-18  3. Pt will increase R knee ROM to 0-120 for mobility. GOAL MET  4. Pt will report pain 3/10 with daily activities. Ongoing, 8-31-18  5. Pt will score 45/80 on LEFS. Progressing, ongoing 8-31-18  Rehabilitation Potential For Stated Goals: Good                HISTORY:   History of Present Injury/Illness (Reason for Referral): She works at [employer] and she was entering the cooler and there was oil on the floor. She slipped and the feet slipped out from under her. She tried to get up and slipped again. Injury was August 14-17. She did go to physical therapy but it was making her worse. They decided to have surgery. Surgery 12-21-17. Stayed 2 nights in the hospital. She did have home health physical therapy 3-4x. Pain increases with walking. Past Medical History/Comorbidities: diabetes type 2 controlled with diet, HTN, L LE varicose vein surgery  Social History/Living Environment: Lives with . 2 steps to get inside. Prior Level of Function/Work/Activity: Works at [employer]. 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 Job does require lifting. Would like to get back to walking and walking dogs.    Current Medications:       Current Outpatient Prescriptions:     magnesium hydroxide (BEAUCHAMP MILK OF MAGNESIA) 400 mg/5 mL suspension, Take 15 mL by mouth as needed for Constipation. as needed daily, Disp: , Rfl:    tramodol   Steroid *    atorvastatin (LIPITOR) 20 mg tablet, Take 20 mg by mouth nightly., Disp: , Rfl:     lisinopril-hydroCHLOROthiazide (PRINZIDE, ZESTORETIC) 10-12.5 mg per tablet, Take  by mouth daily. Indications: hypertension, Disp: , Rfl:     gemfibrozil (LOPID) 600 mg tablet, Take 600 mg by mouth two (2) times a day., Disp: , Rfl:     Omega-3 Fatty Acids 60- mg cpDR, Take  by mouth daily. , Disp: , Rfl:     acetaminophen (TYLENOL) 325 mg tablet, Take 325 mg by mouth every four (4) hours as needed for Pain., Disp: , Rfl:    Date Last Reviewed:  9/11/2018   EXAMINATION:   8-31-18  Observation/Orthostatic Postural Assessment: Comes into clinic without assistive device or brace on R knee. Palpation: Audible and palpable crepitus noted to R knee patellofemoral and tibiofemoral joints. ROM:          R AROM: flexion 125 degrees, extension 0 deg         Strength:            R knee extension 4/5, limited by pain         Functional Mobility: Walking on level ground: ambulates with antalgic gait pattern with stance phase on R LE. Stair ascent/descent: Ascends and descends with step-to gait pattern, turns body sideways and utilizes single handrail to assist with stair ascent/descent. Balance: Not tested. Body Structures Involved:  1. Joints  2. Muscles  3. Ligaments Body Functions Affected:  1. Neuromusculoskeletal Activities and Participation Affected:  1. Mobility  2. Community, Social and Civic Life   CLINICAL DECISION MAKING:   Outcome Measure: Tool Used: Lower Extremity Functional Scale (LEFS)  Score:  Initial: 5/80 20/80 (Date: 4-16-18 ) 44/80 (7-6-18) 35/80 (7-31-18) Most recent: 33/80 (8-31-18)   Interpretation of Score: 20 questions each scored on a 5 point scale with 0 representing \"extreme difficulty or unable to perform\" and 4 representing \"no difficulty\". The lower the score, the greater the functional disability. 80/80 represents no disability. Minimal detectable change is 9 points. Medical Necessity:   · Patient is expected to demonstrate progress in strength, range of motion and balance to improve gait. Reason for Services/Other Comments:  · Patient continues to require skilled intervention due to post-op R knee, decreased ROM, strength, gait. TREATMENT:   (In addition to Assessment/Re-Assessment sessions the following treatments were rendered)  9/11/2018   Accompanied by workers comp and hospital  throughout entire session. Pre-treatment Symptoms/Complaints: Pt to have isokinetic test on 9/18/18. Overall her knee has been doing well. Reports no pain upon arrival to PT. Feels that the ultrasound helps the pain. Pain: Initial:    0/10 Post Session: None reported after PT today      Modalities (10 minutes) to reduce discomfort at anterior R knee. Ultrasound performed x 10 minutes, initially at 1.2 W/cm2, 50% and 1.0 W/cm2 to reduce R knee discomfort at inferior pole of patella  . Manual Therapy (0  Minutes) none performed today    Therapeutic Exercisess (30 minutes) to improve R LE function, strength and reduce discomfort. 5 min warm up on  recumbent bike. Manual stretching to R hamstrings (5 x 20\") to reduce R knee pain.     HEP: She is to continue  her current HEP.   8-23-18 8-24-18 8-28-18 8/30/18 8-31-18 9-4-18 9-7-18 9-11-18              SLR 0# x 10  2# 3 x 10 0# x 10  2# 2 x 10 2# 3 x 10 2# 3 x 10 2# 3 x 10 2# 3 x 10  2# 30 total (x 20, x 10)   SAQ 2# 3 x 10  3#  2# x 12  3# x 10  4# x 10 3# 3 x10 3# 3 x 10 3# 3 x 10 3# 3 x 10  3# 3 x 10 R   LAQ  -   3 x 10     Attempted machine 10# but unable to perform 3# 3 x 10 90-30 degrees     Sidelying hip abd 0# 3 x 10 0#  0 # 3 x 10 0# 3 x 10 0# 3 x 10 0# 3 x 10  0# 3 x 10 R   Prone hip ext 3# 3 x 10 3# initially x 5 repetitions;  1x5, 2 x 10 w/o added resistance 3# 3 x 10 3# 3 x 10  0# x 10  2# 2 x 10     Prone knee flex 3# 3 x 10 3# 3 x 10 3# 3 x10 3# 3 x10       Seated knee flex           Bridging  3 x 10 3 x 10 3 x 10 3 x 10 3 x 10 3 x 10  On swiss ball with knees in approximately 60 degrees of flexion, 3 x 10    Hip abd  -         It band stretch in sidelying  Prone quad stretch           Step ups       2\" with cues for pelvic stability and not to supinate,  2 x 10    Calf raises 2 x 15 bilateral    2 x 15 bilateral 2 x 15  bilateral     Gait drills       Stance phase on R LE with forward advancement of L LE to promote weightbearing through R foot without supination    Shuttle press       Bilateral 25# 2 x 10    50# x 10 B    Unilateral   12.5# 2 x 10 R    Terminal knee extensions       Green, 2 x 15 with 5 sec hold    Recumbent bike- see above  Treatment/Session Assessment:    · Response to Treatment: Patient continues to be limited by R knee pain with activity, and modified bridging to avoid increasing knee pain with feet on swiss ball. Patient remains limited with functional activities due to knee pain and apprehension. Ultarsound appears to reduce discomfort. · Compliance with Program/Exercises:  Appears compliant   · Recommendations/Intent for next treatment session: Continue with ROM and strength work on R knee.    Total Treatment Duration: 40 Minutes   PT Patient Time In/Time Out  Time In: 0805  Time Out: [address], PT

## 2018-09-13 ENCOUNTER — APPOINTMENT (OUTPATIENT)
Dept: PHYSICAL THERAPY | Age: 53
End: 2018-09-13
Payer: COMMERCIAL

## 2018-09-13 ENCOUNTER — HOSPITAL ENCOUNTER (OUTPATIENT)
Dept: PHYSICAL THERAPY | Age: 53
Discharge: HOME | End: 2018-09-13
Payer: COMMERCIAL

## 2018-09-13 PROCEDURE — 97110 THERAPEUTIC EXERCISES: CPT

## 2018-09-13 NOTE — PROGRESS NOTES
Oksana Rabago  : 1965  Payor: [address] [place] [address] Road / Plan: Elisa Escalante / Product Type: Workers Comp /  [address]  at [address] [place] [address] Rd  [facility], [address] [place] [address]  [phone]   [phone]       OUTPATIENT PHYSICAL THERAPY:Daily Note 2018      ICD-10: Treatment Diagnosis: Sprain of posterior cruciate ligament of right knee, sequela (S83.521S); Pain in right knee (M25.561); Stiffness of right knee, not elsewhere classified (M25.661); Difficulty in walking, not elsewhere classified (R26.2)  Precautions/Allergies:   Review of patient's allergies indicates no known allergies. Fall Risk Score: 1 (? 5 = High Risk)  MD Orders:Evaluate and treat, HEP, ROM, strengthening; Quad strengthening; \"continue kneeHab\" (18) MEDICAL/REFERRING DIAGNOSIS:  S/p R knee scope, PCL reconstruction   DATE OF ONSET: Surgery 17  REFERRING PHYSICIAN: Jesu Jarquin MD  RETURN PHYSICIAN APPOINTMENT:  18 ASSESSMENT:  Ms. Diane Rivera presents s/p right knee scope with PCL reconstruction. She demonstrates some mild progress with tolerance to performance of therapeutic exercises, and gait on level ground. Patient remains limited with stair ascent/descent, as she continues to utilize a step-to gait pattern and turning her body sideways to do so. Patient may benefit from ongoing PT in preparation to return to work. PROBLEM LIST (Impacting functional limitations):  1. Post-op pain and swelling right knee  2. Decreased ROM right knee   3. Decreased functional strength right knee/LE   4. Decreased gait skills INTERVENTIONS PLANNED:  1. aquatic therapy  2. Thermal and electric modalities, manual therapies for pain. 3. Manual therapies and therapeutic exercises for ROM and strength. 4. Therapeutic exercises for gait and balance   TREATMENT PLAN:  Effective Dates: 18 to 10-5-18.   Frequency/Duration: 2-3 times a week for 6 more weeks  GOALS: (Goals have been discussed and agreed upon with patient.)   Short-Term Functional Goals: Time Frame: 6 weeks  1. Pt will be able to perform a good quad set and SLR with no extensor lag with for improved strength for gait. MET 7-6-18  2. Pt will increase R knee ROM to 0-90 for improved mobility. GOAL MET  3. Pt will improved R ankle DF AROM to 0 for improved gait. GOAL MET  4. Pt will report pain 5/10 with modified daily activities. GOAL MET  Discharge Goals: Time Frame: 12 weeks   1. Pt will increase R LE strength to 5/5 with manual muscle testing for improved strength for ADLs and work. Ongoing 8-31-18  2. Pt will negotiate 1 flight of stairs with step over step with good control. Ongoing, 8-31-18  3. Pt will increase R knee ROM to 0-120 for mobility. GOAL MET  4. Pt will report pain 3/10 with daily activities. Ongoing, 8-31-18  5. Pt will score 45/80 on LEFS. Progressing, ongoing 8-31-18  Rehabilitation Potential For Stated Goals: Good                HISTORY:   History of Present Injury/Illness (Reason for Referral): She works at [employer] and she was entering the cooler and there was oil on the floor. She slipped and the feet slipped out from under her. She tried to get up and slipped again. Injury was August 14-17. She did go to physical therapy but it was making her worse. They decided to have surgery. Surgery 12-21-17. Stayed 2 nights in the hospital. She did have home health physical therapy 3-4x. Pain increases with walking. Past Medical History/Comorbidities: diabetes type 2 controlled with diet, HTN, L LE varicose vein surgery  Social History/Living Environment: Lives with . 2 steps to get inside. Prior Level of Function/Work/Activity: Works at [employer]. 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 Job does require lifting. Would like to get back to walking and walking dogs.    Current Medications:       Current Outpatient Prescriptions:     magnesium hydroxide (BEAUCHAMP MILK OF MAGNESIA) 400 mg/5 mL suspension, Take 15 mL by mouth as needed for Constipation. as needed daily, Disp: , Rfl:    tramodol   Steroid *    atorvastatin (LIPITOR) 20 mg tablet, Take 20 mg by mouth nightly., Disp: , Rfl:     lisinopril-hydroCHLOROthiazide (PRINZIDE, ZESTORETIC) 10-12.5 mg per tablet, Take  by mouth daily. Indications: hypertension, Disp: , Rfl:     gemfibrozil (LOPID) 600 mg tablet, Take 600 mg by mouth two (2) times a day., Disp: , Rfl:     Omega-3 Fatty Acids 60- mg cpDR, Take  by mouth daily. , Disp: , Rfl:     acetaminophen (TYLENOL) 325 mg tablet, Take 325 mg by mouth every four (4) hours as needed for Pain., Disp: , Rfl:    Date Last Reviewed:  9/13/2018   EXAMINATION:   8-31-18  Observation/Orthostatic Postural Assessment: Comes into clinic without assistive device or brace on R knee. Palpation: Audible and palpable crepitus noted to R knee patellofemoral and tibiofemoral joints. ROM:          R AROM: flexion 125 degrees, extension 0 deg         Strength:            R knee extension 4/5, limited by pain         Functional Mobility: Walking on level ground: ambulates with antalgic gait pattern with stance phase on R LE. Stair ascent/descent: Ascends and descends with step-to gait pattern, turns body sideways and utilizes single handrail to assist with stair ascent/descent. Balance: Not tested. Body Structures Involved:  1. Joints  2. Muscles  3. Ligaments Body Functions Affected:  1. Neuromusculoskeletal Activities and Participation Affected:  1. Mobility  2. Community, Social and Civic Life   CLINICAL DECISION MAKING:   Outcome Measure: Tool Used: Lower Extremity Functional Scale (LEFS)  Score:  Initial: 5/80 20/80 (Date: 4-16-18 ) 44/80 (7-6-18) 35/80 (7-31-18) Most recent: 33/80 (8-31-18)   Interpretation of Score: 20 questions each scored on a 5 point scale with 0 representing \"extreme difficulty or unable to perform\" and 4 representing \"no difficulty\". The lower the score, the greater the functional disability. 80/80 represents no disability. Minimal detectable change is 9 points. Medical Necessity:   · Patient is expected to demonstrate progress in strength, range of motion and balance to improve gait. Reason for Services/Other Comments:  · Patient continues to require skilled intervention due to post-op R knee, decreased ROM, strength, gait. TREATMENT:   (In addition to Assessment/Re-Assessment sessions the following treatments were rendered)  9/13/2018   Accompanied by workers comp and hospital  throughout entire session. Pre-treatment Symptoms/Complaints: Pt states she has been doing a little better. Her knee is still painful with wt bearing and if she bends it too much. Pain: Initial:    2/10 Post Session: 1/10      Modalities (0 minutes) to reduce discomfort at anterior R knee. .  Manual Therapy (0  Minutes) none performed today    Therapeutic Exercisess (30 minutes) to improve R LE function, strength and reduce discomfort. 5 min warm up on  recumbent bike. Manual stretching to R hamstrings (5 x 20\") to reduce R knee pain. HEP: She is to continue  her current HEP. Recumbent bike x 5 min level 2 for warm up   9-13-18       SLR 2# 30 total (x 20, x 10)   SAQ 3# 3 x 10 R   LAQ    Sidelying hip abd 0# 3 x 10 R   Prone hip ext    Prone knee flex    Seated knee flex    Bridging  On swiss ball with knees in approximately 60 degrees of flexion, 3 x 10    Hip abd    It band stretch in sidelying  Prone quad stretch    Step ups    Calf raises    Gait drills    Shuttle press    Terminal knee extensions    Brace fitting-  Therapeutic ex- 10 min. Orthotist came to fit stabilization brace. Brace fit well and pt stated that it felt better. Initially brace was a bit rotated and was not comfortable. Orthotist stated it is important to start out with brace positioned up high enough.   If it slips, need to unstrap and reposition and not merely pull up the brace. Treatment/Session Assessment:    · Response to Treatment: Pt seemed pleased with new brace. She did well with ex today. · Compliance with Program/Exercises:  Appears compliant   · Recommendations/Intent for next treatment session: Continue with ROM and strength work on R knee.    Total Treatment Duration: 40 Minutes   PT Patient Time In/Time Out  Time In: 0800  Time Out: 0840    Stephania Krishna, PT

## 2018-09-14 ENCOUNTER — APPOINTMENT (OUTPATIENT)
Dept: PHYSICAL THERAPY | Age: 53
End: 2018-09-14
Payer: COMMERCIAL

## 2018-09-14 ENCOUNTER — HOSPITAL ENCOUNTER (OUTPATIENT)
Dept: PHYSICAL THERAPY | Age: 53
Discharge: HOME | End: 2018-09-14
Payer: COMMERCIAL

## 2018-09-14 PROCEDURE — 97110 THERAPEUTIC EXERCISES: CPT

## 2018-09-14 NOTE — PROGRESS NOTES
Lakeisha Schaffer  : 1965  Payor: [address] [place] [address] Road / Plan: Moira Phan / Product Type: Workers Comp /  [address]  at [facility] [place] [employer]  [address] [place] [address], [address].  [phone]   [phone]       OUTPATIENT PHYSICAL THERAPY:Daily Note 2018      ICD-10: Treatment Diagnosis: Sprain of posterior cruciate ligament of right knee, sequela (S83.521S); Pain in right knee (M25.561); Stiffness of right knee, not elsewhere classified (M25.661); Difficulty in walking, not elsewhere classified (R26.2)  Precautions/Allergies:   Review of patient's allergies indicates no known allergies. Fall Risk Score: 1 (? 5 = High Risk)  MD Orders:Evaluate and treat, HEP, ROM, strengthening; Quad strengthening; \"continue kneeHab\" (18) MEDICAL/REFERRING DIAGNOSIS:  S/p R knee scope, PCL reconstruction   DATE OF ONSET: Surgery 17  REFERRING PHYSICIAN: Fariha Jain MD  RETURN PHYSICIAN APPOINTMENT:  18 ASSESSMENT:  Ms. Elvira Alex presents s/p right knee scope with PCL reconstruction. She demonstrates some mild progress with tolerance to performance of therapeutic exercises, and gait on level ground. Patient remains limited with stair ascent/descent, as she continues to utilize a step-to gait pattern and turning her body sideways to do so. Patient may benefit from ongoing PT in preparation to return to work. PROBLEM LIST (Impacting functional limitations):  1. Post-op pain and swelling right knee  2. Decreased ROM right knee   3. Decreased functional strength right knee/LE   4. Decreased gait skills INTERVENTIONS PLANNED:  1. aquatic therapy  2. Thermal and electric modalities, manual therapies for pain. 3. Manual therapies and therapeutic exercises for ROM and strength. 4. Therapeutic exercises for gait and balance   TREATMENT PLAN:  Effective Dates: 18 to 10-5-18.   Frequency/Duration: 2-3 times a week for 6 more weeks  GOALS: (Goals have been discussed and agreed upon with patient.)   Short-Term Functional Goals: Time Frame: 6 weeks  1. Pt will be able to perform a good quad set and SLR with no extensor lag with for improved strength for gait. MET 7-6-18  2. Pt will increase R knee ROM to 0-90 for improved mobility. GOAL MET  3. Pt will improved R ankle DF AROM to 0 for improved gait. GOAL MET  4. Pt will report pain 5/10 with modified daily activities. GOAL MET  Discharge Goals: Time Frame: 12 weeks   1. Pt will increase R LE strength to 5/5 with manual muscle testing for improved strength for ADLs and work. Ongoing 8-31-18  2. Pt will negotiate 1 flight of stairs with step over step with good control. Ongoing, 8-31-18  3. Pt will increase R knee ROM to 0-120 for mobility. GOAL MET  4. Pt will report pain 3/10 with daily activities. Ongoing, 8-31-18  5. Pt will score 45/80 on LEFS. Progressing, ongoing 8-31-18  Rehabilitation Potential For Stated Goals: Good                HISTORY:   History of Present Injury/Illness (Reason for Referral): She works at [employer] and she was entering the cooler and there was oil on the floor. She slipped and the feet slipped out from under her. She tried to get up and slipped again. Injury was August 14-17. She did go to physical therapy but it was making her worse. They decided to have surgery. Surgery 12-21-17. Stayed 2 nights in the hospital. She did have home health physical therapy 3-4x. Pain increases with walking. Past Medical History/Comorbidities: diabetes type 2 controlled with diet, HTN, L LE varicose vein surgery  Social History/Living Environment: Lives with . 2 steps to get inside. Prior Level of Function/Work/Activity: Works at [employer]. 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 Job does require lifting. Would like to get back to walking and walking dogs.    Current Medications:       Current Outpatient Prescriptions:     magnesium hydroxide (BEAUCHAMP MILK OF MAGNESIA) 400 mg/5 mL suspension, Take 15 mL by mouth as needed for Constipation. as needed daily, Disp: , Rfl:    tramodol   Steroid *    atorvastatin (LIPITOR) 20 mg tablet, Take 20 mg by mouth nightly., Disp: , Rfl:     lisinopril-hydroCHLOROthiazide (PRINZIDE, ZESTORETIC) 10-12.5 mg per tablet, Take  by mouth daily. Indications: hypertension, Disp: , Rfl:     gemfibrozil (LOPID) 600 mg tablet, Take 600 mg by mouth two (2) times a day., Disp: , Rfl:     Omega-3 Fatty Acids 60- mg cpDR, Take  by mouth daily. , Disp: , Rfl:     acetaminophen (TYLENOL) 325 mg tablet, Take 325 mg by mouth every four (4) hours as needed for Pain., Disp: , Rfl:    Date Last Reviewed:  9/14/2018   EXAMINATION:   8-31-18  Observation/Orthostatic Postural Assessment: Comes into clinic without assistive device or brace on R knee. Palpation: Audible and palpable crepitus noted to R knee patellofemoral and tibiofemoral joints. ROM:          R AROM: flexion 125 degrees, extension 0 deg         Strength:            R knee extension 4/5, limited by pain         Functional Mobility: Walking on level ground: ambulates with antalgic gait pattern with stance phase on R LE. Stair ascent/descent: Ascends and descends with step-to gait pattern, turns body sideways and utilizes single handrail to assist with stair ascent/descent. Balance: Not tested. Body Structures Involved:  1. Joints  2. Muscles  3. Ligaments Body Functions Affected:  1. Neuromusculoskeletal Activities and Participation Affected:  1. Mobility  2. Community, Social and Civic Life   CLINICAL DECISION MAKING:   Outcome Measure: Tool Used: Lower Extremity Functional Scale (LEFS)  Score:  Initial: 5/80 20/80 (Date: 4-16-18 ) 44/80 (7-6-18) 35/80 (7-31-18) Most recent: 33/80 (8-31-18)   Interpretation of Score: 20 questions each scored on a 5 point scale with 0 representing \"extreme difficulty or unable to perform\" and 4 representing \"no difficulty\". The lower the score, the greater the functional disability. 80/80 represents no disability. Minimal detectable change is 9 points. Medical Necessity:   · Patient is expected to demonstrate progress in strength, range of motion and balance to improve gait. Reason for Services/Other Comments:  · Patient continues to require skilled intervention due to post-op R knee, decreased ROM, strength, gait. TREATMENT:   (In addition to Assessment/Re-Assessment sessions the following treatments were rendered)  9/14/2018   Accompanied by workers comp   throughout entire session, but hospital  was unable to attend. Pre-treatment Symptoms/Complaints: Pt reports that she was unable to put on the brace as instructed this morning. Has no new complaints today. Pain: Initial:    No pain value reported Post Session: No numeric value reported, but stated it was sore after LAQ      Modalities (0 minutes) to reduce discomfort at anterior R knee. .  Manual Therapy (0  Minutes) none performed today    Therapeutic Exercisess (30 minutes) to improve R LE function, strength and reduce discomfort. 7 minutes (untimed) with PT donning knee brace for multiple attempts to align brace and secure correctly.     HEP: She is to continue her current HEP.     9-13-18 9-14-18        SLR 2# 30 total (x 20, x 10) 30 total w/brace on R knee   SAQ 3# 3 x 10 R 3# 30 total   LAQ  Machine, 10# concentric bilateral, eccentric unilateral x 25 through allowable ROM   Sidelying hip abd 0# 3 x 10 R    Prone hip ext     Prone knee flex     Seated knee flex     Bridging  On swiss ball with knees in approximately 60 degrees of flexion, 3 x 10  On swiss ball with knees flexed, x 30 total   Hip abd  0# 3 x 10   It band stretch in sidelying  Prone quad stretch     Step ups     Calf raises     Gait drills     Shuttle press     Terminal knee extensions       Treatment/Session Assessment:    · Response to Treatment: Brace appears to not offer enough support per pt report. Pt unable to don/doff without assistance. Was able to perform long arc quads with improved performance, but it increased pain. PT assisted patient with putting on brace, but was unable to get it to line adquately to support. Patient said she would contact  to have another instruction on brace fit. · Compliance with Program/Exercises:  Appears compliant   · Recommendations/Intent for next treatment session: Continue with ROM and strength work on R knee.    Total Treatment Duration: 30 Minutes   PT Patient Time In/Time Out  Time In: 0808  Time Out: [address], PT

## 2018-09-18 ENCOUNTER — APPOINTMENT (OUTPATIENT)
Dept: PHYSICAL THERAPY | Age: 53
End: 2018-09-18
Payer: COMMERCIAL

## 2018-09-18 ENCOUNTER — HOSPITAL ENCOUNTER (OUTPATIENT)
Dept: PHYSICAL THERAPY | Age: 53
Discharge: HOME | End: 2018-09-18
Payer: COMMERCIAL

## 2018-09-18 PROCEDURE — 97110 THERAPEUTIC EXERCISES: CPT

## 2018-09-18 NOTE — PROGRESS NOTES
Zackery Jessica  : 1965  Payor: [address] [place] [address] Road / Plan: Nolberto Paul / Product Type: Workers Comp /  [address]  at [address] [place] [address] Rd  [address] [facility], [address] [place], [place] [address]  [phone]   [phone]       OUTPATIENT PHYSICAL THERAPY:Daily Note 2018      ICD-10: Treatment Diagnosis: Sprain of posterior cruciate ligament of right knee, sequela (S83.521S); Pain in right knee (M25.561); Stiffness of right knee, not elsewhere classified (M25.661); Difficulty in walking, not elsewhere classified (R26.2)  Precautions/Allergies:   Review of patient's allergies indicates no known allergies. Fall Risk Score: 1 (? 5 = High Risk)  MD Orders:Evaluate and treat, HEP, ROM, strengthening; Quad strengthening; \"continue kneeHab\" (18) MEDICAL/REFERRING DIAGNOSIS:  S/p R knee scope, PCL reconstruction   DATE OF ONSET: Surgery 17  REFERRING PHYSICIAN: Aditya Domínguez MD  RETURN PHYSICIAN APPOINTMENT:  18 ASSESSMENT:  Ms. Jhoan Miller presents s/p right knee scope with PCL reconstruction. She demonstrates some mild progress with tolerance to performance of therapeutic exercises, and gait on level ground. Patient remains limited with stair ascent/descent, as she continues to utilize a step-to gait pattern and turning her body sideways to do so. Patient may benefit from ongoing PT in preparation to return to work. PROBLEM LIST (Impacting functional limitations):  1. Post-op pain and swelling right knee  2. Decreased ROM right knee   3. Decreased functional strength right knee/LE   4. Decreased gait skills INTERVENTIONS PLANNED:  1. aquatic therapy  2. Thermal and electric modalities, manual therapies for pain. 3. Manual therapies and therapeutic exercises for ROM and strength. 4. Therapeutic exercises for gait and balance   TREATMENT PLAN:  Effective Dates: 18 to 10-5-18.   Frequency/Duration: 2-3 times a week for 6 more weeks  GOALS: (Goals have been discussed and agreed upon with patient.)   Short-Term Functional Goals: Time Frame: 6 weeks  1. Pt will be able to perform a good quad set and SLR with no extensor lag with for improved strength for gait. MET 7-6-18  2. Pt will increase R knee ROM to 0-90 for improved mobility. GOAL MET  3. Pt will improved R ankle DF AROM to 0 for improved gait. GOAL MET  4. Pt will report pain 5/10 with modified daily activities. GOAL MET  Discharge Goals: Time Frame: 12 weeks   1. Pt will increase R LE strength to 5/5 with manual muscle testing for improved strength for ADLs and work. Ongoing 8-31-18  2. Pt will negotiate 1 flight of stairs with step over step with good control. Ongoing, 8-31-18  3. Pt will increase R knee ROM to 0-120 for mobility. GOAL MET  4. Pt will report pain 3/10 with daily activities. Ongoing, 8-31-18  5. Pt will score 45/80 on LEFS. Progressing, ongoing 8-31-18  Rehabilitation Potential For Stated Goals: Good                HISTORY:   History of Present Injury/Illness (Reason for Referral): She works at [employer] and she was entering the cooler and there was oil on the floor. She slipped and the feet slipped out from under her. She tried to get up and slipped again. Injury was August 14-17. She did go to physical therapy but it was making her worse. They decided to have surgery. Surgery 12-21-17. Stayed 2 nights in the hospital. She did have home health physical therapy 3-4x. Pain increases with walking. Past Medical History/Comorbidities: diabetes type 2 controlled with diet, HTN, L LE varicose vein surgery  Social History/Living Environment: Lives with . 2 steps to get inside. Prior Level of Function/Work/Activity: Works at [employer]. 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 Job does require lifting. Would like to get back to walking and walking dogs.    Current Medications:       Current Outpatient Prescriptions:     magnesium hydroxide (BEAUCHAMP MILK OF MAGNESIA) 400 mg/5 mL suspension, Take 15 mL by mouth as needed for Constipation. as needed daily, Disp: , Rfl:    tramodol   Steroid *    atorvastatin (LIPITOR) 20 mg tablet, Take 20 mg by mouth nightly., Disp: , Rfl:     lisinopril-hydroCHLOROthiazide (PRINZIDE, ZESTORETIC) 10-12.5 mg per tablet, Take  by mouth daily. Indications: hypertension, Disp: , Rfl:     gemfibrozil (LOPID) 600 mg tablet, Take 600 mg by mouth two (2) times a day., Disp: , Rfl:     Omega-3 Fatty Acids 60- mg cpDR, Take  by mouth daily. , Disp: , Rfl:     acetaminophen (TYLENOL) 325 mg tablet, Take 325 mg by mouth every four (4) hours as needed for Pain., Disp: , Rfl:    Date Last Reviewed:  9/18/2018   EXAMINATION:   8-31-18  Observation/Orthostatic Postural Assessment: Comes into clinic without assistive device or brace on R knee. Palpation: Audible and palpable crepitus noted to R knee patellofemoral and tibiofemoral joints. ROM:          R AROM: flexion 125 degrees, extension 0 deg         Strength:            R knee extension 4/5, limited by pain         Functional Mobility: Walking on level ground: ambulates with antalgic gait pattern with stance phase on R LE. Stair ascent/descent: Ascends and descends with step-to gait pattern, turns body sideways and utilizes single handrail to assist with stair ascent/descent. Balance: Not tested. Body Structures Involved:  1. Joints  2. Muscles  3. Ligaments Body Functions Affected:  1. Neuromusculoskeletal Activities and Participation Affected:  1. Mobility  2. Community, Social and Civic Life   CLINICAL DECISION MAKING:   Outcome Measure: Tool Used: Lower Extremity Functional Scale (LEFS)  Score:  Initial: 5/80 20/80 (Date: 4-16-18 ) 44/80 (7-6-18) 35/80 (7-31-18) Most recent: 33/80 (8-31-18)   Interpretation of Score: 20 questions each scored on a 5 point scale with 0 representing \"extreme difficulty or unable to perform\" and 4 representing \"no difficulty\". The lower the score, the greater the functional disability. 80/80 represents no disability. Minimal detectable change is 9 points. Medical Necessity:   · Patient is expected to demonstrate progress in strength, range of motion and balance to improve gait. Reason for Services/Other Comments:  · Patient continues to require skilled intervention due to post-op R knee, decreased ROM, strength, gait. TREATMENT:   (In addition to Assessment/Re-Assessment sessions the following treatments were rendered)  9/18/2018   Accompanied by workers comp   throughout entire session, but hospital  was unable to attend. Pre-treatment Symptoms/Complaints: Pt states she has Isokinetic test today. She is still having problems putting brace on. Pain: Initial:    No pain value reported Post Session: No numeric value reported, but stated it was sore after LAQ      Modalities (0 minutes)    . Manual Therapy (0  Minutes)     Therapeutic Exercisess ( minutes)      HEP: She is to continue her current HEP.     9-13-18 9-14-18          SLR 2# 30 total (x 20, x 10) 30 total w/brace on R knee    SAQ 3# 3 x 10 R 3# 30 total    LAQ  Machine, 10# concentric bilateral, eccentric unilateral x 25 through allowable ROM    Sidelying hip abd 0# 3 x 10 R     Prone hip ext      Prone knee flex      Seated knee flex      Bridging  On swiss ball with knees in approximately 60 degrees of flexion, 3 x 10  On swiss ball with knees flexed, x 30 total    Hip abd  0# 3 x 10    It band stretch in sidelying  Prone quad stretch      Step ups      Calf raises      Gait drills      Shuttle press      Terminal knee extensions      recumbent bike x 5 minutes then worked with pt on donning brace. Treatment/Session Assessment:  Pt stated brace felt more stable and she would leave it on and see how it did.   · Response to Treatment:   · Compliance with Program/Exercises:  Appears compliant   · Recommendations/Intent for next treatment session: Continue with ROM and strength work on R knee.    Total Treatment Duration: 20 minutes  PT Patient Time In/Time Out  Time In: 0800  Time Out: 0820    Rosaura Garcia PT

## 2018-09-20 ENCOUNTER — HOSPITAL ENCOUNTER (OUTPATIENT)
Dept: PHYSICAL THERAPY | Age: 53
Discharge: HOME | End: 2018-09-20
Payer: COMMERCIAL

## 2018-09-20 ENCOUNTER — APPOINTMENT (OUTPATIENT)
Dept: PHYSICAL THERAPY | Age: 53
End: 2018-09-20
Payer: COMMERCIAL

## 2018-09-20 PROCEDURE — 97110 THERAPEUTIC EXERCISES: CPT

## 2018-09-20 NOTE — PROGRESS NOTES
Jeremiah Lane  : 1965  Payor: [address] [place] [address] Road / Plan: Leigh Beat / Product Type: Workers Comp /  [address]  at [address] [place] [address] Rd  [address] [facility], [address].  [phone]   [phone]       OUTPATIENT PHYSICAL THERAPY:Daily Note 2018      ICD-10: Treatment Diagnosis: Sprain of posterior cruciate ligament of right knee, sequela (S83.521S); Pain in right knee (M25.561); Stiffness of right knee, not elsewhere classified (M25.661); Difficulty in walking, not elsewhere classified (R26.2)  Precautions/Allergies:   Review of patient's allergies indicates no known allergies. Fall Risk Score: 1 (? 5 = High Risk)  MD Orders:Evaluate and treat, HEP, ROM, strengthening; Quad strengthening; \"continue kneeHab\" (18) MEDICAL/REFERRING DIAGNOSIS:  S/p R knee scope, PCL reconstruction   DATE OF ONSET: Surgery 17  REFERRING PHYSICIAN: Jose Manuel Sotelo MD  RETURN PHYSICIAN APPOINTMENT:  18 ASSESSMENT:  Ms. Kailey Smiley presents s/p right knee scope with PCL reconstruction. She demonstrates some mild progress with tolerance to performance of therapeutic exercises, and gait on level ground. Patient remains limited with stair ascent/descent, as she continues to utilize a step-to gait pattern and turning her body sideways to do so. Patient may benefit from ongoing PT in preparation to return to work. PROBLEM LIST (Impacting functional limitations):  1. Post-op pain and swelling right knee  2. Decreased ROM right knee   3. Decreased functional strength right knee/LE   4. Decreased gait skills INTERVENTIONS PLANNED:  1. aquatic therapy  2. Thermal and electric modalities, manual therapies for pain. 3. Manual therapies and therapeutic exercises for ROM and strength. 4. Therapeutic exercises for gait and balance   TREATMENT PLAN:  Effective Dates: 18 to 10-5-18.   Frequency/Duration: 2-3 times a week for 6 more weeks  GOALS: (Goals have been discussed and agreed upon with patient.)   Short-Term Functional Goals: Time Frame: 6 weeks  1. Pt will be able to perform a good quad set and SLR with no extensor lag with for improved strength for gait. MET 7-6-18  2. Pt will increase R knee ROM to 0-90 for improved mobility. GOAL MET  3. Pt will improved R ankle DF AROM to 0 for improved gait. GOAL MET  4. Pt will report pain 5/10 with modified daily activities. GOAL MET  Discharge Goals: Time Frame: 12 weeks   1. Pt will increase R LE strength to 5/5 with manual muscle testing for improved strength for ADLs and work. Ongoing 8-31-18  2. Pt will negotiate 1 flight of stairs with step over step with good control. Ongoing, 8-31-18  3. Pt will increase R knee ROM to 0-120 for mobility. GOAL MET  4. Pt will report pain 3/10 with daily activities. Ongoing, 8-31-18  5. Pt will score 45/80 on LEFS. Progressing, ongoing 8-31-18  Rehabilitation Potential For Stated Goals: Good                HISTORY:   History of Present Injury/Illness (Reason for Referral): She works at [employer] and she was entering the cooler and there was oil on the floor. She slipped and the feet slipped out from under her. She tried to get up and slipped again. Injury was August 14-17. She did go to physical therapy but it was making her worse. They decided to have surgery. Surgery 12-21-17. Stayed 2 nights in the hospital. She did have home health physical therapy 3-4x. Pain increases with walking. Past Medical History/Comorbidities: diabetes type 2 controlled with diet, HTN, L LE varicose vein surgery  Social History/Living Environment: Lives with . 2 steps to get inside. Prior Level of Function/Work/Activity: Works at [employer]. 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 Job does require lifting. Would like to get back to walking and walking dogs.    Current Medications:       Current Outpatient Prescriptions:     magnesium hydroxide (BEAUCHAMP MILK OF MAGNESIA) 400 mg/5 mL suspension, Take 15 mL by mouth as needed for Constipation. as needed daily, Disp: , Rfl:    tramodol   Steroid *    atorvastatin (LIPITOR) 20 mg tablet, Take 20 mg by mouth nightly., Disp: , Rfl:     lisinopril-hydroCHLOROthiazide (PRINZIDE, ZESTORETIC) 10-12.5 mg per tablet, Take  by mouth daily. Indications: hypertension, Disp: , Rfl:     gemfibrozil (LOPID) 600 mg tablet, Take 600 mg by mouth two (2) times a day., Disp: , Rfl:     Omega-3 Fatty Acids 60- mg cpDR, Take  by mouth daily. , Disp: , Rfl:     acetaminophen (TYLENOL) 325 mg tablet, Take 325 mg by mouth every four (4) hours as needed for Pain., Disp: , Rfl:    Date Last Reviewed:  9/20/2018   EXAMINATION:   8-31-18  Observation/Orthostatic Postural Assessment: Comes into clinic without assistive device or brace on R knee. Palpation: Audible and palpable crepitus noted to R knee patellofemoral and tibiofemoral joints. ROM:          R AROM: flexion 125 degrees, extension 0 deg         Strength:            R knee extension 4/5, limited by pain         Functional Mobility: Walking on level ground: ambulates with antalgic gait pattern with stance phase on R LE. Stair ascent/descent: Ascends and descends with step-to gait pattern, turns body sideways and utilizes single handrail to assist with stair ascent/descent. Balance: Not tested. Body Structures Involved:  1. Joints  2. Muscles  3. Ligaments Body Functions Affected:  1. Neuromusculoskeletal Activities and Participation Affected:  1. Mobility  2. Community, Social and Civic Life   CLINICAL DECISION MAKING:   Outcome Measure: Tool Used: Lower Extremity Functional Scale (LEFS)  Score:  Initial: 5/80 20/80 (Date: 4-16-18 ) 44/80 (7-6-18) 35/80 (7-31-18) Most recent: 33/80 (8-31-18)   Interpretation of Score: 20 questions each scored on a 5 point scale with 0 representing \"extreme difficulty or unable to perform\" and 4 representing \"no difficulty\". The lower the score, the greater the functional disability. 80/80 represents no disability. Minimal detectable change is 9 points. Medical Necessity:   · Patient is expected to demonstrate progress in strength, range of motion and balance to improve gait. Reason for Services/Other Comments:  · Patient continues to require skilled intervention due to post-op R knee, decreased ROM, strength, gait. TREATMENT:   (In addition to Assessment/Re-Assessment sessions the following treatments were rendered)  9/20/2018   Accompanied by workers comp   throughout entire session, but hospital  was unable to attend. Pre-treatment Symptoms/Complaints: Pt had isokinetic test on Tuesday and is complaining of soreness. Pain: Initial:    4/10 Post Session: 4/10      Modalities (0 minutes)    . Manual Therapy (0  Minutes)     Therapeutic Exercisess ( minutes)      HEP: She is to continue her current HEP.     9-13-18 9-14-18 9/20         SLR 2# 30 total (x 20, x 10) 30 total w/brace on R knee 2# 3 x 10   SAQ 3# 3 x 10 R 3# 30 total    LAQ  Machine, 10# concentric bilateral, eccentric unilateral x 25 through allowable ROM    Sidelying hip abd 0# 3 x 10 R  0# 3 x 10 R   Prone hip ext   2# 3 x 10   Prone knee flex   2# 3 x 10   Seated knee flex      Bridging  On swiss ball with knees in approximately 60 degrees of flexion, 3 x 10  On swiss ball with knees flexed, x 30 total    Hip abd  0# 3 x 10    It band stretch in sidelying  Prone quad stretch      Step ups      Calf raises      Gait drills      Shuttle press      Terminal knee extensions      recumbent bike x 5 minutes then worked with pt on donning brace. Treatment/Session Assessment:  Pt complaining soreness in knee. · Response to Treatment:   · Compliance with Program/Exercises:  Appears compliant   · Recommendations/Intent for next treatment session: Continue with ROM and strength work on R knee.    Total Treatment Duration: 45 minutes  PT Patient Time In/Time Out  Time In: 0845  Time Out: 0930    Rosaura Garcia, PT

## 2018-09-21 ENCOUNTER — APPOINTMENT (OUTPATIENT)
Dept: PHYSICAL THERAPY | Age: 53
End: 2018-09-21
Payer: COMMERCIAL

## 2018-09-21 ENCOUNTER — HOSPITAL ENCOUNTER (OUTPATIENT)
Dept: PHYSICAL THERAPY | Age: 53
Discharge: HOME | End: 2018-09-21
Payer: COMMERCIAL

## 2018-09-21 PROCEDURE — 97140 MANUAL THERAPY 1/> REGIONS: CPT

## 2018-09-21 PROCEDURE — 97110 THERAPEUTIC EXERCISES: CPT

## 2018-09-21 NOTE — PROGRESS NOTES
Rani Jackidenise  : 1965  Payor: [address] [place] [address] Road / Plan: Evon Her / Product Type: Workers Comp /  [address]  at [facility] [place] [employer]  [address] [place] [address], [address].  [phone]   [phone]       OUTPATIENT PHYSICAL THERAPY:Daily Note 2018      ICD-10: Treatment Diagnosis: Sprain of posterior cruciate ligament of right knee, sequela (S83.521S); Pain in right knee (M25.561); Stiffness of right knee, not elsewhere classified (M25.661); Difficulty in walking, not elsewhere classified (R26.2)  Precautions/Allergies:   Review of patient's allergies indicates no known allergies. Fall Risk Score: 1 (? 5 = High Risk)  MD Orders:Evaluate and treat, HEP, ROM, strengthening; Quad strengthening; \"continue kneeHab\" (18) MEDICAL/REFERRING DIAGNOSIS:  S/p R knee scope, PCL reconstruction   DATE OF ONSET: Surgery 17  REFERRING PHYSICIAN: Ladonna Vidal MD  RETURN PHYSICIAN APPOINTMENT:  18 ASSESSMENT:  Ms. Elmer Stephenson presents s/p right knee scope with PCL reconstruction. She demonstrates some mild progress with tolerance to performance of therapeutic exercises, and gait on level ground. Patient remains limited with stair ascent/descent, as she continues to utilize a step-to gait pattern and turning her body sideways to do so. Patient may benefit from ongoing PT in preparation to return to work. PROBLEM LIST (Impacting functional limitations):  1. Post-op pain and swelling right knee  2. Decreased ROM right knee   3. Decreased functional strength right knee/LE   4. Decreased gait skills INTERVENTIONS PLANNED:  1. aquatic therapy  2. Thermal and electric modalities, manual therapies for pain. 3. Manual therapies and therapeutic exercises for ROM and strength. 4. Therapeutic exercises for gait and balance   TREATMENT PLAN:  Effective Dates: 18 to 10-5-18.   Frequency/Duration: 2-3 times a week for 6 more weeks  GOALS: (Goals have been discussed and agreed upon with patient.)   Short-Term Functional Goals: Time Frame: 6 weeks  1. Pt will be able to perform a good quad set and SLR with no extensor lag with for improved strength for gait. MET 7-6-18  2. Pt will increase R knee ROM to 0-90 for improved mobility. GOAL MET  3. Pt will improved R ankle DF AROM to 0 for improved gait. GOAL MET  4. Pt will report pain 5/10 with modified daily activities. GOAL MET  Discharge Goals: Time Frame: 12 weeks   1. Pt will increase R LE strength to 5/5 with manual muscle testing for improved strength for ADLs and work. Ongoing 8-31-18  2. Pt will negotiate 1 flight of stairs with step over step with good control. Ongoing, 8-31-18  3. Pt will increase R knee ROM to 0-120 for mobility. GOAL MET  4. Pt will report pain 3/10 with daily activities. Ongoing, 8-31-18  5. Pt will score 45/80 on LEFS. Progressing, ongoing 8-31-18  Rehabilitation Potential For Stated Goals: Good                HISTORY:   History of Present Injury/Illness (Reason for Referral): She works at [employer] and she was entering the cooler and there was oil on the floor. She slipped and the feet slipped out from under her. She tried to get up and slipped again. Injury was August 14-17. She did go to physical therapy but it was making her worse. They decided to have surgery. Surgery 12-21-17. Stayed 2 nights in the hospital. She did have home health physical therapy 3-4x. Pain increases with walking. Past Medical History/Comorbidities: diabetes type 2 controlled with diet, HTN, L LE varicose vein surgery  Social History/Living Environment: Lives with . 2 steps to get inside. Prior Level of Function/Work/Activity: Works at [employer]. 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 Job does require lifting. Would like to get back to walking and walking dogs.    Current Medications:       Current Outpatient Prescriptions:     magnesium hydroxide (BEAUCHAMP MILK OF MAGNESIA) 400 mg/5 mL suspension, Take 15 mL by mouth as needed for Constipation. as needed daily, Disp: , Rfl:    tramodol   Steroid *    atorvastatin (LIPITOR) 20 mg tablet, Take 20 mg by mouth nightly., Disp: , Rfl:     lisinopril-hydroCHLOROthiazide (PRINZIDE, ZESTORETIC) 10-12.5 mg per tablet, Take  by mouth daily. Indications: hypertension, Disp: , Rfl:     gemfibrozil (LOPID) 600 mg tablet, Take 600 mg by mouth two (2) times a day., Disp: , Rfl:     Omega-3 Fatty Acids 60- mg cpDR, Take  by mouth daily. , Disp: , Rfl:     acetaminophen (TYLENOL) 325 mg tablet, Take 325 mg by mouth every four (4) hours as needed for Pain., Disp: , Rfl:    Date Last Reviewed:  9/21/2018   EXAMINATION:   8-31-18  Observation/Orthostatic Postural Assessment: Comes into clinic without assistive device or brace on R knee. Palpation: Audible and palpable crepitus noted to R knee patellofemoral and tibiofemoral joints. ROM:          R AROM: flexion 125 degrees, extension 0 deg         Strength:            R knee extension 4/5, limited by pain         Functional Mobility: Walking on level ground: ambulates with antalgic gait pattern with stance phase on R LE. Stair ascent/descent: Ascends and descends with step-to gait pattern, turns body sideways and utilizes single handrail to assist with stair ascent/descent. Balance: Not tested. Body Structures Involved:  1. Joints  2. Muscles  3. Ligaments Body Functions Affected:  1. Neuromusculoskeletal Activities and Participation Affected:  1. Mobility  2. Community, Social and Civic Life   CLINICAL DECISION MAKING:   Outcome Measure: Tool Used: Lower Extremity Functional Scale (LEFS)  Score:  Initial: 5/80 20/80 (Date: 4-16-18 ) 44/80 (7-6-18) 35/80 (7-31-18) Most recent: 33/80 (8-31-18)   Interpretation of Score: 20 questions each scored on a 5 point scale with 0 representing \"extreme difficulty or unable to perform\" and 4 representing \"no difficulty\". The lower the score, the greater the functional disability. 80/80 represents no disability. Minimal detectable change is 9 points. Medical Necessity:   · Patient is expected to demonstrate progress in strength, range of motion and balance to improve gait. Reason for Services/Other Comments:  · Patient continues to require skilled intervention due to post-op R knee, decreased ROM, strength, gait. TREATMENT:   (In addition to Assessment/Re-Assessment sessions the following treatments were rendered)  9/21/2018   Accompanied by workers comp and hospital   throughout entire session. Pre-treatment Symptoms/Complaints: Pt reports that her knee feels better after application of brace. Pain: Initial:   No pain reported Post Session: No numeric value provided   Treatment session began with Brea Kwan from [employer] employee assisting patient in application of knee brace to R LE for appropriate fit. (x 10 minutes)    Modalities (0 minutes)    . Manual Therapy (18  Minutes) to reduce stiffness in R gastrocsoleus. Grade 3-4 ankle joint mobilizations in long-sitting to promote improved dorsiflexion. Therapeutic Exercisess ( 15 minutes)  Gait drills performed to promote weightbearing through R LE and normalize gait mechanics to reduce R LE pain. R gastrocsoleus stretches in long sitting performed to reduce R gastrocsoleus tightness and improve flexibility. Pt ambulated [de-identified]' x multiple trials to promote weightbearing through R LE with maximal verbal cues to not remain on outside of foot when ambulating and to promote heel strike as patient has tendency to land on forefoot when ambulating. Manual stretches of R gastrocsoleus performed in long sitting to improve R LE flexiblity and reduce R knee pain. Performed weight shifts in standing to promote patient to not bear weight through lateral R foot.  Performed toe taps to 2\" step while in single limb stance on R LE to promote weightbearing on R LE.    HEP: She is to continue her current HEP.     9-13-18 9-14-18 9/20 9-21-18          SLR 2# 30 total (x 20, x 10) 30 total w/brace on R knee 2# 3 x 10    SAQ 3# 3 x 10 R 3# 30 total     LAQ  Machine, 10# concentric bilateral, eccentric unilateral x 25 through allowable ROM     Sidelying hip abd 0# 3 x 10 R  0# 3 x 10 R    Prone hip ext   2# 3 x 10    Prone knee flex   2# 3 x 10    Seated knee flex       Bridging  On swiss ball with knees in approximately 60 degrees of flexion, 3 x 10  On swiss ball with knees flexed, x 30 total     Hip abd  0# 3 x 10     It band stretch in sidelying  Prone quad stretch       Step ups       Calf raises       Gait drills    Per above   Shuttle press       Terminal knee extensions       4-way ankle    Blue, 2 x 12 for DF and eversion       Treatment/Session Assessment:    · Response to Treatment: Pt continues to exhibit unwillingness to load through R LE without supinating R foot and making initial contact with forefoot. Patient received extensive cueing to load through midfoot when in weightbearing. · Compliance with Program/Exercises:  Appears compliant   · Recommendations/Intent for next treatment session: Continue with ROM and strength work on R knee.    Total Treatment Duration: 33 minutes  PT Patient Time In/Time Out  Time In: 0800  Time Out: 0845    Leocadia Lundborg, PT

## 2018-09-25 ENCOUNTER — HOSPITAL ENCOUNTER (OUTPATIENT)
Dept: PHYSICAL THERAPY | Age: 53
End: 2018-09-25
Payer: COMMERCIAL

## 2018-09-25 ENCOUNTER — HOSPITAL ENCOUNTER (OUTPATIENT)
Dept: PHYSICAL THERAPY | Age: 53
Discharge: HOME | End: 2018-09-25
Payer: COMMERCIAL

## 2018-09-25 PROCEDURE — 97110 THERAPEUTIC EXERCISES: CPT

## 2018-09-25 NOTE — PROGRESS NOTES
Verena Gil  : 1965  Payor: [address] [place] [address] Road / Plan: Rennis Loose / Product Type: Workers Comp /  [address]  at [facility] [place] [employer]  [address] [place] [address], [address].  [phone]   [phone]       OUTPATIENT PHYSICAL THERAPY:Daily Note 2018      ICD-10: Treatment Diagnosis: Sprain of posterior cruciate ligament of right knee, sequela (S83.521S); Pain in right knee (M25.561); Stiffness of right knee, not elsewhere classified (M25.661); Difficulty in walking, not elsewhere classified (R26.2)  Precautions/Allergies:   Review of patient's allergies indicates no known allergies. Fall Risk Score: 1 (? 5 = High Risk)  MD Orders:Evaluate and treat, HEP, ROM, strengthening; Quad strengthening; \"continue kneeHab\" (18) MEDICAL/REFERRING DIAGNOSIS:  S/p R knee scope, PCL reconstruction   DATE OF ONSET: Surgery 17  REFERRING PHYSICIAN: Radha Young MD  RETURN PHYSICIAN APPOINTMENT:  18 ASSESSMENT:  Ms. Susie Marie presents s/p right knee scope with PCL reconstruction. She demonstrates some mild progress with tolerance to performance of therapeutic exercises, and gait on level ground. Patient remains limited with stair ascent/descent, as she continues to utilize a step-to gait pattern and turning her body sideways to do so. Patient may benefit from ongoing PT in preparation to return to work. PROBLEM LIST (Impacting functional limitations):  1. Post-op pain and swelling right knee  2. Decreased ROM right knee   3. Decreased functional strength right knee/LE   4. Decreased gait skills INTERVENTIONS PLANNED:  1. aquatic therapy  2. Thermal and electric modalities, manual therapies for pain. 3. Manual therapies and therapeutic exercises for ROM and strength. 4. Therapeutic exercises for gait and balance   TREATMENT PLAN:  Effective Dates: 18 to 10-5-18.   Frequency/Duration: 2-3 times a week for 6 more weeks  GOALS: (Goals have been discussed and agreed upon with patient.)   Short-Term Functional Goals: Time Frame: 6 weeks  1. Pt will be able to perform a good quad set and SLR with no extensor lag with for improved strength for gait. MET 7-6-18  2. Pt will increase R knee ROM to 0-90 for improved mobility. GOAL MET  3. Pt will improved R ankle DF AROM to 0 for improved gait. GOAL MET  4. Pt will report pain 5/10 with modified daily activities. GOAL MET  Discharge Goals: Time Frame: 12 weeks   1. Pt will increase R LE strength to 5/5 with manual muscle testing for improved strength for ADLs and work. Ongoing 8-31-18  2. Pt will negotiate 1 flight of stairs with step over step with good control. Ongoing, 8-31-18  3. Pt will increase R knee ROM to 0-120 for mobility. GOAL MET  4. Pt will report pain 3/10 with daily activities. Ongoing, 8-31-18  5. Pt will score 45/80 on LEFS. Progressing, ongoing 8-31-18  Rehabilitation Potential For Stated Goals: Good                HISTORY:   History of Present Injury/Illness (Reason for Referral): She works at [employer] and she was entering the cooler and there was oil on the floor. She slipped and the feet slipped out from under her. She tried to get up and slipped again. Injury was August 14-17. She did go to physical therapy but it was making her worse. They decided to have surgery. Surgery 12-21-17. Stayed 2 nights in the hospital. She did have home health physical therapy 3-4x. Pain increases with walking. Past Medical History/Comorbidities: diabetes type 2 controlled with diet, HTN, L LE varicose vein surgery  Social History/Living Environment: Lives with . 2 steps to get inside. Prior Level of Function/Work/Activity: Works at [employer]. 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 Job does require lifting. Would like to get back to walking and walking dogs.    Current Medications:       Current Outpatient Prescriptions:     magnesium hydroxide (BEAUCHAMP MILK OF MAGNESIA) 400 mg/5 mL suspension, Take 15 mL by mouth as needed for Constipation. as needed daily, Disp: , Rfl:    tramodol   Steroid *    atorvastatin (LIPITOR) 20 mg tablet, Take 20 mg by mouth nightly., Disp: , Rfl:     lisinopril-hydroCHLOROthiazide (PRINZIDE, ZESTORETIC) 10-12.5 mg per tablet, Take  by mouth daily. Indications: hypertension, Disp: , Rfl:     gemfibrozil (LOPID) 600 mg tablet, Take 600 mg by mouth two (2) times a day., Disp: , Rfl:     Omega-3 Fatty Acids 60- mg cpDR, Take  by mouth daily. , Disp: , Rfl:     acetaminophen (TYLENOL) 325 mg tablet, Take 325 mg by mouth every four (4) hours as needed for Pain., Disp: , Rfl:    Date Last Reviewed:  9/25/2018   EXAMINATION:   8-31-18  Observation/Orthostatic Postural Assessment: Comes into clinic without assistive device or brace on R knee. Palpation: Audible and palpable crepitus noted to R knee patellofemoral and tibiofemoral joints. ROM:          R AROM: flexion 125 degrees, extension 0 deg         Strength:            R knee extension 4/5, limited by pain         Functional Mobility: Walking on level ground: ambulates with antalgic gait pattern with stance phase on R LE. Stair ascent/descent: Ascends and descends with step-to gait pattern, turns body sideways and utilizes single handrail to assist with stair ascent/descent. Balance: Not tested. Body Structures Involved:  1. Joints  2. Muscles  3. Ligaments Body Functions Affected:  1. Neuromusculoskeletal Activities and Participation Affected:  1. Mobility  2. Community, Social and Civic Life   CLINICAL DECISION MAKING:   Outcome Measure: Tool Used: Lower Extremity Functional Scale (LEFS)  Score:  Initial: 5/80 20/80 (Date: 4-16-18 ) 44/80 (7-6-18) 35/80 (7-31-18) Most recent: 33/80 (8-31-18)   Interpretation of Score: 20 questions each scored on a 5 point scale with 0 representing \"extreme difficulty or unable to perform\" and 4 representing \"no difficulty\". The lower the score, the greater the functional disability. 80/80 represents no disability. Minimal detectable change is 9 points. Medical Necessity:   · Patient is expected to demonstrate progress in strength, range of motion and balance to improve gait. Reason for Services/Other Comments:  · Patient continues to require skilled intervention due to post-op R knee, decreased ROM, strength, gait. TREATMENT:   (In addition to Assessment/Re-Assessment sessions the following treatments were rendered)  9/25/2018   Accompanied by workers comp and hospital   throughout entire session. Pre-treatment Symptoms/Complaints: Pt reports that she cannot get the brace the same way that the [employer] representative got it on Friday. Pain: Initial:   2/10 Post Session: No numeric value provided       Modalities (0 minutes)    . Manual Therapy (  Minutes)      Therapeutic Exercisess ( 45 minutes)  Gait drills performed to promote weightbearing through R LE and normalize gait mechanics to reduce R LE pain. Practiced stepping with UE support - heel toe gait minimizing supination of foot. Assisted with donning of brace for proper placement and stability. Required 3 trials to get brace in way that supports knee in same way as yesterday. Discussed possibility of a lift or an orthotic for her foot to improved functional gait. She also asked questions about her long term expectations for her outcome.   HEP: She is to continue her current HEP.     9-13-18 9-14-18 9/20 9-21-18          SLR 2# 30 total (x 20, x 10) 30 total w/brace on R knee 2# 3 x 10    SAQ 3# 3 x 10 R 3# 30 total     LAQ  Machine, 10# concentric bilateral, eccentric unilateral x 25 through allowable ROM     Sidelying hip abd 0# 3 x 10 R  0# 3 x 10 R    Prone hip ext   2# 3 x 10    Prone knee flex   2# 3 x 10    Seated knee flex       Bridging  On swiss ball with knees in approximately 60 degrees of flexion, 3 x 10  On swiss ball with knees flexed, x 30 total     Hip abd  0# 3 x 10     It band stretch in sidelying  Prone quad stretch       Step ups       Calf raises       Gait drills    Per above   Shuttle press       Terminal knee extensions       4-way ankle    Blue, 2 x 12 for DF and eversion       Treatment/Session Assessment:    · Response to Treatment: Pt has increased pain inferior to patella with loading on foot in proper stance. Pt tends to  supination and plantarflexion which decreases her pain but also her stability. · Compliance with Program/Exercises:  Appears compliant   · Recommendations/Intent for next treatment session: Continue with ROM and strength work on R knee.    Total Treatment Duration: 45 minutes  PT Patient Time In/Time Out  Time In: 0800  Time Out: 1211    Billie Mooney PT

## 2018-09-27 ENCOUNTER — APPOINTMENT (OUTPATIENT)
Dept: PHYSICAL THERAPY | Age: 53
End: 2018-09-27
Payer: COMMERCIAL

## 2018-09-27 ENCOUNTER — HOSPITAL ENCOUNTER (OUTPATIENT)
Dept: PHYSICAL THERAPY | Age: 53
Discharge: HOME | End: 2018-09-27
Payer: COMMERCIAL

## 2018-09-27 PROCEDURE — 97110 THERAPEUTIC EXERCISES: CPT

## 2018-09-27 NOTE — PROGRESS NOTES
Bharathi Lee  : 1965  Payor: [address] [place] [address] Road / Plan: Carmel Mccracken / Product Type: Workers Comp /  [address]  at [address] [place] [address] Rd  [address] [facility], [address] [place] [address],[address], [facility] [address]  [phone]   [phone]       OUTPATIENT PHYSICAL THERAPY:Daily Note and Progress Report 2018      ICD-10: Treatment Diagnosis: Sprain of posterior cruciate ligament of right knee, sequela (S83.521S); Pain in right knee (M25.561); Stiffness of right knee, not elsewhere classified (M25.661); Difficulty in walking, not elsewhere classified (R26.2)  Precautions/Allergies:   Review of patient's allergies indicates no known allergies. Fall Risk Score: 1 (? 5 = High Risk)  MD Orders:Evaluate and treat, HEP, ROM, strengthening; Quad strengthening; \"continue kneeHab\" (18) MEDICAL/REFERRING DIAGNOSIS:  S/p R knee scope, PCL reconstruction   DATE OF ONSET: Surgery 17  REFERRING PHYSICIAN: Lisa Wong MD  RETURN PHYSICIAN APPOINTMENT:  18 ASSESSMENT:  Ms. Winston Newman presents s/p right knee scope with PCL reconstruction. She has continued with her therapy and tolerates exercises, and gait on level ground. Patient remains limited with stair ascent/descent, as she continues to utilize a step-to gait pattern and turning her body sideways to do so. Therapy has focused on strengthening but her strength has improved only minimally. This may be due to the crepitus and pain in the joint. It is palpable with knee flex and extension. She has some \"sticking\" where the joint seems to hang up during the ROM that is also palpable. PROBLEM LIST (Impacting functional limitations):  1. Post-op pain and swelling right knee  2. Decreased ROM right knee   3. Decreased functional strength right knee/LE   4. Decreased gait skills INTERVENTIONS PLANNED:  1. aquatic therapy  2. Thermal and electric modalities, manual therapies for pain.    3. Manual therapies and therapeutic exercises for ROM and strength. 4. Therapeutic exercises for gait and balance   TREATMENT PLAN:  Effective Dates: 8-24-18 to 10-5-18. Frequency/Duration: 2-3 times a week for 6 more weeks  GOALS: (Goals have been discussed and agreed upon with patient.)   Short-Term Functional Goals: Time Frame: 6 weeks  1. Pt will be able to perform a good quad set and SLR with no extensor lag with for improved strength for gait. MET 7-6-18  2. Pt will increase R knee ROM to 0-90 for improved mobility. GOAL MET  3. Pt will improved R ankle DF AROM to 0 for improved gait. GOAL MET  4. Pt will report pain 5/10 with modified daily activities. GOAL MET  Discharge Goals: Time Frame: 12 weeks   1. Pt will increase R LE strength to 5/5 with manual muscle testing for improved strength for ADLs and work. Ongoing 9-27-18  2. Pt will negotiate 1 flight of stairs with step over step with good control. Ongoing, 9-27-18  3. Pt will increase R knee ROM to 0-120 for mobility. GOAL MET  4. Pt will report pain 3/10 with daily activities. Ongoing, 9-27-18  5. Pt will score 45/80 on LEFS. Progressing, ongoing 8-31-18  Rehabilitation Potential For Stated Goals: Good                HISTORY:   History of Present Injury/Illness (Reason for Referral): She works at [employer] and she was entering the cooler and there was oil on the floor. She slipped and the feet slipped out from under her. She tried to get up and slipped again. Injury was August 14-17. She did go to physical therapy but it was making her worse. They decided to have surgery. Surgery 12-21-17. Stayed 2 nights in the hospital. She did have home health physical therapy 3-4x. Pain increases with walking. Past Medical History/Comorbidities: diabetes type 2 controlled with diet, HTN, L LE varicose vein surgery  Social History/Living Environment: Lives with . 2 steps to get inside. Prior Level of Function/Work/Activity: Works at [employer].  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 Job does require lifting. Would like to get back to walking and walking dogs. Current Medications:       Current Outpatient Prescriptions:     magnesium hydroxide (BEAUCHAMP MILK OF MAGNESIA) 400 mg/5 mL suspension, Take 15 mL by mouth as needed for Constipation. as needed daily, Disp: , Rfl:    tramodol   Steroid *    atorvastatin (LIPITOR) 20 mg tablet, Take 20 mg by mouth nightly., Disp: , Rfl:     lisinopril-hydroCHLOROthiazide (PRINZIDE, ZESTORETIC) 10-12.5 mg per tablet, Take  by mouth daily. Indications: hypertension, Disp: , Rfl:     gemfibrozil (LOPID) 600 mg tablet, Take 600 mg by mouth two (2) times a day., Disp: , Rfl:     Omega-3 Fatty Acids 60- mg cpDR, Take  by mouth daily. , Disp: , Rfl:     acetaminophen (TYLENOL) 325 mg tablet, Take 325 mg by mouth every four (4) hours as needed for Pain., Disp: , Rfl:    Date Last Reviewed:  9/27/2018   EXAMINATION:   8-31-18  Observation/Orthostatic Postural Assessment: Comes into clinic without assistive device or brace on R knee. Palpation: Audible and palpable crepitus noted to R knee patellofemoral and tibiofemoral joints. ROM:          R AROM: flexion 125 degrees, extension 0 deg         Strength:            R knee extension 4/5, limited by pain         Functional Mobility: Walking on level ground: ambulates with antalgic gait pattern with stance phase on R LE. Stair ascent/descent: Ascends and descends with step-to gait pattern, turns body sideways and utilizes single handrail to assist with stair ascent/descent. Balance: Not tested. Body Structures Involved:  1. Joints  2. Muscles  3. Ligaments Body Functions Affected:  1. Neuromusculoskeletal Activities and Participation Affected:  1. Mobility  2. Community, Social and Civic Life   CLINICAL DECISION MAKING:   Outcome Measure:    Tool Used: Lower Extremity Functional Scale (LEFS)  Score:  Initial: 5/80 20/80 (Date: 4-16-18 ) 44/80 (7-6-18) 35/80 (7-31-18) Most recent: 33/80 (8-31-18)   Interpretation of Score: 20 questions each scored on a 5 point scale with 0 representing \"extreme difficulty or unable to perform\" and 4 representing \"no difficulty\". The lower the score, the greater the functional disability. 80/80 represents no disability. Minimal detectable change is 9 points. Medical Necessity:   · Patient is expected to demonstrate progress in strength, range of motion and balance to improve gait. Reason for Services/Other Comments:  · Patient continues to require skilled intervention due to post-op R knee, decreased ROM, strength, gait. TREATMENT:   (In addition to Assessment/Re-Assessment sessions the following treatments were rendered)  9/27/2018   Accompanied by workers comp and hospital   throughout entire session. Pre-treatment Symptoms/Complaints: Pt reports that the brace worked better after we worked on donning it properly last visit. She keep reiterating that she can walk or do stairs or other functional tasks but with the limp or compensation that she has. Pain: Initial:   2/10 Post Session: No numeric value provided       Modalities (0 minutes)    . Manual Therapy (  Minutes)      Therapeutic Exercisess ( 45 minutes)  Gait drills performed to promote weightbearing through R LE and normalize gait mechanics to reduce R LE pain. Practiced stepping with UE support - heel toe gait minimizing supination of foot. Assisted with donning of brace for proper placement and stability. Required 3 trials to get brace in way that supports knee in same way as yesterday. Discussed possibility of a lift or an orthotic for her foot to improved functional gait. She also asked questions about her long term expectations for her outcome.   HEP: She is to continue her current HEP.     9-13-18 9-14-18 9/20 9-21-18 9/27/18           SLR 2# 30 total (x 20, x 10) 30 total w/brace on R knee 2# 3 x 10  30x   SAQ 3# 3 x 10 R 3# 30 total LAQ  Machine, 10# concentric bilateral, eccentric unilateral x 25 through allowable ROM      Sidelying hip abd 0# 3 x 10 R  0# 3 x 10 R  3 x 10   Prone hip ext   2# 3 x 10  3 x 10   Prone knee flex   2# 3 x 10  3 x 10   Seated knee flex     10x   Bridging  On swiss ball with knees in approximately 60 degrees of flexion, 3 x 10  On swiss ball with knees flexed, x 30 total      Hip abd  0# 3 x 10      It band stretch in sidelying  Prone quad stretch        Step ups     Stairs x 10 steps   Calf raises        Gait drills    Per above    Shuttle press        Terminal knee extensions        4-way ankle    Blue, 2 x 12 for DF and eversion        Treatment/Session Assessment:    · Response to Treatment: Pt states her last visit is tomorrow. In reviewing functional tasks with her such as picking up something from the floor, stairs, walking - she is insistent that she can do all of these things and she is concerned that the FCE that was done - the therapist said she could not do it. She can do the functional task with compensations. She continues to walk with some ER, knee flexion and up on her toe. This is consistent with her pain complaint of more pain in full extension. When knee is passively put in full extension she has increased pain. · Compliance with Program/Exercises:  Appears compliant   · Recommendations/Intent for next treatment session: Continue with ROM and strength work on R knee.    Total Treatment Duration: 45 minutes       Sacha Montano, PT

## 2018-09-28 ENCOUNTER — HOSPITAL ENCOUNTER (OUTPATIENT)
Dept: PHYSICAL THERAPY | Age: 53
Discharge: HOME | End: 2018-09-28
Payer: COMMERCIAL

## 2018-09-28 ENCOUNTER — APPOINTMENT (OUTPATIENT)
Dept: PHYSICAL THERAPY | Age: 53
End: 2018-09-28
Payer: COMMERCIAL

## 2018-09-28 PROCEDURE — 97110 THERAPEUTIC EXERCISES: CPT

## 2018-09-28 NOTE — PROGRESS NOTES
Micah Mims  : 1965  Payor: [address] [place] [address] Road / Plan: Mason Archer / Product Type: Workers Comp /  [address]  at [facility] [place] [employer]  [address] [place] [address], [facility], [address] [place] [address]  [phone]   [phone]       OUTPATIENT PHYSICAL THERAPY:Daily Note 2018      ICD-10: Treatment Diagnosis: Sprain of posterior cruciate ligament of right knee, sequela (S83.521S); Pain in right knee (M25.561); Stiffness of right knee, not elsewhere classified (M25.661); Difficulty in walking, not elsewhere classified (R26.2)  Precautions/Allergies:   Review of patient's allergies indicates no known allergies. Fall Risk Score: 1 (? 5 = High Risk)  MD Orders:Evaluate and treat, HEP, ROM, strengthening; Quad strengthening; \"continue kneeHab\" (18) MEDICAL/REFERRING DIAGNOSIS:  S/p R knee scope, PCL reconstruction   DATE OF ONSET: Surgery 17  REFERRING PHYSICIAN: Malena Bright MD  RETURN PHYSICIAN APPOINTMENT:  18 ASSESSMENT:  Ms. Fletcher Doty presents s/p right knee scope with PCL reconstruction. She has continued with her therapy and tolerates exercises, and gait on level ground. Patient remains limited with stair ascent/descent, as she continues to utilize a step-to gait pattern and turning her body sideways to do so. Therapy has focused on strengthening but her strength has improved only minimally. This may be due to the crepitus and pain in the joint. It is palpable with knee flex and extension. She has some \"sticking\" where the joint seems to hang up during the ROM that is also palpable. PROBLEM LIST (Impacting functional limitations):  1. Post-op pain and swelling right knee  2. Decreased ROM right knee   3. Decreased functional strength right knee/LE   4. Decreased gait skills INTERVENTIONS PLANNED:  1. aquatic therapy  2. Thermal and electric modalities, manual therapies for pain.    3. Manual therapies and therapeutic exercises for ROM and strength. 4. Therapeutic exercises for gait and balance   TREATMENT PLAN:  Effective Dates: 8-24-18 to 10-5-18. Frequency/Duration: 2-3 times a week for 6 more weeks  GOALS: (Goals have been discussed and agreed upon with patient.)   Short-Term Functional Goals: Time Frame: 6 weeks  1. Pt will be able to perform a good quad set and SLR with no extensor lag with for improved strength for gait. MET 7-6-18  2. Pt will increase R knee ROM to 0-90 for improved mobility. GOAL MET  3. Pt will improved R ankle DF AROM to 0 for improved gait. GOAL MET  4. Pt will report pain 5/10 with modified daily activities. GOAL MET  Discharge Goals: Time Frame: 12 weeks   1. Pt will increase R LE strength to 5/5 with manual muscle testing for improved strength for ADLs and work. Ongoing 9-27-18  2. Pt will negotiate 1 flight of stairs with step over step with good control. Ongoing, 9-27-18  3. Pt will increase R knee ROM to 0-120 for mobility. GOAL MET  4. Pt will report pain 3/10 with daily activities. Ongoing, 9-27-18  5. Pt will score 45/80 on LEFS. Progressing, ongoing 8-31-18  Rehabilitation Potential For Stated Goals: Good                HISTORY:   History of Present Injury/Illness (Reason for Referral): She works at [employer] and she was entering the cooler and there was oil on the floor. She slipped and the feet slipped out from under her. She tried to get up and slipped again. Injury was August 14-17. She did go to physical therapy but it was making her worse. They decided to have surgery. Surgery 12-21-17. Stayed 2 nights in the hospital. She did have home health physical therapy 3-4x. Pain increases with walking. Past Medical History/Comorbidities: diabetes type 2 controlled with diet, HTN, L LE varicose vein surgery  Social History/Living Environment: Lives with . 2 steps to get inside. Prior Level of Function/Work/Activity: Works at [employer].  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 Job does require lifting. Would like to get back to walking and walking dogs. Current Medications:       Current Outpatient Prescriptions:     magnesium hydroxide (BEAUCHAMP MILK OF MAGNESIA) 400 mg/5 mL suspension, Take 15 mL by mouth as needed for Constipation. as needed daily, Disp: , Rfl:    tramodol   Steroid *    atorvastatin (LIPITOR) 20 mg tablet, Take 20 mg by mouth nightly., Disp: , Rfl:     lisinopril-hydroCHLOROthiazide (PRINZIDE, ZESTORETIC) 10-12.5 mg per tablet, Take  by mouth daily. Indications: hypertension, Disp: , Rfl:     gemfibrozil (LOPID) 600 mg tablet, Take 600 mg by mouth two (2) times a day., Disp: , Rfl:     Omega-3 Fatty Acids 60- mg cpDR, Take  by mouth daily. , Disp: , Rfl:     acetaminophen (TYLENOL) 325 mg tablet, Take 325 mg by mouth every four (4) hours as needed for Pain., Disp: , Rfl:    Date Last Reviewed:  9/28/2018   EXAMINATION:   8-31-18  Observation/Orthostatic Postural Assessment: Comes into clinic without assistive device or brace on R knee. Palpation: Audible and palpable crepitus noted to R knee patellofemoral and tibiofemoral joints. ROM:          R AROM: flexion 125 degrees, extension 0 deg         Strength:            R knee extension 4/5, limited by pain         Functional Mobility: Walking on level ground: ambulates with antalgic gait pattern with stance phase on R LE. Stair ascent/descent: Ascends and descends with step-to gait pattern, turns body sideways and utilizes single handrail to assist with stair ascent/descent. Balance: Not tested. Body Structures Involved:  1. Joints  2. Muscles  3. Ligaments Body Functions Affected:  1. Neuromusculoskeletal Activities and Participation Affected:  1. Mobility  2. Community, Social and Civic Life   CLINICAL DECISION MAKING:   Outcome Measure:    Tool Used: Lower Extremity Functional Scale (LEFS)  Score:  Initial: 5/80 20/80 (Date: 4-16-18 ) 44/80 (7-6-18) 35/80 (7-31-18)  33/80 (8-31-18) Most recent: 38/80 (9-28-18)   Interpretation of Score: 20 questions each scored on a 5 point scale with 0 representing \"extreme difficulty or unable to perform\" and 4 representing \"no difficulty\". The lower the score, the greater the functional disability. 80/80 represents no disability. Minimal detectable change is 9 points. Medical Necessity:   · Patient is expected to demonstrate progress in strength, range of motion and balance to improve gait. Reason for Services/Other Comments:  · Patient continues to require skilled intervention due to post-op R knee, decreased ROM, strength, gait. TREATMENT:   (In addition to Assessment/Re-Assessment sessions the following treatments were rendered)  9/28/2018   Accompanied by workers comp and hospital   throughout entire session. Pre-treatment Symptoms/Complaints: Pt reports that the brace is only somewhat helpful. After awhile of having it on it bothers her. Hopes to return to work next Wednesday. Sees Dr Boneta Fothergill on Tuesday. Pain: Initial:   0/10 Post Session: No numeric value provided   Did not wear brace today    Modalities (10 minutes)  To reduce R knee soreness. Ultrasound x 10 minutes to medial R knee and anterior R knee to reduce discomfort. 1.0 W/cm2, 2.0 MHz, continuous. .  Manual Therapy (  Minutes)      Therapeutic Exercisess ( 30 minutes) to facilitate R LE strengthening and improved function.       9-13-18 9-14-18 9/20 9-21-18 9/27/18 9-28-18            SLR 2# 30 total (x 20, x 10) 30 total w/brace on R knee 2# 3 x 10  30x 3 x 12    SAQ 3# 3 x 10 R 3# 30 total    3 x 10   LAQ  Machine, 10# concentric bilateral, eccentric unilateral x 25 through allowable ROM       Sidelying hip abd 0# 3 x 10 R  0# 3 x 10 R  3 x 10 3 x 10   Prone hip ext   2# 3 x 10  3 x 10 3 x 10   Prone knee flex   2# 3 x 10  3 x 10 3 x 10   Seated knee flex     10x    Bridging  On swiss ball with knees in approximately 60 degrees of flexion, 3 x 10  On swiss ball with knees flexed, x 30 total    3 x 10    Hip abd  0# 3 x 10       It band stretch in sidelying  Prone quad stretch         Step ups     Stairs x 10 steps    Calf raises         Gait drills    Per above     Shuttle press         Terminal knee extensions         4-way ankle    Blue, 2 x 12 for DF and eversion         HEP: Patient provided with handout for straight leg raises, side-lying hip ABD, bridging, short arc quads, prone hip extensions and prone knee flexions to continue strengthening at home. Pt verbalized understanding. Treatment/Session Assessment:    · Response to Treatment: Today is patient's last visit. Continues to demonstrate R LE weakness and gait deficits. Will return to Dr Shabnam Clay next week. · Recommendations/Intent for next treatment session: Continue with ROM and strength work on R knee.    Total Treatment Duration: 40 minutes  PT Patient Time In/Time Out  Time In: 0810  Time Out: [address], PT

## 2018-12-07 NOTE — PROGRESS NOTES
Graham Chowdhury  : 1965  Payor: [address] [place] [address] Road / Plan: [place] / Product Type: Workers Comp /  [address]  at [facility] [place] [employer]  [address] [place] [address], [address].  [phone]   [phone]       OUTPATIENT PHYSICAL THERAPY:Discontinuation Summary 2018      ICD-10: Treatment Diagnosis: Sprain of posterior cruciate ligament of right knee, sequela (S83.521S); Pain in right knee (M25.561); Stiffness of right knee, not elsewhere classified (M25.661); Difficulty in walking, not elsewhere classified (R26.2)  Precautions/Allergies:   Patient has no known allergies. Fall Risk Score: 1 (? 5 = High Risk)  MD Orders:Evaluate and treat, HEP, ROM, strengthening; Quad strengthening; \"continue kneeHab\" (18) MEDICAL/REFERRING DIAGNOSIS:  S/p R knee scope, PCL reconstruction   DATE OF ONSET: Surgery 17  REFERRING PHYSICIAN: Caroline Caldera MD  RETURN PHYSICIAN APPOINTMENT:  18 ASSESSMENT:  Ms. Geneva Butler presents s/p right knee scope with PCL reconstruction. She has continued with her therapy and tolerates exercises, and gait on level ground. Patient remains limited with stair ascent/descent, as she continues to utilize a step-to gait pattern and turning her body sideways to do so. Therapy has focused on strengthening but her strength has improved only minimally. This may be due to the crepitus and pain in the joint. It is palpable with knee flex and extension. She has some \"sticking\" where the joint seems to hang up during the ROM that is also palpable. Ms Samira Augustin did not return for therapy after this date. She will be discharged at this time. PROBLEM LIST (Impacting functional limitations):  1. Post-op pain and swelling right knee  2. Decreased ROM right knee   3. Decreased functional strength right knee/LE   4. Decreased gait skills INTERVENTIONS PLANNED:  1. aquatic therapy  2.  Thermal and electric modalities, manual therapies for pain. 3. Manual therapies and therapeutic exercises for ROM and strength. 4. Therapeutic exercises for gait and balance   TREATMENT PLAN:  Effective Dates: 8-24-18 to 10-5-18. Frequency/Duration: 2-3 times a week for 6 more weeks  GOALS: (Goals have been discussed and agreed upon with patient.)   Short-Term Functional Goals: Time Frame: 6 weeks  1. Pt will be able to perform a good quad set and SLR with no extensor lag with for improved strength for gait. MET 7-6-18  2. Pt will increase R knee ROM to 0-90 for improved mobility. GOAL MET  3. Pt will improved R ankle DF AROM to 0 for improved gait. GOAL MET  4. Pt will report pain 5/10 with modified daily activities. GOAL MET  Discharge Goals: Time Frame: 12 weeks   1. Pt will increase R LE strength to 5/5 with manual muscle testing for improved strength for ADLs and work. Ongoing 9-27-18  2. Pt will negotiate 1 flight of stairs with step over step with good control. Ongoing, 9-27-18  3. Pt will increase R knee ROM to 0-120 for mobility. GOAL MET  4. Pt will report pain 3/10 with daily activities. Ongoing, 9-27-18  5. Pt will score 45/80 on LEFS.   Progressing, ongoing 8-31-18  Rehabilitation Potential For Stated Goals: Good

## 2022-10-06 NOTE — H&P
Date of Surgery Update:  Barry Breaux was seen and examined. History and physical has been reviewed. The patient has been examined.  There have been no significant clinical changes since the completion of the originally dated History and Physical.    Signed By: Jem Gil MD     December 21, 2017 12:21 PM Pt was seen 9/21/22 for dysuria and was rx'd Cephalexin 500mg BID, was advised to f/u in 2wks.

## (undated) DEVICE — 3M™ COBAN™ NL STERILE NON-LATEX SELF-ADHERENT WRAP, 2084S, 4 IN X 5 YD (10 CM X 4,5 M), 18 ROLLS/CASE: Brand: 3M™ COBAN™

## (undated) DEVICE — STERILE HOOK LOCK LATEX FREE ELASTIC BANDAGE 6INX5YD: Brand: HOOK LOCK™

## (undated) DEVICE — NDL SPNE QNCKE 18GX3.5IN LF --

## (undated) DEVICE — DRAPE,U/SHT,SPLIT,FILM,60X84,STERILE: Brand: MEDLINE

## (undated) DEVICE — (D)STRIP SKN CLSR 0.5X4IN WHT --

## (undated) DEVICE — SUTURE ETHBND EXCEL SZ 2 L27IN NONABSORBABLE GRN WHT MO-7 D7485

## (undated) DEVICE — GOWN,REINF,POLY,ECL,PP SLV,XL: Brand: MEDLINE

## (undated) DEVICE — (D)PREP SKN CHLRAPRP APPL 26ML -- CONVERT TO ITEM 371833

## (undated) DEVICE — ELECTRODE NDL 2.8IN COAT VALLEYLAB

## (undated) DEVICE — SUTURE VCRL SZ 0 L27IN ABSRB UD L36MM CP-1 1/2 CIR REV CUT J267H

## (undated) DEVICE — DRAPE TWL SURG 16X26IN BLU [ORB06] [ALLCARE INC]

## (undated) DEVICE — BLADE SHV DIA4MM RED RESECT FOR GEN DEB REM OF PERIOST FR

## (undated) DEVICE — SPLINT KNEE L20IN FOR 32IN THGH UNIV FOAM 3 PC DSGN TRIMMED

## (undated) DEVICE — GOWN,REINFORCED,POLY,AURORA,XXLARGE,STR: Brand: MEDLINE

## (undated) DEVICE — Device

## (undated) DEVICE — MEDI-VAC YANKAUER SUCTION HANDLE W/BULBOUS TIP: Brand: CARDINAL HEALTH

## (undated) DEVICE — SUTURE NONABSORBABLE BRAIDED 5-0 30 IN ETHBND EXCEL D7809

## (undated) DEVICE — DRAPE,TOP,102X53,STERILE: Brand: MEDLINE

## (undated) DEVICE — SOLUTION IV 1000ML 0.9% SOD CHL

## (undated) DEVICE — ZIMMER® A.T.S. CYCLINDRICAL TOURNIQUET CUFF, DUAL PORT, DUAL BLADDER 18 IN. (46 CM)

## (undated) DEVICE — SUTURE PROL SZ 2-0 L18IN NONABSORBABLE BLU FS L26MM 3/8 CIR 8685H

## (undated) DEVICE — CARDINAL HEALTH FLEXIBLE LIGHT HANDLE COVER: Brand: CARDINAL HEALTH

## (undated) DEVICE — BUTTON SWITCH PENCIL BLADE ELECTRODE, HOLSTER: Brand: EDGE

## (undated) DEVICE — XEROFORM OCCLUSIVE GAUZE STRIP OVERWRAP, 3% BISMUTH TRIBROMOPHENATE IN PETROLATUM BLEND: Brand: XEROFORM

## (undated) DEVICE — SUTURE ETHLN SZ 2-0 L18IN NONABSORBABLE BLK L26MM PS 3/8 585H

## (undated) DEVICE — [ARTHROSCOPY PUMP,  DO NOT USE IF PACKAGE IS DAMAGED,  KEEP DRY,  KEEP AWAY FROM SUNLIGHT,  PROTECT FROM HEAT AND RADIOACTIVE SOURCES.]: Brand: FLOSTEADY

## (undated) DEVICE — AMD ANTIMICROBIAL GAUZE SPONGES,12 PLY USP TYPE VII, 0.2% POLYHEXAMETHYLENE BIGUANIDE HCI (PHMB): Brand: CURITY

## (undated) DEVICE — DRAPE SHT 3 QTR PROXIMA 53X77 --

## (undated) DEVICE — [RESECTOR CUTTER, ARTHROSCOPIC SHAVER BLADE,  DO NOT RESTERILIZE,  DO NOT USE IF PACKAGE IS DAMAGED,  KEEP DRY,  KEEP AWAY FROM SUNLIGHT]: Brand: FORMULA

## (undated) DEVICE — UTILITY MARKER,BLACK WITH LABELS: Brand: DEVON

## (undated) DEVICE — TABLE COVER: Brand: CONVERTORS

## (undated) DEVICE — 2000CC GUARDIAN II: Brand: GUARDIAN

## (undated) DEVICE — OCCLUSIVE GAUZE STRIP,3% BISMUTH TRIBROMOPHENATE IN PETROLATUM BLEND: Brand: XEROFORM

## (undated) DEVICE — KNIFE SURG 10MM GRFT DISP FOR ACL RECON

## (undated) DEVICE — STOCKINETTE TUBE 6X48 -- MEDICHOICE

## (undated) DEVICE — BUR SHV CUT HLLW 6 FLUT 5.5MM --

## (undated) DEVICE — SOLUTION IRRIG 3000ML 0.9% SOD CHL FLX CONT 0797208] ICU MEDICAL INC]

## (undated) DEVICE — DISPOSABLE TOURNIQUET CUFF SINGLE BLADDER, DUAL PORT AND QUICK CONNECT CONNECTOR: Brand: COLOR CUFF

## (undated) DEVICE — PRECISION THIN (9.0 X 0.38 X 31.0MM)

## (undated) DEVICE — STERILE TETRA-FLEX CF LF, 6IN X 11 YD: Brand: TETRA-FLEX™ CF

## (undated) DEVICE — PADDING CAST W4INXL4YD ST COT COHESIVE HND TEARABLE SPEC

## (undated) DEVICE — SUTURE 5 MERS GRN 30 TO 40 IN D9211

## (undated) DEVICE — PADDING CAST W6INXL4YD ST COT COHESIVE HND TEARABLE SPEC

## (undated) DEVICE — SHEET, DRAPE, SPLIT, STERILE: Brand: MEDLINE

## (undated) DEVICE — GUARDIAN LVC: Brand: GUARDIAN

## (undated) DEVICE — STOCKINETTE,IMPERVIOUS,12X48,STERILE: Brand: MEDLINE

## (undated) DEVICE — SURGICAL PROCEDURE PACK BASIC ST FRANCIS

## (undated) DEVICE — NEEDLE HYPO 18GA L1.5IN PNK S STL HUB POLYPR SHLD REG BVL

## (undated) DEVICE — SINGLE BASIN: Brand: CARDINAL HEALTH

## (undated) DEVICE — PUDDLEVAC FLOOR SUCTION DEVICE: Brand: PUDDLEVAC

## (undated) DEVICE — PAD,ABDOMINAL,5"X9",STERILE,LF,1/PK: Brand: MEDLINE INDUSTRIES, INC.

## (undated) DEVICE — RAP-PAC LTX FREE: Brand: RAP-PAC